# Patient Record
Sex: MALE | Race: WHITE | NOT HISPANIC OR LATINO | Employment: OTHER | URBAN - METROPOLITAN AREA
[De-identification: names, ages, dates, MRNs, and addresses within clinical notes are randomized per-mention and may not be internally consistent; named-entity substitution may affect disease eponyms.]

---

## 2017-01-11 ENCOUNTER — HOSPITAL ENCOUNTER (INPATIENT)
Facility: HOSPITAL | Age: 67
LOS: 3 days | Discharge: HOME HEALTH | End: 2017-01-14
Attending: FAMILY MEDICINE | Admitting: INTERNAL MEDICINE

## 2017-01-11 ENCOUNTER — APPOINTMENT (OUTPATIENT)
Dept: GENERAL RADIOLOGY | Facility: HOSPITAL | Age: 67
End: 2017-01-11

## 2017-01-11 DIAGNOSIS — R26.2 DIFFICULTY WALKING: ICD-10-CM

## 2017-01-11 DIAGNOSIS — S82.831A CLOSED FRACTURE OF DISTAL END OF FIBULA WITH TIBIA, RIGHT, INITIAL ENCOUNTER: Primary | ICD-10-CM
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
## 2017-01-11 DIAGNOSIS — S82.301A CLOSED FRACTURE OF DISTAL END OF FIBULA WITH TIBIA, RIGHT, INITIAL ENCOUNTER: Primary | ICD-10-CM

## 2017-01-11 PROBLEM — S82.839A CLOSED FRACTURE OF DISTAL END OF FIBULA WITH TIBIA: Status: ACTIVE | Noted: 2017-01-11

## 2017-01-11 PROBLEM — S82.309A CLOSED FRACTURE OF DISTAL END OF FIBULA WITH TIBIA: Status: ACTIVE | Noted: 2017-01-11

## 2017-01-11 LAB
ALBUMIN SERPL-MCNC: 4.2 G/DL (ref 3.5–5.2)
ALBUMIN/GLOB SERPL: 1.3 G/DL
ALP SERPL-CCNC: 56 U/L (ref 39–117)
ALT SERPL W P-5'-P-CCNC: 21 U/L (ref 1–41)
ANION GAP SERPL CALCULATED.3IONS-SCNC: 10.2 MMOL/L
APTT PPP: 23.8 SECONDS (ref 22.7–35.4)
AST SERPL-CCNC: 36 U/L (ref 1–40)
BASOPHILS # BLD AUTO: 0.02 10*3/MM3 (ref 0–0.2)
BASOPHILS NFR BLD AUTO: 0.1 % (ref 0–1.5)
BILIRUB SERPL-MCNC: 0.3 MG/DL (ref 0.1–1.2)
BUN BLD-MCNC: 14 MG/DL (ref 8–23)
BUN/CREAT SERPL: 14 (ref 7–25)
CALCIUM SPEC-SCNC: 9.7 MG/DL (ref 8.6–10.5)
CHLORIDE SERPL-SCNC: 104 MMOL/L (ref 98–107)
CO2 SERPL-SCNC: 27.8 MMOL/L (ref 22–29)
CREAT BLD-MCNC: 1 MG/DL (ref 0.76–1.27)
DEPRECATED RDW RBC AUTO: 43.2 FL (ref 37–54)
EOSINOPHIL # BLD AUTO: 0.35 10*3/MM3 (ref 0–0.7)
EOSINOPHIL NFR BLD AUTO: 2.2 % (ref 0.3–6.2)
ERYTHROCYTE [DISTWIDTH] IN BLOOD BY AUTOMATED COUNT: 13.2 % (ref 11.5–14.5)
GFR SERPL CREATININE-BSD FRML MDRD: 75 ML/MIN/1.73
GLOBULIN UR ELPH-MCNC: 3.3 GM/DL
GLUCOSE BLD-MCNC: 99 MG/DL (ref 65–99)
GLUCOSE BLDC GLUCOMTR-MCNC: 188 MG/DL (ref 70–130)
GLUCOSE BLDC GLUCOMTR-MCNC: 280 MG/DL (ref 70–130)
GLUCOSE BLDC GLUCOMTR-MCNC: 97 MG/DL (ref 70–130)
HCT VFR BLD AUTO: 41.1 % (ref 40.4–52.2)
HGB BLD-MCNC: 13.3 G/DL (ref 13.7–17.6)
IMM GRANULOCYTES # BLD: 0.09 10*3/MM3 (ref 0–0.03)
IMM GRANULOCYTES NFR BLD: 0.6 % (ref 0–0.5)
INR PPP: 1.03 (ref 0.9–1.1)
LYMPHOCYTES # BLD AUTO: 2.26 10*3/MM3 (ref 0.9–4.8)
LYMPHOCYTES NFR BLD AUTO: 14.2 % (ref 19.6–45.3)
MCH RBC QN AUTO: 29.2 PG (ref 27–32.7)
MCHC RBC AUTO-ENTMCNC: 32.4 G/DL (ref 32.6–36.4)
MCV RBC AUTO: 90.1 FL (ref 79.8–96.2)
MONOCYTES # BLD AUTO: 0.79 10*3/MM3 (ref 0.2–1.2)
MONOCYTES NFR BLD AUTO: 5 % (ref 5–12)
NEUTROPHILS # BLD AUTO: 12.37 10*3/MM3 (ref 1.9–8.1)
NEUTROPHILS NFR BLD AUTO: 77.9 % (ref 42.7–76)
PLATELET # BLD AUTO: 311 10*3/MM3 (ref 140–500)
PMV BLD AUTO: 10.4 FL (ref 6–12)
POTASSIUM BLD-SCNC: 4.2 MMOL/L (ref 3.5–5.2)
PROT SERPL-MCNC: 7.5 G/DL (ref 6–8.5)
PROTHROMBIN TIME: 13.1 SECONDS (ref 11.7–14.2)
RBC # BLD AUTO: 4.56 10*6/MM3 (ref 4.6–6)
SODIUM BLD-SCNC: 142 MMOL/L (ref 136–145)
WBC NRBC COR # BLD: 15.88 10*3/MM3 (ref 4.5–10.7)

## 2017-01-11 PROCEDURE — 85610 PROTHROMBIN TIME: CPT | Performed by: NURSE PRACTITIONER

## 2017-01-11 PROCEDURE — 99285 EMERGENCY DEPT VISIT HI MDM: CPT

## 2017-01-11 PROCEDURE — 85025 COMPLETE CBC W/AUTO DIFF WBC: CPT | Performed by: NURSE PRACTITIONER

## 2017-01-11 PROCEDURE — 25010000002 HYDROMORPHONE PER 4 MG: Performed by: FAMILY MEDICINE

## 2017-01-11 PROCEDURE — 73600 X-RAY EXAM OF ANKLE: CPT

## 2017-01-11 PROCEDURE — 63710000001 INSULIN ASPART PER 5 UNITS: Performed by: INTERNAL MEDICINE

## 2017-01-11 PROCEDURE — 25010000002 HYDROMORPHONE PER 4 MG: Performed by: INTERNAL MEDICINE

## 2017-01-11 PROCEDURE — 85730 THROMBOPLASTIN TIME PARTIAL: CPT | Performed by: NURSE PRACTITIONER

## 2017-01-11 PROCEDURE — 73590 X-RAY EXAM OF LOWER LEG: CPT

## 2017-01-11 PROCEDURE — 63710000001 INSULIN DETEMER PER 5 UNITS: Performed by: INTERNAL MEDICINE

## 2017-01-11 PROCEDURE — 25010000002 HYDROMORPHONE PER 4 MG: Performed by: NURSE PRACTITIONER

## 2017-01-11 PROCEDURE — 25010000002 PROPOFOL 10 MG/ML EMULSION: Performed by: NURSE PRACTITIONER

## 2017-01-11 PROCEDURE — 25010000002 ONDANSETRON PER 1 MG: Performed by: FAMILY MEDICINE

## 2017-01-11 PROCEDURE — 82962 GLUCOSE BLOOD TEST: CPT

## 2017-01-11 PROCEDURE — 93010 ELECTROCARDIOGRAM REPORT: CPT | Performed by: INTERNAL MEDICINE

## 2017-01-11 PROCEDURE — 80053 COMPREHEN METABOLIC PANEL: CPT | Performed by: NURSE PRACTITIONER

## 2017-01-11 PROCEDURE — 93005 ELECTROCARDIOGRAM TRACING: CPT | Performed by: INTERNAL MEDICINE

## 2017-01-11 RX ORDER — HYDROCODONE BITARTRATE AND ACETAMINOPHEN 7.5; 325 MG/1; MG/1
1 TABLET ORAL EVERY 4 HOURS PRN
Status: DISCONTINUED | OUTPATIENT
Start: 2017-01-11 | End: 2017-01-14

## 2017-01-11 RX ORDER — NICOTINE POLACRILEX 4 MG
15 LOZENGE BUCCAL AS NEEDED
Status: DISCONTINUED | OUTPATIENT
Start: 2017-01-11 | End: 2017-01-14 | Stop reason: HOSPADM

## 2017-01-11 RX ORDER — ASPIRIN 81 MG/1
81 TABLET, CHEWABLE ORAL DAILY
COMMUNITY

## 2017-01-11 RX ORDER — LISINOPRIL 20 MG/1
20 TABLET ORAL 2 TIMES DAILY
Status: DISCONTINUED | OUTPATIENT
Start: 2017-01-11 | End: 2017-01-14 | Stop reason: HOSPADM

## 2017-01-11 RX ORDER — SODIUM CHLORIDE 0.9 % (FLUSH) 0.9 %
10 SYRINGE (ML) INJECTION AS NEEDED
Status: DISCONTINUED | OUTPATIENT
Start: 2017-01-11 | End: 2017-01-14 | Stop reason: HOSPADM

## 2017-01-11 RX ORDER — SIMVASTATIN 10 MG
10 TABLET ORAL NIGHTLY
Status: DISCONTINUED | OUTPATIENT
Start: 2017-01-11 | End: 2017-01-14 | Stop reason: HOSPADM

## 2017-01-11 RX ORDER — LISINOPRIL 20 MG/1
20 TABLET ORAL 2 TIMES DAILY
COMMUNITY
End: 2021-11-09

## 2017-01-11 RX ORDER — INSULIN GLARGINE 100 [IU]/ML
63 INJECTION, SOLUTION SUBCUTANEOUS NIGHTLY
Status: ON HOLD | COMMUNITY
End: 2017-01-14

## 2017-01-11 RX ORDER — DEXTROSE, SODIUM CHLORIDE, AND POTASSIUM CHLORIDE 5; .45; .15 G/100ML; G/100ML; G/100ML
75 INJECTION INTRAVENOUS CONTINUOUS
Status: DISCONTINUED | OUTPATIENT
Start: 2017-01-12 | End: 2017-01-14 | Stop reason: HOSPADM

## 2017-01-11 RX ORDER — ONDANSETRON 2 MG/ML
4 INJECTION INTRAMUSCULAR; INTRAVENOUS ONCE
Status: COMPLETED | OUTPATIENT
Start: 2017-01-11 | End: 2017-01-11

## 2017-01-11 RX ORDER — SODIUM CHLORIDE 0.9 % (FLUSH) 0.9 %
1-10 SYRINGE (ML) INJECTION AS NEEDED
Status: DISCONTINUED | OUTPATIENT
Start: 2017-01-11 | End: 2017-01-14 | Stop reason: HOSPADM

## 2017-01-11 RX ORDER — ONDANSETRON 2 MG/ML
4 INJECTION INTRAMUSCULAR; INTRAVENOUS EVERY 4 HOURS PRN
Status: DISCONTINUED | OUTPATIENT
Start: 2017-01-11 | End: 2017-01-14 | Stop reason: HOSPADM

## 2017-01-11 RX ORDER — AMLODIPINE BESYLATE 2.5 MG/1
2.5 TABLET ORAL DAILY
COMMUNITY
End: 2021-12-01 | Stop reason: SDUPTHER

## 2017-01-11 RX ORDER — CLOPIDOGREL BISULFATE 75 MG/1
75 TABLET ORAL DAILY
COMMUNITY
End: 2021-11-09

## 2017-01-11 RX ORDER — HYDROCODONE BITARTRATE AND ACETAMINOPHEN 7.5; 325 MG/1; MG/1
2 TABLET ORAL EVERY 4 HOURS PRN
Status: DISCONTINUED | OUTPATIENT
Start: 2017-01-21 | End: 2017-01-14

## 2017-01-11 RX ORDER — DEXTROSE MONOHYDRATE 25 G/50ML
25 INJECTION, SOLUTION INTRAVENOUS AS NEEDED
Status: DISCONTINUED | OUTPATIENT
Start: 2017-01-11 | End: 2017-01-14 | Stop reason: HOSPADM

## 2017-01-11 RX ORDER — AMLODIPINE BESYLATE 2.5 MG/1
2.5 TABLET ORAL DAILY
Status: DISCONTINUED | OUTPATIENT
Start: 2017-01-11 | End: 2017-01-14 | Stop reason: HOSPADM

## 2017-01-11 RX ORDER — ACETAMINOPHEN 325 MG/1
650 TABLET ORAL EVERY 4 HOURS PRN
Status: DISCONTINUED | OUTPATIENT
Start: 2017-01-11 | End: 2017-01-14 | Stop reason: HOSPADM

## 2017-01-11 RX ORDER — PROPOFOL 10 MG/ML
20 VIAL (ML) INTRAVENOUS ONCE
Status: COMPLETED | OUTPATIENT
Start: 2017-01-11 | End: 2017-01-11

## 2017-01-11 RX ADMIN — INSULIN DETEMIR 40 UNITS: 100 INJECTION, SOLUTION SUBCUTANEOUS at 20:35

## 2017-01-11 RX ADMIN — PROPOFOL 20 MG: 10 INJECTION, EMULSION INTRAVENOUS at 15:17

## 2017-01-11 RX ADMIN — HYDROMORPHONE HYDROCHLORIDE 1 MG: 1 INJECTION, SOLUTION INTRAMUSCULAR; INTRAVENOUS; SUBCUTANEOUS at 10:37

## 2017-01-11 RX ADMIN — HYDROMORPHONE HYDROCHLORIDE 0.5 MG: 1 INJECTION, SOLUTION INTRAMUSCULAR; INTRAVENOUS; SUBCUTANEOUS at 20:22

## 2017-01-11 RX ADMIN — HYDROMORPHONE HYDROCHLORIDE 1 MG: 1 INJECTION, SOLUTION INTRAMUSCULAR; INTRAVENOUS; SUBCUTANEOUS at 12:47

## 2017-01-11 RX ADMIN — HYDROMORPHONE HYDROCHLORIDE 0.5 MG: 1 INJECTION, SOLUTION INTRAMUSCULAR; INTRAVENOUS; SUBCUTANEOUS at 23:31

## 2017-01-11 RX ADMIN — INSULIN ASPART 8 UNITS: 100 INJECTION, SOLUTION INTRAVENOUS; SUBCUTANEOUS at 18:28

## 2017-01-11 RX ADMIN — HYDROMORPHONE HYDROCHLORIDE 0.5 MG: 1 INJECTION, SOLUTION INTRAMUSCULAR; INTRAVENOUS; SUBCUTANEOUS at 18:17

## 2017-01-11 RX ADMIN — ONDANSETRON 4 MG: 2 INJECTION INTRAMUSCULAR; INTRAVENOUS at 10:37

## 2017-01-11 RX ADMIN — SIMVASTATIN 10 MG: 10 TABLET, FILM COATED ORAL at 23:35

## 2017-01-11 RX ADMIN — METFORMIN HYDROCHLORIDE 850 MG: 850 TABLET ORAL at 20:26

## 2017-01-11 RX ADMIN — LISINOPRIL 20 MG: 20 TABLET ORAL at 20:26

## 2017-01-11 NOTE — IP AVS SNAPSHOT
AFTER VISIT SUMMARY             Lucas Deluca           About your hospitalization     You were admitted on:  January 11, 2017 You last received care in the:  [address]       Procedures & Surgeries      Procedure(s) (LRB):  TIBIA INTRAMEDULLARY NAIL/SHANNON INSERTION (Right)     1/11/2017 - 1/12/2017     Surgeon(s):  Colton Nascimento MD  -------------------      Medications    If you or your caregiver advised us that you are currently taking a medication and that medication is marked below as “Resume”, this simply indicates that we have reviewed those medications to make sure our new therapy recommendations do not interfere.  If you have concerns about medications other than those new ones which we are prescribing today, please consult the physician who prescribed them (or your primary physician).  Our review of your home medications is not meant to indicate that we are directing their use.             Your Medications      START taking these medications     acetaminophen 325 MG tablet   Take 2 tablets by mouth Every 4 (Four) Hours As Needed for mild pain (1-3).   Commonly known as:  TYLENOL            MG capsule   Take 100 mg by mouth 2 (Two) Times a Day for 30 days.   Last time this was given:  1/13/2017  5:40 PM   Next Dose Due:  1/14 pm           HYDROcodone-acetaminophen 7.5-325 MG per tablet   Take 1 tablet by mouth Every 4 (Four) Hours As Needed for moderate pain (4-6) for up to 7 days.   Last time this was given:  1/14/2017  4:56 PM   Commonly known as:  NORCO   Next Dose Due:  1/14 8:56 pm if needed           polyethylene glycol packet   Take 17 g by mouth Daily for 30 days.   Last time this was given:  1/13/2017  5:40 PM   Commonly known as:  MIRALAX   Next Dose Due:  1/15             CHANGE how you take these medications     insulin aspart 100 UNIT/ML injection   Inject 11 Units under the skin 3 (Three) Times a Day Before Meals.   Last time this was given:  1/14/2017 12:35 PM     Commonly known as:  novoLOG   What changed:  how much to take   Next Dose Due:  Before next meal           insulin glargine 100 UNIT/ML injection   Inject 55 Units under the skin Every Night.   Commonly known as:  LANTUS   What changed:  how much to take   Next Dose Due:  1/14             CONTINUE taking these medications     amLODIPine 2.5 MG tablet   Take 2.5 mg by mouth Daily.   Last time this was given:  1/14/2017  8:54 AM   Commonly known as:  NORVASC   Next Dose Due:  1/15           aspirin 81 MG chewable tablet   Chew 81 mg Daily.   Last time this was given:  1/14/2017  8:54 AM   Next Dose Due:  1/15           clopidogrel 75 MG tablet   Take 75 mg by mouth Daily.   Commonly known as:  PLAVIX   Next Dose Due:  1/15           lisinopril 20 MG tablet   Take 20 mg by mouth 2 (Two) Times a Day.   Last time this was given:  1/14/2017  8:54 AM   Commonly known as:  PRINIVIL,ZESTRIL   Next Dose Due:  1/14 pm           metFORMIN 850 MG tablet   Take 850 mg by mouth 2 (Two) Times a Day With Meals.   Last time this was given:  1/14/2017  8:54 AM   Commonly known as:  GLUCOPHAGE   Next Dose Due:  1/14 with dinner           TRICOR PO   Take  by mouth.   Next Dose Due:  1/14           ZOCOR PO   Take  by mouth.   Last time this was given:  1/13/2017  8:55 PM   Next Dose Due:  1/14                Where to Get Your Medications      These medications were sent [facility] [address] - [phone]  - [phone]  [address] [place] [address]     Phone:  [phone]      MG capsule    polyethylene glycol packet         You can get these medications from any pharmacy     Bring a paper prescription for each of these medications     HYDROcodone-acetaminophen 7.5-325 MG per tablet         Information about where to get these medications is not yet available     ! Ask your nurse or doctor about these medications     acetaminophen 325 MG tablet    insulin aspart 100 UNIT/ML  injection    insulin glargine 100 UNIT/ML injection                  Your Medications      Your Medication List           Morning Noon Evening Bedtime As Needed    acetaminophen 325 MG tablet   Take 2 tablets by mouth Every 4 (Four) Hours As Needed for mild pain (1-3).   Commonly known as:  TYLENOL                                   amLODIPine 2.5 MG tablet   Take 2.5 mg by mouth Daily.   Commonly known as:  NORVASC                                   aspirin 81 MG chewable tablet   Chew 81 mg Daily.                                   clopidogrel 75 MG tablet   Take 75 mg by mouth Daily.   Commonly known as:  PLAVIX                                    MG capsule   Take 100 mg by mouth 2 (Two) Times a Day for 30 days.                                      HYDROcodone-acetaminophen 7.5-325 MG per tablet   Take 1 tablet by mouth Every 4 (Four) Hours As Needed for moderate pain (4-6) for up to 7 days.   Commonly known as:  NORCO                                   insulin aspart 100 UNIT/ML injection   Inject 11 Units under the skin 3 (Three) Times a Day Before Meals.   Commonly known as:  novoLOG                                         insulin glargine 100 UNIT/ML injection   Inject 55 Units under the skin Every Night.   Commonly known as:  LANTUS                                   lisinopril 20 MG tablet   Take 20 mg by mouth 2 (Two) Times a Day.   Commonly known as:  PRINIVIL,ZESTRIL                                      metFORMIN 850 MG tablet   Take 850 mg by mouth 2 (Two) Times a Day With Meals.   Commonly known as:  GLUCOPHAGE                                      polyethylene glycol packet   Take 17 g by mouth Daily for 30 days.   Commonly known as:  MIRALAX                                   TRICOR PO   Take  by mouth.                                   ZOCOR PO   Take  by mouth.                                            Instructions for After Discharge        Activity Instructions     Do not apply any weight to  right leg until otherwise instructed by Dr. Nascimento.  Keep dressing clean and dry until your follow up visit. You may take a sponge bath to avoid getting your bandage wet.  If you notice any excessive discharge, foul smell, or pus like substance from bandage, call Dr. Nascimento's office.  If you run a persistent fever (100.5 or greater) notify the MD.             Diet Instructions     You may continue to follow a diabetic diet.           Discharge References/Attachments     TIBIAL FRACTURE, ANKLE, ADULT, DISPLACED, ORIF, CARE AFTER (ENGLISH)    WALKER USE (ENGLISH)    CRYOTHERAPY, EASY-TO-READ (ENGLISH)    ACETAMINOPHEN; HYDROCODONE TABLETS OR CAPSULES (ENGLISH)    POLYETHYLENE GLYCOL POWDER (ENGLISH)    DOCUSATE CAPSULES (ENGLISH)       Follow-ups for After Discharge        Follow-up Information     Follow up with Colton Nascimento MD. Schedule an appointment as soon as possible for a visit in 10 day(s).    Specialty:  Orthopedic Surgery    Contact information:    [facility] [address]  [place] [address]  [phone]        [employer] Signup     [facility] [employer] allows you to send messages to your doctor, view your test results, renew your prescriptions, schedule appointments, and more. To sign up, go to Who@ and click on the Sign Up Now link in the New User? box. Enter your [employer] Activation Code exactly as it appears below along with the last four digits of your Social Security Number and your Date of Birth () to complete the sign-up process. If you do not sign up before the expiration date, you must request a new code.    [employer] Activation Code: [email or web address]  Expires: 2017  5:41 PM    If you have questions, you can email [email or web address] or call [phone] to talk to our [employer] staff. Remember, [employer] is NOT to be used for urgent needs. For medical emergencies, dial 911.           Summary of Your Hospitalization        Reason for Hospitalization     Your  primary diagnosis was:  Not on File    Your diagnoses also included:  Broken Leg, Type 2 Diabetes, Coronary Heart Disease      Care Providers     Provider Service Role Specialty    Aquiles Toribio MD Internal Medicine Attending Provider Internal Medicine    Colton Nascimento MD Surgery Orthopedic Consulting Physician  Orthopedic Surgery    Colton Nascimento MD Surgery Orthopedic Surgeon  Orthopedic Surgery      Your Allergies  Date Reviewed: 1/12/2017    No active allergies      Patient Belongings Returned     Document Return of Belongings Flowsheet     Were the patient bedside belongings sent home?   Yes   Belongings Retrieved from Security & Sent Home   N/A    Belongings Sent to Safe   --   Medications Retrieved from Pharmacy & Sent Home   N/A              More Information      Medial or Posterior Malleolus Fracture Treated With ORIF, Care After  Refer to this sheet in the next few weeks. These instructions provide you with information about caring for yourself after your procedure. Your health care provider may also give you more specific instructions. Your treatment has been planned according to current medical practices, but problems sometimes occur. Call your health care provider if you have any problems or questions after your procedure.  WHAT TO EXPECT AFTER THE PROCEDURE  After your procedure, it is common to have:  · Pain.  · Swelling.  HOME CARE INSTRUCTIONS  If You Have a Cast:  · Do not stick anything inside the cast to scratch your skin. Doing that increases your risk of infection.  · Check the skin around the cast every day. Report any concerns to your health care provider. You may put lotion on dry skin around the edges of the cast. Do not apply lotion to the skin underneath the cast.  Bathing  · Cover the cast with a watertight plastic bag to protect it from water while you take a bath or a shower. Do not let the cast get wet unless you have a waterproof cast.  · Keep the bandage (dressing) dry until  your health care provider says it can be removed. Take sponge baths only. Ask your health care provider when you can start showering or taking a bath.  Incision Care  · There are many different ways to close and cover an incision, including stitches, skin glue, and adhesive strips. Follow instructions from your health care provider about:    Incision care.    Bandage (dressing) changes and removal.    Incision closure removal.  · Check your incision area every day for signs of infection. Watch for:    Redness, swelling, or pain.    Fluid, blood, or pus.  Managing Pain, Stiffness, and Swelling   · If directed, apply ice to the injured area.    Put ice in a plastic bag.    Place a towel between your skin and the bag.    Leave the ice on for 20 minutes, 2-3 times per day.  · Move your toes often to avoid stiffness and to lessen swelling.  · Raise the injured area above the level of your heart while you are sitting or lying down.  Driving  · Do not drive or operate heavy machinery while taking pain medicine.  · Do not drive while wearing a cast on a hand or foot that you use for driving.  Activity  · Return to your normal activities as directed by your health care provider. Ask your health care provider what activities are safe for you.  · Perform range-of-motion exercises only as directed by your health care provider.  Safety  · Do not use the injured limb to support your body weight until your health care provider says that you can. Use crutches as directed by your health care provider.  General Instructions  · Do not put pressure on any part of the cast until it is fully hardened. This may take several hours.  · Keep your cast clean and dry.  · Do not use any tobacco products, including cigarettes, chewing tobacco, or electronic cigarettes. Tobacco can delay bone healing. If you need help quitting, ask your health care provider.  · Take medicines only as directed by your health care provider.  · Keep all follow-up  visits as directed by your health care provider. This is important.  SEEK MEDICAL CARE IF:  · You have a fever.  · Your pain medicine is not helping.  · You have redness, swelling, or pain at the site of your incision.  · You have fluid, blood, or pus coming from your incision or seeping through your cast.  · You notice a bad smell coming from the incision or dressings.  SEEK IMMEDIATE MEDICAL CARE IF:  · You have chest pain or difficulty breathing.  · You have numbness or tingling in your foot or leg.  · Your foot becomes cold, pale, or blue.     This information is not intended to replace advice given to you by your health care provider. Make sure you discuss any questions you have with your health care provider.     Document Released: 09/27/2006 Document Revised: 05/03/2016 Document Reviewed: 11/11/2015  [employer] Interactive Patient Education ©2016 [employer]          Walker Use  HOW TO TELL IF A WALKER IS THE RIGHT SIZE  · With your arms hanging at your sides, the walker handles should be at wrist level. If you cannot find the exact fit, choose the height that is most comfortable.  · If you have been instructed to not place weight on one of your legs, you may feel more comfortable with a shorter height. If you are using the walker for balance, you may prefer a taller height.  · Adjust the height by using the push buttons on the legs of your walker.  · In rest position, the back leg of the walkers should be no further ahead than your toes. With your hands resting on the , your elbows should be slightly bent at about a 30 degree angle.  · Ask your physical therapist or caregiver if you have any concerns.  HOW TO USE A STANDARD WALKER (NO WHEELS)  · Pick your walker up (do not slide your walker) and place it one step length in front of you. The back legs of the walker should be no further ahead than your toes. You should not feel like you need to lean forward to keep your hands on the . As you set the  "walker down, make sure all 4 leg tips contact the ground at the same time.  · Hold onto the walker for support and step forward with your weaker leg into the middle of the walker. Follow the weight bearing instructions your caregiver has given you.  · Push down with your hands and step forward with your stronger leg.  · Be careful not to let the walker get too far ahead of you as you walk.  · Repeat the process for each step.  HOW TO USE A FRONT-WHEELED WALKER  · Slide your walker forward. The back legs of the walker should be no further ahead than your toes. You should not feel like you need to lean forward to keep your hands on the .  · Hold onto the walker for support and step forward with your weaker leg into the middle of the walker. Follow the weight bearing instructions your caregiver has given you.  · Push down with your hands and step forward with your stronger leg.  · Be careful not to let the walker get too far ahead of you as you walk.  · Repeat the process for each step.  · If your walker does not glide well over carpet, consider cutting an \"x\" in 2 old tennis balls and placing them over the back legs of your walker.  STANDING UP FROM A CHAIR WITH ARMRESTS  · It is best to sit in a firm chair with armrests.  · Position your walker directly in front of your chair. Do not pull on the walker when standing up. It is too unstable to support weight when pulled on.  · Slide forward in the chair, with your weaker leg ahead and stronger leg bent near the chair.  · Lean forward and push up from your chair with both hands on the armrests. Straighten your stronger leg, rising to standing. Do not pull yourself up from the walker. This may cause it to tip.  · When you feel steady on your feet, carefully move one hand at a time to the walker.  · Stand for a few seconds to stabilize your balance before you start to walk.  STANDING UP FROM A CHAIR WITHOUT ARMRESTS  · It is best to sit in a firm chair. A low seat or " an overstuffed chair or sofa is hard to get out of.  · Place the walker in front of you. Do not pull on the walker when coming to a standing position.  · Slide forward in the chair, with your weaker leg ahead and stronger leg bent near the chair.  · Push down on the chair seat with the hand opposite your weaker leg. Keep your other hand on the center of the walker's crossbar.  · Stand, steady your balance, and place your hands on the walker handgrips.  SITTING DOWN  · Always back up toward your chair, using your walker, until you feel the back of your legs touch the chair.  · If the chair has armrests, carefully reach back to put your hands on the armrests, and slowly lower your weight.  · If the chair does not have armrests, consider backing up to the side of the chair. You can then hold onto the back of the chair and the front of the seat to slowly lower yourself.  · You should never feel like you are falling into your chair.  USING A WALKER ON STEPS  ·  Before attempting to use your walker on steps, practice with your physical therapist.  · If you are going up a step wide enough to accommodate the entire walker and yourself:    First, place the walker up on the step.    Second, get your feet as close to the step as you can.    Third, press down on the walker with your hands as you step up with your stronger leg. Then step up with your weaker leg.  · If you are going down a step wide enough to accommodate the entire walker and yourself:    First, place the walker down on the step.    Second, hold onto the walker as you step down with your weaker leg. Then step down with your stronger leg.  · If you are going up more than 1 step and have a railing:    First, turn the walker sideways, so the opening is facing in toward you.    Second, place the front 2 legs of the walker on the first step. These front legs should be positioned at the base of the next step.    Third, test the steadiness of the walker. It should feel  sturdy when you press down on the handgrip that is facing the top of the steps.    Finally, placing your weight on the railing and the walker, step up with your stronger leg first. Then step up with your weaker leg.  · If you are going down more than 1 step and have a railing:    First, turn the walker sideways, so the opening is facing in toward you.    Second, place the front 2 legs of the walker down on the first step. When possible, the back legs of the walker should be positioned at the base of the previous step.    Third, test the steadiness of the walker. It should feel sturdy when you press down on the handgrip that is facing the top of the steps.    Finally, placing your weight on the railing and the walker, step down with your weaker leg first. Then step down with your stronger leg.  · Be sure to check the sturdiness of the walker before each step.  · Make sure you have good rubber tips on the legs of your walker to prevent it from slipping.     This information is not intended to replace advice given to you by your health care provider. Make sure you discuss any questions you have with your health care provider.     Document Released: 12/18/2006 Document Revised: 03/11/2013 Document Reviewed: 07/01/2016  [employer] Interactive Patient Education ©2016 [employer]          Cryotherapy  Cryotherapy is when you put ice on your injury. Ice helps lessen pain and puffiness (swelling) after an injury. Ice works the best when you start using it in the first 24 to 48 hours after an injury.  HOME CARE  · Put a dry or damp towel between the ice pack and your skin.  · You may press gently on the ice pack.  · Leave the ice on for no more than 10 to 20 minutes at a time.  · Check your skin after 5 minutes to make sure your skin is okay.  · Rest at least 20 minutes between ice pack uses.  · Stop using ice when your skin loses feeling (numbness).  · Do not use ice on someone who cannot tell you when it hurts. This  includes small children and people with memory problems (dementia).  GET HELP RIGHT AWAY IF:  · You have white spots on your skin.  · Your skin turns blue or pale.  · Your skin feels waxy or hard.  · Your puffiness gets worse.  MAKE SURE YOU:   · Understand these instructions.  · Will watch your condition.  · Will get help right away if you are not doing well or get worse.     This information is not intended to replace advice given to you by your health care provider. Make sure you discuss any questions you have with your health care provider.     Document Released: 06/05/2009 Document Revised: 03/11/2013 Document Reviewed: 08/09/2012  [employer] Interactive Patient Education ©2016 [employer]          Acetaminophen; Hydrocodone tablets or capsules  What is this medicine?  ACETAMINOPHEN; HYDROCODONE (a set a IGNACIO garry fen; mine droe KOE done) is a pain reliever. It is used to treat moderate to severe pain.  This medicine may be used for other purposes; ask your health care provider or pharmacist if you have questions.  What should I tell my health care provider before I take this medicine?  They need to know if you have any of these conditions:  -brain tumor  -Crohn's disease, inflammatory bowel disease, or ulcerative colitis  -drug abuse or addiction  -head injury  -heart or circulation problems  -if you often drink alcohol  -kidney disease or problems going to the bathroom  -liver disease  -lung disease, asthma, or breathing problems  -an unusual or allergic reaction to acetaminophen, hydrocodone, other opioid analgesics, other medicines, foods, dyes, or preservatives  -pregnant or trying to get pregnant  -breast-feeding  How should I use this medicine?  Take this medicine by mouth. Swallow it with a full glass of water. Follow the directions on the prescription label. If the medicine upsets your stomach, take the medicine with food or milk. Do not take more than you are told to take.  Talk to your pediatrician  regarding the use of this medicine in children. This medicine is not approved for use in children.  Patients over 65 years may have a stronger reaction and need a smaller dose.  Overdosage: If you think you have taken too much of this medicine contact a poison control center or emergency room at once.  NOTE: This medicine is only for you. Do not share this medicine with others.  What if I miss a dose?  If you miss a dose, take it as soon as you can. If it is almost time for your next dose, take only that dose. Do not take double or extra doses.  What may interact with this medicine?  -alcohol  -antihistamines  -isoniazid  -medicines for depression, anxiety, or psychotic disturbances  -medicines for sleep  -muscle relaxants  -naltrexone  -narcotic medicines (opiates) for pain  -phenobarbital  -ritonavir  -tramadol  This list may not describe all possible interactions. Give your health care provider a list of all the medicines, herbs, non-prescription drugs, or dietary supplements you use. Also tell them if you smoke, drink alcohol, or use illegal drugs. Some items may interact with your medicine.  What should I watch for while using this medicine?  Tell your doctor or health care professional if your pain does not go away, if it gets worse, or if you have new or a different type of pain. You may develop tolerance to the medicine. Tolerance means that you will need a higher dose of the medicine for pain relief. Tolerance is normal and is expected if you take the medicine for a long time.  Do not suddenly stop taking your medicine because you may develop a severe reaction. Your body becomes used to the medicine. This does NOT mean you are addicted. Addiction is a behavior related to getting and using a drug for a non-medical reason. If you have pain, you have a medical reason to take pain medicine. Your doctor will tell you how much medicine to take. If your doctor wants you to stop the medicine, the dose will be  slowly lowered over time to avoid any side effects.  You may get drowsy or dizzy when you first start taking the medicine or change doses. Do not drive, use machinery, or do anything that may be dangerous until you know how the medicine affects you. Stand or sit up slowly.  There are different types of narcotic medicines (opiates) for pain. If you take more than one type at the same time, you may have more side effects. Give your health care provider a list of all medicines you use. Your doctor will tell you how much medicine to take. Do not take more medicine than directed. Call emergency for help if you have problems breathing.  The medicine will cause constipation. Try to have a bowel movement at least every 2 to 3 days. If you do not have a bowel movement for 3 days, call your doctor or health care professional.  Too much acetaminophen can be very dangerous. Do not take Tylenol (acetaminophen) or medicines that contain acetaminophen with this medicine. Many non-prescription medicines contain acetaminophen. Always read the labels carefully.  What side effects may I notice from receiving this medicine?  Side effects that you should report to your doctor or health care professional as soon as possible:  -allergic reactions like skin rash, itching or hives, swelling of the face, lips, or tongue  -breathing problems  -confusion  -feeling faint or lightheaded, falls  -stomach pain  -yellowing of the eyes or skin  Side effects that usually do not require medical attention (report to your doctor or health care professional if they continue or are bothersome):  -nausea, vomiting  -stomach upset  This list may not describe all possible side effects. Call your doctor for medical advice about side effects. You may report side effects to FDA at 1-211-FDA-6097.  Where should I keep my medicine?  Keep out of the reach of children. This medicine can be abused. Keep your medicine in a safe place to protect it from theft. Do not  share this medicine with anyone. Selling or giving away this medicine is dangerous and against the law.  This medicine may cause accidental overdose and death if it taken by other adults, children, or pets. Mix any unused medicine with a substance like cat litter or coffee grounds. Then throw the medicine away in a sealed container like a sealed bag or a coffee can with a lid. Do not use the medicine after the expiration date.  Store at room temperature between 15 and 30 degrees C (59 and 86 degrees F).  NOTE: This sheet is a summary. It may not cover all possible information. If you have questions about this medicine, talk to your doctor, pharmacist, or health care provider.     © 2016, [employer]. (2015-11-18 15:29:20)          Polyethylene Glycol powder  What is this medicine?  POLYETHYLENE GLYCOL 3350 (cristi ee ETH i jarrod; GLYE col) powder is a laxative used to treat constipation. It increases the amount of water in the stool. Bowel movements become easier and more frequent.  This medicine may be used for other purposes; ask your health care provider or pharmacist if you have questions.  What should I tell my health care provider before I take this medicine?  They need to know if you have any of these conditions:  -a history of blockage of the stomach or intestine  -current abdomen distension or pain  -difficulty swallowing  -diverticulitis, ulcerative colitis, or other chronic bowel disease  -phenylketonuria  -an unusual or allergic reaction to polyethylene glycol, other medicines, dyes, or preservatives  -pregnant or trying to get pregnant  -breast-feeding  How should I use this medicine?  Take this medicine by mouth. The bottle has a measuring cap that is marked with a line. Pour the powder into the cap up to the marked line (the dose is about 1 heaping tablespoon). Add the powder in the cap to a full glass (4 to 8 ounces or 120 to 240 ml) of water, juice, soda, coffee or tea. Mix the powder well.  Drink the solution. Take exactly as directed. Do not take your medicine more often than directed.  Talk to your pediatrician regarding the use of this medicine in children. Special care may be needed.  Overdosage: If you think you have taken too much of this medicine contact a poison control center or emergency room at once.  NOTE: This medicine is only for you. Do not share this medicine with others.  What if I miss a dose?  If you miss a dose, take it as soon as you can. If it is almost time for your next dose, take only that dose. Do not take double or extra doses.  What may interact with this medicine?  Interactions are not expected.  This list may not describe all possible interactions. Give your health care provider a list of all the medicines, herbs, non-prescription drugs, or dietary supplements you use. Also tell them if you smoke, drink alcohol, or use illegal drugs. Some items may interact with your medicine.  What should I watch for while using this medicine?  Do not use for more than 2 weeks without advice from your doctor or health care professional. It can take 2 to 4 days to have a bowel movement and to experience improvement in constipation. See your health care professional for any changes in bowel habits, including constipation, that are severe or last longer than three weeks.  Always take this medicine with plenty of water.  What side effects may I notice from receiving this medicine?  Side effects that you should report to your doctor or health care professional as soon as possible:  -diarrhea  -difficulty breathing  -itching of the skin, hives, or skin rash  -severe bloating, pain, or distension of the stomach  -vomiting  Side effects that usually do not require medical attention (report to your doctor or health care professional if they continue or are bothersome):  -bloating or gas  -lower abdominal discomfort or cramps  -nausea  This list may not describe all possible side effects. Call your  doctor for medical advice about side effects. You may report side effects to FDA at 5-812-DLE-6956.  Where should I keep my medicine?  Keep out of the reach of children.  Store between 15 and 30 degrees C (59 and 86 degrees F). Throw away any unused medicine after the expiration date.  NOTE: This sheet is a summary. It may not cover all possible information. If you have questions about this medicine, talk to your doctor, pharmacist, or health care provider.     © 2016, [employer]. (2009-07-20 16:50:45)          Docusate capsules  What is this medicine?  DOCUSATE (doc CUE sayt) is stool softener. It helps prevent constipation and straining or discomfort associated with hard or dry stools.  This medicine may be used for other purposes; ask your health care provider or pharmacist if you have questions.  What should I tell my health care provider before I take this medicine?  They need to know if you have any of these conditions:  -nausea or vomiting  -severe constipation  -stomach pain  -sudden change in bowel habit lasting more than 2 weeks  -an unusual or allergic reaction to docusate, other medicines, foods, dyes, or preservatives  -pregnant or trying to get pregnant  -breast-feeding  How should I use this medicine?  Take this medicine by mouth with a glass of water. Follow the directions on the label. Take your doses at regular intervals. Do not take your medicine more often than directed.  Talk to your pediatrician regarding the use of this medicine in children. While this medicine may be prescribed for children as young as 2 years for selected conditions, precautions do apply.  Overdosage: If you think you have taken too much of this medicine contact a poison control center or emergency room at once.  NOTE: This medicine is only for you. Do not share this medicine with others.  What if I miss a dose?  If you miss a dose, take it as soon as you can. If it is almost time for your next dose, take only  that dose. Do not take double or extra doses.  What may interact with this medicine?  -mineral oil  This list may not describe all possible interactions. Give your health care provider a list of all the medicines, herbs, non-prescription drugs, or dietary supplements you use. Also tell them if you smoke, drink alcohol, or use illegal drugs. Some items may interact with your medicine.  What should I watch for while using this medicine?  Do not use for more than one week without advice from your doctor or health care professional. If your constipation returns, check with your doctor or health care professional.  Drink plenty of water while taking this medicine. Drinking water helps decrease constipation.  Stop using this medicine and contact your doctor or health care professional if you experience any rectal bleeding or do not have a bowel movement after use. These could be signs of a more serious condition.  What side effects may I notice from receiving this medicine?  Side effects that you should report to your doctor or health care professional as soon as possible:  -allergic reactions like skin rash, itching or hives, swelling of the face, lips, or tongue  Side effects that usually do not require medical attention (report to your doctor or health care professional if they continue or are bothersome):  -diarrhea  -stomach cramps  -throat irritation  This list may not describe all possible side effects. Call your doctor for medical advice about side effects. You may report side effects to FDA at 1-169-FDA-1922.  Where should I keep my medicine?  Keep out of the reach of children.  Store at room temperature between 15 and 30 degrees C (59 and 86 degrees F). Throw away any unused medicine after the expiration date.  NOTE: This sheet is a summary. It may not cover all possible information. If you have questions about this medicine, talk to your doctor, pharmacist, or health care provider.     © 2016, [employer]. (2009-04-09 15:56:49)         PREVENTING SURGICAL SITE INFECTIONS     Surgical Site Infections FAQs  What is a Surgical Site Infection (SSI)?  A surgical site infection is an infection that occurs after surgery in the part of the body where the surgery took place. Most patients who have surgery do not develop an infection. However, infections develop in about 1 to 3 out of every 100 patients who have surgery.  Some of the common symptoms of a surgical site infection are:  · Redness and pain around the area where you had surgery  · Drainage of cloudy fluid from your surgical wound  · Fever  Can SSIs be treated?  Yes. Most surgical site infections can be treated with antibiotics. The antibiotic given to you depends on the bacteria (germs) causing the infection. Sometimes patients with SSIs also need another surgery to treat the infection.  What are some of the things that hospitals are doing to prevent SSIs?  To prevent SSIs, doctors, nurses, and other healthcare providers:  · Clean their hands and arms up to their elbows with an antiseptic agent just before the surgery.  · Clean their hands with soap and water or an alcohol-based hand rub before and after caring for each patient.  · May remove some of your hair immediately before your surgery using electric clippers if the hair is in the same area where the procedure will occur. They should not shave you with a razor.  · Wear special hair covers, masks, gowns, and gloves during surgery to keep the surgery area clean.  · Give you antibiotics before your surgery starts. In most cases, you should get antibiotics within 60 minutes before the surgery starts and the antibiotics should be stopped within 24 hours after surgery.  · Clean the skin at the site of your surgery with a special soap that kills germs.  What can I do to help prevent SSIs?  Before your surgery:  · Tell your doctor about other medical problems you may have. Health problems such as allergies,  diabetes, and obesity could affect your surgery and your treatment.  · Quit smoking. Patients who smoke get more infections. Talk to your doctor about how you can quit before your surgery.  · Do not shave near where you will have surgery. Shaving with a razor can irritate your skin and make it easier to develop an infection.  At the time of your surgery:  · Speak up if someone tries to shave you with a razor before surgery. Ask why you need to be shaved and talk with your surgeon if you have any concerns.  · Ask if you will get antibiotics before surgery.  After your surgery:  · Make sure that your healthcare providers clean their hands before examining you, either with soap and water or an alcohol-based hand rub.    If you do not see your providers clean their hands, please ask them to do so.  · Family and friends who visit you should not touch the surgical wound or dressings.  · Family and friends should clean their hands with soap and water or an alcohol-based hand rub before and after visiting you. If you do not see them clean their hands, ask them to clean their hands.  What do I need to do when I go home from the hospital?  · Before you go home, your doctor or nurse should explain everything you need to know about taking care of your wound. Make sure you understand how to care for your wound before you leave the hospital.  · Always clean your hands before and after caring for your wound.  · Before you go home, make sure you know who to contact if you have questions or problems after you get home.  · If you have any symptoms of an infection, such as redness and pain at the surgery site, drainage, or fever, call your doctor immediately.  If you have additional questions, please ask your doctor or nurse.  Developed and co-sponsored by The Society for Healthcare Epidemiology of [place] (SHEA); Infectious Diseases Society of [place] (IDSA); American Hospital Association; Association for Professionals in Infection  Control and Epidemiology (APIC); Centers for Disease Control and Prevention (CDC); and The Joint Commission.     This information is not intended to replace advice given to you by your health care provider. Make sure you discuss any questions you have with your health care provider.     Document Released: 12/23/2014 Document Revised: 01/08/2016 Document Reviewed: 03/02/2016  [employer] Interactive Patient Education ©2016 [employer]             SYMPTOMS OF A STROKE    Call 911 or have someone take you to the Emergency Department if you have any of the following:    · Sudden numbness or weakness of your face, arm or leg especially on one side of the body  · Sudden confusion, diffiiculty speaking or trouble understanding   · Changes in your vision or loss of sight in one eye  · Sudden severe headache with no known cause  · sudden dizziness, trouble walking, loss of balance or coordination    It is important to seek emergency care right away if you have further stroke symptoms. If you get emergency help quickly, the powerful clot-dissolving medicines can reduce the disabilities caused by a stroke.     For more information:    American Stroke Association  1-559-0-STROKE  www.strokeassociation.org           IF YOU SMOKE OR USE TOBACCO PLEASE READ THE FOLLOWING:    Why is smoking bad for me?  Smoking increases the risk of heart disease, lung disease, vascular disease, stroke, and cancer.     If you smoke, STOP!    If you would like more information on quitting smoking, please visit the [employer] website: [email or web address]   This link will provide additional resources including the QUIT line and the [employer] the [employer] support groups.     For more information:    American Cancer Society  ([phone]    American Heart Association  1-247.839.1167               YOU ARE THE MOST IMPORTANT FACTOR IN YOUR RECOVERY.     Follow all instructions carefully.     I have reviewed my  discharge instructions with my nurse, including the following information, if applicable:     Information about my illness and diagnosis   Follow up appointments (including lab draws)   Wound Care   Equipment Needs   Medications (new and continuing) along with side effects   Preventative information such as vaccines and smoking cessations   Diet   Pain   I know when to contact my Doctor's office or seek emergency care      I want my nurse to describe the side effects of my medications: YES NO   If the answer is no, I understand the side effects of my medications: YES NO   My nurse described the side effects of my medications in a way that I could understand: YES NO   I have taken my personal belongings and my own medications with me at discharge: YES NO            I have received this information and my questions have been answered. I have discussed any concerns I see with this plan with the nurse or physician. I understand these instructions.    Signature of Patient or Responsible Person: _____________________________________    Date: _________________  Time: __________________    Signature of Healthcare Provider: _______________________________________  Date: _________________  Time: __________________

## 2017-01-11 NOTE — H&P
Dictated    1. Right ankle fracture- acceptable risk for OR tomorrow    2. DM2 with cardiovascular complications. Sugars low now. Hasn't eaten all day.  3. CAD with CABG and stent, 2011. Stable tho no EKG done in ER  4. Lipids  5. HTN    [id number]

## 2017-01-11 NOTE — ED PROVIDER NOTES
"  EMERGENCY DEPARTMENT ENCOUNTER    CHIEF COMPLAINT  Chief Complaint: lower extremity injury  History given by: patient, EMS  History limited by: none  Room Number: 05/05  PMD: No Known Provider  Cardiologist- Dr Dow    HPI:  Pt is a 66 y.o. male who presents complaining of right ankle injury s/p fall which occurred this morning. While standing on a ladder at a loading dock, the ladder slipped and pt lost his balance. He subsequently fell 4ft and injured right ankle. He denies right knee pain, numbness, blow to head, loss of consciousness, neck pain, back pain, abd pain, chest pain, trouble breathing, nausea, vomiting, and sustaining any other injury. He has not been able to ambulate since the event. He is on plavix. Past Medical History of HTN , CAD, and diabetes.     Duration: occurred today prior to ED arrival  Timing: constant  Location: right ankle  Radiation: none  Quality: \"pain\"  Intensity/Severity: moderate  Progression: unchanged   Associated Symptoms: unable to bear weight, right ankle swelling  Aggravating Factors: movement   Alleviating Factors: none  Previous Episodes: none  Treatment before arrival: splinting of right ankle per EMS    PAST MEDICAL HISTORY  Active Ambulatory Problems     Diagnosis Date Noted   • No Active Ambulatory Problems     Resolved Ambulatory Problems     Diagnosis Date Noted   • No Resolved Ambulatory Problems     Past Medical History   Diagnosis Date   • Diabetes mellitus    • Hyperlipidemia    • Hypertension    • Macular degeneration        PAST SURGICAL HISTORY  Past Surgical History   Procedure Laterality Date   • Coronary artery bypass graft       2004   • Coronary angioplasty with stent placement     • Tonsillectomy         FAMILY HISTORY  History reviewed. No pertinent family history.    SOCIAL HISTORY  Social History     Social History   • Marital status:      Spouse name: N/A   • Number of children: N/A   • Years of education: N/A     Occupational History "   • Not on file.     Social History Main Topics   • Smoking status: Former Smoker   • Smokeless tobacco: Not on file   • Alcohol use No   • Drug use: No   • Sexual activity: Defer     Other Topics Concern   • Not on file     Social History Narrative   • No narrative on file         ALLERGIES  Review of patient's allergies indicates no known allergies.    REVIEW OF SYSTEMS  Review of Systems   Constitutional: Negative for chills and fever.   HENT: Negative for sore throat.    Respiratory: Negative for shortness of breath.    Cardiovascular: Negative for chest pain.   Gastrointestinal: Negative for nausea and vomiting.   Genitourinary: Negative for dysuria.   Musculoskeletal: Positive for gait problem. Negative for back pain.        Right ankle pain and swelling   Skin: Positive for wound (superficial abrasions to RLE). Negative for rash.   Neurological: Negative for dizziness.   Psychiatric/Behavioral: The patient is not nervous/anxious.        PHYSICAL EXAM  ED Triage Vitals   Temp Heart Rate Resp BP SpO2   01/11/17 1007 01/11/17 1007 01/11/17 [facility] 01/11/17 [facility] 01/11/17 [facility]   97.7 °F (36.5 °C) 67 18 183/67 99 % WNL      Temp src Heart Rate Source Patient Position BP Location FiO2 (%)   01/11/17 1007 01/11/17 [facility] -- -- --   Tympanic Monitor          Physical Exam   Constitutional: He is oriented to person, place, and time and well-developed, well-nourished, and in no distress. No distress.   HENT:   Head: Normocephalic and atraumatic.   Mouth/Throat: Mucous membranes are normal.   Eyes: No scleral icterus.   Neck: Normal range of motion.   No c-spine tenderness   Cardiovascular: Normal rate, regular rhythm, normal heart sounds and intact distal pulses.    Pulses:       Dorsalis pedis pulses are 2+ on the right side, and 2+ on the left side.        Posterior tibial pulses are 2+ on the right side, and 2+ on the left side.   Pulmonary/Chest: Effort normal and breath sounds normal. He exhibits no tenderness.    Musculoskeletal: He exhibits deformity (obvious to the right ankle).        Right knee: No tenderness found.        Right ankle: He exhibits decreased range of motion, swelling and deformity. Tenderness. Medial malleolus tenderness found.   No t-spine or l-spine tenderness, all other extremities are atraumatic, NV intact distally to all extremities   Neurological: He is alert and oriented to person, place, and time. He has normal sensation.   Sensation intact in RLE   Skin: Skin is warm and dry. Abrasion (tiny superficial abrasions to the right medial malleolus and right anterior ankle  (not indicative of open fracture)) noted.   Psychiatric: Mood and affect normal.   Nursing note and vitals reviewed.      LAB RESULTS  Recent Results (from the past 24 hour(s))   Comprehensive Metabolic Panel    Collection Time: 01/11/17 11:08 AM   Result Value Ref Range    Glucose 99 65 - 99 mg/dL    BUN 14 8 - 23 mg/dL    Creatinine 1.00 0.76 - 1.27 mg/dL    Sodium 142 136 - 145 mmol/L    Potassium 4.2 3.5 - 5.2 mmol/L    Chloride 104 98 - 107 mmol/L    CO2 27.8 22.0 - 29.0 mmol/L    Calcium 9.7 8.6 - 10.5 mg/dL    Total Protein 7.5 6.0 - 8.5 g/dL    Albumin 4.20 3.50 - 5.20 g/dL    ALT (SGPT) 21 1 - 41 U/L    AST (SGOT) 36 1 - 40 U/L    Alkaline Phosphatase 56 39 - 117 U/L    Total Bilirubin 0.3 0.1 - 1.2 mg/dL    eGFR Non African Amer 75 >60 mL/min/1.73    Globulin 3.3 gm/dL    A/G Ratio 1.3 g/dL    BUN/Creatinine Ratio 14.0 7.0 - 25.0    Anion Gap 10.2 mmol/L   Protime-INR    Collection Time: 01/11/17 11:08 AM   Result Value Ref Range    Protime 13.1 11.7 - 14.2 Seconds    INR 1.03 0.90 - 1.10   aPTT    Collection Time: 01/11/17 11:08 AM   Result Value Ref Range    PTT 23.8 22.7 - 35.4 seconds   CBC Auto Differential    Collection Time: 01/11/17 11:08 AM   Result Value Ref Range    WBC 15.88 (H) 4.50 - 10.70 10*3/mm3    RBC 4.56 (L) 4.60 - 6.00 10*6/mm3    Hemoglobin 13.3 (L) 13.7 - 17.6 g/dL    Hematocrit 41.1 40.4 - 52.2 %     MCV 90.1 79.8 - 96.2 fL    MCH 29.2 27.0 - 32.7 pg    MCHC 32.4 (L) 32.6 - 36.4 g/dL    RDW 13.2 11.5 - 14.5 %    RDW-SD 43.2 37.0 - 54.0 fl    MPV 10.4 6.0 - 12.0 fL    Platelets 311 140 - 500 10*3/mm3    Neutrophil % 77.9 (H) 42.7 - 76.0 %    Lymphocyte % 14.2 (L) 19.6 - 45.3 %    Monocyte % 5.0 5.0 - 12.0 %    Eosinophil % 2.2 0.3 - 6.2 %    Basophil % 0.1 0.0 - 1.5 %    Immature Grans % 0.6 (H) 0.0 - 0.5 %    Neutrophils, Absolute 12.37 (H) 1.90 - 8.10 10*3/mm3    Lymphocytes, Absolute 2.26 0.90 - 4.80 10*3/mm3    Monocytes, Absolute 0.79 0.20 - 1.20 10*3/mm3    Eosinophils, Absolute 0.35 0.00 - 0.70 10*3/mm3    Basophils, Absolute 0.02 0.00 - 0.20 10*3/mm3    Immature Grans, Absolute 0.09 (H) 0.00 - 0.03 10*3/mm3       I ordered the above labs and reviewed the results    RADIOLOGY         XR Ankle 2 View Right (Final result) Result time: 01/11/17 11:52:56     Procedure changed from XR Ankle 3+ View Right          Final result by Lucas Sauceda MD (01/11/17 11:52:56)     Narrative:     RIGHT ANKLE AND TIBIA AND FIBULA X-RAYS     CLINICAL HISTORY: Patient fell off ladder. Severe pain.     AP and lateral views of the right tibia and fibula and ankle demonstrate  acute comminuted fractures involving the shafts of the distal tibia and  fibula with marked angulation at both fracture sites. No bone is  identified extending beyond the confines of the skin. The fractures do  not extend into the ankle joint which appears grossly intact. The knee  joint also appears intact.     This report was finalized on 1/11/2017 11:52 AM by Dr. Lucas Sauceda MD.        Post reduction right tib/fib xray- successful reduction of distal right tib/fib dislocation     I ordered the above noted radiological studies and reviewed the images on the PACS system.         MEDICAL RECORD REVIEW    PROCEDURES  Orthopedic Injury Treatment  Date/Time: 1/11/2017 3:10 PM  Performed by: YAJAIRA MOORE  Authorized by: BERE WEBER    Consent: Verbal consent obtained.  Risks and benefits: risks, benefits and alternatives were discussed  Consent given by: patient  Patient understanding: patient states understanding of the procedure being performed  Required items: required blood products, implants, devices, and special equipment available  Patient identity confirmed: verbally with patient and arm band  Injury location: ankle  Location details: right ankle  Injury type: fracture-dislocation (right distal tib/fib)  Pre-procedure neurovascular assessment: neurovascularly intact  Pre-procedure distal perfusion: normal  Pre-procedure neurological function: normal  Pre-procedure range of motion: reduced  Local anesthesia used: no    Anesthesia:  Local anesthesia used: no  Sedation:  Patient sedated: yes  Sedatives: propofol (total of 100mg propofol)  Sedation start date/time: 1/11/2017 3:10 PM  Sedation end date/time: 1/11/2017 3:40 PM  Vitals: Vital signs were monitored during sedation.    Manipulation performed: yes  Reduction successful: yes  X-ray confirmed reduction: yes  Immobilization: splint  Splint type: ankle stirrup (posterior)  Supplies used: Ortho-Glass  Post-procedure neurovascular assessment: post-procedure neurovascularly intact  Post-procedure distal perfusion: normal  Post-procedure neurological function: normal  Post-procedure range of motion: improved  Patient tolerance: Patient tolerated the procedure well with no immediate complications  Comments: 3:10 PM- Administered 60mg propofol for conscious sedation per Dr Thurman's request.     3:12 PM- Again administered 20mg propofol per Dr Thurman's request.     3:13 PM- Administered another 20mg propofol per Dr Thurman's request.    3:16 PM- Dr Thurman reduced right ankle fracture-dislocation and splinted right ankle with posterior ankle stirrup. See Dr Thurman's note for further details. Will obtain repeat right ankle xray to confirm reduction.               PROGRESS AND CONSULTS  10:32  AM- Ordered dilaudid and zofran for pain.   10:35 AM- Reviewed pt's history and workup with Dr. Thurman.  At bedside evaluation, they agree with the plan of care.  12:27 PM- Rechecked pt. He rates current pain at 5/10. On reexam, sensation and pedal pulses remain intact in RLE. NV intact distally to right foot. Discussed with pt about right ankle xray and right tib/fib xray results which are indicative of comminuted fractures involving the right distal tibia and fibula with marked angulation. Informed pt of plan to reduce and splint right ankle dislocation and fractures for stabilization and to consult with orthopedic surgery.   12:42 PM- Ordered repeat dose of dilaudid for pain.   1:52 PM- Discussed case with Dr Jacobo (orthopedic surgeon)   Reviewed history, exam, results and treatments.  Discussed concerns and plan of care. Dr Jacobo will consult and requests that hospitalist admit.   2:34 PM- Discussed case with Dr GRACE Vasquez ([facility] hospitalist)  Reviewed history, exam, results and treatments.  Discussed concerns and plan of care. Dr Vasquez accepts pt to be admitted to med/surg.  2:51 PM- Rechecked pt. He is resting comfortably. Informed pt that he will be partially sedated prior to reduction and splint application. Discussed pt admission for further care, orthopedic surgery consult, and observation. Pt verbalizes understanding and agrees with plan.   3:10 PM- Performed conscious sedation prior to reduction and splint application. See procedure note for further details.  3:16 PM- Dr Thurman reduced right ankle fracture-dislocation. He subsequently splinted right ankle with posterior ankle stirrup. See Dr Thurman's note for further details. Pt tolerated procedures without difficulty.  Will obtain repeat right ankle xray to confirm reduction.   4:01 PM- Rechecked pt. He has returned to baseline mental status. He is awake, alert, and oriented x3. Vitals are stable.       ADMISSION- med/surg    Discussed treatment plan and  "reason for admission with pt and admitting physician.  Pt voiced understanding of the plan for admission for further testing/treatment as needed.      DIAGNOSIS  Final diagnoses:   Closed fracture of distal end of fibula with tibia, right, initial encounter   Fall, initial encounter         COURSE & MEDICAL DECISION MAKING  Pertinent Labs and Imaging studies that were ordered and reviewed are noted above.  Results were reviewed/discussed with the patient and they were also made aware of online assess.   Pt also made aware that some labs, such as cultures, will not be resulted during ER visit and follow up with PMD is necessary.     MEDICATIONS GIVEN IN ER  Medications   sodium chloride 0.9 % flush 10 mL (not administered)   HYDROmorphone (DILAUDID) injection 1 mg (1 mg Intravenous Given 1/11/17 1037)   ondansetron (ZOFRAN) injection 4 mg (4 mg Intravenous Given 1/11/17 1037)   HYDROmorphone (DILAUDID) injection 1 mg (1 mg Intravenous Given 1/11/17 1247)   Propofol (DIPRIVAN) injection 20 mg (0 mg Intravenous Stopped 1/11/17 1526)       Visit Vitals   • BP (!) 181/81 (BP Location: Left arm, Patient Position: Sitting)   • Pulse 68   • Temp 97.7 °F (36.5 °C) (Tympanic)   • Resp 16   • Ht 70\" (177.8 cm)   • Wt 195 lb (88.5 kg)   • SpO2 99%   • BMI 27.98 kg/m2         I personally reviewed the past medical history, past surgical history, social history, family history, current medications and allergies as they appear in this chart.  The scribe's note accurately reflects the work and decisions made by me.     I personally scribed for CARISA Hull on 1/11/2017 at 3:56 PM.  Electronically signed by Nazario Peraza on 1/11/2017 at time 3:56 PM         Nazario Peraza  01/11/17 [id number]       JASON Kuo  01/12/17 [id number]    "

## 2017-01-11 NOTE — ED PROVIDER NOTES
Pt presents to the ED c/o R ankle pain after slipping off a ladder and falling around 4 ft. Upon exam, pt has 2 tiny abrasions to medial malleolus and shin that to do not appear to be open fx. He has R ankle deformity. I agree with the plan for XR R ankle.    I supervised care provided by the midlevel provider.    We have discussed this patient's history, physical exam, and treatment plan.   I have reviewed the note and personally saw and examined the patient and agree with the plan of care.    Documentation assistance provided by shania Epperson for Dr. Thurman.  Information recorded by the scribe was done at my direction and has been verified and validated by me.     Shlomo Epperson  01/11/17 [id number]       Car Thurman MD  01/11/17 9380

## 2017-01-11 NOTE — PLAN OF CARE
Problem: Fall Risk (Adult)  Goal: Identify Related Risk Factors and Signs and Symptoms  Outcome: Outcome(s) achieved Date Met:  01/11/17

## 2017-01-11 NOTE — Clinical Note
Level of Care: Med/Surg [1]   Diagnosis: Closed fracture of distal end of fibula with tibia, right, initial encounter [[id number]]   Admitting Physician: FANNY ANDERS [[id number]]   Attending Physician: FANNY ANDERS [[id number]]

## 2017-01-11 NOTE — PLAN OF CARE
Problem: Pain, Acute (Adult)  Goal: Identify Related Risk Factors and Signs and Symptoms  Outcome: Outcome(s) achieved Date Met:  01/11/17

## 2017-01-12 ENCOUNTER — ANESTHESIA (OUTPATIENT)
Dept: PERIOP | Facility: HOSPITAL | Age: 67
End: 2017-01-12

## 2017-01-12 ENCOUNTER — APPOINTMENT (OUTPATIENT)
Dept: GENERAL RADIOLOGY | Facility: HOSPITAL | Age: 67
End: 2017-01-12

## 2017-01-12 ENCOUNTER — ANESTHESIA EVENT (OUTPATIENT)
Dept: PERIOP | Facility: HOSPITAL | Age: 67
End: 2017-01-12

## 2017-01-12 LAB
ANION GAP SERPL CALCULATED.3IONS-SCNC: 13.8 MMOL/L
BUN BLD-MCNC: 12 MG/DL (ref 8–23)
BUN/CREAT SERPL: 11.8 (ref 7–25)
CALCIUM SPEC-SCNC: 8.9 MG/DL (ref 8.6–10.5)
CHLORIDE SERPL-SCNC: 101 MMOL/L (ref 98–107)
CO2 SERPL-SCNC: 24.2 MMOL/L (ref 22–29)
CREAT BLD-MCNC: 1.02 MG/DL (ref 0.76–1.27)
DEPRECATED RDW RBC AUTO: 42.7 FL (ref 37–54)
ERYTHROCYTE [DISTWIDTH] IN BLOOD BY AUTOMATED COUNT: 13.3 % (ref 11.5–14.5)
GFR SERPL CREATININE-BSD FRML MDRD: 74 ML/MIN/1.73
GLUCOSE BLD-MCNC: 222 MG/DL (ref 65–99)
GLUCOSE BLDC GLUCOMTR-MCNC: 220 MG/DL (ref 70–130)
GLUCOSE BLDC GLUCOMTR-MCNC: 221 MG/DL (ref 70–130)
GLUCOSE BLDC GLUCOMTR-MCNC: 230 MG/DL (ref 70–130)
GLUCOSE BLDC GLUCOMTR-MCNC: 232 MG/DL (ref 70–130)
HBA1C MFR BLD: 7.51 % (ref 4.8–5.6)
HCT VFR BLD AUTO: 38.6 % (ref 40.4–52.2)
HGB BLD-MCNC: 12.8 G/DL (ref 13.7–17.6)
MCH RBC QN AUTO: 29.2 PG (ref 27–32.7)
MCHC RBC AUTO-ENTMCNC: 33.2 G/DL (ref 32.6–36.4)
MCV RBC AUTO: 88.1 FL (ref 79.8–96.2)
PLATELET # BLD AUTO: 292 10*3/MM3 (ref 140–500)
PMV BLD AUTO: 10.6 FL (ref 6–12)
POTASSIUM BLD-SCNC: 4.1 MMOL/L (ref 3.5–5.2)
RBC # BLD AUTO: 4.38 10*6/MM3 (ref 4.6–6)
SODIUM BLD-SCNC: 139 MMOL/L (ref 136–145)
WBC NRBC COR # BLD: 9.73 10*3/MM3 (ref 4.5–10.7)

## 2017-01-12 PROCEDURE — 25010000003 CEFAZOLIN IN DEXTROSE 2-4 GM/100ML-% SOLUTION: Performed by: ORTHOPAEDIC SURGERY

## 2017-01-12 PROCEDURE — C1713 ANCHOR/SCREW BN/BN,TIS/BN: HCPCS | Performed by: ORTHOPAEDIC SURGERY

## 2017-01-12 PROCEDURE — 94799 UNLISTED PULMONARY SVC/PX: CPT

## 2017-01-12 PROCEDURE — 25010000002 HYDROMORPHONE PER 4 MG: Performed by: NURSE ANESTHETIST, CERTIFIED REGISTERED

## 2017-01-12 PROCEDURE — 82962 GLUCOSE BLOOD TEST: CPT

## 2017-01-12 PROCEDURE — 25010000002 PROPOFOL 10 MG/ML EMULSION: Performed by: NURSE ANESTHETIST, CERTIFIED REGISTERED

## 2017-01-12 PROCEDURE — 25010000002 ONDANSETRON PER 1 MG: Performed by: INTERNAL MEDICINE

## 2017-01-12 PROCEDURE — 0QSG04Z REPOSITION RIGHT TIBIA WITH INTERNAL FIXATION DEVICE, OPEN APPROACH: ICD-10-PCS | Performed by: ORTHOPAEDIC SURGERY

## 2017-01-12 PROCEDURE — 83036 HEMOGLOBIN GLYCOSYLATED A1C: CPT | Performed by: INTERNAL MEDICINE

## 2017-01-12 PROCEDURE — 25010000002 FENTANYL CITRATE (PF) 100 MCG/2ML SOLUTION: Performed by: NURSE ANESTHETIST, CERTIFIED REGISTERED

## 2017-01-12 PROCEDURE — 25010000002 HYDRALAZINE PER 20 MG

## 2017-01-12 PROCEDURE — 73590 X-RAY EXAM OF LOWER LEG: CPT

## 2017-01-12 PROCEDURE — 25010000002 FENTANYL CITRATE (PF) 100 MCG/2ML SOLUTION

## 2017-01-12 PROCEDURE — 25010000002 NEOSTIGMINE 10 MG/10ML SOLUTION: Performed by: ANESTHESIOLOGY

## 2017-01-12 PROCEDURE — 80048 BASIC METABOLIC PNL TOTAL CA: CPT | Performed by: INTERNAL MEDICINE

## 2017-01-12 PROCEDURE — 85027 COMPLETE CBC AUTOMATED: CPT | Performed by: INTERNAL MEDICINE

## 2017-01-12 PROCEDURE — 25010000002 MIDAZOLAM PER 1 MG: Performed by: ANESTHESIOLOGY

## 2017-01-12 PROCEDURE — 76000 FLUOROSCOPY <1 HR PHYS/QHP: CPT

## 2017-01-12 PROCEDURE — 25010000002 MEPERIDINE 25 MG/0.5ML SOLUTION: Performed by: NURSE ANESTHETIST, CERTIFIED REGISTERED

## 2017-01-12 PROCEDURE — 63710000001 INSULIN REGULAR HUMAN PER 5 UNITS: Performed by: ANESTHESIOLOGY

## 2017-01-12 PROCEDURE — 25010000002 HYDROMORPHONE PER 4 MG: Performed by: INTERNAL MEDICINE

## 2017-01-12 PROCEDURE — L1830 KO IMMOB CANVAS LONG PRE OTS: HCPCS | Performed by: ORTHOPAEDIC SURGERY

## 2017-01-12 DEVICE — GUIDEPIN TEMP THRD 3X305MM DISP: Type: IMPLANTABLE DEVICE | Site: TIBIA | Status: FUNCTIONAL

## 2017-01-12 DEVICE — IMPLANTABLE DEVICE: Type: IMPLANTABLE DEVICE | Site: TIBIA | Status: FUNCTIONAL

## 2017-01-12 DEVICE — SCRW CORT FA FUL/THRD HEX3.5 TI 5X32.5MM: Type: IMPLANTABLE DEVICE | Site: TIBIA | Status: FUNCTIONAL

## 2017-01-12 DEVICE — SCRW CORT FA FUL/THRD HEX3.5 TI 5X50MM: Type: IMPLANTABLE DEVICE | Site: TIBIA | Status: FUNCTIONAL

## 2017-01-12 DEVICE — SCRW CORT FA FUL/THRD HEX3.5 TI 5X55MM: Type: IMPLANTABLE DEVICE | Site: TIBIA | Status: FUNCTIONAL

## 2017-01-12 DEVICE — SCRW CORT FA FUL/THRD HEX3.5 TI 5X40MM: Type: IMPLANTABLE DEVICE | Site: TIBIA | Status: FUNCTIONAL

## 2017-01-12 RX ORDER — PROMETHAZINE HYDROCHLORIDE 25 MG/1
25 SUPPOSITORY RECTAL ONCE AS NEEDED
Status: DISCONTINUED | OUTPATIENT
Start: 2017-01-12 | End: 2017-01-12 | Stop reason: HOSPADM

## 2017-01-12 RX ORDER — PROPOFOL 10 MG/ML
VIAL (ML) INTRAVENOUS AS NEEDED
Status: DISCONTINUED | OUTPATIENT
Start: 2017-01-12 | End: 2017-01-12 | Stop reason: SURG

## 2017-01-12 RX ORDER — PROMETHAZINE HYDROCHLORIDE 25 MG/ML
6.25 INJECTION, SOLUTION INTRAMUSCULAR; INTRAVENOUS ONCE AS NEEDED
Status: DISCONTINUED | OUTPATIENT
Start: 2017-01-12 | End: 2017-01-12 | Stop reason: HOSPADM

## 2017-01-12 RX ORDER — ONDANSETRON 2 MG/ML
4 INJECTION INTRAMUSCULAR; INTRAVENOUS ONCE AS NEEDED
Status: DISCONTINUED | OUTPATIENT
Start: 2017-01-12 | End: 2017-01-12 | Stop reason: HOSPADM

## 2017-01-12 RX ORDER — DIPHENHYDRAMINE HYDROCHLORIDE 50 MG/ML
12.5 INJECTION INTRAMUSCULAR; INTRAVENOUS
Status: DISCONTINUED | OUTPATIENT
Start: 2017-01-12 | End: 2017-01-12 | Stop reason: HOSPADM

## 2017-01-12 RX ORDER — ROCURONIUM BROMIDE 10 MG/ML
INJECTION, SOLUTION INTRAVENOUS AS NEEDED
Status: DISCONTINUED | OUTPATIENT
Start: 2017-01-12 | End: 2017-01-12 | Stop reason: SURG

## 2017-01-12 RX ORDER — HYDROMORPHONE HYDROCHLORIDE 1 MG/ML
0.5 INJECTION, SOLUTION INTRAMUSCULAR; INTRAVENOUS; SUBCUTANEOUS
Status: DISCONTINUED | OUTPATIENT
Start: 2017-01-12 | End: 2017-01-12 | Stop reason: HOSPADM

## 2017-01-12 RX ORDER — MIDAZOLAM HYDROCHLORIDE 1 MG/ML
1 INJECTION INTRAMUSCULAR; INTRAVENOUS
Status: DISCONTINUED | OUTPATIENT
Start: 2017-01-12 | End: 2017-01-12 | Stop reason: HOSPADM

## 2017-01-12 RX ORDER — SODIUM CHLORIDE 0.9 % (FLUSH) 0.9 %
1-10 SYRINGE (ML) INJECTION AS NEEDED
Status: DISCONTINUED | OUTPATIENT
Start: 2017-01-12 | End: 2017-01-12 | Stop reason: HOSPADM

## 2017-01-12 RX ORDER — FENTANYL CITRATE 50 UG/ML
INJECTION, SOLUTION INTRAMUSCULAR; INTRAVENOUS AS NEEDED
Status: DISCONTINUED | OUTPATIENT
Start: 2017-01-12 | End: 2017-01-12 | Stop reason: SURG

## 2017-01-12 RX ORDER — SODIUM CHLORIDE, SODIUM LACTATE, POTASSIUM CHLORIDE, CALCIUM CHLORIDE 600; 310; 30; 20 MG/100ML; MG/100ML; MG/100ML; MG/100ML
100 INJECTION, SOLUTION INTRAVENOUS CONTINUOUS
Status: DISCONTINUED | OUTPATIENT
Start: 2017-01-12 | End: 2017-01-14 | Stop reason: HOSPADM

## 2017-01-12 RX ORDER — HYDRALAZINE HYDROCHLORIDE 20 MG/ML
5 INJECTION INTRAMUSCULAR; INTRAVENOUS
Status: DISCONTINUED | OUTPATIENT
Start: 2017-01-12 | End: 2017-01-12 | Stop reason: HOSPADM

## 2017-01-12 RX ORDER — HYDROCODONE BITARTRATE AND ACETAMINOPHEN 7.5; 325 MG/1; MG/1
1 TABLET ORAL ONCE AS NEEDED
Status: DISCONTINUED | OUTPATIENT
Start: 2017-01-12 | End: 2017-01-12 | Stop reason: HOSPADM

## 2017-01-12 RX ORDER — LABETALOL HYDROCHLORIDE 5 MG/ML
5 INJECTION, SOLUTION INTRAVENOUS
Status: DISCONTINUED | OUTPATIENT
Start: 2017-01-12 | End: 2017-01-12 | Stop reason: HOSPADM

## 2017-01-12 RX ORDER — FENTANYL CITRATE 50 UG/ML
INJECTION, SOLUTION INTRAMUSCULAR; INTRAVENOUS
Status: COMPLETED
Start: 2017-01-12 | End: 2017-01-12

## 2017-01-12 RX ORDER — PROMETHAZINE HYDROCHLORIDE 25 MG/1
25 TABLET ORAL ONCE AS NEEDED
Status: DISCONTINUED | OUTPATIENT
Start: 2017-01-12 | End: 2017-01-12 | Stop reason: HOSPADM

## 2017-01-12 RX ORDER — IPRATROPIUM BROMIDE AND ALBUTEROL SULFATE 2.5; .5 MG/3ML; MG/3ML
3 SOLUTION RESPIRATORY (INHALATION) ONCE AS NEEDED
Status: DISCONTINUED | OUTPATIENT
Start: 2017-01-12 | End: 2017-01-12 | Stop reason: HOSPADM

## 2017-01-12 RX ORDER — FENTANYL CITRATE 50 UG/ML
50 INJECTION, SOLUTION INTRAMUSCULAR; INTRAVENOUS
Status: DISCONTINUED | OUTPATIENT
Start: 2017-01-12 | End: 2017-01-12 | Stop reason: HOSPADM

## 2017-01-12 RX ORDER — CEFAZOLIN SODIUM 2 G/100ML
2 INJECTION, SOLUTION INTRAVENOUS ONCE
Status: COMPLETED | OUTPATIENT
Start: 2017-01-12 | End: 2017-01-12

## 2017-01-12 RX ORDER — NEOSTIGMINE METHYLSULFATE 1 MG/ML
INJECTION, SOLUTION INTRAVENOUS AS NEEDED
Status: DISCONTINUED | OUTPATIENT
Start: 2017-01-12 | End: 2017-01-12 | Stop reason: SURG

## 2017-01-12 RX ORDER — MIDAZOLAM HYDROCHLORIDE 1 MG/ML
2 INJECTION INTRAMUSCULAR; INTRAVENOUS
Status: DISCONTINUED | OUTPATIENT
Start: 2017-01-12 | End: 2017-01-12 | Stop reason: HOSPADM

## 2017-01-12 RX ORDER — LIDOCAINE HYDROCHLORIDE 20 MG/ML
INJECTION, SOLUTION INFILTRATION; PERINEURAL AS NEEDED
Status: DISCONTINUED | OUTPATIENT
Start: 2017-01-12 | End: 2017-01-12 | Stop reason: SURG

## 2017-01-12 RX ORDER — FAMOTIDINE 10 MG/ML
20 INJECTION, SOLUTION INTRAVENOUS ONCE
Status: COMPLETED | OUTPATIENT
Start: 2017-01-12 | End: 2017-01-12

## 2017-01-12 RX ORDER — CEFAZOLIN SODIUM 2 G/100ML
2 INJECTION, SOLUTION INTRAVENOUS EVERY 8 HOURS
Status: COMPLETED | OUTPATIENT
Start: 2017-01-13 | End: 2017-01-13

## 2017-01-12 RX ORDER — SODIUM CHLORIDE, SODIUM LACTATE, POTASSIUM CHLORIDE, CALCIUM CHLORIDE 600; 310; 30; 20 MG/100ML; MG/100ML; MG/100ML; MG/100ML
9 INJECTION, SOLUTION INTRAVENOUS CONTINUOUS
Status: DISCONTINUED | OUTPATIENT
Start: 2017-01-12 | End: 2017-01-14 | Stop reason: HOSPADM

## 2017-01-12 RX ORDER — GLYCOPYRROLATE 0.2 MG/ML
INJECTION INTRAMUSCULAR; INTRAVENOUS AS NEEDED
Status: DISCONTINUED | OUTPATIENT
Start: 2017-01-12 | End: 2017-01-12 | Stop reason: SURG

## 2017-01-12 RX ORDER — HYDRALAZINE HYDROCHLORIDE 20 MG/ML
INJECTION INTRAMUSCULAR; INTRAVENOUS
Status: COMPLETED
Start: 2017-01-12 | End: 2017-01-12

## 2017-01-12 RX ORDER — HYDROMORPHONE HCL 110MG/55ML
PATIENT CONTROLLED ANALGESIA SYRINGE INTRAVENOUS AS NEEDED
Status: DISCONTINUED | OUTPATIENT
Start: 2017-01-12 | End: 2017-01-12 | Stop reason: SURG

## 2017-01-12 RX ADMIN — MIDAZOLAM HYDROCHLORIDE 1 MG: 1 INJECTION, SOLUTION INTRAMUSCULAR; INTRAVENOUS at 14:20

## 2017-01-12 RX ADMIN — HYDROMORPHONE HYDROCHLORIDE 0.4 MG: 2 INJECTION, SOLUTION INTRAMUSCULAR; INTRAVENOUS; SUBCUTANEOUS at 16:50

## 2017-01-12 RX ADMIN — HYDROMORPHONE HYDROCHLORIDE 0.4 MG: 2 INJECTION, SOLUTION INTRAMUSCULAR; INTRAVENOUS; SUBCUTANEOUS at 17:42

## 2017-01-12 RX ADMIN — GLYCOPYRROLATE 0.6 MG: 0.2 INJECTION INTRAMUSCULAR; INTRAVENOUS at 18:14

## 2017-01-12 RX ADMIN — FENTANYL CITRATE 100 MCG: 50 INJECTION INTRAMUSCULAR; INTRAVENOUS at 16:22

## 2017-01-12 RX ADMIN — ROCURONIUM BROMIDE 50 MG: 10 INJECTION INTRAVENOUS at 16:28

## 2017-01-12 RX ADMIN — SODIUM CHLORIDE, POTASSIUM CHLORIDE, SODIUM LACTATE AND CALCIUM CHLORIDE 9 ML/HR: 600; 310; 30; 20 INJECTION, SOLUTION INTRAVENOUS at 14:20

## 2017-01-12 RX ADMIN — HYDROMORPHONE HYDROCHLORIDE 0.4 MG: 2 INJECTION, SOLUTION INTRAMUSCULAR; INTRAVENOUS; SUBCUTANEOUS at 17:34

## 2017-01-12 RX ADMIN — HYDROMORPHONE HYDROCHLORIDE 0.5 MG: 1 INJECTION, SOLUTION INTRAMUSCULAR; INTRAVENOUS; SUBCUTANEOUS at 02:34

## 2017-01-12 RX ADMIN — HYDROMORPHONE HYDROCHLORIDE 0.5 MG: 1 INJECTION, SOLUTION INTRAMUSCULAR; INTRAVENOUS; SUBCUTANEOUS at 22:19

## 2017-01-12 RX ADMIN — FENTANYL CITRATE 50 MCG: 50 INJECTION INTRAMUSCULAR; INTRAVENOUS at 19:33

## 2017-01-12 RX ADMIN — HYDROMORPHONE HYDROCHLORIDE 0.4 MG: 2 INJECTION, SOLUTION INTRAMUSCULAR; INTRAVENOUS; SUBCUTANEOUS at 17:05

## 2017-01-12 RX ADMIN — MEPERIDINE HYDROCHLORIDE 12.5 MG: 25 INJECTION, SOLUTION INTRAMUSCULAR; INTRAVENOUS; SUBCUTANEOUS at 19:20

## 2017-01-12 RX ADMIN — HYDROMORPHONE HYDROCHLORIDE 0.5 MG: 1 INJECTION, SOLUTION INTRAMUSCULAR; INTRAVENOUS; SUBCUTANEOUS at 12:44

## 2017-01-12 RX ADMIN — HYDROMORPHONE HYDROCHLORIDE 0.5 MG: 1 INJECTION, SOLUTION INTRAMUSCULAR; INTRAVENOUS; SUBCUTANEOUS at 05:36

## 2017-01-12 RX ADMIN — CEFAZOLIN SODIUM 2 G: 2 INJECTION, SOLUTION INTRAVENOUS at 16:21

## 2017-01-12 RX ADMIN — SIMVASTATIN 10 MG: 10 TABLET, FILM COATED ORAL at 23:21

## 2017-01-12 RX ADMIN — HYDROMORPHONE HYDROCHLORIDE 0.5 MG: 1 INJECTION, SOLUTION INTRAMUSCULAR; INTRAVENOUS; SUBCUTANEOUS at 10:05

## 2017-01-12 RX ADMIN — HYDROMORPHONE HYDROCHLORIDE 0.5 MG: 1 INJECTION, SOLUTION INTRAMUSCULAR; INTRAVENOUS; SUBCUTANEOUS at 19:34

## 2017-01-12 RX ADMIN — METFORMIN HYDROCHLORIDE 850 MG: 850 TABLET ORAL at 23:22

## 2017-01-12 RX ADMIN — ONDANSETRON 4 MG: 2 INJECTION INTRAMUSCULAR; INTRAVENOUS at 18:14

## 2017-01-12 RX ADMIN — FENTANYL CITRATE 50 MCG: 50 INJECTION INTRAMUSCULAR; INTRAVENOUS at 18:40

## 2017-01-12 RX ADMIN — HYDROMORPHONE HYDROCHLORIDE 0.5 MG: 1 INJECTION, SOLUTION INTRAMUSCULAR; INTRAVENOUS; SUBCUTANEOUS at 07:38

## 2017-01-12 RX ADMIN — POTASSIUM CHLORIDE, DEXTROSE MONOHYDRATE AND SODIUM CHLORIDE 75 ML/HR: 150; 5; 450 INJECTION, SOLUTION INTRAVENOUS at 00:39

## 2017-01-12 RX ADMIN — PROPOFOL 200 MG: 10 INJECTION, EMULSION INTRAVENOUS at 16:28

## 2017-01-12 RX ADMIN — HUMAN INSULIN 5 UNITS: 100 INJECTION, SOLUTION SUBCUTANEOUS at 14:26

## 2017-01-12 RX ADMIN — HYDRALAZINE HYDROCHLORIDE 5 MG: 20 INJECTION INTRAMUSCULAR; INTRAVENOUS at 18:44

## 2017-01-12 RX ADMIN — FENTANYL CITRATE 50 MCG: 50 INJECTION INTRAMUSCULAR; INTRAVENOUS at 16:28

## 2017-01-12 RX ADMIN — HYDROMORPHONE HYDROCHLORIDE 0.4 MG: 2 INJECTION, SOLUTION INTRAMUSCULAR; INTRAVENOUS; SUBCUTANEOUS at 17:20

## 2017-01-12 RX ADMIN — NEOSTIGMINE METHYLSULFATE 5 MG: 1 INJECTION INTRAVENOUS at 18:14

## 2017-01-12 RX ADMIN — SODIUM CHLORIDE, POTASSIUM CHLORIDE, SODIUM LACTATE AND CALCIUM CHLORIDE 100 ML/HR: 600; 310; 30; 20 INJECTION, SOLUTION INTRAVENOUS at 22:20

## 2017-01-12 RX ADMIN — FAMOTIDINE 20 MG: 10 INJECTION, SOLUTION INTRAVENOUS at 14:20

## 2017-01-12 RX ADMIN — FENTANYL CITRATE 50 MCG: 50 INJECTION INTRAMUSCULAR; INTRAVENOUS at 19:07

## 2017-01-12 RX ADMIN — INSULIN DETEMIR 40 UNITS: 100 INJECTION, SOLUTION SUBCUTANEOUS at 23:21

## 2017-01-12 RX ADMIN — HYDROMORPHONE HYDROCHLORIDE 0.5 MG: 1 INJECTION, SOLUTION INTRAMUSCULAR; INTRAVENOUS; SUBCUTANEOUS at 18:47

## 2017-01-12 RX ADMIN — LISINOPRIL 20 MG: 20 TABLET ORAL at 23:22

## 2017-01-12 RX ADMIN — LISINOPRIL 20 MG: 20 TABLET ORAL at 08:42

## 2017-01-12 RX ADMIN — LIDOCAINE HYDROCHLORIDE 60 MG: 20 INJECTION, SOLUTION INFILTRATION; PERINEURAL at 16:28

## 2017-01-12 RX ADMIN — AMLODIPINE BESYLATE 2.5 MG: 2.5 TABLET ORAL at 08:43

## 2017-01-12 RX ADMIN — HYDROMORPHONE HYDROCHLORIDE 0.5 MG: 1 INJECTION, SOLUTION INTRAMUSCULAR; INTRAVENOUS; SUBCUTANEOUS at 19:08

## 2017-01-12 RX ADMIN — FENTANYL CITRATE 50 MCG: 50 INJECTION INTRAMUSCULAR; INTRAVENOUS at 16:35

## 2017-01-12 NOTE — H&P
PRIMARY CARE PHYSICIAN:  Dr. Olivares     CHIEF COMPLAINT: Fall with left ankle injury.     HISTORY OF PRESENT ILLNESS: This is a 66-year-old man who is unknown to our service. He was at work today and coming down a ladder when the ladder just went out from under him. He landed on his left side and injured his right foot and right shoulder. He was brought to the emergency room for a right ankle deformity, and was found to have acute comminuted fractures involving shafts of the distal femur and fibula with marked angulation of both fracture sites. We have been asked to admit him as he has several underlying medical issues. Currently, his pain is fairly well controlled. He is getting relief with the Dilaudid. He was not able to bear weight after the injury. The pain was severe prior to the Dilaudid dose. The pain is sharp and severe and is all around the right foot. The right shoulder was a little bit sore earlier, but that has now resolved. He also felt some soreness in the right hand, but again that has resolved too. No other associated symptoms or exacerbating or alleviating factors.  His injury occurred at work.     PAST MEDICAL/SURGICAL HISTORY:  1.  Coronary artery disease with CABG and stent in 2011.  2.  Diabetes mellitus, type 2, since 2004.  3.  Hypertension.  4.  Hyperlipidemia.   5.  Tonsillectomy.     HOME MEDICATIONS:  1.  Aspirin 81 mg daily.   2.  Plavix 75 mg daily.   3.  Norvasc 2.5 mg daily.   4.  Lantus 63 units at bedtime.   5.  NovoLog 10-15 units t.i.d. with meals.  6.  Prinivil 20 mg b.i.d.   7.  Glucophage 850 mg b.i.d.  8.  TriCor, dosage is not noted.  9.  Zocor 20 mg at bedtime.     ALLERGIES: No known drug allergies.     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  He quit smoking 25 years ago, but before that had smoked up to 2 and a half packs a day. He had started smoking at age 16. He drinks alcohol rarely. Last year, he had 1 drink.     FAMILY HISTORY:  Positive  for heart disease, diabetes, and stroke. Negative for emphysema.     REVIEW OF SYSTEMS: No sore throat. No earache. No vision changes. No hearing changes. No polyuria or polydipsia. No paresthesias. No ankle swelling. No chest pain. No palpitations. No PND.  No orthopnea. No cough or wheezing. No shortness of breath.  Weight has been stable over the last several months. Appetite has been good. Energy level has been good. No constipation or diarrhea. No nausea or vomiting. No dysuria. No rash or skin changes. The rest of the 10-point review of systems is otherwise negative, except for what is noted above.     PHYSICAL EXAMINATION:  GENERAL: No acute distress. He looks about his stated age. Pleasant, alert, cooperative, neatly groomed, well-developed.   VITAL SIGNS: Temperature 97.1, pulse 66, respirations 16, blood pressure 170/78, weight is 195 pounds, O2 saturation 98% on room air.   HEENT: Normocephalic, atraumatic. Pupils are equal, round, and reactive to light and accommodation. No nystagmus, and extraocular movements intact. Lids and conjunctivae normal without ptosis or icterus.  Oropharynx: He has upper dentures.  No thrush. No masses or lesions. External ears and nose are normal on inspection and hearing is intact to normal conversational tones.   NECK: Supple without lymphadenopathy, thyromegaly, JVD, or bruit. Trachea midline.   HEART: Regular without murmur, rub or gallop. Normal S1, S2.   LUNGS: Clear. Breath sounds equal.  Good air movement.  No usage of accessory muscles of respiration.   ABDOMEN: Soft, nontender. Positive bowel sounds. No hepatosplenomegaly, rebound, guarding or mass.   EXTREMITIES: The right ankle and foot are splinted and bandaged. No edema at the left ankle. No clubbing or cyanosis. No increased warmth, erythema or effusions over the upper extremity joints or at the left extremity.    VASCULAR: Left pedal pulses intact. Radial and carotid pulses intact.   SKIN: No rash, ulceration,  or nodule excluding examination of the right foot and ankle.   NEUROLOGIC: Cranial nerves intact. Sensation intact with the plastic filament at the distal left lower extremity.     DIAGNOSTIC DATA: CMP is normal. BUN 14, creatinine 1. INR is 1.  PTT is normal. White count is 15.9, hemoglobin 13.3, platelets 311,000, segs 78, lymphocytes 14, red cell indices are normal. No urinalysis. No EKG.  No chest x-ray. Plain films of the ankle show acute comminuted fractures involving shafts of distal femur and fibula with marked angulation of both fracture sites.     IMPRESSION AND PLAN:  1.  Complicated right ankle fracture. IV and p.o. pain medications as needed with IV antiemetics as needed. A splint was placed in the ER. He is at acceptable risk for the operating room tomorrow. EKG has been ordered. He has no evidence of any acute heart failure or ischemia. Dr. Nascimento was consulted through the emergency room for surgery tomorrow.   2.  Diabetes mellitus type 2 with cardiovascular complications. Sugars are a bit low now. He is on Lantus. I will give him less of the Levemir:  Instead of 63 units, just 40 for now. He has not eaten all day. Avoid hypoglycemia. Sliding scale regimen as needed.   3.  Coronary artery disease with CABG and stent. Stable. Await EKG.   4.  Hyperlipidemia. Continue statin.   5.  Hypertension. Continue with medications.     I have discussed the above with the patient.       Matilde Vasquez M.D.  JH:robbin  D:   01/11/2017 18:31:01  T:   01/11/2017 18:57:40  Job ID:   [id number]  Document ID:   [id number]  cc:   Dr. Olivares

## 2017-01-12 NOTE — BRIEF OP NOTE
TIBIA INTRAMEDULLARY NAIL/SHANNON INSERTION  Procedure Note    [place]  1/11/2017 - 1/12/2017    Pre-op Diagnosis:   Right comminuted distal tibia and fibula fractures    Post-op Diagnosis:     Right comminuted distal tibia and fibula fractures    Procedure/CPT® Codes:      Procedure(s):  TIBIA INTRAMEDULLARY NAIL/SHANNON INSERTION    Surgeon(s):  Colton Nascimento MD    Anesthesia: General    Staff:   Circulator: Anny Muller RN  Radiology Technologist: Heath Elizalde RRT  Scrub Person: Yaneth Mcdonnell; Mauricio Akers  Vendor Representative: Tushar Chavis  Assistant: Doron Crook    Estimated Blood Loss: * No values recorded between 1/12/2017  4:17 PM and 1/12/2017  6:13 PM *    Specimens:                * No specimens in log *      Drains:           Findings: fracture    Complications: none      Colton Nascimento MD     Date: 1/12/2017  Time: 6:18 PM

## 2017-01-12 NOTE — ANESTHESIA PREPROCEDURE EVALUATION
Anesthesia Evaluation      No history of anesthetic complications   Airway   Mallampati: I  TM distance: >3 FB  Neck ROM: full  no difficulty expected  Dental    (+) edentulous    Pulmonary     breath sounds clear to auscultation  Cardiovascular   (+) hypertension, CAD, CABG > 6 Months,     Rhythm: regular  Rate: normal    Neuro/Psych  GI/Hepatic/Renal/Endo    (+)  diabetes mellitus using insulin,     Musculoskeletal     Abdominal    Substance History      OB/GYN          Other                             Anesthesia Plan    ASA 3     general     intravenous induction   Anesthetic plan and risks discussed with patient.

## 2017-01-12 NOTE — PLAN OF CARE
Problem: Perioperative Period (Adult)  Goal: Signs and Symptoms of Listed Potential Problems Will be Absent or Manageable (Perioperative Period)  Outcome: Ongoing (interventions implemented as appropriate)    01/12/17 2417   Perioperative Period   Problems Assessed (Perioperative Period) pain;situational response   Problems Present (Perioperative Period) pain;situational response

## 2017-01-12 NOTE — PROGRESS NOTES
[facility]     LOS: 1 day     Subjective:      Objective:    Vital Signs:  Temp:  [97 °F (36.1 °C)-98.2 °F (36.8 °C)] 98.1 °F (36.7 °C)  Heart Rate:  [77-87] 79  Resp:  [12-16] 12  BP: (138-175)/(77-83) 175/82    In operating room      Results Review:    GLUCOSE   Date Value Ref Range Status   01/12/2017 222 (H) 65 - 99 mg/dL Final   01/11/2017 99 65 - 99 mg/dL Final       Results from last 7 days  Lab Units 01/12/17  0420   WBC 10*3/mm3 9.73   HEMOGLOBIN g/dL 12.8*   HEMATOCRIT % 38.6*   PLATELETS 10*3/mm3 292       Results from last 7 days  Lab Units 01/12/17  0420 01/11/17  1108   SODIUM mmol/L 139 142   POTASSIUM mmol/L 4.1 4.2   CHLORIDE mmol/L 101 104   TOTAL CO2 mmol/L 24.2 27.8   BUN mg/dL 12 14   CREATININE mg/dL 1.02 1.00   CALCIUM mg/dL 8.9 9.7   BILIRUBIN mg/dL  --  0.3   ALK PHOS U/L  --  56   ALT (SGPT) U/L  --  21   AST (SGOT) U/L  --  36   GLUCOSE mg/dL 222* 99       Results from last 7 days  Lab Units 01/11/17  1108   INR  1.03   APTT seconds 23.8                 I have reviewed daily medications and changes in CPOE    Scheduled meds    amLODIPine 2.5 mg Oral Daily   [MAR Hold] insulin aspart 0-14 Units Subcutaneous Q6H   [MAR Hold] insulin detemir 40 Units Subcutaneous Nightly   lisinopril 20 mg Oral BID   metFORMIN 850 mg Oral BID With Meals   [MAR Hold] simvastatin 10 mg Oral Nightly         dextrose 5 % and sodium chloride 0.45 % with KCl 20 mEq/L 75 mL/hr Last Rate: 75 mL/hr (01/12/17 0039)   lactated ringers 9 mL/hr Last Rate: 9 mL/hr (01/12/17 1420)     PRN meds  •  acetaminophen  •  [MAR Hold] dextrose  •  [MAR Hold] dextrose  •  fentanyl  •  [MAR Hold] glucagon (human recombinant)  •  HYDROcodone-acetaminophen **FOLLOWED BY** [START ON 1/21/2017] HYDROcodone-acetaminophen  •  HYDROmorphone  •  influenza vaccine  •  midazolam **OR** midazolam  •  [MAR Hold] ondansetron  •  pneumococcal polysaccharide 23-valent  •  [MAR Hold] sodium chloride  •  sodium  chloride  •  Insert peripheral IV **AND** sodium chloride  •  BH OR ANTIBIOTIC IRRIGATION BUILDER    Assessment:    Active Problems:    Closed fracture of distal end of fibula with tibia  1. Right ankle fracture- currently in the OR     2. DM2 with cardiovascular complications. BS high    3. CAD with CABG and stent, 2011.     4. Lipids    5. HTN          Aquiles Toribio MD  01/12/17  5:56 PM

## 2017-01-12 NOTE — PLAN OF CARE
Problem: Patient Care Overview (Adult)  Goal: Plan of Care Review  Outcome: Ongoing (interventions implemented as appropriate)    01/12/17 0237   Coping/Psychosocial Response Interventions   Plan Of Care Reviewed With patient   Patient Care Overview   Progress improving   Outcome Evaluation   Outcome Summary/Follow up Plan IV PAIN MEDICATION CONTROLLING PAIN. POSSIBLE OR TODAY       Goal: Adult Individualization and Mutuality  Outcome: Ongoing (interventions implemented as appropriate)  Goal: Discharge Needs Assessment  Outcome: Ongoing (interventions implemented as appropriate)    Problem: Fall Risk (Adult)  Goal: Absence of Falls  Outcome: Ongoing (interventions implemented as appropriate)    Problem: Orthopaedic Fracture (Adult)  Goal: Signs and Symptoms of Listed Potential Problems Will be Absent or Manageable (Orthopaedic Fracture)  Outcome: Ongoing (interventions implemented as appropriate)    Problem: Pain, Acute (Adult)  Goal: Acceptable Pain Control/Comfort Level  Outcome: Ongoing (interventions implemented as appropriate)

## 2017-01-12 NOTE — CONSULTS
Orthopaedic Consult      Patient: Lucas Deluca    Date of Admission: 1/11/2017 10:09 AM    YOB: 1950    Medical Record Number: [id number]    Attending Physician: Aquiles Toribio MD  Consulting Physician: Marcos Lam PA-C      Chief Complaints: Fall, initial encounter [W19.XXXA]  Closed fracture of distal end of fibula with tibia, right, initial encounter [S82.301A, S82.831A]      History of Present Illness: 66 y.o. male admitted to [facility] with Fall, initial encounter [W19.XXXA]  Closed fracture of distal end of fibula with tibia, right, initial encounter [S82.301A, S82.831A]. I was consulted for further evaluation and treatment.  The patient reports he had fallen off of a ladder yesterday morning at 9:30 while working at [employer].  The patient reports she developed severe pain in his right lower extremity immediately.  The patient was unable to ambulate.  The patient reports his pain is rated at a 10 on a scale of 1-10.  He denies any numbness tingling fever or chills.  The patient was transported to [facility] for further evaluation and was found to have a distal tibia and fibula fracture on the right lower extremity.    No Known Allergies    Home Medications:  Prescriptions Prior to Admission   Medication Sig Dispense Refill Last Dose   • amLODIPine (NORVASC) 2.5 MG tablet Take 2.5 mg by mouth Daily.   1/11/2017 at Unknown time   • aspirin 81 MG chewable tablet Chew 81 mg Daily.   1/11/2017 at Unknown time   • clopidogrel (PLAVIX) 75 MG tablet Take 75 mg by mouth Daily.   1/11/2017 at Unknown time   • Fenofibrate (TRICOR PO) Take  by mouth.   1/11/2017 at Unknown time   • insulin aspart (novoLOG) 100 UNIT/ML injection Inject  under the skin 3 (Three) Times a Day Before Meals.      • insulin glargine (LANTUS) 100 UNIT/ML injection Inject 63 Units under the skin Every Night.      • lisinopril (PRINIVIL,ZESTRIL) 20 MG tablet Take 20 mg by mouth 2 (Two) Times a Day.    1/11/2017 at Unknown time   • metFORMIN (GLUCOPHAGE) 850 MG tablet Take 850 mg by mouth 2 (Two) Times a Day With Meals.   1/11/2017 at Unknown time   • Simvastatin (ZOCOR PO) Take  by mouth.   1/11/2017 at Unknown time       Current Medications:  Scheduled Meds:  amLODIPine 2.5 mg Oral Daily   insulin aspart 0-14 Units Subcutaneous Q6H   insulin detemir 40 Units Subcutaneous Nightly   lisinopril 20 mg Oral BID   metFORMIN 850 mg Oral BID With Meals   simvastatin 10 mg Oral Nightly     Continuous Infusions:  dextrose 5 % and sodium chloride 0.45 % with KCl 20 mEq/L 75 mL/hr Last Rate: 75 mL/hr (01/12/17 0039)     PRN Meds:.•  acetaminophen  •  dextrose  •  dextrose  •  glucagon (human recombinant)  •  HYDROcodone-acetaminophen **FOLLOWED BY** [START ON 1/21/2017] HYDROcodone-acetaminophen  •  HYDROmorphone  •  influenza vaccine  •  ondansetron  •  pneumococcal polysaccharide 23-valent  •  sodium chloride  •  Insert peripheral IV **AND** sodium chloride    Past Medical History   Diagnosis Date   • Diabetes mellitus    • Hyperlipidemia    • Hypertension    • Macular degeneration      Past Surgical History   Procedure Laterality Date   • Coronary artery bypass graft       2004   • Coronary angioplasty with stent placement     • Tonsillectomy       Social History     Occupational History   • Not on file.     Social History Main Topics   • Smoking status: Former Smoker   • Smokeless tobacco: Not on file   • Alcohol use No   • Drug use: No   • Sexual activity: Defer    Social History     Social History Narrative   • No narrative on file     History reviewed. No pertinent family history.      Review of Systems:   Constitutional: Negative for fatigue, fever, or weight loss  HEENT: Patient denies any headaches, vision changes, sore throat. Admits to wearing glasses  Pulmonary: Patient denies any cough, congestion, SOA, or wheezing  Cardiovascular: Patient denies any chest pain, palpitations, weakness,  Gastrointestinal:   Patient denies nausea, vomiting, diarrhea, constipation  Genital/Urinary: Patient denies dysuria, urinary frequency, urgency, incontinence, retention  Musculoskeletal: Positive for right lower extremity pain . Negative for muscle cramps  Neurological: Patient denies dizziness, paresthesia, loss of sensation, seizures, syncope  Endocrine system: Patient denies tremors, cold or heat intolerance  Psychological: Patient denies thoughts/plans or harming self or other; hallucinations,  insomnia  Skin: Patient denies any bruising, rashes, lesions, or ulcers   Hematopoietic: Patient denies history of MRSA or slow wound healing,  spontaneous or excessive bleeding    Physical Exam: 66 y.o. male  Vitals:    01/11/17 2026 01/11/17 2247 01/12/17 0305 01/12/17 0730   BP: 156/83 151/82 140/80 155/77   BP Location:  Left arm Left arm Left arm   Patient Position:  Lying Lying Lying   Pulse:  87 78 77   Resp:  16 16 16   Temp:  98.2 °F (36.8 °C) 97.8 °F (36.6 °C) 97 °F (36.1 °C)   TempSrc:  Oral Oral Oral   SpO2:  95% 94% 95%   Weight:       Height:                                General Appearance:  Alert, cooperative, in no acute distress    HEENT:    Normocephalic,  Atraumatic, Pupils are equal   Neck:   No adenopathy, supple, trachea midline, no thyromegaly       Lungs:     Clear to auscultation, equal expansion bilaterally, respirations regular, even and               unlabored    Heart:    Regular rhythm and normal rate, normal S1 and S2, no murmur, no gallops   Chest Wall:    Within normal limits   Abdomen:     Normal bowel sounds, no masses,  soft non-tender, non-distended,       Rectal:  Musculoskeletal:     Deferred    Patient's gait was not appreciated examination of the patient's right lower extremity reveals he does have a splint in place.  The patient did have 2+ pulses in his right lower extremity.  He was able to  Fan his toes without problems or complications.  He did have good sensation to both dull and sharp.  I  didn't appreciate bruising in his right lower extremity consistent with fracture and the blood allowing gravity to pull to his right ankle.  There was no other masses or effusion.  His skin was warm and dry and intact with no open wounds.     Extremities:   No clubbin, no edema, no cyanosis   Pulses  Neurovascular:   Pulses palpable and equal bilaterally in all 4 extremities    Patient was alert and oriented x 3 spheres   Skin:   No lesions, ulcers or rashes   Neurologic:   Cranial nerves 2 - 12 grossly intact, sensation intact            Diagnostic Tests:    Admission on 01/11/2017   Component Date Value Ref Range Status   • Glucose 01/11/2017 99  65 - 99 mg/dL Final   • BUN 01/11/2017 14  8 - 23 mg/dL Final   • Creatinine 01/11/2017 1.00  0.76 - 1.27 mg/dL Final   • Sodium 01/11/2017 142  136 - 145 mmol/L Final   • Potassium 01/11/2017 4.2  3.5 - 5.2 mmol/L Final   • Chloride 01/11/2017 104  98 - 107 mmol/L Final   • CO2 01/11/2017 27.8  22.0 - 29.0 mmol/L Final   • Calcium 01/11/2017 9.7  8.6 - 10.5 mg/dL Final   • Total Protein 01/11/2017 7.5  6.0 - 8.5 g/dL Final   • Albumin 01/11/2017 4.20  3.50 - 5.20 g/dL Final   • ALT (SGPT) 01/11/2017 21  1 - 41 U/L Final   • AST (SGOT) 01/11/2017 36  1 - 40 U/L Final   • Alkaline Phosphatase 01/11/2017 56  39 - 117 U/L Final   • Total Bilirubin 01/11/2017 0.3  0.1 - 1.2 mg/dL Final   • eGFR Non African Amer 01/11/2017 75  >60 mL/min/1.73 Final   • Globulin 01/11/2017 3.3  gm/dL Final   • A/G Ratio 01/11/2017 1.3  g/dL Final   • BUN/Creatinine Ratio 01/11/2017 14.0  7.0 - 25.0 Final   • Anion Gap 01/11/2017 10.2  mmol/L Final   • Protime 01/11/2017 13.1  11.7 - 14.2 Seconds Final   • INR 01/11/2017 1.03  0.90 - 1.10 Final   • PTT 01/11/2017 23.8  22.7 - 35.4 seconds Final   • WBC 01/11/2017 15.88* 4.50 - 10.70 10*3/mm3 Final   • RBC 01/11/2017 4.56* 4.60 - 6.00 10*6/mm3 Final   • Hemoglobin 01/11/2017 13.3* 13.7 - 17.6 g/dL Final   • Hematocrit 01/11/2017 41.1  40.4 - 52.2  % Final   • MCV 01/11/2017 90.1  79.8 - 96.2 fL Final   • MCH 01/11/2017 29.2  27.0 - 32.7 pg Final   • MCHC 01/11/2017 32.4* 32.6 - 36.4 g/dL Final   • RDW 01/11/2017 13.2  11.5 - 14.5 % Final   • RDW-SD 01/11/2017 43.2  37.0 - 54.0 fl Final   • MPV 01/11/2017 10.4  6.0 - 12.0 fL Final   • Platelets 01/11/2017 311  140 - 500 10*3/mm3 Final   • Neutrophil % 01/11/2017 77.9* 42.7 - 76.0 % Final   • Lymphocyte % 01/11/2017 14.2* 19.6 - 45.3 % Final   • Monocyte % 01/11/2017 5.0  5.0 - 12.0 % Final   • Eosinophil % 01/11/2017 2.2  0.3 - 6.2 % Final   • Basophil % 01/11/2017 0.1  0.0 - 1.5 % Final   • Immature Grans % 01/11/2017 0.6* 0.0 - 0.5 % Final   • Neutrophils, Absolute 01/11/2017 12.37* 1.90 - 8.10 10*3/mm3 Final   • Lymphocytes, Absolute 01/11/2017 2.26  0.90 - 4.80 10*3/mm3 Final   • Monocytes, Absolute 01/11/2017 0.79  0.20 - 1.20 10*3/mm3 Final   • Eosinophils, Absolute 01/11/2017 0.35  0.00 - 0.70 10*3/mm3 Final   • Basophils, Absolute 01/11/2017 0.02  0.00 - 0.20 10*3/mm3 Final   • Immature Grans, Absolute 01/11/2017 0.09* 0.00 - 0.03 10*3/mm3 Final   • Glucose 01/11/2017 97  70 - 130 mg/dL Final   • Glucose 01/11/2017 280* 70 - 130 mg/dL Final   • Glucose 01/11/2017 188* 70 - 130 mg/dL Final   • WBC 01/12/2017 9.73  4.50 - 10.70 10*3/mm3 Final   • RBC 01/12/2017 4.38* 4.60 - 6.00 10*6/mm3 Final   • Hemoglobin 01/12/2017 12.8* 13.7 - 17.6 g/dL Final   • Hematocrit 01/12/2017 38.6* 40.4 - 52.2 % Final   • MCV 01/12/2017 88.1  79.8 - 96.2 fL Final   • MCH 01/12/2017 29.2  27.0 - 32.7 pg Final   • MCHC 01/12/2017 33.2  32.6 - 36.4 g/dL Final   • RDW 01/12/2017 13.3  11.5 - 14.5 % Final   • RDW-SD 01/12/2017 42.7  37.0 - 54.0 fl Final   • MPV 01/12/2017 10.6  6.0 - 12.0 fL Final   • Platelets 01/12/2017 292  140 - 500 10*3/mm3 Final   • Glucose 01/12/2017 222* 65 - 99 mg/dL Final   • BUN 01/12/2017 12  8 - 23 mg/dL Final   • Creatinine 01/12/2017 1.02  0.76 - 1.27 mg/dL Final   • Sodium 01/12/2017 139  136 -  145 mmol/L Final   • Potassium 01/12/2017 4.1  3.5 - 5.2 mmol/L Final   • Chloride 01/12/2017 101  98 - 107 mmol/L Final   • CO2 01/12/2017 24.2  22.0 - 29.0 mmol/L Final   • Calcium 01/12/2017 8.9  8.6 - 10.5 mg/dL Final   • eGFR Non African Amer 01/12/2017 74  >60 mL/min/1.73 Final   • BUN/Creatinine Ratio 01/12/2017 11.8  7.0 - 25.0 Final   • Anion Gap 01/12/2017 13.8  mmol/L Final   • Hemoglobin A1C 01/12/2017 7.51* 4.80 - 5.60 % Final   • Glucose 01/12/2017 221* 70 - 130 mg/dL Final       Xr Tibia Fibula 2 View Right    Result Date: 1/11/2017  Narrative: RIGHT LOWER LEG X-RAY  HISTORY: Postreduction of right tibia-fibula fracture.  FINDINGS: Three x-ray images of the right lower leg are provided and show splint material around the distal lower leg and foot. This surrounds a spiral, comminuted distal tibia shaft fracture, and a comminuted distal fibular shaft fracture. Those are reduced such that the major fracture fragments are in near-anatomic position.      Impression: Comminuted right tibial and fibular fractures are reduced to near-anatomic position with splint material in place.  This report was finalized on 1/11/2017 9:22 PM by Dr. Doron Sharp MD.      Xr Tibia Fibula 2 View Right    Addendum Date: 1/11/2017 Addendum:   ADDENDUM TO ANKLE AND TIBIA AND FIBULA X-RAYS  There is a typographical error in the report:  It states the fracture involves the distal femur, it should state the fracture involves the distal tibia.  This report was finalized on 1/11/2017 12:45 PM by Dr. Lucas Sauceda MD.      Result Date: 1/11/2017  Narrative: RIGHT ANKLE AND TIBIA AND FIBULA X-RAYS  CLINICAL HISTORY: Patient fell off ladder. Severe pain.  AP and lateral views of the right tibia and fibula and ankle demonstrate acute comminuted fractures involving the shafts of the distal femur and fibula with marked angulation at both fracture sites. No bone is identified extending beyond the confines of the skin. The fractures do  not extend into the ankle joint which appears grossly intact. The knee joint also appears intact.  This report was finalized on 1/11/2017 11:52 AM by Dr. Lucas Sauceda MD.      Xr Ankle 2 View Right    Addendum Date: 1/11/2017 Addendum:   ADDENDUM TO ANKLE AND TIBIA AND FIBULA X-RAYS  There is a typographical error in the report:  It states the fracture involves the distal femur, it should state the fracture involves the distal tibia.  This report was finalized on 1/11/2017 12:45 PM by Dr. Lucas Sauceda MD.      Result Date: 1/11/2017  Narrative: RIGHT ANKLE AND TIBIA AND FIBULA X-RAYS  CLINICAL HISTORY: Patient fell off ladder. Severe pain.  AP and lateral views of the right tibia and fibula and ankle demonstrate acute comminuted fractures involving the shafts of the distal femur and fibula with marked angulation at both fracture sites. No bone is identified extending beyond the confines of the skin. The fractures do not extend into the ankle joint which appears grossly intact. The knee joint also appears intact.  This report was finalized on 1/11/2017 11:52 AM by Dr. Lucas Sauceda MD.          Assessment:  Patient Active Problem List   Diagnosis   • Closed fracture of distal end of fibula with tibia           Plan:  The patient voiced understanding of the risks, benefits, and alternative forms of treatment that were discussed and the patient consents to proceed with open reduction with intramedullary nail for his right tibia fracture.  I did explain the risk and expected outcomes to wrist include blood clot infection neurovascular deficit leg discrepancy and even death.  The patient completely understands and wishes to proceed..     Date: 1/12/2017  Marcos Lam PA-C

## 2017-01-12 NOTE — ANESTHESIA PROCEDURE NOTES
Airway  Urgency: elective    Airway not difficult    General Information and Staff    Patient location during procedure: OR  Anesthesiologist: HARSH PACHECO  CRNA: RADHA CADENA    Indications and Patient Condition  Indications for airway management: airway protection    Preoxygenated: yes  MILS not maintained throughout  Mask difficulty assessment: 1 - vent by mask    Final Airway Details  Final airway type: endotracheal airway      Successful airway: ETT  Cuffed: yes   Successful intubation technique: direct laryngoscopy  Facilitating devices/methods: intubating stylet  Endotracheal tube insertion site: oral  Blade: Vineet  Blade size: #3  ETT size: 8.0 mm  Cormack-Lehane Classification: grade I - full view of glottis  Placement verified by: chest auscultation   Cuff volume (mL): 9  Measured from: lips  ETT to lips (cm): 23  Number of attempts at approach: 1    Additional Comments  PreO2 100% face mask, IV induction, easy mask, DVL x1, cords noted, tube through, cuff up, EBBSH, +etCO2, = chest movement, tube secured in place, atraumatic, teeth and lips intact as preop.

## 2017-01-13 LAB
ANION GAP SERPL CALCULATED.3IONS-SCNC: 15.3 MMOL/L
BUN BLD-MCNC: 11 MG/DL (ref 8–23)
BUN/CREAT SERPL: 11.2 (ref 7–25)
CALCIUM SPEC-SCNC: 8.7 MG/DL (ref 8.6–10.5)
CHLORIDE SERPL-SCNC: 97 MMOL/L (ref 98–107)
CO2 SERPL-SCNC: 24.7 MMOL/L (ref 22–29)
CREAT BLD-MCNC: 0.98 MG/DL (ref 0.76–1.27)
DEPRECATED RDW RBC AUTO: 41.9 FL (ref 37–54)
ERYTHROCYTE [DISTWIDTH] IN BLOOD BY AUTOMATED COUNT: 13.1 % (ref 11.5–14.5)
GFR SERPL CREATININE-BSD FRML MDRD: 77 ML/MIN/1.73
GLUCOSE BLD-MCNC: 218 MG/DL (ref 65–99)
GLUCOSE BLDC GLUCOMTR-MCNC: 157 MG/DL (ref 70–130)
GLUCOSE BLDC GLUCOMTR-MCNC: 166 MG/DL (ref 70–130)
GLUCOSE BLDC GLUCOMTR-MCNC: 174 MG/DL (ref 70–130)
GLUCOSE BLDC GLUCOMTR-MCNC: 191 MG/DL (ref 70–130)
GLUCOSE BLDC GLUCOMTR-MCNC: 212 MG/DL (ref 70–130)
HCT VFR BLD AUTO: 36.1 % (ref 40.4–52.2)
HGB BLD-MCNC: 12.1 G/DL (ref 13.7–17.6)
MCH RBC QN AUTO: 29.6 PG (ref 27–32.7)
MCHC RBC AUTO-ENTMCNC: 33.5 G/DL (ref 32.6–36.4)
MCV RBC AUTO: 88.3 FL (ref 79.8–96.2)
PLATELET # BLD AUTO: 291 10*3/MM3 (ref 140–500)
PMV BLD AUTO: 11.1 FL (ref 6–12)
POTASSIUM BLD-SCNC: 3.7 MMOL/L (ref 3.5–5.2)
RBC # BLD AUTO: 4.09 10*6/MM3 (ref 4.6–6)
SODIUM BLD-SCNC: 137 MMOL/L (ref 136–145)
WBC NRBC COR # BLD: 17.57 10*3/MM3 (ref 4.5–10.7)

## 2017-01-13 PROCEDURE — 63710000001 INSULIN ASPART PER 5 UNITS: Performed by: HOSPITALIST

## 2017-01-13 PROCEDURE — 25010000002 HYDROMORPHONE PER 4 MG: Performed by: INTERNAL MEDICINE

## 2017-01-13 PROCEDURE — 97165 OT EVAL LOW COMPLEX 30 MIN: CPT

## 2017-01-13 PROCEDURE — 25010000002 ENOXAPARIN PER 10 MG: Performed by: ORTHOPAEDIC SURGERY

## 2017-01-13 PROCEDURE — 97162 PT EVAL MOD COMPLEX 30 MIN: CPT | Performed by: PHYSICAL THERAPIST

## 2017-01-13 PROCEDURE — 97166 OT EVAL MOD COMPLEX 45 MIN: CPT

## 2017-01-13 PROCEDURE — 85027 COMPLETE CBC AUTOMATED: CPT | Performed by: ORTHOPAEDIC SURGERY

## 2017-01-13 PROCEDURE — 82962 GLUCOSE BLOOD TEST: CPT

## 2017-01-13 PROCEDURE — 63710000001 INSULIN ASPART PER 5 UNITS: Performed by: INTERNAL MEDICINE

## 2017-01-13 PROCEDURE — 80048 BASIC METABOLIC PNL TOTAL CA: CPT | Performed by: ORTHOPAEDIC SURGERY

## 2017-01-13 PROCEDURE — 25010000003 CEFAZOLIN IN DEXTROSE 2-4 GM/100ML-% SOLUTION: Performed by: ORTHOPAEDIC SURGERY

## 2017-01-13 PROCEDURE — 97530 THERAPEUTIC ACTIVITIES: CPT

## 2017-01-13 PROCEDURE — 97535 SELF CARE MNGMENT TRAINING: CPT

## 2017-01-13 RX ORDER — DOCUSATE SODIUM 100 MG/1
100 CAPSULE, LIQUID FILLED ORAL 2 TIMES DAILY
Status: DISCONTINUED | OUTPATIENT
Start: 2017-01-13 | End: 2017-01-14 | Stop reason: HOSPADM

## 2017-01-13 RX ORDER — POLYETHYLENE GLYCOL 3350 17 G/17G
17 POWDER, FOR SOLUTION ORAL 2 TIMES DAILY
Status: DISCONTINUED | OUTPATIENT
Start: 2017-01-13 | End: 2017-01-14 | Stop reason: HOSPADM

## 2017-01-13 RX ORDER — ASPIRIN 81 MG/1
81 TABLET, CHEWABLE ORAL DAILY
Status: DISCONTINUED | OUTPATIENT
Start: 2017-01-13 | End: 2017-01-14 | Stop reason: HOSPADM

## 2017-01-13 RX ADMIN — INSULIN ASPART 3 UNITS: 100 INJECTION, SOLUTION INTRAVENOUS; SUBCUTANEOUS at 21:47

## 2017-01-13 RX ADMIN — HYDROCODONE BITARTRATE AND ACETAMINOPHEN 1 TABLET: 7.5; 325 TABLET ORAL at 00:09

## 2017-01-13 RX ADMIN — INSULIN ASPART 3 UNITS: 100 INJECTION, SOLUTION INTRAVENOUS; SUBCUTANEOUS at 12:25

## 2017-01-13 RX ADMIN — HYDROCODONE BITARTRATE AND ACETAMINOPHEN 1 TABLET: 7.5; 325 TABLET ORAL at 06:46

## 2017-01-13 RX ADMIN — HYDROMORPHONE HYDROCHLORIDE 0.5 MG: 1 INJECTION, SOLUTION INTRAMUSCULAR; INTRAVENOUS; SUBCUTANEOUS at 02:05

## 2017-01-13 RX ADMIN — HYDROCODONE BITARTRATE AND ACETAMINOPHEN 1 TABLET: 7.5; 325 TABLET ORAL at 22:44

## 2017-01-13 RX ADMIN — AMLODIPINE BESYLATE 2.5 MG: 2.5 TABLET ORAL at 08:25

## 2017-01-13 RX ADMIN — LISINOPRIL 20 MG: 20 TABLET ORAL at 08:25

## 2017-01-13 RX ADMIN — ASPIRIN 81 MG: 81 TABLET, CHEWABLE ORAL at 20:55

## 2017-01-13 RX ADMIN — HYDROCODONE BITARTRATE AND ACETAMINOPHEN 1 TABLET: 7.5; 325 TABLET ORAL at 14:38

## 2017-01-13 RX ADMIN — INSULIN ASPART 3 UNITS: 100 INJECTION, SOLUTION INTRAVENOUS; SUBCUTANEOUS at 08:25

## 2017-01-13 RX ADMIN — HYDROCODONE BITARTRATE AND ACETAMINOPHEN 1 TABLET: 7.5; 325 TABLET ORAL at 11:08

## 2017-01-13 RX ADMIN — HYDROCODONE BITARTRATE AND ACETAMINOPHEN 1 TABLET: 7.5; 325 TABLET ORAL at 18:40

## 2017-01-13 RX ADMIN — CEFAZOLIN SODIUM 2 G: 2 INJECTION, SOLUTION INTRAVENOUS at 02:03

## 2017-01-13 RX ADMIN — METFORMIN HYDROCHLORIDE 850 MG: 850 TABLET ORAL at 08:25

## 2017-01-13 RX ADMIN — METFORMIN HYDROCHLORIDE 850 MG: 850 TABLET ORAL at 17:40

## 2017-01-13 RX ADMIN — INSULIN ASPART 5 UNITS: 100 INJECTION, SOLUTION INTRAVENOUS; SUBCUTANEOUS at 02:04

## 2017-01-13 RX ADMIN — POTASSIUM CHLORIDE, DEXTROSE MONOHYDRATE AND SODIUM CHLORIDE 75 ML/HR: 150; 5; 450 INJECTION, SOLUTION INTRAVENOUS at 02:04

## 2017-01-13 RX ADMIN — SIMVASTATIN 10 MG: 10 TABLET, FILM COATED ORAL at 20:55

## 2017-01-13 RX ADMIN — ENOXAPARIN SODIUM 40 MG: 40 INJECTION SUBCUTANEOUS at 08:25

## 2017-01-13 RX ADMIN — POLYETHYLENE GLYCOL 3350 17 G: 17 POWDER, FOR SOLUTION ORAL at 17:40

## 2017-01-13 RX ADMIN — DOCUSATE SODIUM 100 MG: 100 CAPSULE, LIQUID FILLED ORAL at 17:40

## 2017-01-13 RX ADMIN — CEFAZOLIN SODIUM 2 G: 2 INJECTION, SOLUTION INTRAVENOUS at 08:25

## 2017-01-13 RX ADMIN — INSULIN ASPART 3 UNITS: 100 INJECTION, SOLUTION INTRAVENOUS; SUBCUTANEOUS at 17:40

## 2017-01-13 RX ADMIN — LISINOPRIL 20 MG: 20 TABLET ORAL at 17:40

## 2017-01-13 NOTE — OP NOTE
Date of Procedure:  01/12/2017     PREOPERATIVE DIAGNOSIS: Right comminuted distal tibia and fibula fractures.     POSTOPERATIVE DIAGNOSIS: Right comminuted distal tibia and fibular fractures.     PROCEDURE PERFORMED: Right tibial intramedullary nail.     SURGEON: Colton Nascimento MD     ASSISTANT: MARYBEL De Souza     ANESTHESIA: General endotracheal.     COMPLICATION: None.     SPECIMEN: None.     DRAIN: None.     IMPLANT: Bob Natural Nail suprapatellar approach 360 mm x 10 mm nail, 3 distal locking screws, and 2 proximal locking screws.     INDICATIONS: The patient is a 66-year-old male who was at his job when he fell off a ladder injuring the right leg. He was unable to bear weight. He was brought to the emergency room where he was noted to have distal tibia and fibula fractures. He was placed into a splint and admitted. He was cleared by the medical service for surgery. It was felt he would benefit from an intramedullary nail.     INFORMED CONSENT:  Risks, benefits, and alternatives were discussed with the patient and informed consent was obtained. Risks include, but are not limited to, infection, bleeding, nerve injury, blood clots, risks associated anesthesia, need for further surgery, and possibly death.     DESCRIPTION OF PROCEDURE: On 01/12/2017 the patient was seen in the preoperative holding area where his surgical site was marked. Preoperative antibiotics were received. History and physical and consent were updated. The patient was taken to the operating room and placed on the operating table in supine position. General anesthesia was provided without complication. The right lower extremity was then bumped up with blankets for a suprapatellar approach. The leg was then prepped and draped in typical sterile fashion. Timeout performed, confirming the correct surgical site and procedure. At this point a longitudinal incision was made just proximal to the superior pole of the patella. Incision was  taken down through skin and subcutaneous tissues. The quadriceps tendon was then split in line with its fibers approximately 1 inch to gain access into the joint. A guide was carefully placed down to the starting point at the proximal tibia. Pin was then placed in a longitudinal fashion and checked on both the AP and lateral planes to be centered down the canal. It was then advanced followed by the starting reamer. A ball-tipped long guidewire was then placed down the canal of the tibia. Reduction was held at the fracture site. The guidewire was advanced all the way down to the center/center position of the tibial plafond on both the AP and lateral planes. It was subsequently measured and reamed with the flexible reaming system. The nail was then placed and seated all the way physeal scar distally. Fracture reduction was maintained on both planes. Next 2 distal locking screws were placed from medial to lateral using perfect circles technique. In similar fashion proximally 2 static screws were placed from medial to lateral. Their lengths were checked with the fluoroscopy. At this point a 3rd anterior-to-posterior screw was placed, again using perfect circles distally. Final AP, oblique, and lateral imaging was taken of the tibia, confirming appropriate reduction of fracture and placement of hardware. As noted, there was significant comminution of the tibia and lateral fibula. Based on the injury pattern it was felt that the tibial nail was sufficient and the fibula would heal on its own. At this point the wounds were thoroughly irrigated. Quadriceps tendon was closed with 0 Vicryl suture followed by 2-0 Vicryl for subcutaneous tissues and staples for skin. Xeroform, 4 x 4's, ABD pads, cast padding, and a well-padded posterior splint were placed. The patient was subsequently awakened from general anesthesia in stable condition and taken to the PACU postoperatively.     PLAN: The patient will be nonweightbearing on the  right lower extremity. He will be working with Physical Therapy. He will have standard 24-hour antibiotic protocol. He will start Lovenox while in the hospital and can transition to aspirin upon discharge home. No complications were encountered during this surgical procedure.       Colton Nascimento M.D.  BAD:ab  D:   01/12/2017 18:30:16  T:   01/12/2017 22:40:48  Job ID:   [id number]  Document ID:   [id number]  cc:

## 2017-01-13 NOTE — ANESTHESIA POSTPROCEDURE EVALUATION
Patient: Lucas Deluca    Procedure Summary     Date Anesthesia Start Anesthesia Stop Room / Location    01/12/17 [id number]  TED OR 24 /  TED MAIN OR       Procedure Diagnosis Surgeon Provider    TIBIA INTRAMEDULLARY NAIL/SHANNON INSERTION (Right Leg Lower) No diagnosis on file. MD Jose Calabrese MD          Anesthesia Type: general  Last vitals  BP     Temp      Pulse     Resp      SpO2        Post Anesthesia Care and Evaluation    Patient location during evaluation: bedside  Patient participation: complete - patient participated  Level of consciousness: awake  Pain management: adequate  Airway patency: patent  Anesthetic complications: No anesthetic complications    Cardiovascular status: acceptable  Respiratory status: acceptable  Hydration status: acceptable

## 2017-01-13 NOTE — PROGRESS NOTES
Acute Care - Occupational Therapy Initial Evaluation   Jose Raul     Patient Name: Lucas Deluca  : 1950  MRN: [id number]  Today's Date: 2017  Onset of Illness/Injury or Date of Surgery Date: 17     Referring Physician: Pily    Admit Date: 2017       ICD-10-CM ICD-9-CM   1. Closed fracture of distal end of fibula with tibia, right, initial encounter S82.301A 827.0    S82.831A    2. Fall, initial encounter W19.XXXA E888.9   3. Difficulty walking R26.2 719.7     Patient Active Problem List   Diagnosis   • Closed fracture of distal end of fibula with tibia     Past Medical History   Diagnosis Date   • Diabetes mellitus    • Hyperlipidemia    • Hypertension    • Macular degeneration      Past Surgical History   Procedure Laterality Date   • Coronary artery bypass graft          • Coronary angioplasty with stent placement     • Tonsillectomy     • Pr treat tibial shaft fx, intramed implant Right 2017     Procedure: TIBIA INTRAMEDULLARY NAIL/SHANNON INSERTION;  Surgeon: Colton Nascimento MD;  Location: [facility] OR;  Service: Orthopedics          OT ASSESSMENT FLOWSHEET (last 72 hours)      OT Evaluation       17 1423 17 1237 17 1117 17 0237 17 1655    Rehab Evaluation    Document Type evaluation  -ER  evaluation  -KH      Subjective Information agree to therapy;complains of;pain  -ER  agree to therapy;complains of;pain  -KH      Patient Effort, Rehab Treatment good  -ER  good  -KH      Symptoms Noted During/After Treatment increased pain  -ER  increased pain  -KH      Symptoms Noted Comment RN notified pt asking for pain meds  -ER        General Information    Patient Profile Review yes  -ER  yes  -KH      Onset of Illness/Injury or Date of Surgery Date 17  -ER        Referring Physician Pily  -ER  Dr. 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  -ER  independent:  -KH      Equipment Currently Used at Home none  -ER  walker, standard  -KH walker, standard  -QN     Plans/Goals Discussed With patient;agreed upon  -ER  patient  -KH      Risks Reviewed patient:;increased discomfort  -ER  patient:  -      Benefits Reviewed patient:;increase independence;increase strength  -ER  patient:  -      Barriers to Rehab   none identified  -      Living Environment    Lives With alone  -ER [facility]  -[facility] alone  -  alone  -JT    Living Arrangements house  -ER house  -[facility] house  -  house  -JT    Home Accessibility stairs (1 railing present)  -ER  stairs to enter home  -  stairs to enter home  -JT    Number of Stairs to Enter Home 3  -ER  3  -KH  3  -JT    Stair Railings at Home outside, present on right side  -ER  outside, present on right side  -  outside, present on left side  -JT    Type of Financial/Environmental Concern none  -ER  none  -KH  none  -JT    Transportation Available  car;family or friend will provide  -[facility] car  -  car  -JT    Living Environment Comment Pt has 3 steps to climb to get into his home, pt reports plans to d/c to [facility] and she has no steps to enter  -ER  Patient will need to hop up the steps  -      Clinical Impression    OT Diagnosis impaired (I) for LB ADLS, impaired safety with fxl tfers d/t NWB status of RLE  -ER        Impairments Found (describe specific impairments) ergonomics and body mechanics;gait, locomotion, and balance  -ER        Patient/Family Goals Statement return to baseline  -ER        Criteria for Skilled Therapeutic Interventions Met yes;treatment  indicated  -ER        Rehab Potential good, to achieve stated therapy goals  -ER        Therapy Frequency 3-5 times/wk  -ER        Predicted Duration of Therapy Intervention (days/wks) 1 week in Acute setting  -ER        Anticipated Equipment Needs At Discharge bedside commode  -ER        Anticipated Discharge Disposition inpatient rehabilitation facility;home with home health   Pt reports he is going home with  OT/PT  -ER        Pain Assessment    Pain Assessment 0-10  -ER  0-10  -KH      Pain Score 7  -ER  3  -KH      Post Pain Score 7  -ER  5  -KH      Pain Type Acute pain  -ER  Acute pain  -KH      Pain Location Leg  -ER  Knee  -KH      Pain Orientation Right  -ER  Right  -KH      Pain Intervention(s) Medication (See MAR)  -ER  Repositioned  -      Vision Assessment/Intervention    Visual Impairment WFL  -ER  WFL  -      Cognitive Assessment/Intervention    Current Cognitive/Communication Assessment functional  -ER  functional  -      Orientation Status oriented x 4  -ER  oriented x 4  -KH      Follows Commands/Answers Questions 100% of the time  -ER  100% of the time  -      Personal Safety WNL/WFL  -ER  WNL/WFL  -      Personal Safety Interventions fall prevention program maintained;gait belt;nonskid shoes/slippers when out of bed  -ER  fall prevention program maintained;gait belt;nonskid shoes/slippers when out of bed  -KH      Short/Long Term Memory intact short term memory;intact long term memory  -ER  intact short term memory;intact long term memory  -      ROM (Range of Motion)    General ROM no range of motion deficits identified   BUE WLF, R shoulder h/o RTC injury  -ER  no range of motion deficits identified  -      General ROM Detail   R LE NT decondary to cast  -      MMT (Manual Muscle Testing)    General MMT Assessment no strength deficits identified  -ER  no strength deficits identified  -      General MMT Assessment Detail   R lower leg NT secondary to cast. Grossly 4+/5  otherwise  -KH      Bed Mobility, Assessment/Treatment    Bed Mobility, Assistive Device   bed rails;head of bed elevated  -KH      Bed Mob, Supine to Sit, [place]   minimum assist (75% patient effort)  -      Bed Mob, Sit to Supine, [place] minimum assist (75% patient effort)   for RLE  -ER  not tested  -      Bed Mobility, Impairments   strength decreased;pain  -KH      Transfer Assessment/Treatment    Transfers, Chair-Bed [place] contact guard assist  -ER        Transfers, Bed-Chair-Bed, Assist Device rolling walker  -ER        Transfers, Sit-Stand [place] contact guard assist  -ER  minimum assist (75% patient effort)  -KH      Transfers, Stand-Sit [place] contact guard assist  -ER  minimum assist (75% patient effort)  -KH      Transfers, Sit-Stand-Sit, Assist Device rolling walker  -ER  rolling walker  -KH      Toilet Transfer, [place] contact guard assist  -ER        Toilet Transfer, Assistive Device bedside commode without drop arms   simulated  -ER        Transfer, Maintain Weight Bearing Status   able to maintain weight bearing status  -      Transfer, Safety Issues   weight-shifting ability decreased;balance decreased during turns  -KH      Transfer, Impairments   strength decreased;pain  -KH      Transfer, Comment Pt given VC and ed on hand placement and tech for safe tfer using RWX. Pt able to maintain NWB status   -ER  Elevated heigh of bed for transfer  -      Lower Body Bathing Assessment/Training    LB Bathing Assess/Train Assistive Device long-handled sponge  -ER        LB Bathing Assess/Train, [place] Level minimum assist (75% patient effort)  -ER        LB Bathing Assess/Train, Impairments impaired balance;pain  -ER        LB Bathing Assess/Train, Comment ed pt on use of LH sponge to wash lower legs/feet and back as needed d/t pain limiations  -ER        Upper Body Dressing Assessment/Training    UB Dressing Assess/Train, Position sitting  -ER        UB  Dressing Assess/Train, [place] independent   simulated  -ER        Lower Body Dressing Assessment/Training    LB Dressing Assess/Train, Clothing Type doffing:;donning:;pants;socks  -ER        LB Dressing Assess/Train, Assist Device reacher  -ER        LB Dressing Assess/Train, Position sitting  -ER        LB Dressing Assess/Train, [place] moderate assist (50% patient effort);minimum assist (75% patient effort)   MOD without AE, MIN with AE  -ER        LB Dressing Assess/Train, Impairments impaired balance;pain;decreased flexibility  -ER        LB Dressing Assess/Train, Comment Pt able to d/d sock on LLE, wearing cast on RUE and no sock able to fit over (pt is NWBa nd not putting leg completly on floor). Pt ed on dressing tech to increase (I) for LBD at home using reach as well as other compensatory tech. Pt's friend reports she has a reacher she will lend him for home use.   -ER        Grooming Assessment/Training    Grooming Assess/Train, Position sitting  -ER        Grooming Assess/Train, Indepen Level set up required;conditional independence  -ER        General Therapy Interventions    Planned Therapy Interventions ADL retraining;balance training;transfer training;activity intolerance;adaptive equipment training  -ER        Positioning and Restraints    Pre-Treatment Position sitting in chair/recliner  -ER  in bed  -      Post Treatment Position bed  -ER  chair  -KH      In Bed supine;call light within reach;encouraged to call for assist;with family/caregiver  -ER        In Chair   reclined;encouraged to call for assist;call light within reach;RLE elevated  -        01/11/17 0753                General Information    Equipment Currently Used at Home none  -JT        Functional Level Prior    Ambulation 0-->independent  -JT        Transferring 0-->independent  -JT        Toileting 0-->independent  -JT        Bathing 0-->independent  -JT        Dressing 0-->independent  -JT        Eating  0-->independent  -JT        Communication 0-->understands/communicates without difficulty  -JT        Swallowing 0-->swallows foods/liquids without difficulty  -JT          User Key  (r) = Recorded By, (t) = Taken By, (c) = Cosigned By    Initials Name Effective Dates    ER Roxana Flores OTR 04/13/15 -     [place] Kinga Rios, RN 07/28/16 -     PEDRO PABLO Peraza, RN 06/16/16 -     JT Ragini Guteirrez RN 06/16/16 -     HANNAH Zapata, PT 06/22/16 -            Occupational Therapy Education     Title: PT OT SLP Therapies (Active)     Topic: Occupational Therapy (Done)     Point: ADL training (Done)    Description: Instruct learner(s) on proper safety adaptation and remediation techniques during self care or transfers.   Instruct in proper use of assistive devices.    Learning Progress Summary    Learner Readiness Method Response Comment Documented by Status   Patient Acceptance E [facility] 01/13/17 1433 Done               Point: Precautions (Done)    Description: Instruct learner(s) on prescribed precautions during self-care and functional transfers.    Learning Progress Summary    Learner Readiness Method Response Comment Documented by Status   Patient Acceptance E [facility] 01/13/17 1433 Done               Point: Body mechanics (Done)    Description: Instruct learner(s) on proper positioning and spine alignment during self-care, functional mobility activities and/or exercises.    Learning Progress Summary    Learner Readiness Method Response Comment Documented by Status   Patient Acceptance E [facility] 01/13/17 1433 Done                      User Key     Initials Effective Dates Name Provider Type Discipline    ER 04/13/15 -  Roxana Flores, OTR Occupational Therapist OT                  OT Recommendation and Plan  Anticipated Equipment Needs At Discharge: bedside commode  Anticipated Discharge Disposition: inpatient rehabilitation facility, home with home health (Pt reports he is going home with  OT/PT)  Planned Therapy  Interventions: ADL retraining, balance training, transfer training, activity intolerance, adaptive equipment training  Therapy Frequency: 3-5 times/wk  Plan of Care Review  Plan Of Care Reviewed With: patient  Progress: progress towards functional goals is fair  Outcome Summary/Follow up Plan: PT s/p tib/fib IM nailing, NWB on RLE. Pt presenting with impaired (I) and safety for fxl tfers to BSC/bed/chair as well as impaired (I) for ADLs. Pt will benefit from skilled OT while IP to maximize (I) and safety before d/c home          OT Goals       01/13/17 1447          Bed Mobility OT LTG    Bed Mobility OT LTG, Date Established 01/13/17  -ER      Bed Mobility OT LTG, Time to Achieve 1 wk  -ER      Bed Mobility OT LTG, Activity Type all bed mobility  -ER      Bed Mobility OT LTG, [place] Level conditional independence   for ADL (I)  -ER      Transfer Training OT LTG    Transfer Training OT LTG, Date Established 01/13/17  -ER      Transfer Training OT LTG, Time to Achieve 1 wk  -ER      Transfer Training OT LTG, Activity Type toilet  -ER      Transfer Training OT LTG, [place] Level supervision required  -ER      Transfer Training OT LTG, Assist Device walker, rolling;commode, bedside  -ER      Toileting OT LTG    Toileting Goal OT LTG, Date Established 01/13/17  -ER      Toileting Goal OT LTG, Time to Achieve 1 wk  -ER      Toileting Goal OT LTG, [place] Level conditional independence  -ER      Toileting Goal OT LTG, Assist Device --   BSC  -ER      LB Dressing OT LTG    LB Dressing Goal OT LTG, Date Established 01/13/17  -ER      LB Dressing Goal OT LTG, Time to Achieve 1 wk  -ER      LB Dressing Goal OT LTG, Activity Type Pt to don pants, underwear and L sock using reacher  -ER      LB Dressing Goal OT LTG, [place] Level conditional independence  -ER      LB Dressing Goal OT LTG, Adaptive Equipment reacher  -ER        User Key  (r) = Recorded By, (t) = Taken By, (c) = Cosigned By    Initials Name  Provider Type    ER YOHANNES Barron Occupational Therapist                Outcome Measures       01/13/17 1423 01/13/17 1100       How much help from another person do you currently need...    Turning from your back to your side while in flat bed without using bedrails?  3  -KH     Moving from lying on back to sitting on the side of a flat bed without bedrails?  3  -KH     Moving to and from a bed to a chair (including a wheelchair)?  3  -KH     Standing up from a chair using your arms (e.g., wheelchair, bedside chair)?  3  -KH     Climbing 3-5 steps with a railing?  2  -KH     To walk in hospital room?  2  -KH     AM-PAC 6 Clicks Score  16  -KH     How much help from another is currently needed...    Putting on and taking off regular lower body clothing? 3  -ER      Bathing (including washing, rinsing, and drying) 3  -ER      Toileting (which includes using toilet bed pan or urinal) 3  -ER      Putting on and taking off regular upper body clothing 4  -ER      Taking care of personal grooming (such as brushing teeth) 4  -ER      Eating meals 4  -ER      Score 21  -ER      Functional Assessment    Outcome Measure Options AM-PAC 6 Clicks Daily Activity (OT)  -ER AM-PAC 6 Clicks Basic Mobility (PT)  -       User Key  (r) = Recorded By, (t) = Taken By, (c) = Cosigned By    Initials Name Provider Type    ER YOHANNES Barron Occupational Therapist    HANNAH Zapata, PT Physical Therapist          Time Calculation:   OT Start Time: 1402  OT Stop Time: 1444  OT Time Calculation (min): 42 min  OT Non-Billable Time (min): 10 min    Therapy Charges for Today     Code Description Service Date Service Provider Modifiers Qty    [id number]  OT SELF CARE/MGMT/TRAIN EA 15 MIN 1/13/2017 YOHANNES Barron GO 1    [id number]  OT THERAPEUTIC ACT EA 15 MIN 1/13/2017 YOHANNES Barron GO 1    [id number]  OT EVAL LOW COMPLEXITY 2 1/13/2017 YOHANNES Barron GO 1               YOHANNES Barron  1/13/2017

## 2017-01-13 NOTE — PLAN OF CARE
Problem: Patient Care Overview (Adult)  Goal: Plan of Care Review  Outcome: Ongoing (interventions implemented as appropriate)    01/13/17 1123 01/13/17 1215 01/13/17 1539   Coping/Psychosocial Response Interventions   Plan Of Care Reviewed With --  patient --    Patient Care Overview   Progress progress towards functional goals is fair --  --    Outcome Evaluation   Outcome Summary/Follow up Plan --  --  pt working with therapy. vss. pain is controlled with PO pain medication. family at bedside. possible d/c in am.        Goal: Adult Individualization and Mutuality  Outcome: Ongoing (interventions implemented as appropriate)  Goal: Discharge Needs Assessment  Outcome: Ongoing (interventions implemented as appropriate)    Problem: Fall Risk (Adult)  Goal: Identify Related Risk Factors and Signs and Symptoms  Outcome: Ongoing (interventions implemented as appropriate)  Goal: Absence of Falls  Outcome: Ongoing (interventions implemented as appropriate)    Problem: Pain, Acute (Adult)  Goal: Identify Related Risk Factors and Signs and Symptoms  Outcome: Ongoing (interventions implemented as appropriate)  Goal: Acceptable Pain Control/Comfort Level  Outcome: Ongoing (interventions implemented as appropriate)    Problem: Perioperative Period (Adult)  Goal: Signs and Symptoms of Listed Potential Problems Will be Absent or Manageable (Perioperative Period)  Outcome: Ongoing (interventions implemented as appropriate)

## 2017-01-13 NOTE — PAYOR COMM NOTE
"UR NURSE:  MODESTO CHAVES  P:  [phone]  F:  [phone]    Phillip Landry (66 y.o. Male)     Date of Birth Social Security Number Address Home Phone MRN    1950  [id number] [place] [address] [phone] [id number]    Tenriism Marital Status          None        Admission Date Admission Type Admitting Provider Attending Provider Department, Room/Bed    1/11/17 Emergency Matilde Vasquez MD Edling, Stephen A, MD [address], P789/1    Discharge Date Discharge Disposition Discharge Destination                      Attending Provider: Aquiles Toribio MD     Allergies:  No Known Allergies    Isolation:  None   Infection:  None   Code Status:  FULL    Ht:  70\" (177.8 cm)   Wt:  195 lb (88.5 kg)    Admission Cmt:  None   Principal Problem:  None                Active Insurance as of 1/11/2017     Primary Coverage     Payor Plan Insurance Group Employer/Plan Group    WORKERS COMPENSATION MISC WORKERS COMPENSATION      Coverage Address Coverage Phone Number Effective From Effective To    [address] [phone] 1/11/2017     [place] [address]       Subscriber Name Subscriber Birth Date Member ID       PHILLIP LANDRY [id number]                  Emergency Contacts      (Rel.) Home Phone Work Phone Mobile Phone    Hugh Landry (Son) [phone] -- --               History & Physical      H&P signed by Matilde Vasquez MD at 1/12/2017 11:31 AM              PRIMARY CARE PHYSICIAN:  Dr. Olivares     CHIEF COMPLAINT: Fall with left ankle injury.     HISTORY OF PRESENT ILLNESS: This is a 66-year-old man who is unknown to our service. He was at work today and coming down a ladder when the ladder just went out from under him. He landed on his left side and injured his right foot and right shoulder. He was brought to the emergency room for a right ankle deformity, and was found to have acute comminuted fractures involving shafts of the distal femur and " fibula with marked angulation of both fracture sites. We have been asked to admit him as he has several underlying medical issues. Currently, his pain is fairly well controlled. He is getting relief with the Dilaudid. He was not able to bear weight after the injury. The pain was severe prior to the Dilaudid dose. The pain is sharp and severe and is all around the right foot. The right shoulder was a little bit sore earlier, but that has now resolved. He also felt some soreness in the right hand, but again that has resolved too. No other associated symptoms or exacerbating or alleviating factors.  His injury occurred at work.     PAST MEDICAL/SURGICAL HISTORY:  1.  Coronary artery disease with CABG and stent in 2011.  2.  Diabetes mellitus, type 2, since 2004.  3.  Hypertension.  4.  Hyperlipidemia.   5.  Tonsillectomy.     HOME MEDICATIONS:  1.  Aspirin 81 mg daily.   2.  Plavix 75 mg daily.   3.  Norvasc 2.5 mg daily.   4.  Lantus 63 units at bedtime.   5.  NovoLog 10-15 units t.i.d. with meals.  6.  Prinivil 20 mg b.i.d.   7.  Glucophage 850 mg b.i.d.  8.  TriCor, dosage is not noted.  9.  Zocor 20 mg at bedtime.     ALLERGIES: No known drug allergies.     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  He quit smoking 25 years ago, but before that had smoked up to 2 and a half packs a day. He had started smoking at age 16. He drinks alcohol rarely. Last year, he had 1 drink.     FAMILY HISTORY:  Positive for heart disease, diabetes, and stroke. Negative for emphysema.     REVIEW OF SYSTEMS: No sore throat. No earache. No vision changes. No hearing changes. No polyuria or polydipsia. No paresthesias. No ankle swelling. No chest pain. No palpitations. No PND.  No orthopnea. No cough or wheezing. No shortness of breath.  Weight has been stable over the last several months. Appetite has been good. Energy level has been good. No constipation or diarrhea. No nausea or vomiting. No dysuria. No rash or  skin changes. The rest of the 10-point review of systems is otherwise negative, except for what is noted above.     PHYSICAL EXAMINATION:  GENERAL: No acute distress. He looks about his stated age. Pleasant, alert, cooperative, neatly groomed, well-developed.   VITAL SIGNS: Temperature 97.1, pulse 66, respirations 16, blood pressure 170/78, weight is 195 pounds, O2 saturation 98% on room air.   HEENT: Normocephalic, atraumatic. Pupils are equal, round, and reactive to light and accommodation. No nystagmus, and extraocular movements intact. Lids and conjunctivae normal without ptosis or icterus.  Oropharynx: He has upper dentures.  No thrush. No masses or lesions. External ears and nose are normal on inspection and hearing is intact to normal conversational tones.   NECK: Supple without lymphadenopathy, thyromegaly, JVD, or bruit. Trachea midline.   HEART: Regular without murmur, rub or gallop. Normal S1, S2.   LUNGS: Clear. Breath sounds equal.  Good air movement.  No usage of accessory muscles of respiration.   ABDOMEN: Soft, nontender. Positive bowel sounds. No hepatosplenomegaly, rebound, guarding or mass.   EXTREMITIES: The right ankle and foot are splinted and bandaged. No edema at the left ankle. No clubbing or cyanosis. No increased warmth, erythema or effusions over the upper extremity joints or at the left extremity.    VASCULAR: Left pedal pulses intact. Radial and carotid pulses intact.   SKIN: No rash, ulceration, or nodule excluding examination of the right foot and ankle.   NEUROLOGIC: Cranial nerves intact. Sensation intact with the plastic filament at the distal left lower extremity.     DIAGNOSTIC DATA: CMP is normal. BUN 14, creatinine 1. INR is 1.  PTT is normal. White count is 15.9, hemoglobin 13.3, platelets 311,000, segs 78, lymphocytes 14, red cell indices are normal. No urinalysis. No EKG.  No chest x-ray. Plain films of the ankle show acute comminuted fractures involving shafts of distal  femur and fibula with marked angulation of both fracture sites.     IMPRESSION AND PLAN:  1.  Complicated right ankle fracture. IV and p.o. pain medications as needed with IV antiemetics as needed. A splint was placed in the ER. He is at acceptable risk for the operating room tomorrow. EKG has been ordered. He has no evidence of any acute heart failure or ischemia. Dr. Nascimento was consulted through the emergency room for surgery tomorrow.   2.  Diabetes mellitus type 2 with cardiovascular complications. Sugars are a bit low now. He is on Lantus. I will give him less of the Levemir:  Instead of 63 units, just 40 for now. He has not eaten all day. Avoid hypoglycemia. Sliding scale regimen as needed.   3.  Coronary artery disease with CABG and stent. Stable. Await EKG.   4.  Hyperlipidemia. Continue statin.   5.  Hypertension. Continue with medications.     I have discussed the above with the patient.       Matilde Vasquez M.D.  JH:robbin  D:   01/11/2017 18:31:01  T:   01/11/2017 18:57:40  Job ID:   [id number]  Document ID:   [id number]  cc:   Dr. Olivares         Electronically signed by Matilde Vasquez MD at 1/12/2017 11:31 AM      Matilde Vasquez MD at 1/11/2017  6:12 PM          Dictated    1. Right ankle fracture- acceptable risk for OR tomorrow    2. DM2 with cardiovascular complications. Sugars low now. Hasn't eaten all day.  3. CAD with CABG and stent, 2011. Stable tho no EKG done in ER  4. Lipids  5. HTN    12664     Electronically signed by Matilde Vasquez MD at 1/11/2017  6:31 PM           Emergency Department Notes      JASON Kuo at 1/11/2017 10:27 AM      Procedure Orders:    1. Orthopedic Injury Treatment [[id number]] ordered by JASON Kuo at 01/11/17 1525                  EMERGENCY DEPARTMENT ENCOUNTER    CHIEF COMPLAINT  Chief Complaint: lower extremity injury  History given by: patient, EMS  History limited by: none  Room Number: 05/05  PMD: No Known Provider  Cardiologist-   "Karyna    HPI:  Pt is a 66 y.o. male who presents complaining of right ankle injury s/p fall which occurred this morning. While standing on a ladder at a loading dock, the ladder slipped and pt lost his balance. He subsequently fell 4ft and injured right ankle. He denies right knee pain, numbness, blow to head, loss of consciousness, neck pain, back pain, abd pain, chest pain, trouble breathing, nausea, vomiting, and sustaining any other injury. He has not been able to ambulate since the event. He is on plavix. Past Medical History of HTN , CAD, and diabetes.     Duration: occurred today prior to ED arrival  Timing: constant  Location: right ankle  Radiation: none  Quality: \"pain\"  Intensity/Severity: moderate  Progression: unchanged   Associated Symptoms: unable to bear weight, right ankle swelling  Aggravating Factors: movement   Alleviating Factors: none  Previous Episodes: none  Treatment before arrival: splinting of right ankle per EMS    PAST MEDICAL HISTORY  Active Ambulatory Problems     Diagnosis Date Noted   • No Active Ambulatory Problems     Resolved Ambulatory Problems     Diagnosis Date Noted   • No Resolved Ambulatory Problems     Past Medical History   Diagnosis Date   • Diabetes mellitus    • Hyperlipidemia    • Hypertension    • Macular degeneration        PAST SURGICAL HISTORY  Past Surgical History   Procedure Laterality Date   • Coronary artery bypass graft       2004   • Coronary angioplasty with stent placement     • Tonsillectomy         FAMILY HISTORY  History reviewed. No pertinent family history.    SOCIAL HISTORY  Social History     Social History   • Marital status:      Spouse name: N/A   • Number of children: N/A   • Years of education: N/A     Occupational History   • Not on file.     Social History Main Topics   • Smoking status: Former Smoker   • Smokeless tobacco: Not on file   • Alcohol use No   • Drug use: No   • Sexual activity: Defer     Other Topics Concern   • Not " on file     Social History Narrative   • No narrative on file         ALLERGIES  Review of patient's allergies indicates no known allergies.    REVIEW OF SYSTEMS  Review of Systems   Constitutional: Negative for chills and fever.   HENT: Negative for sore throat.    Respiratory: Negative for shortness of breath.    Cardiovascular: Negative for chest pain.   Gastrointestinal: Negative for nausea and vomiting.   Genitourinary: Negative for dysuria.   Musculoskeletal: Positive for gait problem. Negative for back pain.        Right ankle pain and swelling   Skin: Positive for wound (superficial abrasions to RLE). Negative for rash.   Neurological: Negative for dizziness.   Psychiatric/Behavioral: The patient is not nervous/anxious.        PHYSICAL EXAM  ED Triage Vitals   Temp Heart Rate Resp BP SpO2   01/11/17 1007 01/11/17 1007 01/11/17 1007 01/11/17 1007 01/11/17 [facility]   97.7 °F (36.5 °C) 67 18 183/67 99 % WNL      Temp src Heart Rate Source Patient Position BP Location FiO2 (%)   01/11/17 [facility] 01/11/17 [facility] -- -- --   Tympanic Monitor          Physical Exam   Constitutional: He is oriented to person, place, and time and well-developed, well-nourished, and in no distress. No distress.   HENT:   Head: Normocephalic and atraumatic.   Mouth/Throat: Mucous membranes are normal.   Eyes: No scleral icterus.   Neck: Normal range of motion.   No c-spine tenderness   Cardiovascular: Normal rate, regular rhythm, normal heart sounds and intact distal pulses.    Pulses:       Dorsalis pedis pulses are 2+ on the right side, and 2+ on the left side.        Posterior tibial pulses are 2+ on the right side, and 2+ on the left side.   Pulmonary/Chest: Effort normal and breath sounds normal. He exhibits no tenderness.   Musculoskeletal: He exhibits deformity (obvious to the right ankle).        Right knee: No tenderness found.        Right ankle: He exhibits decreased range of motion, swelling and deformity. Tenderness. Medial  malleolus tenderness found.   No t-spine or l-spine tenderness, all other extremities are atraumatic, NV intact distally to all extremities   Neurological: He is alert and oriented to person, place, and time. He has normal sensation.   Sensation intact in RLE   Skin: Skin is warm and dry. Abrasion (tiny superficial abrasions to the right medial malleolus and right anterior ankle  (not indicative of open fracture)) noted.   Psychiatric: Mood and affect normal.   Nursing note and vitals reviewed.      LAB RESULTS  Recent Results (from the past 24 hour(s))   Comprehensive Metabolic Panel    Collection Time: 01/11/17 11:08 AM   Result Value Ref Range    Glucose 99 65 - 99 mg/dL    BUN 14 8 - 23 mg/dL    Creatinine 1.00 0.76 - 1.27 mg/dL    Sodium 142 136 - 145 mmol/L    Potassium 4.2 3.5 - 5.2 mmol/L    Chloride 104 98 - 107 mmol/L    CO2 27.8 22.0 - 29.0 mmol/L    Calcium 9.7 8.6 - 10.5 mg/dL    Total Protein 7.5 6.0 - 8.5 g/dL    Albumin 4.20 3.50 - 5.20 g/dL    ALT (SGPT) 21 1 - 41 U/L    AST (SGOT) 36 1 - 40 U/L    Alkaline Phosphatase 56 39 - 117 U/L    Total Bilirubin 0.3 0.1 - 1.2 mg/dL    eGFR Non African Amer 75 >60 mL/min/1.73    Globulin 3.3 gm/dL    A/G Ratio 1.3 g/dL    BUN/Creatinine Ratio 14.0 7.0 - 25.0    Anion Gap 10.2 mmol/L   Protime-INR    Collection Time: 01/11/17 11:08 AM   Result Value Ref Range    Protime 13.1 11.7 - 14.2 Seconds    INR 1.03 0.90 - 1.10   aPTT    Collection Time: 01/11/17 11:08 AM   Result Value Ref Range    PTT 23.8 22.7 - 35.4 seconds   CBC Auto Differential    Collection Time: 01/11/17 11:08 AM   Result Value Ref Range    WBC 15.88 (H) 4.50 - 10.70 10*3/mm3    RBC 4.56 (L) 4.60 - 6.00 10*6/mm3    Hemoglobin 13.3 (L) 13.7 - 17.6 g/dL    Hematocrit 41.1 40.4 - 52.2 %    MCV 90.1 79.8 - 96.2 fL    MCH 29.2 27.0 - 32.7 pg    MCHC 32.4 (L) 32.6 - 36.4 g/dL    RDW 13.2 11.5 - 14.5 %    RDW-SD 43.2 37.0 - 54.0 fl    MPV 10.4 6.0 - 12.0 fL    Platelets 311 140 - 500 10*3/mm3     Neutrophil % 77.9 (H) 42.7 - 76.0 %    Lymphocyte % 14.2 (L) 19.6 - 45.3 %    Monocyte % 5.0 5.0 - 12.0 %    Eosinophil % 2.2 0.3 - 6.2 %    Basophil % 0.1 0.0 - 1.5 %    Immature Grans % 0.6 (H) 0.0 - 0.5 %    Neutrophils, Absolute 12.37 (H) 1.90 - 8.10 10*3/mm3    Lymphocytes, Absolute 2.26 0.90 - 4.80 10*3/mm3    Monocytes, Absolute 0.79 0.20 - 1.20 10*3/mm3    Eosinophils, Absolute 0.35 0.00 - 0.70 10*3/mm3    Basophils, Absolute 0.02 0.00 - 0.20 10*3/mm3    Immature Grans, Absolute 0.09 (H) 0.00 - 0.03 10*3/mm3       I ordered the above labs and reviewed the results    RADIOLOGY         XR Ankle 2 View Right (Final result) Result time: 01/11/17 11:52:56     Procedure changed from XR Ankle 3+ View Right          Final result by Lucas Sauceda MD (01/11/17 11:52:56)     Narrative:     RIGHT ANKLE AND TIBIA AND FIBULA X-RAYS     CLINICAL HISTORY: Patient fell off ladder. Severe pain.     AP and lateral views of the right tibia and fibula and ankle demonstrate  acute comminuted fractures involving the shafts of the distal tibia and  fibula with marked angulation at both fracture sites. No bone is  identified extending beyond the confines of the skin. The fractures do  not extend into the ankle joint which appears grossly intact. The knee  joint also appears intact.     This report was finalized on 1/11/2017 11:52 AM by Dr. Lucas Sauceda MD.        Post reduction right tib/fib xray- successful reduction of distal right tib/fib dislocation     I ordered the above noted radiological studies and reviewed the images on the PACS system.         MEDICAL RECORD REVIEW    PROCEDURES  Orthopedic Injury Treatment  Date/Time: 1/11/2017 3:10 PM  Performed by: YAJAIRA MOORE  Authorized by: BERE WEBER   Consent: Verbal consent obtained.  Risks and benefits: risks, benefits and alternatives were discussed  Consent given by: patient  Patient understanding: patient states understanding of the procedure being  performed  Required items: required blood products, implants, devices, and special equipment available  Patient identity confirmed: verbally with patient and arm band  Injury location: ankle  Location details: right ankle  Injury type: fracture-dislocation (right distal tib/fib)  Pre-procedure neurovascular assessment: neurovascularly intact  Pre-procedure distal perfusion: normal  Pre-procedure neurological function: normal  Pre-procedure range of motion: reduced  Local anesthesia used: no    Anesthesia:  Local anesthesia used: no  Sedation:  Patient sedated: yes  Sedatives: propofol (total of 100mg propofol)  Sedation start date/time: 1/11/2017 3:10 PM  Sedation end date/time: 1/11/2017 3:40 PM  Vitals: Vital signs were monitored during sedation.    Manipulation performed: yes  Reduction successful: yes  X-ray confirmed reduction: yes  Immobilization: splint  Splint type: ankle stirrup (posterior)  Supplies used: Ortho-Glass  Post-procedure neurovascular assessment: post-procedure neurovascularly intact  Post-procedure distal perfusion: normal  Post-procedure neurological function: normal  Post-procedure range of motion: improved  Patient tolerance: Patient tolerated the procedure well with no immediate complications  Comments: 3:10 PM- Administered 60mg propofol for conscious sedation per Dr Thurman's request.     3:12 PM- Again administered 20mg propofol per Dr Thurman's request.     3:13 PM- Administered another 20mg propofol per Dr Thurman's request.    3:16 PM- Dr Thurman reduced right ankle fracture-dislocation and splinted right ankle with posterior ankle stirrup. See Dr Thurman's note for further details. Will obtain repeat right ankle xray to confirm reduction.               PROGRESS AND CONSULTS  10:32 AM- Ordered dilaudid and zofran for pain.   10:35 AM- Reviewed pt's history and workup with Dr. Thurman.  At bedside evaluation, they agree with the plan of care.  12:27 PM- Rechecked pt. He rates current pain  at 5/10. On reexam, sensation and pedal pulses remain intact in RLE. NV intact distally to right foot. Discussed with pt about right ankle xray and right tib/fib xray results which are indicative of comminuted fractures involving the right distal tibia and fibula with marked angulation. Informed pt of plan to reduce and splint right ankle dislocation and fractures for stabilization and to consult with orthopedic surgery.   12:42 PM- Ordered repeat dose of dilaudid for pain.   1:52 PM- Discussed case with Dr Jacobo (orthopedic surgeon)   Reviewed history, exam, results and treatments.  Discussed concerns and plan of care. Dr Jacobo will consult and requests that hospitalist admit.   2:34 PM- Discussed case with Dr GRACE Vasquez ([facility] hospitalist)  Reviewed history, exam, results and treatments.  Discussed concerns and plan of care. Dr Vasquez accepts pt to be admitted to med/surg.  2:51 PM- Rechecked pt. He is resting comfortably. Informed pt that he will be partially sedated prior to reduction and splint application. Discussed pt admission for further care, orthopedic surgery consult, and observation. Pt verbalizes understanding and agrees with plan.   3:10 PM- Performed conscious sedation prior to reduction and splint application. See procedure note for further details.  3:16 PM- Dr Thurman reduced right ankle fracture-dislocation. He subsequently splinted right ankle with posterior ankle stirrup. See Dr Thurman's note for further details. Pt tolerated procedures without difficulty.  Will obtain repeat right ankle xray to confirm reduction.   4:01 PM- Rechecked pt. He has returned to baseline mental status. He is awake, alert, and oriented x3. Vitals are stable.       ADMISSION- med/surg    Discussed treatment plan and reason for admission with pt and admitting physician.  Pt voiced understanding of the plan for admission for further testing/treatment as needed.      DIAGNOSIS  Final diagnoses:   Closed fracture of distal  "end of fibula with tibia, right, initial encounter   Fall, initial encounter         COURSE & MEDICAL DECISION MAKING  Pertinent Labs and Imaging studies that were ordered and reviewed are noted above.  Results were reviewed/discussed with the patient and they were also made aware of online assess.   Pt also made aware that some labs, such as cultures, will not be resulted during ER visit and follow up with PMD is necessary.     MEDICATIONS GIVEN IN ER  Medications   sodium chloride 0.9 % flush 10 mL (not administered)   HYDROmorphone (DILAUDID) injection 1 mg (1 mg Intravenous Given 1/11/17 1037)   ondansetron (ZOFRAN) injection 4 mg (4 mg Intravenous Given 1/11/17 1037)   HYDROmorphone (DILAUDID) injection 1 mg (1 mg Intravenous Given 1/11/17 1247)   Propofol (DIPRIVAN) injection 20 mg (0 mg Intravenous Stopped 1/11/17 1526)       Visit Vitals   • BP (!) 181/81 (BP Location: Left arm, Patient Position: Sitting)   • Pulse 68   • Temp 97.7 °F (36.5 °C) (Tympanic)   • Resp 16   • Ht 70\" (177.8 cm)   • Wt 195 lb (88.5 kg)   • SpO2 99%   • BMI 27.98 kg/m2         I personally reviewed the past medical history, past surgical history, social history, family history, current medications and allergies as they appear in this chart.  The scribe's note accurately reflects the work and decisions made by me.     I personally scribed for CARISA Hull on 1/11/2017 at 3:56 PM.  Electronically signed by Nazario Peraza on 1/11/2017 at time 3:56 PM         Nazario Peraza  01/11/17 1719       JASON Kuo  01/12/17 1347       Electronically signed by JASON Kuo at 1/12/2017  1:47 PM      Car Thurman MD at 1/11/2017 10:36 AM          Pt presents to the ED c/o R ankle pain after slipping off a ladder and falling around 4 ft. Upon exam, pt has 2 tiny abrasions to medial malleolus and shin that to do not appear to be open fx. He has R ankle deformity. I agree with the plan for XR R ankle.    I " supervised care provided by the midlevel provider.    We have discussed this patient's history, physical exam, and treatment plan.   I have reviewed the note and personally saw and examined the patient and agree with the plan of care.    Documentation assistance provided by shania Epperson for Dr. Thurman.  Information recorded by the scribe was done at my direction and has been verified and validated by me.     Shlomo Epperson  01/11/17 1040       Car Thurman MD  01/11/17 1152       Electronically signed by Car Thurman MD at 1/11/2017  5:05 PM        Hospital Medications (active)       Dose Frequency Start End    acetaminophen (TYLENOL) tablet 650 mg 650 mg Every 4 Hours PRN 1/11/2017     Sig - Route: Take 2 tablets by mouth Every 4 (Four) Hours As Needed for mild pain (1-3). - Oral    amLODIPine (NORVASC) tablet 2.5 mg 2.5 mg Daily 1/11/2017     Sig - Route: Take 1 tablet by mouth Daily. - Oral    ceFAZolin in dextrose (ANCEF) IVPB solution 2 g 2 g Once 1/12/2017 1/12/2017    Sig - Route: Infuse 100 mL into a venous catheter 1 (One) Time. - Intravenous    ceFAZolin in dextrose (ANCEF) IVPB solution 2 g 2 g Every 8 Hours 1/13/2017 1/13/2017    Sig - Route: Infuse 100 mL into a venous catheter Every 8 (Eight) Hours. - Intravenous    dextrose (D50W) solution 25 g 25 g As Needed 1/11/2017     Sig - Route: Infuse 50 mL into a venous catheter As Needed for low blood sugar. - Intravenous    dextrose (GLUTOSE) oral gel 15 g 15 g As Needed 1/11/2017     Sig - Route: Take 15 g by mouth As Needed for low blood sugar. - Oral    dextrose 5 % and sodium chloride 0.45 % with KCl 20 mEq/L infusion 75 mL/hr Continuous 1/12/2017     Sig - Route: Infuse 75 mL/hr into a venous catheter Continuous. - Intravenous    enoxaparin (LOVENOX) syringe 40 mg 40 mg Every 24 Hours Scheduled 1/13/2017     Sig - Route: Inject 0.4 mL under the skin Daily. - Subcutaneous    famotidine (PEPCID) injection 20 mg 20 mg Once 1/12/2017  "1/12/2017    Sig - Route: Infuse 2 mL into a venous catheter 1 (One) Time. - Intravenous    glucagon (human recombinant) (GLUCAGEN DIAGNOSTIC) injection 1 mg 1 mg Once As Needed 1/11/2017     Sig - Route: Inject 1 mg under the skin 1 (One) Time As Needed (hypoglycemia). - Subcutaneous    HYDROcodone-acetaminophen (NORCO) 7.5-325 MG per tablet 1 tablet 1 tablet Every 4 Hours PRN 1/11/2017 1/21/2017    Sig - Route: Take 1 tablet by mouth Every 4 (Four) Hours As Needed for moderate pain (4-6). - Oral    Linked Group 1:  \"Followed by\" Linked Group Details        HYDROcodone-acetaminophen (NORCO) 7.5-325 MG per tablet 2 tablet 2 tablet Every 4 Hours PRN 1/21/2017 1/31/2017    Sig - Route: Take 2 tablets by mouth Every 4 (Four) Hours As Needed for moderate pain (4-6). - Oral    Linked Group 1:  \"Followed by\" Linked Group Details        HYDROmorphone (DILAUDID) injection 0.5 mg 0.5 mg Every 2 Hours PRN 1/11/2017 1/21/2017    Sig - Route: Infuse 0.5 mg into a venous catheter Every 2 (Two) Hours As Needed for severe pain (7-10). - Intravenous    Influenza Vac Subunit Quad (FLUCELVAX) injection 0.5 mL 0.5 mL During Hospitalization 1/11/2017     Sig - Route: Inject 0.5 mL into the shoulder, thigh, or buttocks During Hospitalization for immunization. - Intramuscular    insulin aspart (novoLOG) injection 0-14 Units 0-14 Units Every 6 Hours 1/11/2017     Sig - Route: Inject 0-14 Units under the skin Every 6 (Six) Hours. - Subcutaneous    insulin detemir (LEVEMIR) injection 40 Units 40 Units Nightly 1/11/2017     Sig - Route: Inject 40 Units under the skin Every Night. - Subcutaneous    insulin regular (humuLIN R,novoLIN R) injection 5 Units 5 Units Once 1/12/2017 1/12/2017    Sig - Route: Inject 5 Units under the skin 1 (One) Time. - Subcutaneous    lactated ringers infusion 9 mL/hr Continuous 1/12/2017     Sig - Route: Infuse 9 mL/hr into a venous catheter Continuous. - Intravenous    lactated ringers infusion 100 mL/hr " "Continuous 1/12/2017     Sig - Route: Infuse 100 mL/hr into a venous catheter Continuous. - Intravenous    lisinopril (PRINIVIL,ZESTRIL) tablet 20 mg 20 mg 2 Times Daily 1/11/2017     Sig - Route: Take 1 tablet by mouth 2 (Two) Times a Day. - Oral    metFORMIN (GLUCOPHAGE) tablet 850 mg 850 mg 2 Times Daily With Meals 1/11/2017     Sig - Route: Take 1 tablet by mouth 2 (Two) Times a Day With Meals. - Oral    ondansetron (ZOFRAN) injection 4 mg 4 mg Every 4 Hours PRN 1/11/2017     Sig - Route: Infuse 2 mL into a venous catheter Every 4 (Four) Hours As Needed for nausea or vomiting. - Intravenous    pneumococcal polysaccharide 23-valent (PNEUMOVAX-23) vaccine 0.5 mL 0.5 mL During Hospitalization 1/11/2017     Sig - Route: Inject 0.5 mL into the shoulder, thigh, or buttocks During Hospitalization for immunization. - Intramuscular    simvastatin (ZOCOR) tablet 10 mg 10 mg Nightly 1/11/2017     Sig - Route: Take 1 tablet by mouth Every Night. - Oral    sodium chloride 0.9 % flush 1-10 mL 1-10 mL As Needed 1/11/2017     Sig - Route: Infuse 1-10 mL into a venous catheter As Needed for line care. - Intravenous    sodium chloride 0.9 % flush 10 mL 10 mL As Needed 1/11/2017     Sig - Route: Infuse 10 mL into a venous catheter As Needed for line care. - Intravenous    Linked Group 2:  \"And\" Linked Group Details        diphenhydrAMINE (BENADRYL) injection 12.5 mg (Discontinued) 12.5 mg Every 15 Minutes PRN 1/12/2017 1/12/2017    Sig - Route: Infuse 0.25 mL into a venous catheter Every 15 (Fifteen) Minutes As Needed for itching (May repeat x 1). - Intravenous    Reason for Discontinue: Patient Transfer    FentaNYL Citrate (PF) (SUBLIMAZE) injection 50 mcg (Discontinued) 50 mcg Every 10 Minutes PRN 1/12/2017 1/12/2017    Sig - Route: Infuse 1 mL into a venous catheter Every 10 (Ten) Minutes As Needed for severe pain (7-10). - Intravenous    Reason for Discontinue: Patient Transfer    FentaNYL Citrate (PF) (SUBLIMAZE) injection 50 " mcg (Discontinued) 50 mcg Every 10 Minutes PRN 1/12/2017 1/12/2017    Sig - Route: Infuse 1 mL into a venous catheter Every 10 (Ten) Minutes As Needed for severe pain (7-10). - Intravenous    Reason for Discontinue: Patient Transfer    FentaNYL Citrate (PF) (SUBLIMAZE) injection (Discontinued)  As Needed 1/12/2017 1/12/2017    Sig - Route: Infuse  into a venous catheter As Needed for severe pain (7-10). - Intravenous    Reason for Discontinue: Anesthesia Stop    glycopyrrolate (ROBINUL) injection (Discontinued)  As Needed 1/12/2017 1/12/2017    Sig - Route: Infuse  into a venous catheter As Needed for excess secretions. - Intravenous    Reason for Discontinue: Anesthesia Stop    hydrALAZINE (APRESOLINE) injection 5 mg (Discontinued) 5 mg Every 10 Minutes PRN 1/12/2017 1/12/2017    Sig - Route: Infuse 0.25 mL into a venous catheter Every 10 (Ten) Minutes As Needed for high blood pressure (for systolic blood pressure greater than 180 mmHg or diastolic blood pressure greater than 105 mmHg). - Intravenous    Reason for Discontinue: Patient Transfer    HYDROcodone-acetaminophen (NORCO) 7.5-325 MG per tablet 1 tablet (Discontinued) 1 tablet Once As Needed 1/12/2017 1/12/2017    Sig - Route: Take 1 tablet by mouth 1 (One) Time As Needed for moderate pain (4-6). - Oral    Reason for Discontinue: Patient Transfer    HYDROmorphone (DILAUDID) injection 0.5 mg (Discontinued) 0.5 mg Every 10 Minutes PRN 1/12/2017 1/12/2017    Sig - Route: Infuse 0.5 mg into a venous catheter Every 10 (Ten) Minutes As Needed for moderate pain (4-6). - Intravenous    Reason for Discontinue: Patient Transfer    HYDROmorphone (DILAUDID) injection (Discontinued)  As Needed 1/12/2017 1/12/2017    Sig - Route: Infuse  into a venous catheter As Needed for severe pain (7-10). - Intravenous    Reason for Discontinue: Anesthesia Stop    ipratropium-albuterol (DUO-NEB) nebulizer solution 3 mL (Discontinued) 3 mL Once As Needed 1/12/2017 1/12/2017    Sig -  "Route: Take 3 mL by nebulization 1 (One) Time As Needed for wheezing or shortness of air (bronchospasm). - Nebulization    Reason for Discontinue: Patient Transfer    labetalol (NORMODYNE,TRANDATE) injection 5 mg (Discontinued) 5 mg Every 5 Minutes PRN 1/12/2017 1/12/2017    Sig - Route: Infuse 1 mL into a venous catheter Every 5 (Five) Minutes As Needed for high blood pressure (for systolic blood pressure greater than 180 mmHg or diastolic blood pressure greater than 105 mmHg). - Intravenous    Reason for Discontinue: Patient Transfer    lidocaine (XYLOCAINE) 2 % injection (Discontinued)  As Needed 1/12/2017 1/12/2017    Sig: As Needed.    Reason for Discontinue: Anesthesia Stop    meperidine (DEMEROL) injection 12.5 mg (Discontinued) 12.5 mg Every 5 Minutes PRN 1/12/2017 1/12/2017    Sig - Route: Infuse 0.25 mL into a venous catheter Every 5 (Five) Minutes As Needed for shivering (May repeat x 1). - Intravenous    Reason for Discontinue: Patient Transfer    metoprolol (LOPRESSOR) injection 2.5 mg (Discontinued) 2.5 mg Every 5 Minutes PRN 1/12/2017 1/12/2017    Sig - Route: Infuse 2.5 mL into a venous catheter Every 5 (Five) Minutes As Needed for high blood pressure (for systolic blood pressure greater than 180 mmHg or diastolic blood pressure greater than 105 mmHg). - Intravenous    Reason for Discontinue: Patient Transfer    midazolam (VERSED) injection 1 mg (Discontinued) 1 mg Every 5 Minutes PRN 1/12/2017 1/12/2017    Sig - Route: Infuse 1 mL into a venous catheter Every 5 (Five) Minutes As Needed for anxiety (Max 4mg midazolam TOTAL). - Intravenous    Reason for Discontinue: Patient Transfer    Linked Group 3:  \"Or\" Linked Group Details        midazolam (VERSED) injection 1 mg (Discontinued) 1 mg Every 5 Minutes PRN 1/12/2017 1/12/2017    Sig - Route: Infuse 1 mL into a venous catheter Every 5 (Five) Minutes As Needed for anxiety. - Intravenous    Reason for Discontinue: Patient Transfer    midazolam (VERSED) " "injection 2 mg (Discontinued) 2 mg Every 5 Minutes PRN 1/12/2017 1/12/2017    Sig - Route: Infuse 2 mL into a venous catheter Every 5 (Five) Minutes As Needed for anxiety (Max 4mg midazolam TOTAL). - Intravenous    Reason for Discontinue: Patient Transfer    Linked Group 3:  \"Or\" Linked Group Details        neostigmine injection (Discontinued)  As Needed 1/12/2017 1/12/2017    Sig - Route: Infuse  into a venous catheter As Needed. - Intravenous    Reason for Discontinue: Anesthesia Stop    ondansetron (ZOFRAN) injection 4 mg (Discontinued) 4 mg Once As Needed 1/12/2017 1/12/2017    Sig - Route: Infuse 2 mL into a venous catheter 1 (One) Time As Needed for nausea or vomiting. - Intravenous    Reason for Discontinue: Patient Transfer    promethazine (PHENERGAN) injection 6.25 mg (Discontinued) 6.25 mg Once As Needed 1/12/2017 1/12/2017    Sig - Route: Infuse 0.25 mL into a venous catheter 1 (One) Time As Needed for nausea or vomiting. - Intravenous    Reason for Discontinue: Patient Transfer    Linked Group 4:  \"Or\" Linked Group Details        promethazine (PHENERGAN) injection 6.25 mg (Discontinued) 6.25 mg Once As Needed 1/12/2017 1/12/2017    Sig - Route: Inject 0.25 mL into the shoulder, thigh, or buttocks 1 (One) Time As Needed for nausea or vomiting. - Intramuscular    Reason for Discontinue: Patient Transfer    Linked Group 4:  \"Or\" Linked Group Details        promethazine (PHENERGAN) suppository 25 mg (Discontinued) 25 mg Once As Needed 1/12/2017 1/12/2017    Sig - Route: Insert 1 suppository into the rectum 1 (One) Time As Needed for nausea or vomiting. - Rectal    Reason for Discontinue: Patient Transfer    Linked Group 4:  \"Or\" Linked Group Details        promethazine (PHENERGAN) tablet 25 mg (Discontinued) 25 mg Once As Needed 1/12/2017 1/12/2017    Sig - Route: Take 1 tablet by mouth 1 (One) Time As Needed for nausea or vomiting. - Oral    Reason for Discontinue: Patient Transfer    Linked Group 4:  \"Or\" " Linked Group Details        Propofol (DIPRIVAN) injection (Discontinued)  As Needed 1/12/2017 1/12/2017    Sig - Route: Infuse  into a venous catheter As Needed. - Intravenous    Reason for Discontinue: Anesthesia Stop    rocuronium (ZEMURON) injection (Discontinued)  As Needed 1/12/2017 1/12/2017    Sig - Route: Infuse  into a venous catheter As Needed. - Intravenous    Reason for Discontinue: Anesthesia Stop    sodium chloride 0.9 % flush 1-10 mL (Discontinued) 1-10 mL As Needed 1/12/2017 1/12/2017    Sig - Route: Infuse 1-10 mL into a venous catheter As Needed for line care. - Intravenous    Reason for Discontinue: Patient Transfer    sodium chloride 1,000 mL with bacitracin 50,000 Units irrigation (Discontinued)  As Needed 1/12/2017 1/12/2017    Sig: As Needed.    Reason for Discontinue: Patient Discharge             Operative/Procedure Notes      Colton Nascimento MD at 1/12/2017  6:18 PM  Version 1 of 1         TIBIA INTRAMEDULLARY NAIL/SHANNON INSERTION  Procedure Note    Lucas Deluca  1/11/2017 - 1/12/2017    Pre-op Diagnosis:   Right comminuted distal tibia and fibula fractures    Post-op Diagnosis:     Right comminuted distal tibia and fibula fractures    Procedure/CPT® Codes:      Procedure(s):  TIBIA INTRAMEDULLARY NAIL/SHANNON INSERTION    Surgeon(s):  Colton Nascimento MD    Anesthesia: General    Staff:   Circulator: Anny Muller RN  Radiology Technologist: Heath Elizalde RRT  Scrub Person: Yaneth Akers  Vendor Representative: Tushar Chavis  Assistant: Doron Crook    Estimated Blood Loss: * No values recorded between 1/12/2017  4:17 PM and 1/12/2017  6:13 PM *    Specimens:                * No specimens in log *      Drains:           Findings: fracture    Complications: none      Colton Nascimento MD     Date: 1/12/2017  Time: 6:18 PM         Electronically signed by Colton Nascimento MD at 1/12/2017  6:18 PM           Physician Progress Notes       Aquiles Toribio MD at  1/12/2017  5:56 PM  Version 1 of 1             [facility]     LOS: 1 day     Subjective:      Objective:    Vital Signs:  Temp:  [97 °F (36.1 °C)-98.2 °F (36.8 °C)] 98.1 °F (36.7 °C)  Heart Rate:  [77-87] 79  Resp:  [12-16] 12  BP: (138-175)/(77-83) 175/82    In operating room      Results Review:    GLUCOSE   Date Value Ref Range Status   01/12/2017 222 (H) 65 - 99 mg/dL Final   01/11/2017 99 65 - 99 mg/dL Final       Results from last 7 days  Lab Units 01/12/17  0420   WBC 10*3/mm3 9.73   HEMOGLOBIN g/dL 12.8*   HEMATOCRIT % 38.6*   PLATELETS 10*3/mm3 292       Results from last 7 days  Lab Units 01/12/17  0420 01/11/17  1108   SODIUM mmol/L 139 142   POTASSIUM mmol/L 4.1 4.2   CHLORIDE mmol/L 101 104   TOTAL CO2 mmol/L 24.2 27.8   BUN mg/dL 12 14   CREATININE mg/dL 1.02 1.00   CALCIUM mg/dL 8.9 9.7   BILIRUBIN mg/dL  --  0.3   ALK PHOS U/L  --  56   ALT (SGPT) U/L  --  21   AST (SGOT) U/L  --  36   GLUCOSE mg/dL 222* 99       Results from last 7 days  Lab Units 01/11/17  1108   INR  1.03   APTT seconds 23.8                 I have reviewed daily medications and changes in CPOE    Scheduled meds    amLODIPine 2.5 mg Oral Daily   [MAR Hold] insulin aspart 0-14 Units Subcutaneous Q6H   [MAR Hold] insulin detemir 40 Units Subcutaneous Nightly   lisinopril 20 mg Oral BID   metFORMIN 850 mg Oral BID With Meals   [MAR Hold] simvastatin 10 mg Oral Nightly         dextrose 5 % and sodium chloride 0.45 % with KCl 20 mEq/L 75 mL/hr Last Rate: 75 mL/hr (01/12/17 0039)   lactated ringers 9 mL/hr Last Rate: 9 mL/hr (01/12/17 1420)     PRN meds  •  acetaminophen  •  [MAR Hold] dextrose  •  [MAR Hold] dextrose  •  fentanyl  •  [MAR Hold] glucagon (human recombinant)  •  HYDROcodone-acetaminophen **FOLLOWED BY** [START ON 1/21/2017] HYDROcodone-acetaminophen  •  HYDROmorphone  •  influenza vaccine  •  midazolam **OR** midazolam  •  [MAR Hold] ondansetron  •  pneumococcal polysaccharide 23-valent  •  [MAR  Hold] sodium chloride  •  sodium chloride  •  Insert peripheral IV **AND** sodium chloride  •  BH OR ANTIBIOTIC IRRIGATION BUILDER    Assessment:    Active Problems:    Closed fracture of distal end of fibula with tibia  1. Right ankle fracture- currently in the OR     2. DM2 with cardiovascular complications. BS high    3. CAD with CABG and stent, 2011.     4. Lipids    5. HTN          Aquiles Toribio MD  01/12/17  5:56 PM         Electronically signed by Aquiles Toribio MD at 1/12/2017  5:57 PM      Colton Nascimento MD at 1/13/2017  7:26 AM  Version 1 of 1          Orthopedic Progress Note    Subjective :   Doing ok.  Knee pain after surgery.    Objective :    Vital signs in last 24 hours:  Temp:  [97 °F (36.1 °C)-99.4 °F (37.4 °C)] 98.5 °F (36.9 °C)  Heart Rate:  [] 100  Resp:  [12-24] 20  BP: (138-209)/() 149/81  Vitals:    01/12/17 2350 01/13/17 0000 01/13/17 0300 01/13/17 0634   BP:  172/84 162/82 149/81   BP Location:  Left arm Left arm Left arm   Patient Position:  Lying Lying Lying   Pulse:  109 105 100   Resp:  16 24 20   Temp:  97.9 °F (36.6 °C) 99.4 °F (37.4 °C) 98.5 °F (36.9 °C)   TempSrc:  Oral Oral Oral   SpO2: 98% 97% 99% 98%   Weight:       Height:           PHYSICAL EXAM:  Patient is calm, in no acute distress, awake and oriented x 3.  Dressing with some saturation distal.  No signs of infection.  Swelling is appropriate.  Ecchymosis is appropriate in amount.  Homans test is negative.  Patient is neurovascularly intact distally.      LABS:    Results from last 7 days  Lab Units 01/13/17  0355   WBC 10*3/mm3 17.57*   HEMOGLOBIN g/dL 12.1*   HEMATOCRIT % 36.1*   PLATELETS 10*3/mm3 291       Results from last 7 days  Lab Units 01/13/17  0355   SODIUM mmol/L 137   POTASSIUM mmol/L 3.7   CHLORIDE mmol/L 97*   TOTAL CO2 mmol/L 24.7   BUN mg/dL 11   CREATININE mg/dL 0.98   GLUCOSE mg/dL 218*   CALCIUM mg/dL 8.7       Results from last 7 days  Lab Units 01/11/17  1108   INR  1.03   APTT  seconds 23.8       ASSESSMENT:  Status post right tibial nail.    Plan:  Continue Physical Therapy, NWB RLE..  Continue SCDs, Continue Lovenox while in hospital followed by Aspirin 325 mg daily x 2 weeks at discharge for DVT prophylaxis.  Dispo planning for home [facility] when medically stable and pain controlled.      Colton Nascimento MD    Date: 1/13/2017  Time: 7:26 AM     Electronically signed by Colton Nascimento MD at 1/13/2017  7:28 AM           Consult Notes       Marcos Lam PA-C at 1/12/2017  7:31 AM  Version 1 of 1     Consult Orders:    1. Inpatient Consult to Orthopedic Surgery [[id number]] ordered by Matilde Vasquez MD at 01/11/17 1726                  Orthopaedic Consult      Patient: Lucas Deluca    Date of Admission: 1/11/2017 10:09 AM    YOB: 1950    Medical Record Number: [id number]    Attending Physician: Aquiles Toribio MD  Consulting Physician: Marcos Lam PA-C      Chief Complaints: Fall, initial encounter [W19.XXXA]  Closed fracture of distal end of fibula with tibia, right, initial encounter [S82.301A, S82.831A]      History of Present Illness: 66 y.o. male admitted to [facility] with Fall, initial encounter [W19.XXXA]  Closed fracture of distal end of fibula with tibia, right, initial encounter [S82.301A, S82.831A]. I was consulted for further evaluation and treatment.  The patient reports he had fallen off of a ladder yesterday morning at 9:30 while working at [employer].  The patient reports she developed severe pain in his right lower extremity immediately.  The patient was unable to ambulate.  The patient reports his pain is rated at a 10 on a scale of 1-10.  He denies any numbness tingling fever or chills.  The patient was transported to [facility] for further evaluation and was found to have a distal tibia and fibula fracture on the right lower extremity.    No Known Allergies    Home Medications:  Prescriptions Prior to Admission    Medication Sig Dispense Refill Last Dose   • amLODIPine (NORVASC) 2.5 MG tablet Take 2.5 mg by mouth Daily.   1/11/2017 at Unknown time   • aspirin 81 MG chewable tablet Chew 81 mg Daily.   1/11/2017 at Unknown time   • clopidogrel (PLAVIX) 75 MG tablet Take 75 mg by mouth Daily.   1/11/2017 at Unknown time   • Fenofibrate (TRICOR PO) Take  by mouth.   1/11/2017 at Unknown time   • insulin aspart (novoLOG) 100 UNIT/ML injection Inject  under the skin 3 (Three) Times a Day Before Meals.      • insulin glargine (LANTUS) 100 UNIT/ML injection Inject 63 Units under the skin Every Night.      • lisinopril (PRINIVIL,ZESTRIL) 20 MG tablet Take 20 mg by mouth 2 (Two) Times a Day.   1/11/2017 at Unknown time   • metFORMIN (GLUCOPHAGE) 850 MG tablet Take 850 mg by mouth 2 (Two) Times a Day With Meals.   1/11/2017 at Unknown time   • Simvastatin (ZOCOR PO) Take  by mouth.   1/11/2017 at Unknown time       Current Medications:  Scheduled Meds:  amLODIPine 2.5 mg Oral Daily   insulin aspart 0-14 Units Subcutaneous Q6H   insulin detemir 40 Units Subcutaneous Nightly   lisinopril 20 mg Oral BID   metFORMIN 850 mg Oral BID With Meals   simvastatin 10 mg Oral Nightly     Continuous Infusions:  dextrose 5 % and sodium chloride 0.45 % with KCl 20 mEq/L 75 mL/hr Last Rate: 75 mL/hr (01/12/17 0039)     PRN Meds:.•  acetaminophen  •  dextrose  •  dextrose  •  glucagon (human recombinant)  •  HYDROcodone-acetaminophen **FOLLOWED BY** [START ON 1/21/2017] HYDROcodone-acetaminophen  •  HYDROmorphone  •  influenza vaccine  •  ondansetron  •  pneumococcal polysaccharide 23-valent  •  sodium chloride  •  Insert peripheral IV **AND** sodium chloride    Past Medical History   Diagnosis Date   • Diabetes mellitus    • Hyperlipidemia    • Hypertension    • Macular degeneration      Past Surgical History   Procedure Laterality Date   • Coronary artery bypass graft       2004   • Coronary angioplasty with stent placement     • Tonsillectomy        Social History     Occupational History   • Not on file.     Social History Main Topics   • Smoking status: Former Smoker   • Smokeless tobacco: Not on file   • Alcohol use No   • Drug use: No   • Sexual activity: Defer    Social History     Social History Narrative   • No narrative on file     History reviewed. No pertinent family history.      Review of Systems:   Constitutional: Negative for fatigue, fever, or weight loss  HEENT: Patient denies any headaches, vision changes, sore throat. Admits to wearing glasses  Pulmonary: Patient denies any cough, congestion, SOA, or wheezing  Cardiovascular: Patient denies any chest pain, palpitations, weakness,  Gastrointestinal:  Patient denies nausea, vomiting, diarrhea, constipation  Genital/Urinary: Patient denies dysuria, urinary frequency, urgency, incontinence, retention  Musculoskeletal: Positive for right lower extremity pain . Negative for muscle cramps  Neurological: Patient denies dizziness, paresthesia, loss of sensation, seizures, syncope  Endocrine system: Patient denies tremors, cold or heat intolerance  Psychological: Patient denies thoughts/plans or harming self or other; hallucinations,  insomnia  Skin: Patient denies any bruising, rashes, lesions, or ulcers   Hematopoietic: Patient denies history of MRSA or slow wound healing,  spontaneous or excessive bleeding    Physical Exam: 66 y.o. male  Vitals:    01/11/17 2026 01/11/17 2247 01/12/17 0305 01/12/17 0730   BP: 156/83 151/82 140/80 155/77   BP Location:  Left arm Left arm Left arm   Patient Position:  Lying Lying Lying   Pulse:  87 78 77   Resp:  16 16 16   Temp:  98.2 °F (36.8 °C) 97.8 °F (36.6 °C) 97 °F (36.1 °C)   TempSrc:  Oral Oral Oral   SpO2:  95% 94% 95%   Weight:       Height:                                General Appearance:  Alert, cooperative, in no acute distress    HEENT:    Normocephalic,  Atraumatic, Pupils are equal   Neck:   No adenopathy, supple, trachea midline, no thyromegaly        Lungs:     Clear to auscultation, equal expansion bilaterally, respirations regular, even and               unlabored    Heart:    Regular rhythm and normal rate, normal S1 and S2, no murmur, no gallops   Chest Wall:    Within normal limits   Abdomen:     Normal bowel sounds, no masses,  soft non-tender, non-distended,       Rectal:  Musculoskeletal:     Deferred    Patient's gait was not appreciated examination of the patient's right lower extremity reveals he does have a splint in place.  The patient did have 2+ pulses in his right lower extremity.  He was able to  Fan his toes without problems or complications.  He did have good sensation to both dull and sharp.  I didn't appreciate bruising in his right lower extremity consistent with fracture and the blood allowing gravity to pull to his right ankle.  There was no other masses or effusion.  His skin was warm and dry and intact with no open wounds.     Extremities:   No clubbin, no edema, no cyanosis   Pulses  Neurovascular:   Pulses palpable and equal bilaterally in all 4 extremities    Patient was alert and oriented x 3 spheres   Skin:   No lesions, ulcers or rashes   Neurologic:   Cranial nerves 2 - 12 grossly intact, sensation intact            Diagnostic Tests:    Admission on 01/11/2017   Component Date Value Ref Range Status   • Glucose 01/11/2017 99  65 - 99 mg/dL Final   • BUN 01/11/2017 14  8 - 23 mg/dL Final   • Creatinine 01/11/2017 1.00  0.76 - 1.27 mg/dL Final   • Sodium 01/11/2017 142  136 - 145 mmol/L Final   • Potassium 01/11/2017 4.2  3.5 - 5.2 mmol/L Final   • Chloride 01/11/2017 104  98 - 107 mmol/L Final   • CO2 01/11/2017 27.8  22.0 - 29.0 mmol/L Final   • Calcium 01/11/2017 9.7  8.6 - 10.5 mg/dL Final   • Total Protein 01/11/2017 7.5  6.0 - 8.5 g/dL Final   • Albumin 01/11/2017 4.20  3.50 - 5.20 g/dL Final   • ALT (SGPT) 01/11/2017 21  1 - 41 U/L Final   • AST (SGOT) 01/11/2017 36  1 - 40 U/L Final   • Alkaline Phosphatase 01/11/2017  56  39 - 117 U/L Final   • Total Bilirubin 01/11/2017 0.3  0.1 - 1.2 mg/dL Final   • eGFR Non African Amer 01/11/2017 75  >60 mL/min/1.73 Final   • Globulin 01/11/2017 3.3  gm/dL Final   • A/G Ratio 01/11/2017 1.3  g/dL Final   • BUN/Creatinine Ratio 01/11/2017 14.0  7.0 - 25.0 Final   • Anion Gap 01/11/2017 10.2  mmol/L Final   • Protime 01/11/2017 13.1  11.7 - 14.2 Seconds Final   • INR 01/11/2017 1.03  0.90 - 1.10 Final   • PTT 01/11/2017 23.8  22.7 - 35.4 seconds Final   • WBC 01/11/2017 15.88* 4.50 - 10.70 10*3/mm3 Final   • RBC 01/11/2017 4.56* 4.60 - 6.00 10*6/mm3 Final   • Hemoglobin 01/11/2017 13.3* 13.7 - 17.6 g/dL Final   • Hematocrit 01/11/2017 41.1  40.4 - 52.2 % Final   • MCV 01/11/2017 90.1  79.8 - 96.2 fL Final   • MCH 01/11/2017 29.2  27.0 - 32.7 pg Final   • MCHC 01/11/2017 32.4* 32.6 - 36.4 g/dL Final   • RDW 01/11/2017 13.2  11.5 - 14.5 % Final   • RDW-SD 01/11/2017 43.2  37.0 - 54.0 fl Final   • MPV 01/11/2017 10.4  6.0 - 12.0 fL Final   • Platelets 01/11/2017 311  140 - 500 10*3/mm3 Final   • Neutrophil % 01/11/2017 77.9* 42.7 - 76.0 % Final   • Lymphocyte % 01/11/2017 14.2* 19.6 - 45.3 % Final   • Monocyte % 01/11/2017 5.0  5.0 - 12.0 % Final   • Eosinophil % 01/11/2017 2.2  0.3 - 6.2 % Final   • Basophil % 01/11/2017 0.1  0.0 - 1.5 % Final   • Immature Grans % 01/11/2017 0.6* 0.0 - 0.5 % Final   • Neutrophils, Absolute 01/11/2017 12.37* 1.90 - 8.10 10*3/mm3 Final   • Lymphocytes, Absolute 01/11/2017 2.26  0.90 - 4.80 10*3/mm3 Final   • Monocytes, Absolute 01/11/2017 0.79  0.20 - 1.20 10*3/mm3 Final   • Eosinophils, Absolute 01/11/2017 0.35  0.00 - 0.70 10*3/mm3 Final   • Basophils, Absolute 01/11/2017 0.02  0.00 - 0.20 10*3/mm3 Final   • Immature Grans, Absolute 01/11/2017 0.09* 0.00 - 0.03 10*3/mm3 Final   • Glucose 01/11/2017 97  70 - 130 mg/dL Final   • Glucose 01/11/2017 280* 70 - 130 mg/dL Final   • Glucose 01/11/2017 188* 70 - 130 mg/dL Final   • WBC 01/12/2017 9.73  4.50 - 10.70  10*3/mm3 Final   • RBC 01/12/2017 4.38* 4.60 - 6.00 10*6/mm3 Final   • Hemoglobin 01/12/2017 12.8* 13.7 - 17.6 g/dL Final   • Hematocrit 01/12/2017 38.6* 40.4 - 52.2 % Final   • MCV 01/12/2017 88.1  79.8 - 96.2 fL Final   • MCH 01/12/2017 29.2  27.0 - 32.7 pg Final   • MCHC 01/12/2017 33.2  32.6 - 36.4 g/dL Final   • RDW 01/12/2017 13.3  11.5 - 14.5 % Final   • RDW-SD 01/12/2017 42.7  37.0 - 54.0 fl Final   • MPV 01/12/2017 10.6  6.0 - 12.0 fL Final   • Platelets 01/12/2017 292  140 - 500 10*3/mm3 Final   • Glucose 01/12/2017 222* 65 - 99 mg/dL Final   • BUN 01/12/2017 12  8 - 23 mg/dL Final   • Creatinine 01/12/2017 1.02  0.76 - 1.27 mg/dL Final   • Sodium 01/12/2017 139  136 - 145 mmol/L Final   • Potassium 01/12/2017 4.1  3.5 - 5.2 mmol/L Final   • Chloride 01/12/2017 101  98 - 107 mmol/L Final   • CO2 01/12/2017 24.2  22.0 - 29.0 mmol/L Final   • Calcium 01/12/2017 8.9  8.6 - 10.5 mg/dL Final   • eGFR Non African Amer 01/12/2017 74  >60 mL/min/1.73 Final   • BUN/Creatinine Ratio 01/12/2017 11.8  7.0 - 25.0 Final   • Anion Gap 01/12/2017 13.8  mmol/L Final   • Hemoglobin A1C 01/12/2017 7.51* 4.80 - 5.60 % Final   • Glucose 01/12/2017 221* 70 - 130 mg/dL Final       Xr Tibia Fibula 2 View Right    Result Date: 1/11/2017  Narrative: RIGHT LOWER LEG X-RAY  HISTORY: Postreduction of right tibia-fibula fracture.  FINDINGS: Three x-ray images of the right lower leg are provided and show splint material around the distal lower leg and foot. This surrounds a spiral, comminuted distal tibia shaft fracture, and a comminuted distal fibular shaft fracture. Those are reduced such that the major fracture fragments are in near-anatomic position.      Impression: Comminuted right tibial and fibular fractures are reduced to near-anatomic position with splint material in place.  This report was finalized on 1/11/2017 9:22 PM by Dr. Doron Sharp MD.      Xr Tibia Fibula 2 View Right    Addendum Date: 1/11/2017 Addendum:    ADDENDUM TO ANKLE AND TIBIA AND FIBULA X-RAYS  There is a typographical error in the report:  It states the fracture involves the distal femur, it should state the fracture involves the distal tibia.  This report was finalized on 1/11/2017 12:45 PM by Dr. Lucas Sauceda MD.      Result Date: 1/11/2017  Narrative: RIGHT ANKLE AND TIBIA AND FIBULA X-RAYS  CLINICAL HISTORY: Patient fell off ladder. Severe pain.  AP and lateral views of the right tibia and fibula and ankle demonstrate acute comminuted fractures involving the shafts of the distal femur and fibula with marked angulation at both fracture sites. No bone is identified extending beyond the confines of the skin. The fractures do not extend into the ankle joint which appears grossly intact. The knee joint also appears intact.  This report was finalized on 1/11/2017 11:52 AM by Dr. Lucas Sauceda MD.      Xr Ankle 2 View Right    Addendum Date: 1/11/2017 Addendum:   ADDENDUM TO ANKLE AND TIBIA AND FIBULA X-RAYS  There is a typographical error in the report:  It states the fracture involves the distal femur, it should state the fracture involves the distal tibia.  This report was finalized on 1/11/2017 12:45 PM by Dr. Lucas Sauceda MD.      Result Date: 1/11/2017  Narrative: RIGHT ANKLE AND TIBIA AND FIBULA X-RAYS  CLINICAL HISTORY: Patient fell off ladder. Severe pain.  AP and lateral views of the right tibia and fibula and ankle demonstrate acute comminuted fractures involving the shafts of the distal femur and fibula with marked angulation at both fracture sites. No bone is identified extending beyond the confines of the skin. The fractures do not extend into the ankle joint which appears grossly intact. The knee joint also appears intact.  This report was finalized on 1/11/2017 11:52 AM by Dr. Lucas Sauceda MD.          Assessment:  Patient Active Problem List   Diagnosis   • Closed fracture of distal end of fibula with tibia           Plan:  The patient  voiced understanding of the risks, benefits, and alternative forms of treatment that were discussed and the patient consents to proceed with open reduction with intramedullary nail for his right tibia fracture.  I did explain the risk and expected outcomes to wrist include blood clot infection neurovascular deficit leg discrepancy and even death.  The patient completely understands and wishes to proceed..     Date: 1/12/2017  Marcos Lam PA-C       Electronically signed by Marcos Lam PA-C at 1/12/2017  7:38 AM

## 2017-01-13 NOTE — PLAN OF CARE
Problem: Patient Care Overview (Adult)  Goal: Plan of Care Review  Outcome: Ongoing (interventions implemented as appropriate)    01/13/17 1123   Coping/Psychosocial Response Interventions   Plan Of Care Reviewed With patient   Patient Care Overview   Progress progress towards functional goals is fair   Outcome Evaluation   Outcome Summary/Follow up Plan Patient is a 65 y/o male who was living independently prior to admission but recently suffered a tib/fib fracture on his R LE. He currently requires min A for all mobility and would benefit from skilled PT while in acute care to increase level of independence prior to D/C         Problem: Inpatient Physical Therapy  Goal: Bed Mobility Goal LTG- PT  Outcome: Ongoing (interventions implemented as appropriate)    01/13/17 1123   Bed Mobility PT LTG   Bed Mobility PT LTG, Date Established 01/13/17   Bed Mobility PT LTG, Time to Achieve 1 wk   Bed Mobility PT LTG, Activity Type all bed mobility   Bed Mobility PT LTG, [place] Level conditional independence   Bed Mobility PT Goal LTG, Assist Device bed rails       Goal: Transfer Training Goal 2 LTG- PT  Outcome: Ongoing (interventions implemented as appropriate)    01/13/17 1123   Transfer Training 2 PT LTG   Transfer Training PT 2 LTG, Date Established 01/13/17   Transfer Training PT 2 LTG, Time to Achieve 1 wk   Transfer Training PT 2 LTG, Activity Type all transfers   Transfer Training PT 2 LTG, [place] Level conditional independence   Transfer Training PT 2 LTG, Assist Device walker, rolling       Goal: Gait Training Goal LTG- PT  Outcome: Ongoing (interventions implemented as appropriate)    01/13/17 1123   Gait Training PT LTG   Gait Training Goal PT LTG, Date Established 01/13/17   Gait Training Goal PT LTG, Time to Achieve 1 wk   Gait Training Goal PT LTG, [place] Level conditional independence   Gait Training Goal PT LTG, Assist Device walker, rolling   Gait Training Goal PT LTG, Distance to Achieve  15       Goal: Stair Training Goal LTG- PT  Outcome: Ongoing (interventions implemented as appropriate)    01/13/17 1123   Stair Training PT LTG   Stair Training Goal PT LTG, Date Established 01/13/17   Stair Training Goal PT LTG, Time to Achieve 1 wk   Stair Training Goal PT LTG, Number of Steps 3   Stair Training Goal PT LTG, [place] Level conditional independence   Stair Training Goal PT LTG, Assist Device 1 handrail

## 2017-01-13 NOTE — PROGRESS NOTES
Orthopedic Progress Note    Subjective :   Doing ok.  Knee pain after surgery.    Objective :    Vital signs in last 24 hours:  Temp:  [97 °F (36.1 °C)-99.4 °F (37.4 °C)] 98.5 °F (36.9 °C)  Heart Rate:  [] 100  Resp:  [12-24] 20  BP: (138-209)/() 149/81  Vitals:    01/12/17 2350 01/13/17 0000 01/13/17 0300 01/13/17 0634   BP:  172/84 162/82 149/81   BP Location:  Left arm Left arm Left arm   Patient Position:  Lying Lying Lying   Pulse:  109 105 100   Resp:  16 24 20   Temp:  97.9 °F (36.6 °C) 99.4 °F (37.4 °C) 98.5 °F (36.9 °C)   TempSrc:  Oral Oral Oral   SpO2: 98% 97% 99% 98%   Weight:       Height:           PHYSICAL EXAM:  Patient is calm, in no acute distress, awake and oriented x 3.  Dressing with some saturation distal.  No signs of infection.  Swelling is appropriate.  Ecchymosis is appropriate in amount.  Homans test is negative.  Patient is neurovascularly intact distally.      LABS:    Results from last 7 days  Lab Units 01/13/17  0355   WBC 10*3/mm3 17.57*   HEMOGLOBIN g/dL 12.1*   HEMATOCRIT % 36.1*   PLATELETS 10*3/mm3 291       Results from last 7 days  Lab Units 01/13/17  0355   SODIUM mmol/L 137   POTASSIUM mmol/L 3.7   CHLORIDE mmol/L 97*   TOTAL CO2 mmol/L 24.7   BUN mg/dL 11   CREATININE mg/dL 0.98   GLUCOSE mg/dL 218*   CALCIUM mg/dL 8.7       Results from last 7 days  Lab Units 01/11/17  1108   INR  1.03   APTT seconds 23.8       ASSESSMENT:  Status post right tibial nail.    Plan:  Continue Physical Therapy, NWB RLE..  Continue SCDs, Continue Lovenox while in hospital followed by Aspirin 325 mg daily x 2 weeks at discharge for DVT prophylaxis.  Dispo planning for home with [facility] when medically stable and pain controlled.      Colton Nascimento MD    Date: 1/13/2017  Time: 7:26 AM

## 2017-01-13 NOTE — PROGRESS NOTES
Acute Care - Physical Therapy Initial Evaluation   Jose Raul     Patient Name: Lucas Deluca  : 1950  MRN: [id number]  Today's Date: 2017      Date of Referral to PT: 17  Referring Physician: Dr. Colton Nascimento MD      Admit Date: 2017     Visit Dx:    ICD-10-CM ICD-9-CM   1. Closed fracture of distal end of fibula with tibia, right, initial encounter S82.301A 827.0    S82.831A    2. Fall, initial encounter W19.XXXA E888.9   3. Difficulty walking R26.2 719.7     Patient Active Problem List   Diagnosis   • Closed fracture of distal end of fibula with tibia     Past Medical History   Diagnosis Date   • Diabetes mellitus    • Hyperlipidemia    • Hypertension    • Macular degeneration      Past Surgical History   Procedure Laterality Date   • Coronary artery bypass graft          • Coronary angioplasty with stent placement     • Tonsillectomy     • Pr treat tibial shaft fx, intramed implant Right 2017     Procedure: TIBIA INTRAMEDULLARY NAIL/SHANNON INSERTION;  Surgeon: Colton Nascimento MD;  Location: [facility];  Service: Orthopedics          PT ASSESSMENT (last 72 hours)      PT Evaluation       17 1117 17 0237    Rehab Evaluation    Document Type evaluation  -     Subjective Information agree to therapy;complains of;pain  -KH     Patient Effort, Rehab Treatment good  -KH     Symptoms Noted During/After Treatment increased pain  -KH     General Information    Patient Profile Review yes  -KH     Referring Physician Dr. Colton Nascimento MD  -KH     General Observations laying in bed with friend present  -KH     Pertinent History Of Current Problem distal tib/fib fracture  -     Precautions/Limitations fall precautions;non-weight bearing status   NWB R LE  -KH     Prior Level of Function independent:  -KH     Equipment Currently Used at Home walker, standard  -KH walker, standard  -QN    Plans/Goals Discussed With patient  -KH     Risks Reviewed patient:  -KH      Benefits Reviewed patient:  -KH     Barriers to Rehab none identified  -KH     Living Environment    Lives With alone  -KH     Living Arrangements house  -KH     Home Accessibility stairs to enter home  -KH     Number of Stairs to Enter Home 3  -KH     Stair Railings at Home outside, present on right side  -KH     Type of Financial/Environmental Concern none  -KH     Transportation Available car  -KH     Living Environment Comment Patient will need to hop up the steps  -     Clinical Impression    Date of Referral to PT 01/12/17  -     PT Diagnosis difficulty walking  -KH     Prognosis good  -KH     Functional Level At Time Of Evaluation Min A  -KH     Criteria for Skilled Therapeutic Interventions Met yes;treatment indicated  -     Pathology/Pathophysiology Noted (Describe Specifically for Each System) musculoskeletal  -     Impairments Found (describe specific impairments) gait, locomotion, and balance;aerobic capacity/endurance  -     Functional Limitations in Following Categories (Describe Specific Limitations) self-care;home management  -     Disability: Inability to Perform Actions/Activities of Required Roles (describe specific disability) community/leisure  -     Risk Reduction/Prevention (Describe Specific Areas of risk reduction/prevention) risk factors;recurrence of condition;secondary impairments  -     Rehab Potential good, to achieve stated therapy goals  -     Pain Assessment    Pain Assessment 0-10  -     Pain Score 3  -     Post Pain Score 5  -KH     Pain Type Acute pain  -     Pain Location Knee  -     Pain Orientation Right  -     Pain Intervention(s) Repositioned  -     Vision Assessment/Intervention    Visual Impairment WFL  -     Cognitive Assessment/Intervention    Current Cognitive/Communication Assessment functional  -     Orientation Status oriented x 4  -KH     Follows Commands/Answers Questions 100% of the time  -     Personal Safety WNL/WFL  -      Personal Safety Interventions fall prevention program maintained;gait belt;nonskid shoes/slippers when out of bed  -     Short/Long Term Memory intact short term memory;intact long term memory  -     ROM (Range of Motion)    General ROM no range of motion deficits identified  -     General ROM Detail R LE NT decondary to cast  -     MMT (Manual Muscle Testing)    General MMT Assessment no strength deficits identified  -     General MMT Assessment Detail R lower leg NT secondary to cast. Grossly 4+/5 otherwise  -     Bed Mobility, Assessment/Treatment    Bed Mobility, Assistive Device bed rails;head of bed elevated  -     Bed Mob, Supine to Sit, [place] minimum assist (75% patient effort)  -     Bed Mob, Sit to Supine, [place] not tested  -     Bed Mobility, Impairments strength decreased;pain  -     Transfer Assessment/Treatment    Transfers, Sit-Stand [place] minimum assist (75% patient effort)  -     Transfers, Stand-Sit [place] minimum assist (75% patient effort)  -     Transfers, Sit-Stand-Sit, Assist Device rolling walker  -     Transfer, Maintain Weight Bearing Status able to maintain weight bearing status  -     Transfer, Safety Issues weight-shifting ability decreased;balance decreased during turns  -     Transfer, Impairments strength decreased;pain  -     Transfer, Comment Elevated heigh of bed for transfer  -     Gait Assessment/Treatment    Gait, [place] Level minimum assist (75% patient effort)  -     Gait, Assistive Device rolling walker  -     Gait, Distance (Feet) 3  -     Gait, Gait Pattern Analysis 2-point gait  -     Gait, Gait Deviations ben decreased;step length decreased;weight-shifting ability decreased  -     Gait, Maintain Weight Bearing Status able to maintain weight bearing status  -     Gait, Safety Issues balance decreased during turns;weight-shifting ability decreased  -     Gait, Impairments impaired  balance;strength decreased;pain  -     Gait, Comment Patient able to rely on UE and hop but limited by endurance and unable to hop further than bed to chair this AM  -     Positioning and Restraints    Pre-Treatment Position in bed  -     Post Treatment Position chair  -     In Chair reclined;encouraged to call for assist;call light within reach;RLE elevated  -       01/11/17 1655 01/11/17 1653    General Information    Equipment Currently Used at Home  none  -JT    Living Environment    Lives With alone  -JT     Living Arrangements house  -JT     Home Accessibility stairs to enter home  -JT     Number of Stairs to Enter Home 3  -JT     Stair Railings at Home outside, present on left side  -JT     Type of Financial/Environmental Concern none  -JT     Transportation Available car  -JT       User Key  (r) = Recorded By, (t) = Taken By, (c) = Cosigned By    Initials Name Provider Type    PEDRO PABLO Peraza RN Registered Nurse    BAYLEE Gutierrez, RN Registered Nurse    HANNAH Zapata, PT Physical Therapist          Physical Therapy Education     Title: PT OT SLP Therapies (Active)     Topic: Physical Therapy (Active)     Point: Mobility training (Done)    Learning Progress Summary    Learner Readiness Method Response Comment Documented by Status   Patient Acceptance [facility] 01/13/17 1123 Done               Point: Body mechanics (Done)    Learning Progress Summary    Learner Readiness Method Response Comment Documented by Status   Patient Acceptance [facility] 01/13/17 1123 Done               Point: Precautions (Done)    Learning Progress Summary    Learner Readiness Method Response Comment Documented by Status   Patient Acceptance E [facility] 01/13/17 1123 Done                      User Key     Initials Effective Dates Name Provider Type Discipline     06/22/16 -  Merari Zapata PT Physical Therapist PT                PT Recommendation and Plan  Anticipated Equipment Needs At Discharge: front wheeled walker,  standard cane  Anticipated Discharge Disposition: inpatient rehabilitation facility  Planned Therapy Interventions: bed mobility training, home exercise program, gait training, postural re-education, stair training, strengthening, stretching, transfer training  PT Frequency: daily  Plan of Care Review  Plan Of Care Reviewed With: patient  Progress: progress towards functional goals is fair  Outcome Summary/Follow up Plan: Patient is a 65 y/o male who was living independently prior to admission but recently suffered a tib/fib fracture on his R LE. He currently requires min A for all mobility and would benefit from skilled PT while in acute care to increase level of independence prior to D/C          IP PT Goals       01/13/17 1123          Bed Mobility PT LTG    Bed Mobility PT LTG, Date Established 01/13/17  -KH      Bed Mobility PT LTG, Time to Achieve 1 wk  -KH      Bed Mobility PT LTG, Activity Type all bed mobility  -KH      Bed Mobility PT LTG, [place] Level conditional independence  -KH      Bed Mobility PT Goal  LTG, Assist Device bed rails  -KH      Transfer Training 2 PT LTG    Transfer Training PT 2 LTG, Date Established 01/13/17  -KH      Transfer Training PT 2 LTG, Time to Achieve 1 wk  -KH      Transfer Training PT 2 LTG, Activity Type all transfers  -KH      Transfer Training PT 2 LTG, [place] Level conditional independence  -KH      Transfer Training PT 2 LTG, Assist Device walker, rolling  -KH      Gait Training PT LTG    Gait Training Goal PT LTG, Date Established 01/13/17  -KH      Gait Training Goal PT LTG, Time to Achieve 1 wk  -KH      Gait Training Goal PT LTG, [place] Level conditional independence  -KH      Gait Training Goal PT LTG, Assist Device walker, rolling  -KH      Gait Training Goal PT LTG, Distance to Achieve 15  -KH      Stair Training PT LTG    Stair Training Goal PT LTG, Date Established 01/13/17  -KH      Stair Training Goal PT LTG, Time to Achieve 1 wk  -KH       Stair Training Goal PT LTG, Number of Steps 3  -KH      Stair Training Goal PT LTG, [place] Level conditional independence  -KH      Stair Training Goal PT LTG, Assist Device 1 handrail  -HANNAH        User Key  (r) = Recorded By, (t) = Taken By, (c) = Cosigned By    Initials Name Provider Type    HANNAH Zapata PT Physical Therapist                Outcome Measures       01/13/17 1100          How much help from another person do you currently need...    Turning from your back to your side while in flat bed without using bedrails? 3  -KH      Moving from lying on back to sitting on the side of a flat bed without bedrails? 3  -KH      Moving to and from a bed to a chair (including a wheelchair)? 3  -KH      Standing up from a chair using your arms (e.g., wheelchair, bedside chair)? 3  -KH      Climbing 3-5 steps with a railing? 2  -KH      To walk in hospital room? 2  -KH      AM-PAC 6 Clicks Score 16  -KH      Functional Assessment    Outcome Measure Options AM-PAC 6 Clicks Basic Mobility (PT)  -HANNAH        User Key  (r) = Recorded By, (t) = Taken By, (c) = Cosigned By    Initials Name Provider Type    HANNAH Zapata PT Physical Therapist           Time Calculation:         PT Charges       01/13/17 1129          Time Calculation    Start Time 1057  -      Stop Time 1120  -      Time Calculation (min) 23 min  -KH      PT Received On 01/13/17  -HANNAH      PT - Next Appointment 01/14/17  -HANNAH      PT Goal Re-Cert Due Date 01/20/17  -HANNAH        User Key  (r) = Recorded By, (t) = Taken By, (c) = Cosigned By    Initials Name Provider Type    HANNAH Zapata PT Physical Therapist          Therapy Charges for Today     Code Description Service Date Service Provider Modifiers Qty    02282094538 HC PT EVAL MOD COMPLEXITY 2 1/13/2017 Merari Zapata PT GP 1          PT G-Codes  Outcome Measure Options: AM-PAC 6 Clicks Basic Mobility (PT)      Merari Zapata PT  1/13/2017

## 2017-01-14 VITALS
OXYGEN SATURATION: 97 % | HEART RATE: 96 BPM | DIASTOLIC BLOOD PRESSURE: 72 MMHG | BODY MASS INDEX: 27.92 KG/M2 | RESPIRATION RATE: 16 BRPM | HEIGHT: 70 IN | WEIGHT: 195 LBS | TEMPERATURE: 95.9 F | SYSTOLIC BLOOD PRESSURE: 129 MMHG

## 2017-01-14 PROBLEM — E11.9 TYPE 2 DIABETES MELLITUS: Status: ACTIVE | Noted: 2017-01-14

## 2017-01-14 PROBLEM — I25.10 CAD (CORONARY ARTERY DISEASE): Status: ACTIVE | Noted: 2017-01-14

## 2017-01-14 LAB
ALBUMIN SERPL-MCNC: 3.4 G/DL (ref 3.5–5.2)
ALBUMIN/GLOB SERPL: 1 G/DL
ALP SERPL-CCNC: 49 U/L (ref 39–117)
ALT SERPL W P-5'-P-CCNC: 13 U/L (ref 1–41)
ANION GAP SERPL CALCULATED.3IONS-SCNC: 13 MMOL/L
AST SERPL-CCNC: 15 U/L (ref 1–40)
BASOPHILS # BLD AUTO: 0.01 10*3/MM3 (ref 0–0.2)
BASOPHILS NFR BLD AUTO: 0.1 % (ref 0–1.5)
BILIRUB SERPL-MCNC: 0.5 MG/DL (ref 0.1–1.2)
BUN BLD-MCNC: 13 MG/DL (ref 8–23)
BUN/CREAT SERPL: 14 (ref 7–25)
CALCIUM SPEC-SCNC: 8.9 MG/DL (ref 8.6–10.5)
CHLORIDE SERPL-SCNC: 102 MMOL/L (ref 98–107)
CO2 SERPL-SCNC: 25 MMOL/L (ref 22–29)
CREAT BLD-MCNC: 0.93 MG/DL (ref 0.76–1.27)
DEPRECATED RDW RBC AUTO: 43.9 FL (ref 37–54)
EOSINOPHIL # BLD AUTO: 0.16 10*3/MM3 (ref 0–0.7)
EOSINOPHIL NFR BLD AUTO: 1.5 % (ref 0.3–6.2)
ERYTHROCYTE [DISTWIDTH] IN BLOOD BY AUTOMATED COUNT: 13.3 % (ref 11.5–14.5)
GFR SERPL CREATININE-BSD FRML MDRD: 82 ML/MIN/1.73
GLOBULIN UR ELPH-MCNC: 3.5 GM/DL
GLUCOSE BLD-MCNC: 125 MG/DL (ref 65–99)
GLUCOSE BLDC GLUCOMTR-MCNC: 103 MG/DL (ref 70–130)
GLUCOSE BLDC GLUCOMTR-MCNC: 205 MG/DL (ref 70–130)
GLUCOSE BLDC GLUCOMTR-MCNC: 215 MG/DL (ref 70–130)
HCT VFR BLD AUTO: 33.9 % (ref 40.4–52.2)
HGB BLD-MCNC: 10.9 G/DL (ref 13.7–17.6)
IMM GRANULOCYTES # BLD: 0.03 10*3/MM3 (ref 0–0.03)
IMM GRANULOCYTES NFR BLD: 0.3 % (ref 0–0.5)
LYMPHOCYTES # BLD AUTO: 2.1 10*3/MM3 (ref 0.9–4.8)
LYMPHOCYTES NFR BLD AUTO: 19.2 % (ref 19.6–45.3)
MCH RBC QN AUTO: 29.1 PG (ref 27–32.7)
MCHC RBC AUTO-ENTMCNC: 32.2 G/DL (ref 32.6–36.4)
MCV RBC AUTO: 90.4 FL (ref 79.8–96.2)
MONOCYTES # BLD AUTO: 1.82 10*3/MM3 (ref 0.2–1.2)
MONOCYTES NFR BLD AUTO: 16.7 % (ref 5–12)
NEUTROPHILS # BLD AUTO: 6.79 10*3/MM3 (ref 1.9–8.1)
NEUTROPHILS NFR BLD AUTO: 62.2 % (ref 42.7–76)
PLATELET # BLD AUTO: 269 10*3/MM3 (ref 140–500)
PMV BLD AUTO: 11 FL (ref 6–12)
POTASSIUM BLD-SCNC: 3.7 MMOL/L (ref 3.5–5.2)
PROT SERPL-MCNC: 6.9 G/DL (ref 6–8.5)
RBC # BLD AUTO: 3.75 10*6/MM3 (ref 4.6–6)
SODIUM BLD-SCNC: 140 MMOL/L (ref 136–145)
WBC NRBC COR # BLD: 10.91 10*3/MM3 (ref 4.5–10.7)

## 2017-01-14 PROCEDURE — 80053 COMPREHEN METABOLIC PANEL: CPT | Performed by: HOSPITALIST

## 2017-01-14 PROCEDURE — 90674 CCIIV4 VAC NO PRSV 0.5 ML IM: CPT | Performed by: INTERNAL MEDICINE

## 2017-01-14 PROCEDURE — 25010000002 ENOXAPARIN PER 10 MG: Performed by: ORTHOPAEDIC SURGERY

## 2017-01-14 PROCEDURE — 25010000002 PNEUMOCOCCAL VAC POLYVALENT PER 0.5 ML: Performed by: INTERNAL MEDICINE

## 2017-01-14 PROCEDURE — 90732 PPSV23 VACC 2 YRS+ SUBQ/IM: CPT | Performed by: INTERNAL MEDICINE

## 2017-01-14 PROCEDURE — 63710000001 INSULIN ASPART PER 5 UNITS: Performed by: HOSPITALIST

## 2017-01-14 PROCEDURE — 97110 THERAPEUTIC EXERCISES: CPT | Performed by: PHYSICAL THERAPIST

## 2017-01-14 PROCEDURE — G0009 ADMIN PNEUMOCOCCAL VACCINE: HCPCS | Performed by: INTERNAL MEDICINE

## 2017-01-14 PROCEDURE — 25010000002 INFLUENZA VAC SPLIT QUAD 0.5 ML SUSPENSION PREFILLED SYRINGE: Performed by: INTERNAL MEDICINE

## 2017-01-14 PROCEDURE — 82962 GLUCOSE BLOOD TEST: CPT

## 2017-01-14 PROCEDURE — 85025 COMPLETE CBC W/AUTO DIFF WBC: CPT | Performed by: HOSPITALIST

## 2017-01-14 RX ORDER — HYDROCODONE BITARTRATE AND ACETAMINOPHEN 7.5; 325 MG/1; MG/1
1 TABLET ORAL EVERY 4 HOURS PRN
Qty: 30 TABLET | Refills: 0 | Status: SHIPPED | OUTPATIENT
Start: 2017-01-14 | End: 2017-01-21

## 2017-01-14 RX ORDER — POLYETHYLENE GLYCOL 3350 17 G/17G
17 POWDER, FOR SOLUTION ORAL DAILY
Qty: 510 G | Refills: 0 | Status: SHIPPED | OUTPATIENT
Start: 2017-01-14 | End: 2017-02-13

## 2017-01-14 RX ORDER — HYDROCODONE BITARTRATE AND ACETAMINOPHEN 7.5; 325 MG/1; MG/1
2 TABLET ORAL EVERY 4 HOURS PRN
Status: DISCONTINUED | OUTPATIENT
Start: 2017-01-14 | End: 2017-01-14 | Stop reason: HOSPADM

## 2017-01-14 RX ORDER — PSEUDOEPHEDRINE HCL 30 MG
100 TABLET ORAL 2 TIMES DAILY
Qty: 60 CAPSULE | Refills: 0 | Status: SHIPPED | OUTPATIENT
Start: 2017-01-14 | End: 2017-02-13

## 2017-01-14 RX ORDER — INSULIN GLARGINE 100 [IU]/ML
55 INJECTION, SOLUTION SUBCUTANEOUS NIGHTLY
Refills: 12
Start: 2017-01-14 | End: 2021-12-01 | Stop reason: SDUPTHER

## 2017-01-14 RX ORDER — HYDROCODONE BITARTRATE AND ACETAMINOPHEN 7.5; 325 MG/1; MG/1
1 TABLET ORAL EVERY 4 HOURS PRN
Status: DISCONTINUED | OUTPATIENT
Start: 2017-01-14 | End: 2017-01-14 | Stop reason: HOSPADM

## 2017-01-14 RX ORDER — ACETAMINOPHEN 325 MG/1
650 TABLET ORAL EVERY 4 HOURS PRN
Refills: 0
Start: 2017-01-14 | End: 2021-12-01 | Stop reason: SDUPTHER

## 2017-01-14 RX ADMIN — LISINOPRIL 20 MG: 20 TABLET ORAL at 08:54

## 2017-01-14 RX ADMIN — HYDROCODONE BITARTRATE AND ACETAMINOPHEN 2 TABLET: 7.5; 325 TABLET ORAL at 16:56

## 2017-01-14 RX ADMIN — HYDROCODONE BITARTRATE AND ACETAMINOPHEN 2 TABLET: 7.5; 325 TABLET ORAL at 12:36

## 2017-01-14 RX ADMIN — INFLUENZA A VIRUS A/CALIFORNIA/7/2009 X-179A (H1N1) ANTIGEN (FORMALDEHYDE INACTIVATED), INFLUENZA A VIRUS A/HONG KONG/4801/2014 X-263B (H3N2) ANTIGEN (FORMALDEHYDE INACTIVATED), INFLUENZA B VIRUS B/PHUKET/3073/2013 ANTIGEN (FORMALDEHYDE INACTIVATED), AND INFLUENZA B VIRUS B/BRISBANE/60/2008 ANTIGEN (FORMALDEHYDE INACTIVATED) 0.5 ML: 15; 15; 15; 15 INJECTION, SUSPENSION INTRAMUSCULAR at 17:45

## 2017-01-14 RX ADMIN — HYDROCODONE BITARTRATE AND ACETAMINOPHEN 2 TABLET: 7.5; 325 TABLET ORAL at 03:13

## 2017-01-14 RX ADMIN — AMLODIPINE BESYLATE 2.5 MG: 2.5 TABLET ORAL at 08:54

## 2017-01-14 RX ADMIN — METFORMIN HYDROCHLORIDE 850 MG: 850 TABLET ORAL at 08:54

## 2017-01-14 RX ADMIN — ENOXAPARIN SODIUM 40 MG: 40 INJECTION SUBCUTANEOUS at 08:54

## 2017-01-14 RX ADMIN — PNEUMOCOCCAL VACCINE POLYVALENT 0.5 ML
25; 25; 25; 25; 25; 25; 25; 25; 25; 25; 25; 25; 25; 25; 25; 25; 25; 25; 25; 25; 25; 25; 25 INJECTION, SOLUTION INTRAMUSCULAR; SUBCUTANEOUS at 17:44

## 2017-01-14 RX ADMIN — HYDROCODONE BITARTRATE AND ACETAMINOPHEN 2 TABLET: 7.5; 325 TABLET ORAL at 07:06

## 2017-01-14 RX ADMIN — INSULIN ASPART 5 UNITS: 100 INJECTION, SOLUTION INTRAVENOUS; SUBCUTANEOUS at 12:35

## 2017-01-14 RX ADMIN — ASPIRIN 81 MG: 81 TABLET, CHEWABLE ORAL at 08:54

## 2017-01-14 NOTE — PLAN OF CARE
Problem: Patient Care Overview (Adult)  Goal: Plan of Care Review  Outcome: Ongoing (interventions implemented as appropriate)    01/14/17 0419   Coping/Psychosocial Response Interventions   Plan Of Care Reviewed With patient   Patient Care Overview   Progress improving   Outcome Evaluation   Outcome Summary/Follow up Plan Pt is continuing to do well tonight. Pain is well controlled with po pain meds. Is NWB but can ambulate with assistance short distances. Voiding adequately and v/s stable.        Goal: Adult Individualization and Mutuality  Outcome: Ongoing (interventions implemented as appropriate)  Goal: Discharge Needs Assessment  Outcome: Ongoing (interventions implemented as appropriate)    Problem: Fall Risk (Adult)  Goal: Identify Related Risk Factors and Signs and Symptoms  Outcome: Outcome(s) achieved Date Met:  01/14/17 01/14/17 0419   Fall Risk   Fall Risk: Related Risk Factors gait/mobility problems   Fall Risk: Signs and Symptoms presence of risk factors       Goal: Absence of Falls  Outcome: Ongoing (interventions implemented as appropriate)    01/14/17 0419   Fall Risk (Adult)   Absence of Falls achieves outcome         Problem: Perioperative Period (Adult)  Goal: Signs and Symptoms of Listed Potential Problems Will be Absent or Manageable (Perioperative Period)  Outcome: Ongoing (interventions implemented as appropriate)    01/14/17 0419   Perioperative Period   Problems Assessed (Perioperative Period) all   Problems Present (Perioperative Period) pain         Problem: Mobility, Physical Impaired (Adult)  Goal: Identify Related Risk Factors and Signs and Symptoms  Outcome: Ongoing (interventions implemented as appropriate)    01/14/17 0419   Mobility, Physical Impaired   Physical Mobility, Impaired: Related Risk Factors injury;pain/discomfort;surgery/procedure   Signs and Symptoms (Physical Mobility Impaired) decreased balance       Goal: Enhanced Mobility Skills  Outcome: Ongoing (interventions  implemented as appropriate)    01/14/17 0419   Mobility, Physical Impaired (Adult)   Enhanced Mobility Skills achieves outcome       Goal: Enhanced Functionality Ability  Outcome: Ongoing (interventions implemented as appropriate)    01/14/17 0419   Mobility, Physical Impaired (Adult)   Enhanced Functionality Ability achieves outcome

## 2017-01-14 NOTE — THERAPY DISCHARGE NOTE
Acute Care - Physical Therapy Treatment Note/Discharge  [facility]     Patient Name: Lucas Deluca  : 1950  MRN: [id number]  Today's Date: 2017  Onset of Illness/Injury or Date of Surgery Date: 17  Date of Referral to PT: 17  Referring Physician: Pily    Admit Date: 2017    Visit Dx:    ICD-10-CM ICD-9-CM   1. Closed fracture of distal end of fibula with tibia, right, initial encounter S82.301A 827.0    S82.831A    2. Fall, initial encounter W19.XXXA E888.9   3. Difficulty walking R26.2 719.7     Patient Active Problem List   Diagnosis   • Closed fracture of distal end of fibula with tibia       Physical Therapy Education     Title: PT OT SLP Therapies (Done)     Topic: Physical Therapy (Done)     Point: Mobility training (Done)    Learning Progress Summary    Learner Readiness Method Response Comment Documented by Status   Patient Eager E [facility] 17 1524 Done    Acceptance E [facility] 17 1123 Done               Point: Home exercise program (Done)    Learning Progress Summary    Learner Readiness Method Response Comment Documented by Status   Patient Eager E [facility] 17 1524 Done               Point: Body mechanics (Done)    Learning Progress Summary    Learner Readiness Method Response Comment Documented by Status   Patient Eager E [facility] 17 1524 Done    Acceptance E [facility] 17 1123 Done               Point: Precautions (Done)    Learning Progress Summary    Learner Readiness Method Response Comment Documented by Status   Patient Eager E [facility] 17 1524 Done    Acceptance E [facility] 17 1123 Done                      User Key     Initials Effective Dates Name Provider Type Discipline     16 -  Car Knight, PT Physical Therapist PT     16 -  Merari Zapata, PT Physical Therapist PT                    IP PT Goals       17 1524 17 1123       Bed Mobility PT LTG    Bed Mobility PT LTG, Date Established  17  -     Bed  Mobility PT LTG, Time to Achieve  1 wk  -KH     Bed Mobility PT LTG, Activity Type  all bed mobility  -KH     Bed Mobility PT LTG, [place] Level  conditional independence  -KH     Bed Mobility PT Goal  LTG, Assist Device  bed rails  -KH     Transfer Training 2 PT LTG    Transfer Training PT 2 LTG, Date Established  01/13/17  -KH     Transfer Training PT 2 LTG, Time to Achieve  1 wk  -KH     Transfer Training PT 2 LTG, Activity Type  all transfers  -KH     Transfer Training PT 2 LTG, [place] Level  conditional independence  -KH     Transfer Training PT 2 LTG, Assist Device  walker, rolling  -KH     Gait Training PT LTG    Gait Training Goal PT LTG, Date Established  01/13/17  -KH     Gait Training Goal PT LTG, Time to Achieve  1 wk  -KH     Gait Training Goal PT LTG, [place] Level  conditional independence  -KH     Gait Training Goal PT LTG, Assist Device  walker, rolling  -KH     Gait Training Goal PT LTG, Distance to Achieve  15  -KH     Stair Training PT LTG    Stair Training Goal PT LTG, Date Established  01/13/17  -KH     Stair Training Goal PT LTG, Time to Achieve 1 day  -DH 1 wk  -KH     Stair Training Goal PT LTG, Number of Steps  3  -KH     Stair Training Goal PT LTG, [place] Level minimum assist (75% patient effort)  -DH conditional independence  -KH     Stair Training Goal PT LTG, Assist Device 1 handrail  -DH 1 handrail  -KH     Stair Training Goal PT LTG, Outcome goal met  -        User Key  (r) = Recorded By, (t) = Taken By, (c) = Cosigned By    Initials Name Provider Type     Car Knight, FRED Physical Therapist    HANNAH Zapata PT Physical Therapist              Adult Rehabilitation Note       01/14/17 1500          Rehab Assessment/Intervention    Discipline physical therapist  -      Document Type progress note  -      Subjective Information agree to therapy  -      Recorded by [] Car Knight, PT      Pain Assessment    Pain Assessment 0-10  -      Pain  Score 6  -      Recorded by [] Car Knight PT      Cognitive Assessment/Intervention    Current Cognitive/Communication Assessment functional  -      Orientation Status oriented x 4  -      Personal Safety WNL/WFL  -      Personal Safety Interventions fall prevention program maintained  -      Recorded by [] Car Knight PT      Stairs Assessment/Treatment    Stairs, Comment --   mod a x 1 single HR  -DH      Recorded by [] Car Knight PT        User Key  (r) = Recorded By, (t) = Taken By, (c) = Cosigned By    Initials Name Effective Dates     Car Knight PT 06/22/16 -           PT Recommendation and Plan  Anticipated Equipment Needs At Discharge: front wheeled walker, standard cane  Anticipated Discharge Disposition: inpatient rehabilitation facility  Planned Therapy Interventions: bed mobility training, home exercise program, gait training, postural re-education, stair training, strengthening, stretching, transfer training  PT Frequency: daily  Plan of Care Review  Plan Of Care Reviewed With: patient  Progress: improving  Outcome Summary/Follow up Plan: Pt is continuing to do well tonight. Pain is well controlled with po pain meds. Is NWB but can ambulate with assistance short distances. Voiding adequately and v/s stable.           Outcome Measures       01/14/17 1500 01/13/17 1423 01/13/17 1100    How much help from another person do you currently need...    Turning from your back to your side while in flat bed without using bedrails?   3  -KH    Moving from lying on back to sitting on the side of a flat bed without bedrails?   3  -KH    Moving to and from a bed to a chair (including a wheelchair)?   3  -KH    Standing up from a chair using your arms (e.g., wheelchair, bedside chair)?   3  -KH    Climbing 3-5 steps with a railing?   2  -KH    To walk in hospital room?   2  -KH    AM-PAC 6 Clicks Score   16  -KH    How much help from another is currently needed...    Putting on  and taking off regular lower body clothing?  3  -ER     Bathing (including washing, rinsing, and drying)  3  -ER     Toileting (which includes using toilet bed pan or urinal)  3  -ER     Putting on and taking off regular upper body clothing  4  -ER     Taking care of personal grooming (such as brushing teeth)  4  -ER     Eating meals  4  -ER     Score  21  -ER     Functional Assessment    Outcome Measure Options AM-PAC 6 Clicks Basic Mobility (PT)  - AM-PAC 6 Clicks Daily Activity (OT)  -ER AM-PAC 6 Clicks Basic Mobility (PT)  -      User Key  (r) = Recorded By, (t) = Taken By, (c) = Cosigned By    Initials Name Provider Type    ER Roxana Flores, OTR Occupational Therapist    FLOYD Knight, PT Physical Therapist    HANNAH Zapata, PT Physical Therapist           Time Calculation:         PT Charges       01/14/17 1525          Time Calculation    Start Time 1500  -      Stop Time 1520  -DH      Time Calculation (min) 20 min  -        User Key  (r) = Recorded By, (t) = Taken By, (c) = Cosigned By    Initials Name Provider Type     Car Knight, PT Physical Therapist          Therapy Charges for Today     Code Description Service Date Service Provider Modifiers Qty    [id number] HC PT THER PROC EA 15 MIN 1/14/2017 Car Knight, PT GP 1          PT G-Codes  Outcome Measure Options: AM-PAC 6 Clicks Basic Mobility (PT)    PT Discharge Summary  Anticipated Discharge Disposition: inpatient rehabilitation facility    Car Knight, PT  1/14/2017

## 2017-01-14 NOTE — DISCHARGE INSTR - ACTIVITY
Do not apply any weight to right leg until otherwise instructed by Dr. Nascimento.  Keep dressing clean and dry until your follow up visit. You may take a sponge bath to avoid getting your bandage wet.  If you notice any excessive discharge, foul smell, or pus like substance from bandage, call Dr. Nascimento's office.  If you run a persistent fever (100.5 or greater) notify the MD.

## 2017-01-15 NOTE — PLAN OF CARE
Problem: Patient Care Overview (Adult)  Goal: Plan of Care Review  Outcome: Outcome(s) achieved Date Met:  01/14/17 01/14/17 2016   Coping/Psychosocial Response Interventions   Plan Of Care Reviewed With patient   Patient Care Overview   Progress improving   Outcome Evaluation   Outcome Summary/Follow up Plan patient able to transfer safely and maintain nwb restrictions. pain adequately controlled with po meds. worked on managing stairs with PT. patient feels prepared and ready for d/c home.       Goal: Adult Individualization and Mutuality  Outcome: Outcome(s) achieved Date Met:  01/14/17  Goal: Discharge Needs Assessment  Outcome: Outcome(s) achieved Date Met:  01/14/17 01/13/17 1237 01/14/17 2016   Discharge Needs Assessment   Equipment Needed After Discharge walker, standard --    Discharge Disposition --  home or self-care   Living Environment   Transportation Available car;family or friend will provide --          Problem: Fall Risk (Adult)  Goal: Absence of Falls  Outcome: Outcome(s) achieved Date Met:  01/14/17 01/14/17 2016   Fall Risk (Adult)   Absence of Falls achieves outcome         Problem: Perioperative Period (Adult)  Goal: Signs and Symptoms of Listed Potential Problems Will be Absent or Manageable (Perioperative Period)  Outcome: Outcome(s) achieved Date Met:  01/14/17 01/14/17 2016   Perioperative Period   Problems Assessed (Perioperative Period) all   Problems Present (Perioperative Period) pain         Problem: Mobility, Physical Impaired (Adult)  Goal: Identify Related Risk Factors and Signs and Symptoms  Outcome: Outcome(s) achieved Date Met:  01/14/17 01/14/17 2016   Mobility, Physical Impaired   Physical Mobility, Impaired: Related Risk Factors injury;ordered restrictions;pain/discomfort   Signs and Symptoms (Physical Mobility Impaired) decreased balance;inability to purposefully move in environment       Goal: Enhanced Mobility Skills  Outcome: Outcome(s) achieved Date Met:   01/14/17  Goal: Enhanced Functionality Ability  Outcome: Outcome(s) achieved Date Met:  01/14/17

## 2017-01-16 NOTE — DISCHARGE SUMMARY
DATE OF ADMISSION: 01/11/2017  DATE OF DISCHARGE: 01/14/2017    PRIMARY CARE PHYSICIAN: None.     DISCHARGE DIAGNOSES:  1. Complicated right tibial fracture for which the patient is status post tibia intramedullary nail kallie insertion by Dr. Colton Nascimento on 01/12/2017. Postoperative the patient is doing well.  2. Type 2 diabetes.  3. Coronary artery disease with prior stent and coronary artery bypass graft.  4. Hyperlipidemia.  5. Hypertension.    HISTORY/HOSPITAL COURSE: This is a 66-year-old gentleman who came to the hospital because he fell while at work injuring his right leg. He underwent intramedullary nailing of the tibia. The patient is nonweight-bearing, currently has a splint in place. He has gotten physical therapy today. He is able to make it up 3 steps. So, the plan is for the patient to go home today.    The patient’s diabetes here is slightly elevated. His hemoglobin A1c is 7.5 at home. He is on Lantus 63 units and sliding scale insulin of 10-15 t.i.d. with meals. We are going to decrease his Lantus just slightly as he has got decreased appetite and we do not want him to have hypoglycemia, but he can quickly get back to his Lantus 63 units at night and also he can restart his metformin on discharge.    As far as the fracture is concerned, we will call Orthopedics for final recommendations, but the patient currently is splinted. So, presumably the patient will stay in the splint until seen in their office in about a week or so. The patient is nonweight-bearing in the lower extremity until then. Orthopedics has written the patient is okay for aspirin on discharge. I have asked the nurse to make sure that Orthopedics is also okay for Plavix as he does have the stent, so I would like for the patient to be on aspirin and Plavix if at all possible.    The patient is taking pain medication here, so on discharge the patient will be sent out with pain medication. Risks and benefits discussed of the pain  medication.     DIAGNOSTIC DATA: Tibia/fibula right intraoperative study. Tibia/fibula study that showed the comminuted right tibia/fibular fracture reduced. There is some question concerning the tibia fracture extending beyond the _____. It says that no bone is identified extending beyond the confines of the skin. BUN and creatinine are normal. Liver enzymes normal. White blood cell count is 10.9, hemoglobin is 10.9, platelets are 269,000, differential is fairly unremarkable. The patient’s preoperative EKG shows nonspecific T-wave abnormalities, but the patient has no chest pain, tolerated surgery well, no complaint.    DISCHARGE MEDICATIONS:   1. Tylenol.  2. Aspirin 81 p.o. daily.  3. Plavix 75 p.o. daily.  4. Norco 7.5/325 take 1 tablet q.4 h. p.r.n. pain.  5. NovoLog 10-15 units t.i.d. with meals depending on blood sugar.  6. Lantus 55 units subcutaneous at bedtime.  7. Metformin 850 p.o. b.i.d.   8. Fenofibrate p.o. daily.  9. Zocor 10 p.o. daily.  10. Lisinopril 20 twice daily.  11. Norvasc 2.5 daily.  12. Docusate 100 b.i.d.   13. MiraLax 1 packet p.o. daily.    FOLLOWUP:  1. The patient will need to follow up with Dr. Colton Nascimento in approximately 1 week for further evaluation.  2. The patient can follow up with primary care doctor in 1-2 weeks. The patient needs to be monitoring blood sugars at home preferably trying to keep them less than 150 for adequate healing.     Greater than 30 minutes spent in discharge planning.      Aquiles Toribio M.D.  ALBARO:rené  D:   01/14/2017 16:04:34  T:   01/14/2017 16:53:38  Job ID:   [id number]  Document ID:   [id number]  cc:     (no pcp)

## 2018-02-27 ENCOUNTER — OFFICE VISIT CONVERTED (OUTPATIENT)
Dept: CARDIOLOGY | Facility: CLINIC | Age: 68
End: 2018-02-27
Attending: SPECIALIST

## 2018-03-26 ENCOUNTER — HOSPITAL ENCOUNTER (OUTPATIENT)
Dept: OTHER | Facility: HOSPITAL | Age: 68
Setting detail: SPECIMEN
Discharge: HOME | End: 2018-03-26
Attending: INTERNAL MEDICINE | Admitting: INTERNAL MEDICINE

## 2018-04-23 NOTE — PROGRESS NOTES
[facility]     LOS: 2 days     Subjective:  No cp  No soa  Eating ok  Constipated  Right leg pain    Objective:    Vital Signs:  Temp:  [97.7 °F (36.5 °C)-99.4 °F (37.4 °C)] 99.4 °F (37.4 °C)  Heart Rate:  [] 96  Resp:  [16-24] 16  BP: (140-209)/() 157/71    In operating room      Results Review:    GLUCOSE   Date Value Ref Range Status   01/13/2017 218 (H) 65 - 99 mg/dL Final   01/12/2017 222 (H) 65 - 99 mg/dL Final   01/11/2017 99 65 - 99 mg/dL Final       Results from last 7 days  Lab Units 01/13/17  0355   WBC 10*3/mm3 17.57*   HEMOGLOBIN g/dL 12.1*   HEMATOCRIT % 36.1*   PLATELETS 10*3/mm3 291       Results from last 7 days  Lab Units 01/13/17  0355  01/11/17  1108   SODIUM mmol/L 137  < > 142   POTASSIUM mmol/L 3.7  < > 4.2   CHLORIDE mmol/L 97*  < > 104   TOTAL CO2 mmol/L 24.7  < > 27.8   BUN mg/dL 11  < > 14   CREATININE mg/dL 0.98  < > 1.00   CALCIUM mg/dL 8.7  < > 9.7   BILIRUBIN mg/dL  --   --  0.3   ALK PHOS U/L  --   --  56   ALT (SGPT) U/L  --   --  21   AST (SGOT) U/L  --   --  36   GLUCOSE mg/dL 218*  < > 99   < > = values in this interval not displayed.    Results from last 7 days  Lab Units 01/11/17  1108   INR  1.03   APTT seconds 23.8                 I have reviewed daily medications and changes in CPOE    Scheduled meds    amLODIPine 2.5 mg Oral Daily   docusate sodium 100 mg Oral BID   enoxaparin 40 mg Subcutaneous Q24H   insulin aspart 0-14 Units Subcutaneous Q6H   insulin detemir 50 Units Subcutaneous Nightly   lisinopril 20 mg Oral BID   metFORMIN 850 mg Oral BID With Meals   polyethylene glycol 17 g Oral BID   simvastatin 10 mg Oral Nightly         dextrose 5 % and sodium chloride 0.45 % with KCl 20 mEq/L 75 mL/hr Last Rate: 75 mL/hr (01/13/17 0204)   lactated ringers 9 mL/hr Last Rate: 9 mL/hr (01/12/17 1420)   lactated ringers 100 mL/hr Last Rate: 100 mL/hr (01/12/17 2220)     PRN meds  •  acetaminophen  •  dextrose  •  dextrose  •  glucagon  (human recombinant)  •  HYDROcodone-acetaminophen **FOLLOWED BY** [START ON 1/21/2017] HYDROcodone-acetaminophen  •  HYDROmorphone  •  influenza vaccine  •  ondansetron  •  pneumococcal polysaccharide 23-valent  •  sodium chloride  •  Insert peripheral IV **AND** sodium chloride    Assessment:    Active Problems:    Closed fracture of distal end of fibula with tibia    1. Right ankle fracture- currently in the OR     2. DM2 with cardiovascular complications. BS high    3. CAD with CABG (2011) and stent (2012). Restart aspirin 81 tonight, discussed with Dr Molina.     4. Lipids    5. HTN- high, norvasc and lisinopril    6. Leukocytosis- f/u tomorrow, afebrile and feels well        Aquiles Toribio MD  01/13/17  5:25 PM       Detail Level: Generalized

## 2018-07-03 ENCOUNTER — OFFICE VISIT CONVERTED (OUTPATIENT)
Dept: CARDIOLOGY | Facility: CLINIC | Age: 68
End: 2018-07-03
Attending: SPECIALIST

## 2018-11-06 ENCOUNTER — CONVERSION ENCOUNTER (OUTPATIENT)
Dept: CARDIOLOGY | Facility: CLINIC | Age: 68
End: 2018-11-06

## 2018-11-06 ENCOUNTER — OFFICE VISIT CONVERTED (OUTPATIENT)
Dept: CARDIOLOGY | Facility: CLINIC | Age: 68
End: 2018-11-06
Attending: SPECIALIST

## 2019-03-19 ENCOUNTER — OFFICE VISIT CONVERTED (OUTPATIENT)
Dept: CARDIOLOGY | Facility: CLINIC | Age: 69
End: 2019-03-19
Attending: SPECIALIST

## 2019-03-19 ENCOUNTER — CONVERSION ENCOUNTER (OUTPATIENT)
Dept: CARDIOLOGY | Facility: CLINIC | Age: 69
End: 2019-03-19

## 2019-05-23 NOTE — ED PROCEDURE NOTE
PRE-SEDATION ASSESSMENT    CONSENT  Consent for procedure and sedation obtained: Yes    MEDICAL HISTORY  Significant medical/surgical history: Yes  Past Complications with Sedation/Anesthesia: No  Significant Family History: No  Smoking History: No  Alcohol/Drug abuse: No  Possible Pregnancy (LMP): No  Cardiac History: Yes  Respiratory History: Yes    PHYSICAL EXAM  History and Physical Reviewed: Patient has valid H&P within 30 days. I have reviewed and there are no changes.  Airway Anatomy : Class II  Heart : Normal  Lungs : Normal  LOC/Mental Status : Normal    OTHER FINDINGS  Reviewed current medications and allergies: Yes  Pertinent lab/diagnostic test reviewed: Yes    SEDATION RISK ASSESSMENT  Risk Status ASA: Class II - Normal patient with mild systemic disease  Plan for Sedation: Moderate Sedation  Indications for Procedure/Pre-Procedure Diagnosis and Planned Procedure: Hx gastric ulcer  EKG Monitoring: Yes    NARRATIVE FINDINGS      Place of Service:[facility] EMERGENCY DEPARTMENT  Patient Name:Lucas Deluca  :1950    Procedure  Splint Application  Date/Time: 2017 3:16 PM  Performed by: BERE WEBER  Authorized by: BERE WEBER   Consent: Verbal consent obtained.  Risks and benefits: risks, benefits and alternatives were discussed  Consent given by: patient  Patient understanding: patient states understanding of the procedure being performed  Patient consent: the patient's understanding of the procedure matches consent given  Procedure consent: procedure consent matches procedure scheduled  Patient identity confirmed: verbally with patient  Location details: right ankle  Splint type: ankle stirrup (Posterior splint w/ stirrup)  Supplies used: Ortho-Glass  Post-procedure: The splinted body part was neurovascularly unchanged following the procedure.  Patient tolerance: Patient tolerated the procedure well with no immediate complications                Shlomo Epperson  17 1527       Bere Weber MD  17 [id number]     Statement Selected

## 2019-09-27 ENCOUNTER — CONVERSION ENCOUNTER (OUTPATIENT)
Dept: CARDIOLOGY | Facility: CLINIC | Age: 69
End: 2019-09-27

## 2019-09-27 ENCOUNTER — OFFICE VISIT CONVERTED (OUTPATIENT)
Dept: CARDIOLOGY | Facility: CLINIC | Age: 69
End: 2019-09-27
Attending: SPECIALIST

## 2020-02-21 ENCOUNTER — HOSPITAL ENCOUNTER (OUTPATIENT)
Dept: OTHER | Facility: HOSPITAL | Age: 70
Discharge: HOME | End: 2020-02-21
Attending: FAMILY MEDICINE

## 2020-02-21 LAB
25(OH)D3 SERPL-MCNC: 61.9 NG/ML (ref 30–100)
ALBUMIN SERPL-MCNC: 4.1 G/DL (ref 3.5–5)
ALP SERPL-CCNC: 61 U/L (ref 56–155)
ALT SERPL-CCNC: 18 U/L (ref 10–40)
ANION GAP SERPL CALC-SCNC: 17 MMOL/L (ref 8–19)
APPEARANCE UR: CLEAR
AST SERPL-CCNC: 16 U/L (ref 15–50)
BASOPHILS # BLD AUTO: 0.02 10*3/UL (ref 0–0.2)
BASOPHILS NFR BLD AUTO: 0.3 % (ref 0–3)
BILIRUB SERPL-MCNC: 0.32 MG/DL (ref 0.2–1.3)
BILIRUB UR QL: NEGATIVE
BUN SERPL-MCNC: 18 MG/DL (ref 5–25)
BUN/CREAT SERPL: 18 {RATIO} (ref 6–20)
CALCIUM SERPL-MCNC: 9.9 MG/DL (ref 8.7–10.4)
CHLORIDE SERPL-SCNC: 102 MMOL/L (ref 99–111)
CHOLEST SERPL-MCNC: 188 MG/DL (ref 107–200)
CHOLEST/HDLC SERPL: 5.7 {RATIO} (ref 3–6)
COLOR UR: YELLOW
CONV ABS IMM GRAN: 0.03 10*3/UL (ref 0–0.2)
CONV BACTERIA: NEGATIVE
CONV BILI, CONJUGATED: <0.2 MG/DL (ref 0–0.6)
CONV CO2: 24 MMOL/L (ref 22–32)
CONV COLLECTION SOURCE (UA): ABNORMAL
CONV CREATININE URINE, RANDOM: 41.9 MG/DL (ref 10–300)
CONV IMMATURE GRAN: 0.4 % (ref 0–1.8)
CONV MICROALBUM.,U,RANDOM: 13.1 MG/L (ref 0–20)
CONV TOTAL PROTEIN: 7.5 G/DL (ref 6.3–8.2)
CONV UNCONJUGATED BILIRUBIN: 0.1 MG/DL (ref 0–1.1)
CONV UROBILINOGEN IN URINE BY AUTOMATED TEST STRIP: 0.2 {EHRLICHU}/DL (ref 0.1–1)
CREAT UR-MCNC: 0.98 MG/DL (ref 0.7–1.2)
DEPRECATED RDW RBC AUTO: 42.5 FL (ref 35.1–43.9)
EOSINOPHIL # BLD AUTO: 0.31 10*3/UL (ref 0–0.7)
EOSINOPHIL # BLD AUTO: 4.1 % (ref 0–7)
ERYTHROCYTE [DISTWIDTH] IN BLOOD BY AUTOMATED COUNT: 13 % (ref 11.6–14.4)
EST. AVERAGE GLUCOSE BLD GHB EST-MCNC: 212 MG/DL
FERRITIN SERPL-MCNC: 115 NG/ML (ref 30–300)
FOLATE SERPL-MCNC: >20 NG/ML (ref 4.8–20)
GFR SERPLBLD BASED ON 1.73 SQ M-ARVRAT: >60 ML/MIN/{1.73_M2}
GLUCOSE SERPL-MCNC: 165 MG/DL (ref 70–99)
GLUCOSE UR QL: >=1000 MG/DL
HBA1C MFR BLD: 9 % (ref 3.5–5.7)
HCT VFR BLD AUTO: 44.7 % (ref 42–52)
HDLC SERPL-MCNC: 33 MG/DL (ref 40–60)
HGB BLD-MCNC: 14.4 G/DL (ref 14–18)
HGB UR QL STRIP: NEGATIVE
IRON SATN MFR SERPL: 22 % (ref 20–55)
IRON SERPL-MCNC: 85 UG/DL (ref 70–180)
KETONES UR QL STRIP: NEGATIVE MG/DL
LDLC SERPL CALC-MCNC: 118 MG/DL (ref 70–100)
LEUKOCYTE ESTERASE UR QL STRIP: NEGATIVE
LYMPHOCYTES # BLD AUTO: 2.14 10*3/UL (ref 1–5)
LYMPHOCYTES NFR BLD AUTO: 28.6 % (ref 20–45)
MCH RBC QN AUTO: 28.7 PG (ref 27–31)
MCHC RBC AUTO-ENTMCNC: 32.2 G/DL (ref 33–37)
MCV RBC AUTO: 89 FL (ref 80–96)
MICROALBUMIN/CREAT UR: 31.3 MG/G{CRE} (ref 0–25)
MONOCYTES # BLD AUTO: 0.62 10*3/UL (ref 0.2–1.2)
MONOCYTES NFR BLD AUTO: 8.3 % (ref 3–10)
NEUTROPHILS # BLD AUTO: 4.35 10*3/UL (ref 2–8)
NEUTROPHILS NFR BLD AUTO: 58.3 % (ref 30–85)
NITRITE UR QL STRIP: POSITIVE
NRBC CBCN: 0 % (ref 0–0.7)
OSMOLALITY SERPL CALC.SUM OF ELEC: 294 MOSM/KG (ref 273–304)
PH UR STRIP.AUTO: 5.5 [PH] (ref 5–8)
PLATELET # BLD AUTO: 289 10*3/UL (ref 130–400)
PMV BLD AUTO: 11.1 FL (ref 9.4–12.4)
POTASSIUM SERPL-SCNC: 4.3 MMOL/L (ref 3.5–5.3)
PROT UR QL: NEGATIVE MG/DL
RBC # BLD AUTO: 5.02 10*6/UL (ref 4.7–6.1)
RBC #/AREA URNS HPF: ABNORMAL /[HPF]
SODIUM SERPL-SCNC: 139 MMOL/L (ref 135–147)
SP GR UR: 1.03 (ref 1–1.03)
TIBC SERPL-MCNC: 392 UG/DL (ref 245–450)
TRANSFERRIN SERPL-MCNC: 274 MG/DL (ref 215–365)
TRIGL SERPL-MCNC: 183 MG/DL (ref 40–150)
VIT B12 SERPL-MCNC: 959 PG/ML (ref 211–911)
VLDLC SERPL-MCNC: 37 MG/DL (ref 5–37)
WBC # BLD AUTO: 7.47 10*3/UL (ref 4.8–10.8)
WBC #/AREA URNS HPF: ABNORMAL /[HPF]

## 2020-03-17 ENCOUNTER — OFFICE VISIT CONVERTED (OUTPATIENT)
Dept: CARDIOLOGY | Facility: CLINIC | Age: 70
End: 2020-03-17
Attending: SPECIALIST

## 2020-04-27 ENCOUNTER — HOSPITAL ENCOUNTER (OUTPATIENT)
Dept: GENERAL RADIOLOGY | Facility: HOSPITAL | Age: 70
Discharge: HOME | End: 2020-04-27
Attending: FAMILY MEDICINE

## 2020-08-19 ENCOUNTER — OFFICE VISIT CONVERTED (OUTPATIENT)
Dept: UROLOGY | Facility: CLINIC | Age: 70
End: 2020-08-19
Attending: UROLOGY

## 2020-08-24 ENCOUNTER — HOSPITAL ENCOUNTER (OUTPATIENT)
Dept: CT IMAGING | Facility: HOSPITAL | Age: 70
Discharge: HOME | End: 2020-08-24
Attending: UROLOGY

## 2020-09-22 ENCOUNTER — OFFICE VISIT CONVERTED (OUTPATIENT)
Dept: UROLOGY | Facility: CLINIC | Age: 70
End: 2020-09-22
Attending: UROLOGY

## 2020-10-13 ENCOUNTER — OFFICE VISIT CONVERTED (OUTPATIENT)
Dept: CARDIOLOGY | Facility: CLINIC | Age: 70
End: 2020-10-13
Attending: SPECIALIST

## 2020-10-13 ENCOUNTER — CONVERSION ENCOUNTER (OUTPATIENT)
Dept: CARDIOLOGY | Facility: CLINIC | Age: 70
End: 2020-10-13

## 2020-10-29 ENCOUNTER — TELEPHONE CONVERTED (OUTPATIENT)
Dept: UROLOGY | Facility: CLINIC | Age: 70
End: 2020-10-29
Attending: UROLOGY

## 2020-12-08 ENCOUNTER — HOSPITAL ENCOUNTER (OUTPATIENT)
Dept: ULTRASOUND IMAGING | Facility: HOSPITAL | Age: 70
Discharge: HOME | End: 2020-12-08
Attending: UROLOGY

## 2020-12-31 ENCOUNTER — PROCEDURE VISIT CONVERTED (OUTPATIENT)
Dept: UROLOGY | Facility: CLINIC | Age: 70
End: 2020-12-31
Attending: UROLOGY

## 2021-02-09 ENCOUNTER — HOSPITAL ENCOUNTER (OUTPATIENT)
Dept: NUCLEAR MEDICINE | Facility: HOSPITAL | Age: 71
Discharge: HOME | End: 2021-02-09
Attending: SPECIALIST

## 2021-02-10 ENCOUNTER — HOSPITAL ENCOUNTER (OUTPATIENT)
Dept: PREADMISSION TESTING | Facility: HOSPITAL | Age: 71
Discharge: HOME | End: 2021-02-10
Attending: UROLOGY

## 2021-02-10 LAB
ANION GAP SERPL CALC-SCNC: 14 MMOL/L (ref 8–19)
BASOPHILS # BLD AUTO: 0.04 10*3/UL (ref 0–0.2)
BASOPHILS NFR BLD AUTO: 0.6 % (ref 0–3)
BUN SERPL-MCNC: 20 MG/DL (ref 5–25)
BUN/CREAT SERPL: 19 {RATIO} (ref 6–20)
CALCIUM SERPL-MCNC: 10.1 MG/DL (ref 8.7–10.4)
CHLORIDE SERPL-SCNC: 102 MMOL/L (ref 99–111)
CONV ABS IMM GRAN: 0.04 10*3/UL (ref 0–0.2)
CONV CO2: 28 MMOL/L (ref 22–32)
CONV IMMATURE GRAN: 0.6 % (ref 0–1.8)
CREAT UR-MCNC: 1.06 MG/DL (ref 0.7–1.2)
DEPRECATED RDW RBC AUTO: 42.6 FL (ref 35.1–43.9)
EOSINOPHIL # BLD AUTO: 0.19 10*3/UL (ref 0–0.7)
EOSINOPHIL # BLD AUTO: 2.9 % (ref 0–7)
ERYTHROCYTE [DISTWIDTH] IN BLOOD BY AUTOMATED COUNT: 13.3 % (ref 11.6–14.4)
GFR SERPLBLD BASED ON 1.73 SQ M-ARVRAT: >60 ML/MIN/{1.73_M2}
GLUCOSE SERPL-MCNC: 155 MG/DL (ref 70–99)
HCT VFR BLD AUTO: 49.3 % (ref 42–52)
HGB BLD-MCNC: 15.9 G/DL (ref 14–18)
INR PPP: 0.95 (ref 2–3)
LYMPHOCYTES # BLD AUTO: 2.03 10*3/UL (ref 1–5)
LYMPHOCYTES NFR BLD AUTO: 30.5 % (ref 20–45)
MCH RBC QN AUTO: 28.2 PG (ref 27–31)
MCHC RBC AUTO-ENTMCNC: 32.3 G/DL (ref 33–37)
MCV RBC AUTO: 87.6 FL (ref 80–96)
MONOCYTES # BLD AUTO: 0.58 10*3/UL (ref 0.2–1.2)
MONOCYTES NFR BLD AUTO: 8.7 % (ref 3–10)
NEUTROPHILS # BLD AUTO: 3.77 10*3/UL (ref 2–8)
NEUTROPHILS NFR BLD AUTO: 56.7 % (ref 30–85)
NRBC CBCN: 0 % (ref 0–0.7)
OSMOLALITY SERPL CALC.SUM OF ELEC: 296 MOSM/KG (ref 273–304)
PLATELET # BLD AUTO: 268 10*3/UL (ref 130–400)
PMV BLD AUTO: 10.7 FL (ref 9.4–12.4)
POTASSIUM SERPL-SCNC: 3.8 MMOL/L (ref 3.5–5.3)
PROTHROMBIN TIME: 10.5 S (ref 9.4–12)
RBC # BLD AUTO: 5.63 10*6/UL (ref 4.7–6.1)
SODIUM SERPL-SCNC: 140 MMOL/L (ref 135–147)
WBC # BLD AUTO: 6.65 10*3/UL (ref 4.8–10.8)

## 2021-02-11 LAB — SARS-COV-2 RNA SPEC QL NAA+PROBE: NOT DETECTED

## 2021-02-12 LAB — BACTERIA UR CULT: NORMAL

## 2021-02-17 ENCOUNTER — HOSPITAL ENCOUNTER (OUTPATIENT)
Dept: PREADMISSION TESTING | Facility: HOSPITAL | Age: 71
Discharge: HOME | End: 2021-02-17
Attending: UROLOGY

## 2021-02-18 LAB — SARS-COV-2 RNA SPEC QL NAA+PROBE: NOT DETECTED

## 2021-02-22 ENCOUNTER — HOSPITAL ENCOUNTER (OUTPATIENT)
Dept: PERIOP | Facility: HOSPITAL | Age: 71
Setting detail: HOSPITAL OUTPATIENT SURGERY
Discharge: HOME | End: 2021-02-23
Attending: UROLOGY

## 2021-02-22 LAB
ABO GROUP BLD: NORMAL
BLD GP AB SCN SERPL QL: NORMAL
CONV ABD CONTROL: NORMAL
GLUCOSE BLD-MCNC: 180 MG/DL (ref 70–99)
GLUCOSE BLD-MCNC: 189 MG/DL (ref 70–99)
GLUCOSE BLD-MCNC: 195 MG/DL (ref 70–99)
RH BLD: NORMAL

## 2021-02-23 LAB
ANION GAP SERPL CALC-SCNC: 18 MMOL/L (ref 8–19)
BASOPHILS # BLD AUTO: 0.02 10*3/UL (ref 0–0.2)
BASOPHILS NFR BLD AUTO: 0.2 % (ref 0–3)
BUN SERPL-MCNC: 18 MG/DL (ref 5–25)
BUN/CREAT SERPL: 18 {RATIO} (ref 6–20)
CALCIUM SERPL-MCNC: 8.7 MG/DL (ref 8.7–10.4)
CHLORIDE SERPL-SCNC: 105 MMOL/L (ref 99–111)
CONV ABS IMM GRAN: 0.05 10*3/UL (ref 0–0.2)
CONV CO2: 22 MMOL/L (ref 22–32)
CONV IMMATURE GRAN: 0.5 % (ref 0–1.8)
CREAT UR-MCNC: 1 MG/DL (ref 0.7–1.2)
DEPRECATED RDW RBC AUTO: 41.1 FL (ref 35.1–43.9)
EOSINOPHIL # BLD AUTO: 0 % (ref 0–7)
EOSINOPHIL # BLD AUTO: 0 10*3/UL (ref 0–0.7)
ERYTHROCYTE [DISTWIDTH] IN BLOOD BY AUTOMATED COUNT: 13.1 % (ref 11.6–14.4)
GFR SERPLBLD BASED ON 1.73 SQ M-ARVRAT: >60 ML/MIN/{1.73_M2}
GLUCOSE BLD-MCNC: 135 MG/DL (ref 70–99)
GLUCOSE BLD-MCNC: 230 MG/DL (ref 70–99)
GLUCOSE SERPL-MCNC: 156 MG/DL (ref 70–99)
HCT VFR BLD AUTO: 42.3 % (ref 42–52)
HGB BLD-MCNC: 13.9 G/DL (ref 14–18)
LYMPHOCYTES # BLD AUTO: 1.32 10*3/UL (ref 1–5)
LYMPHOCYTES NFR BLD AUTO: 13.7 % (ref 20–45)
MCH RBC QN AUTO: 28.6 PG (ref 27–31)
MCHC RBC AUTO-ENTMCNC: 32.9 G/DL (ref 33–37)
MCV RBC AUTO: 87 FL (ref 80–96)
MONOCYTES # BLD AUTO: 0.58 10*3/UL (ref 0.2–1.2)
MONOCYTES NFR BLD AUTO: 6 % (ref 3–10)
NEUTROPHILS # BLD AUTO: 7.68 10*3/UL (ref 2–8)
NEUTROPHILS NFR BLD AUTO: 79.6 % (ref 30–85)
NRBC CBCN: 0 % (ref 0–0.7)
OSMOLALITY SERPL CALC.SUM OF ELEC: 295 MOSM/KG (ref 273–304)
PLATELET # BLD AUTO: 196 10*3/UL (ref 130–400)
PMV BLD AUTO: 10.4 FL (ref 9.4–12.4)
POTASSIUM SERPL-SCNC: 4.5 MMOL/L (ref 3.5–5.3)
RBC # BLD AUTO: 4.86 10*6/UL (ref 4.7–6.1)
SODIUM SERPL-SCNC: 140 MMOL/L (ref 135–147)
WBC # BLD AUTO: 9.65 10*3/UL (ref 4.8–10.8)

## 2021-03-04 ENCOUNTER — CONVERSION ENCOUNTER (OUTPATIENT)
Dept: SURGERY | Facility: CLINIC | Age: 71
End: 2021-03-04

## 2021-03-04 ENCOUNTER — OFFICE VISIT CONVERTED (OUTPATIENT)
Dept: UROLOGY | Facility: CLINIC | Age: 71
End: 2021-03-04
Attending: UROLOGY

## 2021-03-12 ENCOUNTER — CONVERSION ENCOUNTER (OUTPATIENT)
Dept: SURGERY | Facility: CLINIC | Age: 71
End: 2021-03-12

## 2021-03-12 ENCOUNTER — OFFICE VISIT CONVERTED (OUTPATIENT)
Dept: UROLOGY | Facility: CLINIC | Age: 71
End: 2021-03-12
Attending: UROLOGY

## 2021-04-27 ENCOUNTER — HOSPITAL ENCOUNTER (OUTPATIENT)
Dept: ULTRASOUND IMAGING | Facility: HOSPITAL | Age: 71
Discharge: HOME | End: 2021-04-27
Attending: UROLOGY

## 2021-05-06 ENCOUNTER — OFFICE VISIT CONVERTED (OUTPATIENT)
Dept: UROLOGY | Facility: CLINIC | Age: 71
End: 2021-05-06
Attending: UROLOGY

## 2021-05-06 LAB
BILIRUB UR QL STRIP: NEGATIVE
COLOR UR: YELLOW
CONV CLARITY OF URINE: CLEAR
CONV PROTEIN IN URINE BY AUTOMATED TEST STRIP: NEGATIVE
CONV UROBILINOGEN IN URINE BY AUTOMATED TEST STRIP: NORMAL
GLUCOSE UR QL: NORMAL
HGB UR QL STRIP: NORMAL
KETONES UR QL STRIP: NEGATIVE
LEUKOCYTE ESTERASE UR QL STRIP: NORMAL
NITRITE UR QL STRIP: NEGATIVE
PH UR STRIP.AUTO: 5 [PH]
SP GR UR: 1.01

## 2021-05-10 NOTE — H&P
History and Physical      Patient Name: Lucas Deluca   Patient ID: [id number]   Sex: Male   YOB: 1950    Primary Care Provider: Sarina Javed MD   Referring Provider: Sarina Javed MD    Visit Date: August 19, 2020    Provider: Connie Livingston MD   Location: [facility]   Location Address: [address] [place] [address]  [id number]   Location Phone: [phone]          Chief Complaint  · Patient is here for urological concerns      History Of Present Illness  Lucas Deluca is a 69 year old /White male who presents to the office today for further evaluation of urinary frequency.      4 mo ago thought that he was passing a kidney stone; seen by PCP and told that he had UTI.  Had severe abdominal pain.     Has had 2 UTIs that have been treated with antibiotics, first 3/2/2020 treated via Macrobid and again treated 4/13/2020 with Augmentin.  UTI symptoms noted initially as lower back pain which resolved with Macrobid but then came back and he was placed on Augmentin. UA and cultures not available for review.   No prior h/o UTI.     3 months ago suddenly started having nocturnal enuresis; to the point of wearing brief and incontinence and pad. Also gets up 3-4x at night. Does not even realize that he is going.     Current urinary symptoms include urinary frequency at the end of urination.  And burning at the end of the penis.  Feels that symptoms are better controlled during the day; able to hold; rare daytime leakage.   Denies hematuria  Denies hesitancy  Denies sensation of incomplete emptying.  Reports good strong stream.   Not on any BPH medications.  Denies history of BPH.  Denies h/o renal insufficiency; has h/o DM    Had PSA drawn 2-3 weeks ago.     Initial PVR today 691; repeat after voiding 611 cc. Feels like he could void again; not a painful or bothersome sense of urgency.     No prior imaging       Past Medical History  Diabetes; Heart Disease; High  "blood pressure         Past Surgical History  Cardiac         Medication List  amlodipine 5 mg oral tablet; carvedilol 25 mg oral tablet; clopidogrel 75 mg oral tablet; Coreg 25 mg oral tablet; finasteride 5 mg oral tablet; Jardiance 10 mg oral tablet; Lantus U-100 Insulin 100 unit/mL subcutaneous solution; Lipitor 20 mg oral tablet; lisinopril 20 mg oral tablet; Novolog Flexpen U-100 Insulin 100 unit/mL (3 mL) subcutaneous insulin pen; tamsulosin 0.4 mg oral capsule; Tricor 145 mg oral tablet; vitamin B complex oral tablet; Vitamin D3 25 mcg (1,000 unit) oral capsule         Allergy List  NO KNOWN DRUG ALLERGIES       Allergies Reconciled  Family Medical History  Family history of breast cancer; -Father's Family History Unknown; -Mother's Family History Unknown         Social History  Tobacco (Former)         Review of Systems  · Constitutional  o Denies  o : chills, fever  · Eyes  o Denies  o : yellowish discoloration of eyes  · HENT  o Denies  o : difficulty swallowing  · Cardiovascular  o Denies  o : chest pain on exertion  · Respiratory  o Denies  o : shortness of breath  · Gastrointestinal  o Denies  o : nausea, vomiting  · Genitourinary  o Denies  o : frequency of urine, urgency with urine  · Neurologic  o Denies  o : tingling or numbness  · Musculoskeletal  o Denies  o : joint pain  · Endocrine  o Denies  o : weight gain, weight loss  · Heme-Lymph  o Denies  o : easy bleeding, easy bruising      Vitals  Date Time BP Position Site L\R Cuff Size HR RR TEMP (F) WT  HT  BMI kg/m2 BSA m2 O2 Sat        08/19/2020 09:49 AM       14  208lbs 0oz 5'  11\" 29.01 2.17           Physical Examination  · Constitutional  o Appearance  o : Well nourished, well developed patient in no acute distress.  · Eyes  o Conjunctivae  o : Conjunctivae normal  o Sclerae  o : Sclerae white  · Ears, Nose, Mouth and Throat  o Ears  o :   § Hearing  § : Intact to conversational voice both ears  § Ears  § : Normal  o Nose  o :   § External " Nose  § : Appearance normal  · Neck  o Inspection/Palpation  o : Normal appearance, trachea midline  · Respiratory  o Respiratory Effort  o : Breathing unlabored without accessory muscle use  o Inspection of Chest  o : Normal appearance, no retractions  o Auscultation of Lungs  o : Normal breath sounds  · Cardiovascular  o Heart  o : Normal Rate  · Gastrointestinal  o Abdominal Examination  o : Appears soft and nondistended  · Genitourinary  o Bladder  o : nontender to palpation  · Lymphatic  o Neck  o : No apparent lymphadenopathy present  · Skin and Subcutaneous Tissue  o General Inspection  o : No rashes, lesions, or areas of discoloration present  o General Palpation  o : Skin Turgor Normal  · Neurologic  o Mental Status Examination  o :   § Orientation  § : Alert and Oriented X3  o Gait and Station  o :   § Gait Screening  § : Ambulating without difficulty  · Psychiatric  o Mood and Affect  o : Mood normal, affect appropriate          Results  · In-Office Procedures  o Lab procedure  § Automated Dipstick Urinalysis (Surg Spec) WITHOUT Micro [facility] (01323)   § Color Ur: Yellow   § Clarity Ur: Clear   § Glucose Ur Ql Strip: >=1000 mg/dL   § Bilirub Ur Ql Strip: Negative   § Ketones Ur Ql Strip: Negative   § Sp Gr Ur Qn: 1.015   § Hgb Ur Ql Strip: Negative   § pH Ur-LsCnc: 5.5   § Prot Ur Ql Strip: Negative   § Urobilinogen Ur Strip-mCnc: 0.2 E.U./dL   § Nitrite Ur Ql Strip: Negative   § WBC Est Ur Ql Strip: Negative   o Surgical procedure  § IOP - Bladder Scan/Residual Urine (48725)   § Specimen vol Ur: 691   § Bladder Scan/Residual Urine (09661)   § Specimen vol Ur: 611       Assessment  · BPH (benign prostatic hyperplasia)     600.00/N40.0  · Urinary frequency     788.41/R35.0  · Urinary retention     788.20/R33.9    Problems Reconciled  Plan  · Orders  o CT Abdomen and Pelvis without IV Contrast [facility]; suggest oral prep (allergic--drink Readicat; not allegic but with renal failure--drink Omnipaque) (71480) -  600.00/N40.0, 788.20/R33.9 - 08/24/2020  · Medications  o Medications have been Reconciled  o Transition of Care or Provider Policy  · Instructions  o Electronically Identified Patient Education Materials Provided Electronically     Patient with significantly elevated postvoid residual greater than 600 despite efforts to further empty discussed with patient that this is likely due to BPH however, could be due to diabetic uropathy or other medical condition affecting his bladder.    Believe this to be the etiology of patient's symptoms including urinary frequency and nocturnal enuresis  Patient to initiate self-catheterization 3 times daily  Trial of intermittent self-catheterization.  Patient is agreeable to self cath and was taught and able to demonstrate self-catheterization technique.  Prelubricated self catheter with coude tip to ease insertion with BPH was used.  Instructions were provided as well as sample catheters.     Start Flomax and finasteride; side effects of each of the medications discussed including lower blood pressure sexual side effects, fatigue, and breast enlargement and tenderness    Obtain PSA from PCP for review  Will assess upper tracts via CT scan abdomen and pelvis  Follow-up in 4-66 weeks for follow-up  All questions addressed    Total time for encounter greater than 45 minutes due to review of records, counseling and coordination of care which dominated greater than 51% of the encounter.               Electronically Signed by: Connie Livingston MD -Author on August 19, 2020 05:28:29 PM

## 2021-05-10 NOTE — PROCEDURES
Procedure Note      Patient Name: Lucas Deluca   Patient ID: [id number]   Sex: Male   YOB: 1950    Primary Care Provider: Sarina Javed MD   Referring Provider: Sarina Javed MD    Visit Date: December 31, 2020    Provider: Connie Livingston MD   Location: [facility]   Location Address: [address] [place] [address]  [id number]   Location Phone: [phone]          Cystoscopy Procedure:  PROCEDURE: Flexible cystoscope was passed per urethra into the bladder without difficulty after proper consent. There was an area of apparent narrowing at the bulbomembranous urethra but the cystoscope had no issue passing through it. The prostate was notably enlarged with coapting lateral lobes but not elongated. Upon retroflex of the cystoscope patient noted to have a sizable median lobe. The bladder was inspected in a systematic meridian fashion. There were no tumors, lesions, stones, within the bladder. There are mild trabeculations, and bladder appeared of large capacity. No diverticula. Of note, there was no increased vascularity as well. Both ureteral orifices were identified and were normal in appearance. The flexible cystoscope was removed. The patient tolerated the procedure well.   FOLLOW UP OFFICE NOTE: Continues to cath at least 4 times a day. Had renal ultrasound prior to today's visit which was without hydronephrosis.           Assessment  · Urinary retention     788.20/R33.9  · BPH (benign prostatic hyperplasia)     600.00/N40.0    Problems Reconciled  Plan  · Orders  o Cystoscopy (67927) - 788.20/R33.9, 600.00/N40.0 - 12/31/2020  · Medications  o Medications have been Reconciled  o Transition of Care or Provider Policy  · Instructions  o Electronically Identified Patient Education Materials Provided Electronically     Tolerated procedure well.  Instructions provided.  Renal ultrasound results discussed with patient; no evidence of bilateral  hydronephrosis.  Patient to continue cathing per routine.    Discussed findings of cystoscopy with patient.  Patient With large bladder, enlarged prostate with large median lobe on CT scan.  No bladder masses or tumors appreciated.    Believe patient would likely benefit from bladder outlet procedure; discussed option of performing transurethral resection of prostate.  This procedure would be performed in hopes of patient spontaneously voiding and no longer needing to self catheterize.  Discussed at length however, that results after TURP are not guaranteed and he could have to continue to cath.  Also aware that the entire prostate is not removed thus there will be regrowth of the prostate over a period of time and could have recurrence of symptoms. Risks benefits and alternatives of transurethral resection of prostate discussed with patient.  Other risks including but not limited to bleeding, infection, pain, need for further procedures, need for decompression of his bladder via catheter or self cath, worsening urinary symptoms, sexual side effects, urinary incontinence, or damage to surrounding structures.  Patient was understanding of these risks.  Believes that he wants to proceed with TURP, will call to schedule.  Otherwise plan for follow-up in 6 months or earlier as needed  All questions addressed             Electronically Signed by: Connie Livingston MD -Author on December 31, 2020 11:57:35 AM

## 2021-05-13 ENCOUNTER — OFFICE VISIT CONVERTED (OUTPATIENT)
Dept: CARDIOLOGY | Facility: CLINIC | Age: 71
End: 2021-05-13
Attending: SPECIALIST

## 2021-05-13 NOTE — PROGRESS NOTES
Progress Note      Patient Name: Lucas Deluca   Patient ID: [id number]   Sex: Male   YOB: 1950    Primary Care Provider: Sarina Javed MD   Referring Provider: Sarina Javed MD    Visit Date: September 22, 2020    Provider: Connie Livingston MD   Location: [facility]   Location Address: [address] [place] [address]  [id number]   Location Phone: [phone]          Chief Complaint  · Pt is here for urological concerns      History Of Present Illness  Lucas Deluca is a 69 year old /White male who presents to the office today for further evaluation of urinary frequency.      4 mo ago thought that he was passing a kidney stone; seen by PCP and told that he had UTI.  Had severe abdominal pain.     Has had 2 UTIs that have been treated with antibiotics, first 3/2/2020 treated via Macrobid and again treated 4/13/2020 with Augmentin.  UTI symptoms noted initially as lower back pain which resolved with Macrobid but then came back and he was placed on Augmentin. UA and cultures not available for review.   No prior h/o UTI.     3 months ago suddenly started having nocturnal enuresis; to the point of wearing brief and incontinence and pad. Also gets up 3-4x at night. Does not even realize that he is going.     Current urinary symptoms include urinary frequency at the end of urination.  And burning at the end of the penis.  Feels that symptoms are better controlled during the day; able to hold; rare daytime leakage.   Denies hematuria  Denies hesitancy  Denies sensation of incomplete emptying.  Reports good strong stream.   Not on any BPH medications.  Denies history of BPH.  Denies h/o renal insufficiency; has h/o DM    Had PSA drawn 2-3 weeks ago.     Initial PVR today 691; repeat after voiding 611 cc. Feels like he could void again; not a painful or bothersome sense of urgency.     No prior imaging    Update 9/22/20: Presents for follow up; cathing 3x  "a day. Getting around 800cc in upon waking; 700cc at midday, and 800cc prior to sleep. Urinary frequency, nocturia, and nocturnal enuresis have resolved since starting to cathing. Denies issues cathing. Denies flank or back pain. Taking Flomax and finasteride without adverse side effects.    Had CT scan prior to today's visit.    CT scan 8/24/2020: Mildly enlarged prostate; moderate distention of bladder; moderate right hydro-nephrosis without obstructing ureteral stone       Past Medical History  Diabetes; Heart Disease; High blood pressure         Past Surgical History  Cardiac         Medication List  amlodipine 5 mg oral tablet; carvedilol 25 mg oral tablet; clopidogrel 75 mg oral tablet; Coreg 25 mg oral tablet; finasteride 5 mg oral tablet; Jardiance 10 mg oral tablet; Lantus U-100 Insulin 100 unit/mL subcutaneous solution; Lipitor 20 mg oral tablet; lisinopril 20 mg oral tablet; Novolog Flexpen U-100 Insulin 100 unit/mL (3 mL) subcutaneous insulin pen; tamsulosin 0.4 mg oral capsule; Tricor 145 mg oral tablet; vitamin B complex oral tablet; Vitamin D3 25 mcg (1,000 unit) oral capsule         Allergy List  NO KNOWN DRUG ALLERGIES       Allergies Reconciled  Family Medical History  Family history of breast cancer; -Father's Family History Unknown; -Mother's Family History Unknown         Social History  Tobacco (Former)         Review of Systems  · Constitutional  o Denies  o : chills, fever  · Gastrointestinal  o Denies  o : nausea, vomiting, diarrhea      Vitals  Date Time BP Position Site L\R Cuff Size HR RR TEMP (F) WT  HT  BMI kg/m2 BSA m2 O2 Sat        09/22/2020 10:44 AM       14  208lbs 0oz 5'  11\" 29.01 2.17           Physical Examination  · Constitutional  o Appearance  o : Well nourished, well developed patient in no acute distress.  · Eyes  o Conjunctivae  o : Conjunctivae normal  o Sclerae  o : Sclerae white  · Ears, Nose, Mouth and Throat  o Ears  o :   § Hearing  § : Intact to conversational " voice both ears  § Ears  § : Normal  o Nose  o :   § External Nose  § : Appearance normal  · Respiratory  o Respiratory Effort  o : Breathing unlabored without accessory muscle use  o Inspection of Chest  o : Normal appearance, no retractions  o Auscultation of Lungs  o : Normal breath sounds  · Cardiovascular  o Heart  o : Normal Rate  · Skin and Subcutaneous Tissue  o General Inspection  o : No rashes, lesions, or areas of discoloration present  o General Palpation  o : Skin Turgor Normal  · Neurologic  o Mental Status Examination  o :   § Orientation  § : Alert and Oriented X3  o Gait and Station  o :   § Gait Screening  § : Ambulating without difficulty  · Psychiatric  o Mood and Affect  o : Mood normal, affect appropriate          Results  · In-Office Procedures  o Surgical procedure  § IOP - Bladder Scan/Residual Urine (95334)   § Specimen vol Ur: 241        [facility]      PACS RADIOLOGY REPORT    Patient: PHILLIP LANDRY Acct: #[id number] Report: #[id number]    UNIT #: [id number]  DOS: 20 1507  INSURANCE:MEDICARE PART A & B ORDER #:[id number]  LOCATION:CT   : 1950    PROVIDERS  ADMITTING:   ATTENDING: CURTIS KEITH  FAMILY:  [facility] ORDERING:  CURTIS KEITH   OTHER:  DICTATING:  Phillip Larose MD    REQ #:[id number]   EXAM:[id number] - CT ABDOMEN PELVIS wo CONTRAST  REASON FOR EXAM:  BPH,URINARY RENTENTION  REASON FOR VISIT:  BPH,URINARY RENTENTION    *******Signed******     PROCEDURE: CT ABDOMEN PELVIS WITHOUT CONTRAST     COMPARISON: [facility], CR, XR CHEST 2 VIEWS, 2013, 15:46.  [facility], CT, ABD/PELVIS STONE PROTOCOL W/O, 2010, 23:18.     INDICATIONS: BPH,URINARY RENTENTION     TECHNIQUE: CT images were created without intravenous contrast.       PROTOCOL:   Standard imaging protocol performed      RADIATION:   DLP: 924mGy*cm    Automated exposure control was  utilized to minimize radiation dose.      FINDINGS:   Within the lung bases is a 4 mm nodule along the right minor fissure, likely an intrapulmonary   lymph node.     There is hepatic steatosis.  The unenhanced gallbladder, adrenal glands, left kidney, spleen, and   pancreas are unremarkable.  There is scarring along the right kidney.  There is moderate right   hydroureteronephrosis, but with no obstructing ureteral stone identified.     The stomach appears normal.  The small bowel appears normal in caliber and configuration.  The   colon appears normal.  The appendix appears normal.  There is no ascites or loculated collection.    No abnormally enlarged lymph nodes are identified.  There is atherosclerotic plaque in the   abdominal aorta.     The rectum is unremarkable.  The prostate is mildly enlarged measuring 4.7 cm in transverse   dimension.  There is moderate distention of the urinary bladder.     No aggressive osseous lesions are identified.  A right hip arthroplasty is noted.  There is a mild   superior endplate compression deformity at L1.     CONCLUSION:   1. Mildly enlarged prostate.  2. Moderate distention of urinary bladder.  3. Hepatic steatosis.  4. Moderate right hydroureteronephrosis, but without an obstructing ureteral stone identified.  5. Mild superior endplate compression deformity at L1, which is age-indeterminate.  Correlate with   point tenderness and history of recent trauma.            PHILLIP BURNHAM MD         Electronically Signed and Approved By: PHILLIP BURNHAM MD on 8/24/2020 at 15:51                     Until signed, this is an unconfirmed preliminary report that may contain  errors and is subject to change.                RODD1:  D:08/24/20 1551         Assessment  · BPH (benign prostatic hyperplasia)     600.00/N40.0  · Urinary frequency     788.41/R35.0  · Urinary retention     788.20/R33.9    Problems Reconciled  Plan  · Medications  o Medications have been  Reconciled  o Transition of Care or Provider Policy  · Instructions  o Electronically Identified Patient Education Materials Provided Electronically     Doing well with self cath; CT imaging reviewed, discussed with patient.      Patient with moderately distended bladder and right hydronephrosisthis was performed only 5 days after him starting to cath; will follow with repeat imaging in about 3 months    Will have labs with PCP in November; have asked patient to have those sent to us for review    Patient to continue cathing 34 times daily  Continue Flomax and finasteride; aware that maximum effects of finasteride can take up to 34 months; refill sent  Plan for UDS and renal ultrasound in 3 months  Follow-up after UDS and renal ultrasound  All questions addressed        Total time for encounter greater than 20 minutes due to review of records, counseling and coordination of care which dominated greater than 51% of the encounter.               Electronically Signed by: Connie Livingston MD -Author on September 22, 2020 11:23:08 AM

## 2021-05-13 NOTE — PROGRESS NOTES
Progress Note      Patient Name: Lucas Deluca   Patient ID: [id number]   Sex: Male   YOB: 1950    Primary Care Provider: Sarina Javed MD   Referring Provider: Sarina Javed MD    Visit Date: October 29, 2020    Provider: Connie Livingston MD   Location: [facility]   Location Address: [address] [place] [address]  [id number]   Location Phone: [phone]          Chief Complaint  · Pt is here for urological concerns     Telephone Visit- presents for evaluation via telephone.   Verbal consent obtained before beginning visit.   The following staff were present during this visit: Josie Leal LPN  Total time for encounter: 12 minutes       History Of Present Illness  Lucas Deluca is a 69 year old /White male who presents to the office today for further evaluation of urinary frequency.      4 mo ago thought that he was passing a kidney stone; seen by PCP and told that he had UTI.  Had severe abdominal pain.     Has had 2 UTIs that have been treated with antibiotics, first 3/2/2020 treated via Macrobid and again treated 4/13/2020 with Augmentin.  UTI symptoms noted initially as lower back pain which resolved with Macrobid but then came back and he was placed on Augmentin. UA and cultures not available for review.   No prior h/o UTI.     3 months ago suddenly started having nocturnal enuresis; to the point of wearing brief and incontinence and pad. Also gets up 3-4x at night. Does not even realize that he is going.     Current urinary symptoms include urinary frequency at the end of urination.  And burning at the end of the penis.  Feels that symptoms are better controlled during the day; able to hold; rare daytime leakage.   Denies hematuria  Denies hesitancy  Denies sensation of incomplete emptying.  Reports good strong stream.   Not on any BPH medications.  Denies history of BPH.  Denies h/o renal insufficiency; has h/o DM    Had PSA drawn 2-3  weeks ago.     Initial PVR today 691; repeat after voiding 611 cc. Feels like he could void again; not a painful or bothersome sense of urgency.     No prior imaging    Update 9/22/20: Presents for follow up; cathing 3x a day. Getting around 800cc in upon waking; 700cc at midday, and 800cc prior to sleep. Urinary frequency, nocturia, and nocturnal enuresis have resolved since starting to cathing. Denies issues cathing. Denies flank or back pain. Taking Flomax and finasteride without adverse side effects.    Had CT scan prior to today's visit.    Update 10/29/2020: Patient presents for follow-up after UDS.  Did not have her US performed prior.  Patient states he is generally doing well.  Continues to self cath 3 times daily.  Gets volumes of 700 cc each time.  Denies issues cathing; does not like to cath; does not like the idea of self cathing.  No other complaints today.  Denies changes.    ___________  CT scan 8/24/2020: Mildly enlarged prostate; moderate distention of bladder; moderate right hydro-nephrosis without obstructing ureteral stone    UDS, 10/15/2020: During filling bladder with increased sensation, normal capacity.  Evidence of involuntary bladder contractions causing increased urgency no urge incontinence.  During voiding, bladder with normal detrusor contraction.  Flow was moderate with an elongated rate.  Flow charted obstructed with AG #113.3.  PVR: 198 cc EMG normal.       Past Medical History  Diabetes; Heart Disease; High blood pressure         Past Surgical History  Cardiac         Medication List  amlodipine 5 mg oral tablet; carvedilol 25 mg oral tablet; clopidogrel 75 mg oral tablet; Coreg 25 mg oral tablet; finasteride 5 mg oral tablet; Jardiance 10 mg oral tablet; Lantus U-100 Insulin 100 unit/mL subcutaneous solution; Lipitor 20 mg oral tablet; lisinopril 20 mg oral tablet; Mobic 7.5 mg oral tablet; Novolog Flexpen U-100 Insulin 100 unit/mL (3 mL) subcutaneous insulin pen; tamsulosin 0.4  mg oral capsule; Tricor 145 mg oral tablet; vitamin B complex oral tablet; Vitamin D3 25 mcg (1,000 unit) oral capsule         Allergy List  NO KNOWN DRUG ALLERGIES       Allergies Reconciled  Family Medical History  Family history of breast cancer; -Father's Family History Unknown; -Mother's Family History Unknown         Social History  Tobacco (Former)         Review of Systems  · Constitutional  o Denies  o : chills, fever  · Gastrointestinal  o Denies  o : nausea, vomiting              Assessment  · BPH (benign prostatic hyperplasia)     600.00/N40.0  · Urinary frequency     788.41/R35.0  · Urinary retention     788.20/R33.9    Problems Reconciled  Plan  · Orders  o BMP Non-fasting [facility] (70652) - 600.00/N40.0, 788.41/R35.0, 788.20/R33.9 - 10/29/2020  o Renal Ultrasound-bilateral (74316, 65280) - 600.00/N40.0, 788.20/R33.9 - 10/29/2020  o Physician Telephone Evaluation, 11-20 minutes (07298) - 600.00/N40.0, 788.41/R35.0, 788.20/R33.9 - 10/29/2020  · Medications  o Medications have been Reconciled  o Transition of Care or Provider Policy  · Instructions  o Electronically Identified Patient Education Materials Provided Electronically     Doing well with self cath; encourage patient to increase self cath to 4 times daily to keep volumes less than 500 cc  Continue Flomax and finasteride    UDS results discussed with patientdetrusor with normal contraction; flow charted as obstructive with AG #113.3; discussed that this is likely due to bladder outlet obstruction from enlarged prostate.  Reassuring that patient detrusor has a normal contraction.    Believe patient would likely benefit from a bladder outlet procedure and may be able to cease cathing once performed.    Will need BMP and renal ultrasound to assess upper tracts now that is successfully performing self cath and to assess for resolution of right-sided hydronephrosis.    Plan for further evaluation and management discussion of bladder outlet procedure via  cystoscopy    Schedule cystoscopy with renal ultrasound and BMP prior  All questions addressed             Electronically Signed by: Connie Livingston MD -Author on October 29, 2020 10:21:55 AM

## 2021-05-13 NOTE — PROGRESS NOTES
"   Progress Note      Patient Name: Lucas Deluca   Patient ID: [id number]   Sex: Male   YOB: 1950    Primary Care Provider: Sarina Javed MD   Referring Provider: Sarina Javed MD    Visit Date: October 13, 2020    Provider: Stephane Dow MD   Location: [facility] [place] [place] [facility]   Location Address: [address] [place] [address], [facility]   [place], [place]  [id number]   Location Phone: [phone]          Chief Complaint  · Coronary artery disease   · Hypertension       History Of Present Illness  Lucas Deluca is a 70 year old /White male with a history of coronary artery disease and hypertension. He denies any chest pain or shortness of breath.   CURRENT MEDICATIONS: include Norvasc 5 mg daily; Proscar 3 mg daily; Plavix 75 mg daily; Coreg 25 mg b.i.d.; Lisinopril 20 mg b.i.d.; ASA 81 mg daily; TriCor 145 mg daily; Atorvastatin 20 mg daily; NovoLog; Lantus; Vitamin D3; Flomax Vitamin B12; Viteyes; AREDs; Jardiance 10 mg daily. The dosage and frequency of the medications were reviewed with the patient.   PAST MEDICAL HISTORY: Coronary artery disease status post PTCA/stent; diabetes mellitus; hyperlipidemia; hypertension.   PSYCHOSOCIAL HISTORY: He rarely drinks alcohol. He previously used tobacco but quit.       Review of Systems  · Cardiovascular  o Denies  o : palpitations (fast, fluttering, or skipping beats), swelling (feet, ankles, hands), shortness of breath while walking or lying flat, chest pain or angina pectoris   · Respiratory  o Denies  o : chronic or frequent cough, asthma or wheezing      Vitals  Date Time BP Position Site L\R Cuff Size HR RR TEMP (F) WT  HT  BMI kg/m2 BSA m2 O2 Sat FR L/min FiO2 HC       10/13/2020 10:15 /78 Sitting    68 - R   210lbs 0oz 5'  11\" 29.29 2.18       10/13/2020 10:15 /80 Sitting    68 - R                   Physical Examination  · Constitutional  o Appearance  o : Awake, alert, cooperative, pleasant.  · Respiratory  o Inspection " of Chest  o : No chest wall deformities, moving equal.  o Auscultation of Lungs  o : Good air entry with vesicular breath sounds.  · Cardiovascular  o Heart  o :   § Auscultation of Heart  § : S1 and S2 regular. No S3. No S4. No murmurs.  o Peripheral Vascular System  o :   § Extremities  § : Peripheral pulses were well felt. No edema. No cyanosis.  · Gastrointestinal  o Abdominal Examination  o : No masses or organomegaly noted.     EKG was performed in the office today.  Indication:       coronary artery disease.  Results:          sinus rhythm with non-specific ST wave changes in the anterior leads.  Comparison:    No change from previous EKG.               Assessment     ASSESSMENT AND PLAN:    1.  Essential hypertension controlled, white-coat hypertension:  Continue current dose of Norvasc.  2.  Coronary artery disease, without angina, history of PTCA/stent, stable:  Continue current dose of ASA.  3.  Hyperlipidemia:  Continue Lipitor, managed by his PMD.  4.  See me back in 6 months.    Stephane Dow MD, [facility]  KEZIA/loretta           This note was transcribed by Rose Ibrahim.  [email or web address]/[email or web address]  The above service was transcribed by Rose Ibrahim, and I attest to the accuracy of the note.  KEZIA               Electronically Signed by: Rose Ibrahim-, -Author on October 19, 2020 07:07:10 AM  Electronically Co-signed by: Stephane Dow MD -Reviewer on October 20, 2020 12:50:35 PM

## 2021-05-14 VITALS
HEART RATE: 68 BPM | BODY MASS INDEX: 29.4 KG/M2 | SYSTOLIC BLOOD PRESSURE: 154 MMHG | DIASTOLIC BLOOD PRESSURE: 78 MMHG | HEIGHT: 71 IN | WEIGHT: 210 LBS

## 2021-05-14 VITALS — WEIGHT: 210.25 LBS | BODY MASS INDEX: 29.44 KG/M2 | RESPIRATION RATE: 12 BRPM | HEIGHT: 71 IN

## 2021-05-14 VITALS — WEIGHT: 201.5 LBS | BODY MASS INDEX: 28.21 KG/M2 | RESPIRATION RATE: 16 BRPM | HEIGHT: 71 IN

## 2021-05-14 VITALS — WEIGHT: 208 LBS | HEIGHT: 71 IN | RESPIRATION RATE: 14 BRPM | BODY MASS INDEX: 29.12 KG/M2

## 2021-05-14 NOTE — PROGRESS NOTES
Progress Note      Patient Name: Lucas Deluca   Patient ID: [id number]   Sex: Male   YOB: 1950    Primary Care Provider: Sarina Javed MD   Referring Provider: Sarina Javed MD    Visit Date: March 4, 2021    Provider: Connie Livingston MD   Location: [facility]   Location Address: [address] [place] [address]  [id number]   Location Phone: [phone]          Chief Complaint  · Pt is here for urological concerns      History Of Present Illness  Lucas Deluca is a 69 year old /White male who presents to the office today for further evaluation of urinary frequency.      4 mo ago thought that he was passing a kidney stone; seen by PCP and told that he had UTI.  Had severe abdominal pain.     Has had 2 UTIs that have been treated with antibiotics, first 3/2/2020 treated via Macrobid and again treated 4/13/2020 with Augmentin.  UTI symptoms noted initially as lower back pain which resolved with Macrobid but then came back and he was placed on Augmentin. UA and cultures not available for review.   No prior h/o UTI.     3 months ago suddenly started having nocturnal enuresis; to the point of wearing brief and incontinence and pad. Also gets up 3-4x at night. Does not even realize that he is going.     Current urinary symptoms include urinary frequency at the end of urination.  And burning at the end of the penis.  Feels that symptoms are better controlled during the day; able to hold; rare daytime leakage.   Denies hematuria  Denies hesitancy  Denies sensation of incomplete emptying.  Reports good strong stream.   Not on any BPH medications.  Denies history of BPH.  Denies h/o renal insufficiency; has h/o DM    Had PSA drawn 2-3 weeks ago.     Initial PVR today 691; repeat after voiding 611 cc. Feels like he could void again; not a painful or bothersome sense of urgency.     No prior imaging    Update 9/22/20: Presents for follow up; cathing 3x a  day. Getting around 800cc in upon waking; 700cc at midday, and 800cc prior to sleep. Urinary frequency, nocturia, and nocturnal enuresis have resolved since starting to cathing. Denies issues cathing. Denies flank or back pain. Taking Flomax and finasteride without adverse side effects.    Had CT scan prior to today's visit.    Update 10/29/2020: Patient presents for follow-up after UDS.  Did not have her US performed prior.  Patient states he is generally doing well.  Continues to self cath 3 times daily.  Gets volumes of 700 cc each time.  Denies issues cathing; does not like to cath; does not like the idea of self cathing.  No other complaints today.  Denies changes.    Update 3/4/2021: Patient presents postop follow-up from TURP.  Patient removed catheter this morning around 5 AM; has voided 3 times.  Denies significant sensation of incomplete emptying.  Denies dysuria or pain with voiding.  No complaints.  Final PVR today 250 cc    ___________  Prostate chips pathology, 2/22/2021: Adenocarcinoma prostate, [place] 3+3 = 6 in 1 of multiple chips involving approximately 1% of tissue    CT scan 8/24/2020: Mildly enlarged prostate; moderate distention of bladder; moderate right hydro-nephrosis without obstructing ureteral stone    UDS, 10/15/2020: During filling bladder with increased sensation, normal capacity.  Evidence of involuntary bladder contractions causing increased urgency no urge incontinence.  During voiding, bladder with normal detrusor contraction.  Flow was moderate with an elongated rate.  Flow charted obstructed with AG #113.3.  PVR: 198 cc EMG normal.       Past Medical History  Diabetes; Heart Disease; High blood pressure         Past Surgical History  Cardiac         Medication List  amlodipine 5 mg oral tablet; carvedilol 25 mg oral tablet; clopidogrel 75 mg oral tablet; Coreg 25 mg oral tablet; finasteride 5 mg oral tablet; Jardiance 10 mg oral tablet; Lantus U-100 Insulin 100 unit/mL  "subcutaneous solution; Lipitor 20 mg oral tablet; lisinopril 20 mg oral tablet; Mobic 7.5 mg oral tablet; Novolog Flexpen U-100 Insulin 100 unit/mL (3 mL) subcutaneous insulin pen; tamsulosin 0.4 mg oral capsule; Tricor 145 mg oral tablet; vitamin B complex oral tablet; Vitamin D3 25 mcg (1,000 unit) oral capsule         Allergy List  NO KNOWN DRUG ALLERGIES       Allergies Reconciled  Family Medical History  Family history of breast cancer; -Father's Family History Unknown; -Mother's Family History Unknown         Social History  Tobacco (Former)         Review of Systems  · Constitutional  o Denies  o : chills, fever  · Gastrointestinal  o Denies  o : nausea, vomiting      Vitals  Date Time BP Position Site L\R Cuff Size HR RR TEMP (F) WT  HT  BMI kg/m2 BSA m2 O2 Sat FR L/min FiO2 HC       03/04/2021 02:10 PM       12  210lbs 4oz 5'  11\" 29.32 2.19             Physical Examination  · Constitutional  o Appearance  o : Well nourished, well developed patient in no acute distress.  · Eyes  o Conjunctivae  o : Conjunctivae normal  o Sclerae  o : Sclerae white  · Ears, Nose, Mouth and Throat  o Ears  o :   § Hearing  § : Intact to conversational voice both ears  § Ears  § : Normal  o Nose  o :   § External Nose  § : Appearance normal  · Genitourinary  o Bladder  o : nontender to palpation  · Skin and Subcutaneous Tissue  o General Inspection  o : No rashes, lesions, or areas of discoloration present  o General Palpation  o : Skin Turgor Normal  · Neurologic  o Mental Status Examination  o :   § Orientation  § : Alert and Oriented X3  o Gait and Station  o :   § Gait Screening  § : Ambulating without difficulty  · Psychiatric  o Mood and Affect  o : Mood normal, affect appropriate          Results  · In-Office Procedures  o Surgical procedure  § IOP - Bladder Scan/Residual Urine (29504)   § Specimen vol Ur: 467mL   § Bladder Scan/Residual Urine (91520)   § Specimen vol Ur: 250       Assessment  · BPH (benign prostatic " hyperplasia)     600.00/N40.0  · Urinary frequency     788.41/R35.0  · Urinary retention     788.20/R33.9  · Prostate cancer     185/C61    Problems Reconciled  Plan  · Orders  o PSA ultrasensitive DIAGNOSTIC [facility] ([address]88) - 185/C61 - 09/08/2021  · Medications  o Medications have been Reconciled  o Transition of Care or Provider Policy  · Instructions  o Electronically Identified Patient Education Materials Provided Electronically     Doing well after TURP  Spontaneously voiding; given PVR today of 250 compared to preop PVR; no indication for intermittent cath at this time  We will continue to closely monitor  Patient to follow-up next week for repeat PVR    TURP pathologydiscussed with patient at length; detected [place] 3+3 = 6 adenocarcinoma of the prostate.  The patient was counseled regarding diagnostic results, prognosis and risks and benefits of treatment options.    I discussed the management options for prostate cancer with the patient at length and the risks and benefits of each.  Recommend pursuing active surveillance at this point.  Active surveillance is considered standard of care and the preferred option for a patient with low clinical risk prostate cancer. The risks of surveillance include progression of disease, failure of clinical staging to detect advanced disease, need for strict follow up, need for repeat prostate biopsies, and patient anxiety related to PSA. Although the evidence suggests that patients who progress to treatment on surveillance have survival similar to those treated at diagnosis.   I discussed surgical management with the patient including RRP, RALP. I discussed the risks of surgery including disease recurrence, bleeding, infection, injury to the bowel, erectile dysfunction, and urinary incontinence. If large series are examined, it appears radiation and surgery have similar 10 year progression free survivals. I think he is a candidate for RALP, but recommended to proceed at this  time with active surveillance. I have discussed my own operative experience, and have offered for the patient to discuss the operation with other urologists for a second opinion.   I discussed radiation therapy with the patient and encouraged him to seek an opinion of a radiation oncologist if desired. Mentioned the different radiation modalities including IMRT, Cyberknife, proton therapy, brachytherapy, and brachytherapy with external beam boost. I have advised the patient that at 10 years the outcomes from radiation and surgery appear similar. The risk of radiation include ED, radiation cystitis and proctitis.   Alternative therapies include HIFU, cryotherapy and focal therapy for the prostate though I advised the patient that these therapies are not NCCN guidelines, and not considered standard of care. Risks of these therapies include failure, ED, rectourethral fistula, urethral stricture.          Patient participated in the discussion, asked appropriate questions.    Informational handouts and pamphlets provided.    Patient agreeable to proceed with Active Surveillance at this point. Aware that MRI prostate and/or confirmatory bx to be performed within 2 years of diagnosis.   Obtain prior PSAs from PCP for review  Will start interval monitoring of PSA with plan for MRI and biopsy per protocol in future.    Follow up in 6 months with PSA prior  Patient encouraged to call with any questions or concerns.   All questions addressed at this time.    Follow-up next week repeat PVR             Electronically Signed by: Connie Livingston MD -Author on March 8, 2021 02:46:37 PM

## 2021-05-14 NOTE — PROGRESS NOTES
Progress Note      Patient Name: Lucas Deluca   Patient ID: [id number]   Sex: Male   YOB: 1950    Primary Care Provider: Sarina Javed MD   Referring Provider: Sarina Javed MD    Visit Date: March 12, 2021    Provider: Connie Livingston MD   Location: [facility]   Location Address: [address] [place] [address]  [id number]   Location Phone: [phone]          Chief Complaint  · Pt is here for urological concerns      History Of Present Illness  Lucas Deluca is a 69 year old /White male who presents to the office today for further evaluation of urinary frequency.      4 mo ago thought that he was passing a kidney stone; seen by PCP and told that he had UTI.  Had severe abdominal pain.     Has had 2 UTIs that have been treated with antibiotics, first 3/2/2020 treated via Macrobid and again treated 4/13/2020 with Augmentin.  UTI symptoms noted initially as lower back pain which resolved with Macrobid but then came back and he was placed on Augmentin. UA and cultures not available for review.   No prior h/o UTI.     3 months ago suddenly started having nocturnal enuresis; to the point of wearing brief and incontinence and pad. Also gets up 3-4x at night. Does not even realize that he is going.     Current urinary symptoms include urinary frequency at the end of urination.  And burning at the end of the penis.  Feels that symptoms are better controlled during the day; able to hold; rare daytime leakage.   Denies hematuria  Denies hesitancy  Denies sensation of incomplete emptying.  Reports good strong stream.   Not on any BPH medications.  Denies history of BPH.  Denies h/o renal insufficiency; has h/o DM    Had PSA drawn 2-3 weeks ago.     Initial PVR today 691; repeat after voiding 611 cc. Feels like he could void again; not a painful or bothersome sense of urgency.     No prior imaging    Update 9/22/20: Presents for follow up; cathing 3x a  "day. Getting around 800cc in upon waking; 700cc at midday, and 800cc prior to sleep. Urinary frequency, nocturia, and nocturnal enuresis have resolved since starting to cathing. Denies issues cathing. Denies flank or back pain. Taking Flomax and finasteride without adverse side effects.    Had CT scan prior to today's visit.    Update 10/29/2020: Patient presents for follow-up after UDS.  Did not have her US performed prior.  Patient states he is generally doing well.  Continues to self cath 3 times daily.  Gets volumes of 700 cc each time.  Denies issues cathing; does not like to cath; does not like the idea of self cathing.  No other complaints today.  Denies changes.    Update 3/4/2021: Patient presents postop follow-up from TURP.  Patient removed catheter this morning around 5 AM; has voided 3 times.  Denies significant sensation of incomplete emptying.  Denies dysuria or pain with voiding.  No complaints.  Final PVR today 250 cc    Update 3/12/2021: Patient reports \"doing great.\"  Voiding with good strong stream; no straining or hesitancy.  Denies sensation of incomplete emptying.  Wants to know if he can go dancing.  No complaints today.   cc  ___________  PSA  7/14/2020: 1.6    Prostate chips pathology, 2/22/2021: Adenocarcinoma prostate, [place] 3+3 = 6 in 1 of multiple chips involving approximately 1% of tissue    CT scan 8/24/2020: Mildly enlarged prostate; moderate distention of bladder; moderate right hydro-nephrosis without obstructing ureteral stone    UDS, 10/15/2020: During filling bladder with increased sensation, normal capacity.  Evidence of involuntary bladder contractions causing increased urgency no urge incontinence.  During voiding, bladder with normal detrusor contraction.  Flow was moderate with an elongated rate.  Flow charted obstructed with AG #113.3.  PVR: 198 cc EMG normal.       Past Medical History  Diabetes; Heart Disease; High blood pressure         Past Surgical " "History  Cardiac         Medication List  amlodipine 5 mg oral tablet; carvedilol 25 mg oral tablet; clopidogrel 75 mg oral tablet; Coreg 25 mg oral tablet; finasteride 5 mg oral tablet; Jardiance 10 mg oral tablet; Lantus U-100 Insulin 100 unit/mL subcutaneous solution; Lipitor 20 mg oral tablet; lisinopril 20 mg oral tablet; Mobic 7.5 mg oral tablet; Novolog Flexpen U-100 Insulin 100 unit/mL (3 mL) subcutaneous insulin pen; tamsulosin 0.4 mg oral capsule; Tricor 145 mg oral tablet; vitamin B complex oral tablet; Vitamin D3 25 mcg (1,000 unit) oral capsule         Allergy List  NO KNOWN DRUG ALLERGIES       Allergies Reconciled  Family Medical History  Family history of breast cancer; -Father's Family History Unknown; -Mother's Family History Unknown         Social History  Tobacco (Former)         Review of Systems  · Constitutional  o Denies  o : fever, chills  · Gastrointestinal  o Denies  o : nausea, vomiting      Vitals  Date Time BP Position Site L\R Cuff Size HR RR TEMP (F) WT  HT  BMI kg/m2 BSA m2 O2 Sat FR L/min FiO2 HC       03/12/2021 11:54 AM       16  201lbs 8oz 5'  11\" 28.1 2.14             Physical Examination  · Constitutional  o Appearance  o : Well nourished, well developed patient in no acute distress.  · Eyes  o Conjunctivae  o : Conjunctivae normal  o Sclerae  o : Sclerae white  · Neurologic  o Mental Status Examination  o :   § Orientation  § : Alert and Oriented X3  o Gait and Station  o :   § Gait Screening  § : Ambulating without difficulty  · Psychiatric  o Mood and Affect  o : Mood normal, affect appropriate          Results  · In-Office Procedures  o Surgical procedure  § IOP - Bladder Scan/Residual Urine (35832)   § Specimen vol Ur: 328       Assessment  · BPH (benign prostatic hyperplasia)     600.00/N40.0  · Incomplete bladder emptying     788.21/R33.9  · Urinary frequency     788.41/R35.0  · Prostate cancer     185/C61    Problems Reconciled  Plan  · Orders  o Renal " Ultrasound-bilateral (21547, 30336) - 600.00/N40.0 - 05/13/2021  · Medications  o Medications have been Reconciled  o Transition of Care or Provider Policy  · Instructions  o Electronically Identified Patient Education Materials Provided Electronically     Doing well after TURP; voiding with acceptable PVR given extent of retention prior to TURP  Continue to monitor PVR  Assess upper tracts after TURP via renal ultrasound in 2 months   Prostate cancer Viv[place] 6 and TURP specimen pathologyno history of PSA elevation    Obtain PSA in 6 months  Follow-up 2 months with renal ultrasound prior; repeat PVR  All question addressed                 Electronically Signed by: Connie Livingston MD -Author on March 13, 2021 08:10:39 AM

## 2021-05-15 VITALS
SYSTOLIC BLOOD PRESSURE: 170 MMHG | WEIGHT: 206 LBS | HEIGHT: 71 IN | HEART RATE: 64 BPM | BODY MASS INDEX: 28.84 KG/M2 | DIASTOLIC BLOOD PRESSURE: 82 MMHG

## 2021-05-15 VITALS
HEART RATE: 62 BPM | WEIGHT: 204 LBS | DIASTOLIC BLOOD PRESSURE: 86 MMHG | HEIGHT: 71 IN | BODY MASS INDEX: 28.56 KG/M2 | SYSTOLIC BLOOD PRESSURE: 162 MMHG

## 2021-05-15 VITALS — RESPIRATION RATE: 14 BRPM | HEIGHT: 71 IN | BODY MASS INDEX: 29.12 KG/M2 | WEIGHT: 208 LBS

## 2021-05-15 VITALS
HEART RATE: 62 BPM | DIASTOLIC BLOOD PRESSURE: 76 MMHG | WEIGHT: 205 LBS | SYSTOLIC BLOOD PRESSURE: 166 MMHG | BODY MASS INDEX: 28.7 KG/M2 | HEIGHT: 71 IN

## 2021-05-16 VITALS
HEART RATE: 66 BPM | HEIGHT: 71 IN | BODY MASS INDEX: 28 KG/M2 | DIASTOLIC BLOOD PRESSURE: 78 MMHG | WEIGHT: 200 LBS | SYSTOLIC BLOOD PRESSURE: 142 MMHG

## 2021-05-16 VITALS
BODY MASS INDEX: 27.86 KG/M2 | WEIGHT: 199 LBS | HEART RATE: 68 BPM | SYSTOLIC BLOOD PRESSURE: 156 MMHG | DIASTOLIC BLOOD PRESSURE: 86 MMHG | HEIGHT: 71 IN

## 2021-05-16 VITALS
SYSTOLIC BLOOD PRESSURE: 138 MMHG | BODY MASS INDEX: 28.84 KG/M2 | HEIGHT: 71 IN | WEIGHT: 206 LBS | DIASTOLIC BLOOD PRESSURE: 78 MMHG | HEART RATE: 62 BPM

## 2021-06-05 NOTE — PROGRESS NOTES
"   Progress Note      Patient Name: Lucas Deluca   Patient ID: [id number]   Sex: Male   YOB: 1950    Primary Care Provider: Sarina Javed MD   Referring Provider: Sarina Javed MD    Visit Date: May 13, 2021    Provider: Stephane Dow MD   Location: [facility]   Location Address: [address] [place] [address], [facility]   [place], [place]  [id number]   Location Phone: [phone]          Chief Complaint     Hypertension and coronary artery disease.       History Of Present Illness  Lucas Deluca is a 70 year old /White male with history of coronary artery disease and hypertension. Blood pressure is well controlled at home, in the 130-140/70 range, but a little high here today.   CURRENT MEDICATIONS: Medications have been reviewed and are as stated.   PAST MEDICAL HISTORY: Coronary artery disease status post PTCA/stent; diabetes mellitus; hyperlipidemia; hypertension.   PSYCHOSOCIAL HISTORY: Rarely consumes alcohol. Previously smoked, but quit.      ALLERGIES: No known drug allergies.       Review of Systems  · Cardiovascular  o Denies  o : palpitations (fast, fluttering, or skipping beats), swelling (feet, ankles, hands), shortness of breath while walking or lying flat, chest pain or angina pectoris   · Respiratory  o Denies  o : chronic or frequent cough      Vitals  Date Time BP Position Site L\R Cuff Size HR RR TEMP (F) WT  HT  BMI kg/m2 BSA m2 O2 Sat FR L/min FiO2 HC       05/13/2021 10:54 /74 Sitting    68 - R   204lbs 16oz 5'  11\" 28.59 2.16       05/13/2021 10:54 /80 Sitting                       Physical Examination  · Constitutional  o Appearance  o : Awake, alert, cooperative, pleasant.  · Respiratory  o Inspection of Chest  o : No chest wall deformities, moving equal.  o Auscultation of Lungs  o : Good air entry with vesicular breath sounds.  · Cardiovascular  o Heart  o :   § Auscultation of Heart  § : S1 and S2 regular. No S3. No S4. No " murmurs.  o Peripheral Vascular System  o :   § Extremities  § : Peripheral pulses were well felt. No edema. No cyanosis.  · Gastrointestinal  o Abdominal Examination  o : No masses or organomegaly noted.          Assessment     ASSESSMENT & PLAN:    1.  Essential hypertension, controlled, white coat hypertension. Blood pressure controlled at home, in the        130/70 range.  Continue lisinopril 20 mg twice a day.  Continue Norvasc 5 mg once a day.  Increase        Norvasc to 10 mg if blood pressure remains persistently high.  Continue carvedilol.   2.  Coronary artery disease with history of PTCA/stent, stable.  Continue current dose of aspirin.   3.  Hyperlipidemia.  Continue Lipitor.  Check lipids for adequate control on the next visit.   4.  See me back in 6 months.     Stephane Dow MD, [facility]  KEZIA/rt                Electronically Signed by: Leda Levi-, Other -Author on May 15, 2021 09:42:51 AM  Electronically Co-signed by: Stephane Dow MD -Reviewer on May 17, 2021 08:47:48 AM

## 2021-06-05 NOTE — PROGRESS NOTES
Progress Note      Patient Name: Lucas Deluca   Patient ID: [id number]   Sex: Male   YOB: 1950    Primary Care Provider: Sarina Javed MD   Referring Provider: Sarina Javed MD    Visit Date: May 6, 2021    Provider: Connie Livingston MD   Location: [facility]   Location Address: [address] [place] [address]  [id number]   Location Phone: [phone]          Chief Complaint  · Pt is here for urological concerns      History Of Present Illness  Lucas Deluca is a 69 year old /White male who presents to the office today for further evaluation of urinary frequency.      4 mo ago thought that he was passing a kidney stone; seen by PCP and told that he had UTI.  Had severe abdominal pain.     Has had 2 UTIs that have been treated with antibiotics, first 3/2/2020 treated via Macrobid and again treated 4/13/2020 with Augmentin.  UTI symptoms noted initially as lower back pain which resolved with Macrobid but then came back and he was placed on Augmentin. UA and cultures not available for review.   No prior h/o UTI.     3 months ago suddenly started having nocturnal enuresis; to the point of wearing brief and incontinence and pad. Also gets up 3-4x at night. Does not even realize that he is going.     Current urinary symptoms include urinary frequency at the end of urination.  And burning at the end of the penis.  Feels that symptoms are better controlled during the day; able to hold; rare daytime leakage.   Denies hematuria  Denies hesitancy  Denies sensation of incomplete emptying.  Reports good strong stream.   Not on any BPH medications.  Denies history of BPH.  Denies h/o renal insufficiency; has h/o DM    Had PSA drawn 2-3 weeks ago.     Initial PVR today 691; repeat after voiding 611 cc. Feels like he could void again; not a painful or bothersome sense of urgency.     No prior imaging    Update 9/22/20: Presents for follow up; cathing 3x a day.  "Getting around 800cc in upon waking; 700cc at midday, and 800cc prior to sleep. Urinary frequency, nocturia, and nocturnal enuresis have resolved since starting to cathing. Denies issues cathing. Denies flank or back pain. Taking Flomax and finasteride without adverse side effects.    Had CT scan prior to today's visit.    Update 10/29/2020: Patient presents for follow-up after UDS.  Did not have her US performed prior.  Patient states he is generally doing well.  Continues to self cath 3 times daily.  Gets volumes of 700 cc each time.  Denies issues cathing; does not like to cath; does not like the idea of self cathing.  No other complaints today.  Denies changes.    Update 3/4/2021: Patient presents postop follow-up from TURP.  Patient removed catheter this morning around 5 AM; has voided 3 times.  Denies significant sensation of incomplete emptying.  Denies dysuria or pain with voiding.  No complaints.  Final PVR today 250 cc    Update 3/12/2021: Patient reports \"doing great.\"  Voiding with good strong stream; no straining or hesitancy.  Denies sensation of incomplete emptying.  Wants to know if he can go dancing.  No complaints today.   cc    Update 5/6/2021: Patient presents for follow-up states he is doing very well.  States he is voiding with good strong stream denies sensation of incomplete emptying.  Pleased with results after TURP.  Gets every 3 month lab checks with PCP; wonders if PCP can order his PSA testing for surveillance of his prostate cancer.  PVR today 198 cc  ___________  Renal ultrasound, 4/27/2021: No hydronephrosis or acute sonographic abnormality    PSA  7/14/2020: 1.6    Prostate chips pathology, 2/22/2021: Adenocarcinoma prostate, [place] 3+3 = 6 in 1 of multiple chips involving approximately 1% of tissue    CT scan 8/24/2020: Mildly enlarged prostate; moderate distention of bladder; moderate right hydro-nephrosis without obstructing ureteral stone    UDS, 10/15/2020: During " "filling bladder with increased sensation, normal capacity.  Evidence of involuntary bladder contractions causing increased urgency no urge incontinence.  During voiding, bladder with normal detrusor contraction.  Flow was moderate with an elongated rate.  Flow charted obstructed with AG #113.3.  PVR: 198 cc EMG normal.       Past Medical History  Diabetes; Heart Disease; High blood pressure         Past Surgical History  Cardiac         Medication List  amlodipine 5 mg oral tablet; carvedilol 25 mg oral tablet; clopidogrel 75 mg oral tablet; Coreg 25 mg oral tablet; finasteride 5 mg oral tablet; Jardiance 10 mg oral tablet; Lantus U-100 Insulin 100 unit/mL subcutaneous solution; Lipitor 20 mg oral tablet; lisinopril 20 mg oral tablet; Mobic 7.5 mg oral tablet; Novolog Flexpen U-100 Insulin 100 unit/mL (3 mL) subcutaneous insulin pen; tamsulosin 0.4 mg oral capsule; Tricor 145 mg oral tablet; vitamin B complex oral tablet; Vitamin D3 25 mcg (1,000 unit) oral capsule         Allergy List  NO KNOWN DRUG ALLERGIES       Allergies Reconciled  Family Medical History  Family history of breast cancer; -Father's Family History Unknown; -Mother's Family History Unknown         Social History  Tobacco (Former)         Review of Systems  · Constitutional  o Denies  o : fever, chills  · Gastrointestinal  o Denies  o : nausea, vomiting      Vitals  Date Time BP Position Site L\R Cuff Size HR RR TEMP (F) WT  HT  BMI kg/m2 BSA m2 O2 Sat FR L/min FiO2        05/06/2021 02:20 PM       14  206lbs 6oz 5'  11\" 28.78 2.17             Physical Examination  · Constitutional  o Appearance  o : Well nourished, well developed patient in no acute distress.  · Eyes  o Conjunctivae  o : Conjunctivae normal  o Sclerae  o : Sclerae white  · Ears, Nose, Mouth and Throat  o Ears  o :   § Hearing  § : Intact to conversational voice both ears  § Ears  § : Normal  o Nose  o :   § External Nose  § : Appearance normal  · Genitourinary  o Bladder  o : " nontender to palpation  · Skin and Subcutaneous Tissue  o General Inspection  o : No rashes, lesions, or areas of discoloration present  o General Palpation  o : Skin Turgor Normal  · Neurologic  o Mental Status Examination  o :   § Orientation  § : Alert and Oriented X3  o Gait and Station  o :   § Gait Screening  § : Ambulating without difficulty  · Psychiatric  o Mood and Affect  o : Mood normal, affect appropriate          Results  · In-Office Procedures  o Surgical procedure  § IOP - Bladder Scan/Residual Urine (58255)   § Specimen vol Ur: 198   o Lab procedure  § Automated Dipstick Urinalysis (Surg Spec) WITHOUT Micro HMH (17885)   § Color Ur: Yellow   § Clarity Ur: Clear   § Glucose Ur Ql Strip: >=1000 mg/dL   § Bilirub Ur Ql Strip: Negative   § Ketones Ur Ql Strip: Negative   § Sp Gr Ur Qn: 1.015   § Hgb Ur Ql Strip: Trace-Intact   § pH Ur-LsCnc: 5.0   § Prot Ur Ql Strip: Negative   § Urobilinogen Ur Strip-mCnc: 0.2 E.U./dL   § Nitrite Ur Ql Strip: Negative   § WBC Est Ur Ql Strip: Trace       Assessment  · BPH (benign prostatic hyperplasia)     600.00/N40.0  · Incomplete bladder emptying     788.21/R33.9  · Urinary frequency     788.41/R35.0  · Prostate cancer     185/C61    Problems Reconciled  Plan  · Medications  o Medications have been Reconciled  o Transition of Care or Provider Policy  · Instructions  o Electronically Identified Patient Education Materials Provided Electronically     Continues to do well after TURP; voiding with acceptable PVR  Continue to monitor PVR at subsequent follow-up    Renal ultrasound imaging reviewed, discussed with patient; no hydronephrosis or evidence of upper tract deterioration despite history of urinary retention; now seems to be voiding well with adequate emptying    Prostate cancer [place] 6 and TURP specimen pathologyno history of PSA elevation  PSA checks per PCP; obtain in 3 months with routine labs then subsequently again in 3 months.  After this, we will plan  for every 6 month PSA checks per PCP.  Patient states he will communicate this with PCP.    Follow-up in 5-6 months with PSA per PCP prior; repeat PVR  All question addressed                 Electronically Signed by: Connie Livingston MD -Author on May 6, 2021 02:59:58 PM

## 2021-07-15 VITALS — RESPIRATION RATE: 14 BRPM | BODY MASS INDEX: 28.89 KG/M2 | HEIGHT: 71 IN | WEIGHT: 206.37 LBS

## 2021-07-15 VITALS
HEART RATE: 68 BPM | SYSTOLIC BLOOD PRESSURE: 164 MMHG | HEIGHT: 71 IN | BODY MASS INDEX: 28.7 KG/M2 | WEIGHT: 205 LBS | DIASTOLIC BLOOD PRESSURE: 74 MMHG

## 2021-10-05 DIAGNOSIS — R35.0 BENIGN PROSTATIC HYPERPLASIA WITH URINARY FREQUENCY: Primary | ICD-10-CM

## 2021-10-05 DIAGNOSIS — N40.1 BENIGN PROSTATIC HYPERPLASIA WITH URINARY FREQUENCY: Primary | ICD-10-CM

## 2021-10-05 RX ORDER — FINASTERIDE 5 MG/1
TABLET, FILM COATED ORAL
Qty: 90 TABLET | Refills: 3 | Status: SHIPPED | OUTPATIENT
Start: 2021-10-05 | End: 2022-12-06

## 2021-10-05 RX ORDER — TAMSULOSIN HYDROCHLORIDE 0.4 MG/1
CAPSULE ORAL
Qty: 90 CAPSULE | Refills: 3 | Status: SHIPPED | OUTPATIENT
Start: 2021-10-05 | End: 2022-06-03

## 2021-11-09 RX ORDER — CLOPIDOGREL BISULFATE 75 MG/1
TABLET ORAL
Qty: 90 TABLET | Refills: 1 | Status: SHIPPED | OUTPATIENT
Start: 2021-11-09 | End: 2022-05-09

## 2021-11-09 RX ORDER — LISINOPRIL 20 MG/1
TABLET ORAL
Qty: 180 TABLET | Refills: 1 | Status: SHIPPED | OUTPATIENT
Start: 2021-11-09 | End: 2021-12-01 | Stop reason: SDUPTHER

## 2021-11-09 RX ORDER — CARVEDILOL 25 MG/1
TABLET ORAL
Qty: 180 TABLET | Refills: 1 | Status: SHIPPED | OUTPATIENT
Start: 2021-11-09 | End: 2022-05-09

## 2021-11-09 RX ORDER — ATORVASTATIN CALCIUM 20 MG/1
TABLET, FILM COATED ORAL
Qty: 90 TABLET | Refills: 1 | Status: SHIPPED | OUTPATIENT
Start: 2021-11-09 | End: 2021-12-14 | Stop reason: SDUPTHER

## 2021-11-11 ENCOUNTER — TELEPHONE (OUTPATIENT)
Dept: UROLOGY | Facility: CLINIC | Age: 71
End: 2021-11-11

## 2021-11-11 NOTE — TELEPHONE ENCOUNTER
Pt has an appt on 12/01 and needs an order put in for lab work. His PCP did a PSA in September, it's Dr. Powell in [place]. If other bloodwork please let him know what is needed.

## 2021-11-15 RX ORDER — AMLODIPINE BESYLATE 5 MG/1
TABLET ORAL
Qty: 90 TABLET | Refills: 1 | Status: SHIPPED | OUTPATIENT
Start: 2021-11-15 | End: 2021-12-14 | Stop reason: SDUPTHER

## 2021-11-15 NOTE — TELEPHONE ENCOUNTER
[facility] EXAM NOTE      Chief Complaint   Patient presents with     Abdominal Pain     X2WEEKS SX: BLOATING (ACID REFLUX)       Due to language barrier, a phone  was present during the history-taking and subsequent discussion (and for  the physical exam) with this patient.      ASSESSMENT & PLAN    Geronimo was seen today for abdominal pain.    Diagnoses and all orders for this visit:    Gastritis: from what I can gather has hx of gastritis/reflux. Patient feels symptoms consistent with this. Out of medications for awhile. Will refill TUMS and Prilosec. Discussed when to take. F?u in 2 weeks via telephone visit. Consider lab work/h pylori test if still having pain (lab closed at appt time). Consider other sources such as pancreatitis, liver gall bladder at next visit if no improvement in pain. Exam benign today. Not currently having the pain.   -     omeprazole (PRILOSEC) 20 MG capsule; TAKE 1 CAPSULE BY MOUTH EVERY DAY  -     calcium, as carbonate, (TUMS) 200 mg calcium (500 mg) chewable tablet; 3 po qd    Cervical stenosis of spinal canal: at the last minute asking for something for her neck pain. Given gastritis trial tylenol. Also recommended f/u with spine clinic. She does not remember this appt.   -     acetaminophen (TYLENOL EXTRA STRENGTH) 500 MG tablet; Take 2 tablets (1,000 mg total) by mouth every 6 (six) hours as needed for pain.      Based on my observations with patient today I am wondering about a learning disability/ cognitive delay.      HISTORY    Geronimo Dumont is a 54 y.o.  female who presents for the following issues:    Acid reflux x 2 weeks  Also causes her to have back pain.   Had this problem since 2015 but doesn't occur very often  Last time she had this pain she fainted and they took her to the doctor? Nothing on saw on the chart regarding this  Also feels the burning sensation in the stomach  She is a poor historian. Keeps repeating herself. Interpretor also appears confused. At  Patient last OV 05.13.21      Medication was documented at that visit.       Next OV  12.14.21   times the interpretor just said she was repeating herself.   Consistently was not answering my quesitons directly. Started telling me about all her health hx although not worried about those things today.     No nausea or vomiting. Stools normal.     MEDICATIONS    Current Outpatient Medications on File Prior to Visit   Medication Sig Dispense Refill     cholecalciferol, vitamin D3, 2,000 unit Tab 1 po daily 90 tablet 1     gabapentin (NEURONTIN) 300 MG capsule Take 300 mg by mouth.       ibuprofen (ADVIL,MOTRIN) 400 MG tablet 1 po two times a day prn pain 40 tablet 1     ondansetron (ZOFRAN) 4 MG tablet Take 1 tablet (4 mg total) by mouth every 6 (six) hours. 12 tablet 0     sucralfate (CARAFATE) 1 gram tablet Take 1 g by mouth 4 (four) times a day.       [DISCONTINUED] calcium, as carbonate, (TUMS) 200 mg calcium (500 mg) chewable tablet 3 po qd 90 tablet 6     [DISCONTINUED] omeprazole (PRILOSEC) 20 MG capsule TAKE 1 CAPSULE BY MOUTH EVERY DAY 90 capsule 3     Current Facility-Administered Medications on File Prior to Visit   Medication Dose Route Frequency Provider Last Rate Last Admin     bupivacaine (PF) 0.25% injection 1.25 mg (MARCAINE)  0.5 mL Intra-articular Once Beltran Alva MD           Pertinent past medical, surgical, social and family history reviewed and updated in [facility].    Social History     Socioeconomic History     Marital status:      Spouse name: Not on file     Number of children: Not on file     Years of education: Not on file     Highest education level: Not on file   Occupational History     Not on file   Social Needs     Financial resource strain: Not on file     Food insecurity     Worry: Not on file     Inability: Not on file     Transportation needs     Medical: Not on file     Non-medical: Not on file   Tobacco Use     Smoking status: Never Smoker     Smokeless tobacco: Never Used   Substance and Sexual Activity     Alcohol use: Not on file     Drug use: Not on file      Sexual activity: Not on file   Lifestyle     Physical activity     Days per week: Not on file     Minutes per session: Not on file     Stress: Not on file   Relationships     Social connections     Talks on phone: Not on file     Gets together: Not on file     Attends Anabaptism service: Not on file     Active member of club or organization: Not on file     Attends meetings of clubs or organizations: Not on file     Relationship status: Not on file     Intimate partner violence     Fear of current or ex partner: Not on file     Emotionally abused: Not on file     Physically abused: Not on file     Forced sexual activity: Not on file   Other Topics Concern     Not on file   Social History Narrative     Not on file         REVIEW OF SYSTEMS    ROS: 10 pt review of systems performed and all negative except noted in HPI.     PHYSICAL EXAM    /76 (Patient Site: Right Arm, Patient Position: Sitting, Cuff Size: Adult Regular)   Pulse 76   Wt 122 lb 6 oz (55.5 kg)   BMI 27.93 kg/m    GEN:  54 y.o. female sitting comfortably in no apparent distress.   HEENT: EOMI, no scleral icterus, buccal mucosa moist  Neck: Supple   CHEST/LUNG: No respiratory distress, good air flow to bases, CTAB   CV: RRR, S1, S2 normal; no murmurs, rubs or gallops  ABD:  Soft, non-tender, non distended, no guarding,  No masses. Does not have pain currently but points to epigastric as the worse of her pain and the RUQ as to where she gets pain sometimes.   MSK:  Strength grossly normal  SKIN: warm, dry, no rashes or lesions  NEURO: Gait normal, coordination intact  PSYCH:  Mood and affect appropriate      At the end of the encounter, I discussed results, diagnosis, medications. Discussed red flags for immediate return to clinic/ER, as well as indications for follow up if no improvement. Patient and/or caregiver understood and agreed to plan. Patient was stable for discharge.        Elaine Aggarwal

## 2021-12-01 ENCOUNTER — OFFICE VISIT (OUTPATIENT)
Dept: UROLOGY | Facility: CLINIC | Age: 71
End: 2021-12-01

## 2021-12-01 VITALS — HEIGHT: 71 IN | WEIGHT: 209.6 LBS | RESPIRATION RATE: 14 BRPM | BODY MASS INDEX: 29.34 KG/M2

## 2021-12-01 DIAGNOSIS — C61 PROSTATE CANCER (HCC): ICD-10-CM

## 2021-12-01 DIAGNOSIS — N40.1 BENIGN PROSTATIC HYPERPLASIA WITH NOCTURIA: Primary | ICD-10-CM

## 2021-12-01 DIAGNOSIS — R35.1 BENIGN PROSTATIC HYPERPLASIA WITH NOCTURIA: Primary | ICD-10-CM

## 2021-12-01 PROCEDURE — 99213 OFFICE O/P EST LOW 20 MIN: CPT | Performed by: UROLOGY

## 2021-12-01 PROCEDURE — 51798 US URINE CAPACITY MEASURE: CPT | Performed by: UROLOGY

## 2021-12-01 RX ORDER — FENOFIBRATE 145 MG/1
TABLET, COATED ORAL
COMMUNITY
Start: 2021-10-12 | End: 2022-02-03 | Stop reason: SDUPTHER

## 2021-12-01 RX ORDER — INSULIN GLARGINE 100 [IU]/ML
INJECTION, SOLUTION SUBCUTANEOUS
COMMUNITY
End: 2023-04-06 | Stop reason: SDUPTHER

## 2021-12-01 RX ORDER — LISINOPRIL 20 MG/1
TABLET ORAL
COMMUNITY
Start: 2021-03-02 | End: 2022-05-09

## 2021-12-01 RX ORDER — EMPAGLIFLOZIN 10 MG/1
TABLET, FILM COATED ORAL
COMMUNITY
Start: 2021-10-26 | End: 2023-04-06

## 2021-12-01 RX ORDER — MELOXICAM 7.5 MG/1
15 TABLET ORAL DAILY
COMMUNITY
End: 2022-06-03

## 2021-12-01 RX ORDER — INSULIN ASPART 100 [IU]/ML
INJECTION, SOLUTION INTRAVENOUS; SUBCUTANEOUS
COMMUNITY

## 2021-12-01 NOTE — PROGRESS NOTES
UROLOGY OFFICE FOLLOW-UP NOTE    Subjective   HPI  Lucas Deluca is a 71 y.o. male who presents to the office today for further evaluation of urinary frequency.      4 mo ago thought that he was passing a kidney stone; seen by PCP and told that he had UTI.  Had severe abdominal pain.     Has had 2 UTIs that have been treated with antibiotics, first 3/2/2020 treated via Macrobid and again treated 4/13/2020 with Augmentin.  UTI symptoms noted initially as lower back pain which resolved with Macrobid but then came back and he was placed on Augmentin. UA and cultures not available for review.   No prior h/o UTI.     3 months ago suddenly started having nocturnal enuresis; to the point of wearing brief and incontinence and pad. Also gets up 3-4x at night. Does not even realize that he is going.     Current urinary symptoms include urinary frequency at the end of urination.  And burning at the end of the penis.  Feels that symptoms are better controlled during the day; able to hold; rare daytime leakage.   Denies hematuria  Denies hesitancy  Denies sensation of incomplete emptying.  Reports good strong stream.   Not on any BPH medications.  Denies history of BPH.  Denies h/o renal insufficiency; has h/o DM    Had PSA drawn 2-3 weeks ago.     Initial PVR today 691; repeat after voiding 611 cc. Feels like he could void again; not a painful or bothersome sense of urgency.     No prior imaging    Update 9/22/20: Presents for follow up; cathing 3x a day. Getting around 800cc in upon waking; 700cc at midday, and 800cc prior to sleep. Urinary frequency, nocturia, and nocturnal enuresis have resolved since starting to cathing. Denies issues cathing. Denies flank or back pain. Taking Flomax and finasteride without adverse side effects.    Had CT scan prior to today's visit.    Update 10/29/2020: Patient presents for follow-up after UDS.  Did not have her US performed prior.  Patient states he is generally doing well.   "Continues to self cath 3 times daily.  Gets volumes of 700 cc each time.  Denies issues cathing; does not like to cath; does not like the idea of self cathing.  No other complaints today.  Denies changes.    Update 3/4/2021: Patient presents postop follow-up from TURP.  Patient removed catheter this morning around 5 AM; has voided 3 times.  Denies significant sensation of incomplete emptying.  Denies dysuria or pain with voiding.  No complaints.  Final PVR today 250 cc    Update 3/12/2021: Patient reports \"doing great.\"  Voiding with good strong stream; no straining or hesitancy.  Denies sensation of incomplete emptying.  Wants to know if he can go dancing.  No complaints today.   cc    Update 5/6/2021: Patient presents for follow-up states he is doing very well.  States he is voiding with good strong stream denies sensation of incomplete emptying.  Pleased with results after TURP.  Gets every 3 month lab checks with PCP; wonders if PCP can order his PSA testing for surveillance of his prostate cancer.  PVR today 198 cc    Update 12/1/21: Presents for routine follow up with psa prior.  Gets up at night every 2 hours; able to go right back to sleep. Has had 2 accidents at nighttime since last visit. Continues to take flomax and finasteride. Reports good strong stream. Denies significant daytime urinary symptoms.  Has retrograde ejaculation.  ___________  Renal ultrasound, 4/27/2021: No hydronephrosis or acute sonographic abnormality    PSA  9/20/2021: 0.3  7/14/2020: 1.6    Prostate chips pathology, 2/22/2021: Adenocarcinoma prostate, Roach 3+3 = 6 in 1 of multiple chips involving approximately 1% of tissue    CT scan 8/24/2020: Mildly enlarged prostate; moderate distention of bladder; moderate right hydro-nephrosis without obstructing ureteral stone    UDS, 10/15/2020: During filling bladder with increased sensation, normal capacity.  Evidence of involuntary bladder contractions causing increased urgency no " urge incontinence.  During voiding, bladder with normal detrusor contraction.  Flow was moderate with an elongated rate.  Flow charted obstructed with AG #113.3.  PVR: 198 cc EMG normal.          Results for orders placed or performed in visit on 05/06/21   Urinalysis without microscopic (no culture) -   Result Value Ref Range    Leukocytes, UA Trace     Nitrite, UA Negative     Urobilinogen, UA 0.2 E.U./dL     Protein, UA Negative     pH, UA 5.0     Blood, UA Trace-Intact     Specific [place], UA 1.015     Ketones, UA Negative     Bilirubin, UA Negative     Glucose, UA >=1000 mg/dL     Appearance Clear     Color, UA Yellow          Medical History:  Past Medical History:   Diagnosis Date   • Diabetes mellitus (HCC)    • Hyperlipidemia    • Hypertension    • Macular degeneration         Social History:  Social History     Socioeconomic History   • Marital status:    Tobacco Use   • Smoking status: Former Smoker   • Smokeless tobacco: Former User   Vaping Use   • Vaping Use: Never used   Substance and Sexual Activity   • Alcohol use: No   • Drug use: No   • Sexual activity: Defer        Family History:  Family History   Problem Relation Age of Onset   • Heart disease Father    • Cancer Mother    • Diabetes Mother    • Cancer Maternal Aunt    • Cancer Maternal Uncle    • Cancer Maternal Grandmother    • Cancer Maternal Grandfather         Surgical History:  Past Surgical History:   Procedure Laterality Date   • CORONARY ANGIOPLASTY WITH STENT PLACEMENT     • CORONARY ARTERY BYPASS GRAFT      2004   • NV TREAT TIBIAL SHAFT FX, INTRAMED IMPLANT Right 1/12/2017    Procedure: TIBIA INTRAMEDULLARY NAIL/SHANNON INSERTION;  Surgeon: Colton Nascimento MD;  Location: [facility];  Service: Orthopedics   • TONSILLECTOMY          Allergies:  No Known Allergies     Current Medications:  Current Outpatient Medications   Medication Sig Dispense Refill   • amLODIPine (NORVASC) 5 MG tablet TAKE 1 TABLET DAILY 90 tablet 1   •  "aspirin 81 MG chewable tablet Chew 81 mg Daily.     • atorvastatin (LIPITOR) 20 MG tablet TAKE 1 TABLET DAILY 90 tablet 1   • B Complex Vitamins (VITAMIN B COMPLEX PO) vitamin B complex oral tablet take 1 tablet by oral route daily   Active     • carvedilol (COREG) 25 MG tablet TAKE 1 TABLET TWICE A  tablet 1   • Cholecalciferol 25 MCG (1000 UT) capsule Vitamin D3 25 mcg (1,000 unit) oral capsule take 1 capsule by oral route 2 times a day   Active     • clopidogrel (PLAVIX) 75 MG tablet TAKE 1 TABLET DAILY 90 tablet 1   • fenofibrate (TRICOR) 145 MG tablet      • finasteride (PROSCAR) 5 MG tablet TAKE 1 TABLET DAILY 90 tablet 3   • insulin aspart (NovoLOG FlexPen) 100 UNIT/ML solution pen-injector sc pen Novolog Flexpen U-100 Insulin 100 unit/mL (3 mL) subcutaneous insulin pen inject by subcutaneous route per prescriber's instructions. Insulin dosing requires individualization.   Active     • insulin glargine (Lantus) 100 UNIT/ML injection Lantus U-100 Insulin 100 unit/mL subcutaneous solution inject by subcutaneous route as per insulin protocol   Active     • Jardiance 10 MG tablet tablet      • lisinopril (PRINIVIL,ZESTRIL) 20 MG tablet lisinopril 20 mg oral tablet take 1 tablet by oral route 2 times a day 3/2/2021  Active     • meloxicam (Mobic) 7.5 MG tablet Mobic 7.5 mg oral tablet take 1 tablet (7.5 mg) by oral route once daily   Active     • tamsulosin (FLOMAX) 0.4 MG capsule 24 hr capsule TAKE 1 CAPSULE DAILY 1/2 HOUR FOLLOWING THE SAME MEAL EACH DAY 90 capsule 3     No current facility-administered medications for this visit.       Review of systems  Constitutional: Denies fever chills  GI: Denies nausea, vomiting    Objective     Vital Signs:   Resp 14   Ht 180.3 cm (71\")   Wt 95.1 kg (209 lb 9.6 oz)   BMI 29.23 kg/m²       Physical exam  No acute distress, well-nourished  Awake alert and oriented  Mood normal; affect normal    Bladder Scan interpretation 12/01/2021    Estimation of residual " urine via B[facility] Bladder Scan  Residual Urine: 136 ml  Indication: No diagnosis found.   Position: Supine  Examination: Incremental scanning of the suprapubic area using 2.0 MHz transducer using copious amounts of acoustic gel.   Findings: An anechoic area was demonstrated which represented the bladder, with measurement of residual urine as noted. I inspected this myself. In that the residual urine was stable and lower from prior, refer to plan for treatment and plan necessary at this time.     Problem List:  Patient Active Problem List   Diagnosis   • Closed fracture of distal end of fibula with tibia   • Type 2 diabetes mellitus (HCC)   • CAD (coronary artery disease)       Assessment/Plan   Diagnoses and all orders for this visit:    1. Benign prostatic hyperplasia with nocturia (Primary)    2. Prostate cancer (HCC)  -     PSA DIAGNOSTIC; Future        Continues to do well, history of TURP; voiding with acceptable PVR  Continue to monitor PVR at subsequent follow-up    Prostate cancer Vivian 6 on TURP specimen pathology  Most recent PSA 0.3; patient on finasteride  Continue to monitor via PSA in 6 months    Not significantly bothered by nocutria at this point, continue to monitor  6 month psa check    Stop BPH meds one at a time; flomax first, monitor symptoms then finasteride if ok; will have to be mindful if off finasteride when assessing psa    Follow up 6 month, repeat pvr, psa prior or earlier as needed  All questions addressed    Signed:  Connie Fraire MD  12/01/21  11:22 EST    Total time for encounter was 22 minutes including same day preparation prior to encounter (e.g. reviewing labs, prior notes), face to face time, counseling and coordination of care and ordering medications, test, or procedures.

## 2021-12-12 PROBLEM — E78.2 HYPERLIPEMIA, MIXED: Status: ACTIVE | Noted: 2021-12-12

## 2021-12-12 PROBLEM — I10 HYPERTENSION, ESSENTIAL: Status: ACTIVE | Noted: 2021-12-12

## 2021-12-12 NOTE — PROGRESS NOTES
[facility]  Cardiology progress Note    Patient Name: Lucas Deluca  : 1950    CHIEF COMPLAINT  Hypertension.      Subjective   Subjective     HISTORY OF PRESENT ILLNESS    Lucas Deluca is a 71 y.o. male with history of CORONARY ARTERY DISEASE, hypertension and hyperlipidemia.  No chest pain or shortness of breath.    Review of Systems:   Constitutional no fever,  no weight loss   Skin no rash   Otolaryngeal no difficulty swallowing   Cardiovascular See HPI   Pulmonary no cough, no sputum production   Gastrointestinal no constipation, no diarrhea   Genitourinary no dysuria, no hematuria   Hematologic no easy bruisability, no abnormal bleeding   Musculoskeletal no muscle pain   Neurologic no dizziness, no falls         Personal History     Social History:  reports that he quit smoking about 30 years ago. He has quit using smokeless tobacco. He reports that he does not drink alcohol and does not use drugs.    Home Medications:  Current Outpatient Medications on File Prior to Visit   Medication Sig   • amLODIPine (NORVASC) 5 MG tablet TAKE 1 TABLET DAILY   • aspirin 81 MG chewable tablet Chew 81 mg Daily.   • atorvastatin (LIPITOR) 20 MG tablet TAKE 1 TABLET DAILY   • B Complex Vitamins (VITAMIN B COMPLEX PO) vitamin B complex oral tablet take 1 tablet by oral route daily   Active   • carvedilol (COREG) 25 MG tablet TAKE 1 TABLET TWICE A DAY   • Cholecalciferol 25 MCG (1000 UT) capsule Vitamin D3 25 mcg (1,000 unit) oral capsule take 1 capsule by oral route 2 times a day   Active   • clopidogrel (PLAVIX) 75 MG tablet TAKE 1 TABLET DAILY   • fenofibrate (TRICOR) 145 MG tablet    • finasteride (PROSCAR) 5 MG tablet TAKE 1 TABLET DAILY   • insulin aspart (NovoLOG FlexPen) 100 UNIT/ML solution pen-injector sc pen Novolog Flexpen U-100 Insulin 100 unit/mL (3 mL) subcutaneous insulin pen inject by subcutaneous route per prescriber's instructions. Insulin dosing requires individualization.   Active    • insulin glargine (Lantus) 100 UNIT/ML injection Lantus U-100 Insulin 100 unit/mL subcutaneous solution inject by subcutaneous route as per insulin protocol   Active   • Jardiance 10 MG tablet tablet    • lisinopril (PRINIVIL,ZESTRIL) 20 MG tablet lisinopril 20 mg oral tablet take 1 tablet by oral route 2 times a day 3/2/2021  Active   • meloxicam (Mobic) 7.5 MG tablet Take 15 mg by mouth Daily.   • tamsulosin (FLOMAX) 0.4 MG capsule 24 hr capsule TAKE 1 CAPSULE DAILY 1/2 HOUR FOLLOWING THE SAME MEAL EACH DAY     No current facility-administered medications on file prior to visit.     Allergies:  No Known Allergies    Objective    Objective       Vitals:   Heart Rate:  [58-60] 60  BP: (148-152)/(66-68) 148/68  Body mass index is 28.45 kg/m².     Physical Exam:   Constitutional: Awake, alert, No acute distress    Eyes: PERRLA, sclerae anicteric, no conjunctival injection   HENT: NCAT, mucous membranes moist   Neck: Supple, no thyromegaly, no lymphadenopathy, trachea midline   Respiratory: Clear to auscultation bilaterally, nonlabored respirations    Cardiovascular: RRR, no murmurs or rubs. Palpable pedal pulses bilaterally   Musculoskeletal: No bilateral ankle edema, no cyanosis to extremities   Psychiatric: Appropriate affect, cooperative   Neurologic: Oriented x 3, strength symmetric in all extremities, Cranial Nerves grossly intact to confrontation, speech clear   Skin: No rashes.    Result Review    Result Review:  I have personally reviewed the available results from  [x]  Laboratory  [x]  EKG  [x]  Cardiology  [x]  Medications  [x]  Old records  []  Other:     ECG 12 Lead    Date/Time: 12/14/2021 12:33 PM  Performed by: Stephane Dow MD  Authorized by: Stephane Dow MD   Comparison: compared with previous ECG   Similar to previous ECG  Rhythm: sinus rhythm  Conduction: non-specific intraventricular conduction delay    Clinical impression: abnormal EKG  Comments: Normal sinus rhythm.  No  significant changes compared to previous EKG.            Impression/Plan:  1.  Coronary artery s/p PTCA/stent stable: Continue aspirin 81 mg a day.  Continue Plavix 75 mg once a day. continue carvedilol 25 mg twice daily.  No chest pain.  2.  Hyperlipidemia: Lipitor 40 mg once a day.  Lipid profile reviewed. .  Recheck lipid and hepatic profile in 3 months  3.  Essential hypertension Uncontrolled: Continue lisinopril 20 mg twice a day.  Increase amlodipine 10 mg a day.  Monitor blood pressure regularly at home.           Stephane Dow MD   12/14/21   12:26 EST

## 2021-12-14 ENCOUNTER — OFFICE VISIT (OUTPATIENT)
Dept: CARDIOLOGY | Facility: CLINIC | Age: 71
End: 2021-12-14

## 2021-12-14 VITALS
DIASTOLIC BLOOD PRESSURE: 68 MMHG | SYSTOLIC BLOOD PRESSURE: 148 MMHG | WEIGHT: 204 LBS | HEIGHT: 71 IN | BODY MASS INDEX: 28.56 KG/M2 | HEART RATE: 60 BPM

## 2021-12-14 DIAGNOSIS — Z95.5 HISTORY OF CORONARY ANGIOPLASTY WITH INSERTION OF STENT: ICD-10-CM

## 2021-12-14 DIAGNOSIS — I25.10 CORONARY ARTERY DISEASE INVOLVING NATIVE CORONARY ARTERY OF NATIVE HEART WITHOUT ANGINA PECTORIS: ICD-10-CM

## 2021-12-14 DIAGNOSIS — E78.2 HYPERLIPEMIA, MIXED: ICD-10-CM

## 2021-12-14 DIAGNOSIS — I10 HYPERTENSION, ESSENTIAL: Primary | ICD-10-CM

## 2021-12-14 PROCEDURE — 99214 OFFICE O/P EST MOD 30 MIN: CPT | Performed by: SPECIALIST

## 2021-12-14 PROCEDURE — 93000 ELECTROCARDIOGRAM COMPLETE: CPT | Performed by: SPECIALIST

## 2021-12-14 RX ORDER — ATORVASTATIN CALCIUM 40 MG/1
20 TABLET, FILM COATED ORAL DAILY
Qty: 90 TABLET | Refills: 3 | Status: SHIPPED | OUTPATIENT
Start: 2021-12-14 | End: 2022-05-10 | Stop reason: SDUPTHER

## 2021-12-14 RX ORDER — AMLODIPINE BESYLATE 10 MG/1
5 TABLET ORAL DAILY
Qty: 90 TABLET | Refills: 3 | Status: SHIPPED | OUTPATIENT
Start: 2021-12-14 | End: 2021-12-17 | Stop reason: SDUPTHER

## 2021-12-17 RX ORDER — AMLODIPINE BESYLATE 10 MG/1
10 TABLET ORAL DAILY
Qty: 90 TABLET | Refills: 3 | Status: SHIPPED | OUTPATIENT
Start: 2021-12-17 | End: 2022-07-05 | Stop reason: SDUPTHER

## 2022-02-03 RX ORDER — FENOFIBRATE 145 MG/1
145 TABLET, COATED ORAL DAILY
Qty: 90 TABLET | Refills: 3 | Status: SHIPPED | OUTPATIENT
Start: 2022-02-03 | End: 2022-07-05 | Stop reason: SDUPTHER

## 2022-04-28 RX ORDER — AMLODIPINE BESYLATE 5 MG/1
TABLET ORAL
Qty: 90 TABLET | Refills: 3 | OUTPATIENT
Start: 2022-04-28

## 2022-05-09 RX ORDER — CLOPIDOGREL BISULFATE 75 MG/1
TABLET ORAL
Qty: 90 TABLET | Refills: 0 | Status: SHIPPED | OUTPATIENT
Start: 2022-05-09 | End: 2022-08-08

## 2022-05-09 RX ORDER — CARVEDILOL 25 MG/1
TABLET ORAL
Qty: 180 TABLET | Refills: 0 | Status: SHIPPED | OUTPATIENT
Start: 2022-05-09 | End: 2022-08-08

## 2022-05-09 RX ORDER — LISINOPRIL 20 MG/1
TABLET ORAL
Qty: 180 TABLET | Refills: 0 | Status: SHIPPED | OUTPATIENT
Start: 2022-05-09 | End: 2022-07-13 | Stop reason: SDUPTHER

## 2022-05-10 RX ORDER — ATORVASTATIN CALCIUM 40 MG/1
20 TABLET, FILM COATED ORAL DAILY
Qty: 90 TABLET | Refills: 3 | Status: SHIPPED | OUTPATIENT
Start: 2022-05-10 | End: 2022-05-10

## 2022-05-10 RX ORDER — ATORVASTATIN CALCIUM 40 MG/1
20 TABLET, FILM COATED ORAL DAILY
Qty: 90 TABLET | Refills: 3 | Status: SHIPPED | OUTPATIENT
Start: 2022-05-10 | End: 2022-07-05 | Stop reason: SDUPTHER

## 2022-05-10 NOTE — TELEPHONE ENCOUNTER
After discussion with pt and spouse, Dr. Kang would like to have pt receive insulin now and recheck blood sugars per protocol.  Writer spoke with anesthesia and will follow sliding scale insulin orders.  Depending on results, surgery may not be cancelled for today.   LOV 12/14/2021    FOV 07/05/2022    Medication was noted at LOV

## 2022-06-01 ENCOUNTER — LAB (OUTPATIENT)
Dept: LAB | Facility: HOSPITAL | Age: 72
End: 2022-06-01

## 2022-06-01 DIAGNOSIS — C61 PROSTATE CANCER: ICD-10-CM

## 2022-06-01 DIAGNOSIS — E78.2 HYPERLIPEMIA, MIXED: ICD-10-CM

## 2022-06-01 LAB
ALBUMIN SERPL-MCNC: 4.4 G/DL (ref 3.5–5.2)
ALP SERPL-CCNC: 60 U/L (ref 39–117)
ALT SERPL W P-5'-P-CCNC: 21 U/L (ref 1–41)
AST SERPL-CCNC: 16 U/L (ref 1–40)
BILIRUB CONJ SERPL-MCNC: <0.2 MG/DL (ref 0–0.3)
BILIRUB INDIRECT SERPL-MCNC: NORMAL MG/DL
BILIRUB SERPL-MCNC: 0.3 MG/DL (ref 0–1.2)
CHOLEST SERPL-MCNC: 185 MG/DL (ref 0–200)
HDLC SERPL-MCNC: 35 MG/DL (ref 40–60)
LDLC SERPL CALC-MCNC: 122 MG/DL (ref 0–100)
LDLC/HDLC SERPL: 3.38 {RATIO}
PROT SERPL-MCNC: 6.8 G/DL (ref 6–8.5)
PSA SERPL-MCNC: 0.29 NG/ML (ref 0–4)
TRIGL SERPL-MCNC: 158 MG/DL (ref 0–150)
VLDLC SERPL-MCNC: 28 MG/DL (ref 5–40)

## 2022-06-01 PROCEDURE — 80061 LIPID PANEL: CPT

## 2022-06-01 PROCEDURE — 80076 HEPATIC FUNCTION PANEL: CPT

## 2022-06-01 PROCEDURE — 36415 COLL VENOUS BLD VENIPUNCTURE: CPT

## 2022-06-01 PROCEDURE — 84153 ASSAY OF PSA TOTAL: CPT

## 2022-06-01 RX ORDER — LANCETS
EACH MISCELLANEOUS
COMMUNITY
Start: 2022-04-22

## 2022-06-01 RX ORDER — BLOOD SUGAR DIAGNOSTIC
STRIP MISCELLANEOUS
COMMUNITY
Start: 2022-04-22

## 2022-06-01 RX ORDER — PEN NEEDLE, DIABETIC 32GX 5/32"
NEEDLE, DISPOSABLE MISCELLANEOUS
COMMUNITY
Start: 2022-04-22

## 2022-06-03 ENCOUNTER — OFFICE VISIT (OUTPATIENT)
Dept: UROLOGY | Facility: CLINIC | Age: 72
End: 2022-06-03

## 2022-06-03 VITALS — BODY MASS INDEX: 27.47 KG/M2 | HEIGHT: 71 IN | WEIGHT: 196.2 LBS

## 2022-06-03 DIAGNOSIS — C61 PROSTATE CANCER: ICD-10-CM

## 2022-06-03 DIAGNOSIS — N40.0 BENIGN PROSTATIC HYPERPLASIA WITHOUT LOWER URINARY TRACT SYMPTOMS: Primary | ICD-10-CM

## 2022-06-03 LAB — SPECIMEN VOL 24H UR: NORMAL L

## 2022-06-03 PROCEDURE — 99213 OFFICE O/P EST LOW 20 MIN: CPT | Performed by: UROLOGY

## 2022-06-03 PROCEDURE — 51798 US URINE CAPACITY MEASURE: CPT | Performed by: UROLOGY

## 2022-06-03 NOTE — PROGRESS NOTES
UROLOGY OFFICE follow-up NOTE    Subjective   HPI  Lucas Deluca is a 71 y.o. male. who presents to the office today for further evaluation of urinary frequency.      4 mo ago thought that he was passing a kidney stone; seen by PCP and told that he had UTI.  Had severe abdominal pain.      Has had 2 UTIs that have been treated with antibiotics, first 3/2/2020 treated via Macrobid and again treated 4/13/2020 with Augmentin.  UTI symptoms noted initially as lower back pain which resolved with Macrobid but then came back and he was placed on Augmentin. UA and cultures not available for review.   No prior h/o UTI.      3 months ago suddenly started having nocturnal enuresis; to the point of wearing brief and incontinence and pad. Also gets up 3-4x at night. Does not even realize that he is going.      Current urinary symptoms include urinary frequency at the end of urination.  And burning at the end of the penis.  Feels that symptoms are better controlled during the day; able to hold; rare daytime leakage.   Denies hematuria  Denies hesitancy  Denies sensation of incomplete emptying.  Reports good strong stream.   Not on any BPH medications.  Denies history of BPH.  Denies h/o renal insufficiency; has h/o DM     Had PSA drawn 2-3 weeks ago.      Initial PVR today 691; repeat after voiding 611 cc. Feels like he could void again; not a painful or bothersome sense of urgency.      No prior imaging     Update 9/22/20: Presents for follow up; cathing 3x a day. Getting around 800cc in upon waking; 700cc at midday, and 800cc prior to sleep. Urinary frequency, nocturia, and nocturnal enuresis have resolved since starting to cathing. Denies issues cathing. Denies flank or back pain. Taking Flomax and finasteride without adverse side effects.    Had CT scan prior to today's visit.     Update 10/29/2020: Patient presents for follow-up after UDS.  Did not have her US performed prior.  Patient states he is generally doing  "well.  Continues to self cath 3 times daily.  Gets volumes of 700 cc each time.  Denies issues cathing; does not like to cath; does not like the idea of self cathing.  No other complaints today.  Denies changes.     Update 3/4/2021: Patient presents postop follow-up from TURP.  Patient removed catheter this morning around 5 AM; has voided 3 times.  Denies significant sensation of incomplete emptying.  Denies dysuria or pain with voiding.  No complaints.  Final PVR today 250 cc     Update 3/12/2021: Patient reports \"doing great.\"  Voiding with good strong stream; no straining or hesitancy.  Denies sensation of incomplete emptying.  Wants to know if he can go dancing.  No complaints today.   cc     Update 5/6/2021: Patient presents for follow-up states he is doing very well.  States he is voiding with good strong stream denies sensation of incomplete emptying.  Pleased with results after TURP.  Gets every 3 month lab checks with PCP; wonders if PCP can order his PSA testing for surveillance of his prostate cancer.  PVR today 198 cc     Update 12/1/21: Presents for routine follow up with psa prior.  Gets up at night every 2 hours; able to go right back to sleep. Has had 2 accidents at nighttime since last visit. Continues to take flomax and finasteride. Reports good strong stream. Denies significant daytime urinary symptoms.  Has retrograde ejaculation.    Update 6/3/2022: Patient presents for routine follow-up of BPH history of TURP and Lewis 6 prostate cancer on active surveillance with PSA prior.  Patient doing well; no voiding complaints.  No longer taking Flomax.  Does note antegrade ejaculation.    ___________  Renal ultrasound, 4/27/2021: No hydronephrosis or acute sonographic abnormality     PSA  6/1/22: 0.28  9/20/2021: 0.3  7/14/2020: 1.6     Prostate chips pathology, 2/22/2021: Adenocarcinoma prostate, Vivian 3+3 = 6 in 1 of multiple chips involving approximately 1% of tissue     CT scan 8/24/2020: " Mildly enlarged prostate; moderate distention of bladder; moderate right hydro-nephrosis without obstructing ureteral stone     UDS, 10/15/2020: During filling bladder with increased sensation, normal capacity.  Evidence of involuntary bladder contractions causing increased urgency no urge incontinence.  During voiding, bladder with normal detrusor contraction.  Flow was moderate with an elongated rate.  Flow charted obstructed with AG #113.3.  PVR: 198 cc EMG normal.            Results for orders placed or performed in visit on 22   Bladder Scan   Result Value Ref Range    Volume 105ml          Medical History:  Past Medical History:   Diagnosis Date   • Diabetes mellitus (HCC)    • Hyperlipidemia    • Hypertension    • Macular degeneration         Social History:  Social History     Socioeconomic History   • Marital status:    Tobacco Use   • Smoking status: Former Smoker     Quit date: 1991     Years since quittin.4   • Smokeless tobacco: Former User   Vaping Use   • Vaping Use: Never used   Substance and Sexual Activity   • Alcohol use: No   • Drug use: No   • Sexual activity: Defer        Family History:  Family History   Problem Relation Age of Onset   • Heart disease Father    • Cancer Mother    • Diabetes Mother    • Cancer Maternal Aunt    • Cancer Maternal Uncle    • Cancer Maternal Grandmother    • Cancer Maternal Grandfather         Surgical History:  Past Surgical History:   Procedure Laterality Date   • CORONARY ANGIOPLASTY WITH STENT PLACEMENT     • CORONARY ARTERY BYPASS GRAFT         • AR TREAT TIBIAL SHAFT FX, INTRAMED IMPLANT Right 2017    Procedure: TIBIA INTRAMEDULLARY NAIL/SHANNON INSERTION;  Surgeon: Colton Nascimento MD;  Location: [facility];  Service: Orthopedics   • TONSILLECTOMY          Allergies:  No Known Allergies     Current Medications:  Current Outpatient Medications   Medication Sig Dispense Refill   • amLODIPine (NORVASC) 10 MG tablet Take 1 tablet  "by mouth Daily. 90 tablet 3   • aspirin 81 MG chewable tablet Chew 81 mg Daily.     • atorvastatin (LIPITOR) 40 MG tablet Take 0.5 tablets by mouth Daily. 90 tablet 3   • B Complex Vitamins (VITAMIN B COMPLEX PO) vitamin B complex oral tablet take 1 tablet by oral route daily   Active     • carvedilol (COREG) 25 MG tablet TAKE 1 TABLET TWICE A  tablet 0   • Cholecalciferol 25 MCG (1000 UT) capsule Vitamin D3 25 mcg (1,000 unit) oral capsule take 1 capsule by oral route 2 times a day   Active     • clopidogrel (PLAVIX) 75 MG tablet TAKE 1 TABLET DAILY 90 tablet 0   • fenofibrate (TRICOR) 145 MG tablet Take 1 tablet by mouth Daily. 90 tablet 3   • finasteride (PROSCAR) 5 MG tablet TAKE 1 TABLET DAILY 90 tablet 3   • insulin aspart (NovoLOG FlexPen) 100 UNIT/ML solution pen-injector sc pen Novolog Flexpen U-100 Insulin 100 unit/mL (3 mL) subcutaneous insulin pen inject by subcutaneous route per prescriber's instructions. Insulin dosing requires individualization.   Active     • insulin glargine (Lantus) 100 UNIT/ML injection Lantus U-100 Insulin 100 unit/mL subcutaneous solution inject by subcutaneous route as per insulin protocol   Active     • Jardiance 10 MG tablet tablet      • lisinopril (PRINIVIL,ZESTRIL) 20 MG tablet TAKE 1 TABLET TWICE A  tablet 0   • Microlet Lancets misc      • PRECISION XTRA TEST STRIPS test strip      • Sure Comfort Pen Needles 32G X 6 MM misc        No current facility-administered medications for this visit.       Review of systems  A review of systems was performed, and positive findings are noted in the HPI.    Objective     Vital Signs:   Ht 180.3 cm (71\")   Wt 89 kg (196 lb 3.2 oz)   BMI 27.36 kg/m²       Physical exam  No acute distress, well-nourished  Awake alert and oriented  Mood normal; affect normal    Bladder Scan interpretation 06/03/2022    Estimation of residual urine via "[employer] [facility] Bladder Scan  Performed by: Ana Anaya RN  Residual Urine: 105 " ml  Indication: Benign prostatic hyperplasia without lower urinary tract symptoms    Prostate cancer (HCC)   Position: Supine  Examination: Incremental scanning of the suprapubic area using 2.0 MHz transducer using copious amounts of acoustic gel.   Findings: An anechoic area was demonstrated which represented the bladder, with measurement of residual urine as noted. I inspected this myself. In that the residual urine was stable, refer to plan for treatment and plan necessary at this time.     Problem List:  Patient Active Problem List   Diagnosis   • Closed fracture of distal end of fibula with tibia   • Diabetes (HCC)   • Coronary artery disease involving native coronary artery of native heart without angina pectoris   • Benign prostatic hyperplasia with nocturia   • Prostate cancer (HCC)   • High blood pressure   • Hyperlipemia, mixed   • History of coronary angioplasty with insertion of stent       Assessment & Plan   Diagnoses and all orders for this visit:    1. Benign prostatic hyperplasia without lower urinary tract symptoms (Primary)  -     Bladder Scan    2. Prostate cancer (HCC)  -     PSA DIAGNOSTIC; Future      Continues to do well, history of TURP; voiding with acceptable PVR, Stable  Continue to monitor PVR      Prostate cancer Vivian 6 on TURP specimen pathology  Most recent PSA stable, 0.28 from 0.3; patient on finasteride  Continue to monitor via PSA in 6 months     Follow up 6 month, repeat pvr, psa prior or earlier as needed  All questions addressed

## 2022-06-30 ENCOUNTER — LAB (OUTPATIENT)
Dept: LAB | Facility: HOSPITAL | Age: 72
End: 2022-06-30

## 2022-06-30 ENCOUNTER — OFFICE VISIT (OUTPATIENT)
Dept: ORTHOPEDIC SURGERY | Facility: CLINIC | Age: 72
End: 2022-06-30

## 2022-06-30 VITALS — WEIGHT: 194 LBS | BODY MASS INDEX: 27.16 KG/M2 | TEMPERATURE: 96.7 F | HEIGHT: 71 IN

## 2022-06-30 DIAGNOSIS — R52 PAIN: Primary | ICD-10-CM

## 2022-06-30 LAB — CRP SERPL-MCNC: 0.38 MG/DL (ref 0–0.5)

## 2022-06-30 PROCEDURE — 86140 C-REACTIVE PROTEIN: CPT | Performed by: ORTHOPAEDIC SURGERY

## 2022-06-30 PROCEDURE — 99204 OFFICE O/P NEW MOD 45 MIN: CPT | Performed by: ORTHOPAEDIC SURGERY

## 2022-06-30 PROCEDURE — 36415 COLL VENOUS BLD VENIPUNCTURE: CPT | Performed by: ORTHOPAEDIC SURGERY

## 2022-06-30 PROCEDURE — 73502 X-RAY EXAM HIP UNI 2-3 VIEWS: CPT | Performed by: ORTHOPAEDIC SURGERY

## 2022-06-30 NOTE — PROGRESS NOTES
Patient: Lucas Deluca  YOB: 1950 71 y.o. male  Medical Record Number: [id number]    Chief Complaints:   Chief Complaint   Patient presents with   • Right Hip - Establish Care, Pain       History of Present Illness:Lucas Deluca is a 71 y.o. male who presents with  Right hip pain -  Primarily posterior -  -ache/ pain with activity-  Had partial replacement 5 years ago for fx -  didd well for a few year but is now developed some pain over the past 2 years.  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 90 tablet 3   • aspirin 81 MG chewable tablet Chew 81 mg Daily.     • atorvastatin (LIPITOR) 40 MG tablet Take 0.5 tablets by mouth Daily. 90 tablet 3   • B Complex Vitamins (VITAMIN B COMPLEX PO) vitamin B complex oral tablet take 1 tablet by oral route daily   Active     • carvedilol (COREG) 25 MG tablet TAKE 1 TABLET TWICE A  tablet 0   • Cholecalciferol 25 MCG (1000 UT) capsule Vitamin D3 25 mcg (1,000 unit) oral capsule take 1 capsule by oral route 2 times a day   Active     • clopidogrel (PLAVIX) 75 MG tablet TAKE 1 TABLET DAILY 90 tablet 0   • fenofibrate (TRICOR) 145 MG tablet Take 1 tablet by mouth Daily. 90 tablet 3   • finasteride (PROSCAR) 5 MG tablet TAKE 1 TABLET DAILY 90 tablet 3   • insulin aspart (NovoLOG FlexPen) 100 UNIT/ML solution pen-injector sc pen Novolog Flexpen U-100 Insulin 100 unit/mL (3 mL) subcutaneous insulin pen inject by subcutaneous route per prescriber's instructions. Insulin dosing requires individualization.   Active     • insulin glargine (Lantus) 100 UNIT/ML injection Lantus U-100 Insulin 100 unit/mL subcutaneous solution inject by subcutaneous  "route as per insulin protocol   Active     • Jardiance 10 MG tablet tablet      • lisinopril (PRINIVIL,ZESTRIL) 20 MG tablet TAKE 1 TABLET TWICE A  tablet 0   • Microlet Lancets misc      • PRECISION XTRA TEST STRIPS test strip      • Sure Comfort Pen Needles 32G X 6 MM misc        No current facility-administered medications for this visit.         The following portions of the patient's history were reviewed and updated as appropriate: allergies, current medications, past family history, past medical history, past social history, past surgical history and problem list.    Review of Systems:   A 14 point review of systems was performed. All systems negative except pertinent positives/negative listed in HPI above    Physical Exam:   Vitals:    06/30/22 0913   Temp: 96.7 °F (35.9 °C)   Weight: 88 kg (194 lb)   Height: 180.3 cm (71\")       General: A and O x 3, ASA, NAD    SCLERA:    Normal    DENTITION:   Normal   Hip:  right    LEG ALIGNMENT:     Neutral        LEG LENGTH DISCREPANCY   :    none    GAIT:     Slight Antalgic    SKIN:     No abnormality    RANGE OF MOTION:     slightlyLimited by joint irritability    STRENGTH:     Slightly Limited by joint irratibility    DISTAL PULSES:    Paplable    DISTAL SENSATION :   Intact    LYMPHATICS:     No   lymphadenopathy    OTHER:          + / -  Stinchfeld test      -    log roll      -   Tenderness to palpation trochanteric bursa       Radiology:  Xrays 2views right hip  (AP bilateral hips, and lateral of the hip) ordered and reviewed for evaluation of hip pain  demonstrating a unipolar hemiarthroplasty of the right hip.  There appears to be essentially no cartilage space between the head and his acetabulum.  There is a what I believe is a partially porous-coated press-fit stem.  Is difficult to determine if there are spot welds I do not see divergent lucencies but I do see a hint of lucency around the stem tip and some cortical hypertrophy.  I have a low " suspicion that the stem is loose but not 0.    Assessment/Plan: Painful right hip.  He is 5 years out from a unipolar hemiarthroplasty for fracture.  He is a very active individual and I fear he may have worn out the articular surface.  He has posterior related symptoms someone to get a bone scan to further investigate it.  I am also going to order a sed rate and C-reactive protein.  I will call him back with the results if the bone scan is equivocal we may need to consider a diagnostic injection.  If the bone scan points to where of the articular surface the neck step would be conversion to total hip.  We will come up with further plans after obtaining the above results.      Hebert Jung MD  6/30/2022

## 2022-07-05 ENCOUNTER — OFFICE VISIT (OUTPATIENT)
Dept: CARDIOLOGY | Facility: CLINIC | Age: 72
End: 2022-07-05

## 2022-07-05 VITALS
BODY MASS INDEX: 27.75 KG/M2 | HEART RATE: 63 BPM | DIASTOLIC BLOOD PRESSURE: 71 MMHG | WEIGHT: 198.2 LBS | HEIGHT: 71 IN | SYSTOLIC BLOOD PRESSURE: 155 MMHG

## 2022-07-05 DIAGNOSIS — E78.2 HYPERLIPEMIA, MIXED: ICD-10-CM

## 2022-07-05 DIAGNOSIS — I10 HYPERTENSION, ESSENTIAL: ICD-10-CM

## 2022-07-05 DIAGNOSIS — I25.10 CORONARY ARTERY DISEASE INVOLVING NATIVE CORONARY ARTERY OF NATIVE HEART WITHOUT ANGINA PECTORIS: Primary | ICD-10-CM

## 2022-07-05 PROCEDURE — 99214 OFFICE O/P EST MOD 30 MIN: CPT | Performed by: SPECIALIST

## 2022-07-05 RX ORDER — AMLODIPINE BESYLATE 10 MG/1
10 TABLET ORAL DAILY
Qty: 90 TABLET | Refills: 3 | Status: SHIPPED | OUTPATIENT
Start: 2022-07-05 | End: 2023-02-20

## 2022-07-05 RX ORDER — FENOFIBRATE 145 MG/1
145 TABLET, COATED ORAL DAILY
Qty: 90 TABLET | Refills: 3 | Status: SHIPPED | OUTPATIENT
Start: 2022-07-05

## 2022-07-05 RX ORDER — ATORVASTATIN CALCIUM 40 MG/1
40 TABLET, FILM COATED ORAL DAILY
Qty: 90 TABLET | Refills: 3 | Status: SHIPPED | OUTPATIENT
Start: 2022-07-05 | End: 2023-01-05

## 2022-07-05 RX ORDER — ATORVASTATIN CALCIUM 40 MG/1
20 TABLET, FILM COATED ORAL DAILY
Qty: 90 TABLET | Refills: 3 | Status: SHIPPED | OUTPATIENT
Start: 2022-07-05 | End: 2022-07-05 | Stop reason: SDUPTHER

## 2022-07-08 ENCOUNTER — TELEPHONE (OUTPATIENT)
Dept: ORTHOPEDIC SURGERY | Facility: CLINIC | Age: 72
End: 2022-07-08

## 2022-07-08 NOTE — TELEPHONE ENCOUNTER
Spoke with patient let him know the bone scan was approved by his insurance 7/7/22 so it will take another 2-4 days due to the weekend for a  to call him, patient voiced understanding

## 2022-07-08 NOTE — TELEPHONE ENCOUNTER
Caller: Lucas Deluca    Relationship to patient: Self    Best call back number: [phone]    Type of visit: BONE SCAN    Requested date: ASAP    Additional notes: PATIENT STATES HE HASN'T HEARD FROM ANYONE ABOUT GETTING THIS SCHEDULED

## 2022-07-13 RX ORDER — LISINOPRIL 20 MG/1
20 TABLET ORAL DAILY
Qty: 30 TABLET | Refills: 0 | Status: SHIPPED | OUTPATIENT
Start: 2022-07-13 | End: 2023-01-16

## 2022-07-13 RX ORDER — LISINOPRIL 20 MG/1
20 TABLET ORAL 2 TIMES DAILY
Qty: 180 TABLET | Refills: 2 | Status: SHIPPED | OUTPATIENT
Start: 2022-07-13

## 2022-07-13 NOTE — TELEPHONE ENCOUNTER
LOV 07/05/2022    FU OV01/05/2023    Medication was noted at LOV    Pt called and stated that pharmacy hadn't received there medication so Im resending it.

## 2022-07-22 ENCOUNTER — HOSPITAL ENCOUNTER (OUTPATIENT)
Dept: NUCLEAR MEDICINE | Facility: HOSPITAL | Age: 72
Discharge: HOME | End: 2022-07-22

## 2022-07-22 DIAGNOSIS — R52 PAIN: ICD-10-CM

## 2022-07-22 PROCEDURE — 0 TECHNETIUM MEDRONATE KIT: Performed by: ORTHOPAEDIC SURGERY

## 2022-07-22 PROCEDURE — A9503 TC99M MEDRONATE: HCPCS | Performed by: ORTHOPAEDIC SURGERY

## 2022-07-22 PROCEDURE — 78300 BONE IMAGING LIMITED AREA: CPT

## 2022-07-22 RX ORDER — TC 99M MEDRONATE 20 MG/10ML
21.8 INJECTION, POWDER, LYOPHILIZED, FOR SOLUTION INTRAVENOUS
Status: COMPLETED | OUTPATIENT
Start: 2022-07-22 | End: 2022-07-22

## 2022-07-22 RX ADMIN — Medication 21.8 MILLICURIE: at 10:30

## 2022-07-26 ENCOUNTER — TRANSCRIBE ORDERS (OUTPATIENT)
Dept: ADMINISTRATIVE | Facility: HOSPITAL | Age: 72
End: 2022-07-26

## 2022-07-26 DIAGNOSIS — I70.213 ATHEROSCLER OF NATIVE ARTERY OF BOTH LEGS WITH INTERMIT CLAUDICATION: Primary | ICD-10-CM

## 2022-07-26 DIAGNOSIS — R60.9 EDEMA, UNSPECIFIED TYPE: ICD-10-CM

## 2022-07-29 ENCOUNTER — TELEPHONE (OUTPATIENT)
Dept: ORTHOPEDIC SURGERY | Facility: CLINIC | Age: 72
End: 2022-07-29

## 2022-08-01 NOTE — TELEPHONE ENCOUNTER
Sent patient a my chart message stating [facility] and CAR would go over his MRI and someone should call him with results this week

## 2022-08-08 RX ORDER — CLOPIDOGREL BISULFATE 75 MG/1
TABLET ORAL
Qty: 90 TABLET | Refills: 3 | Status: SHIPPED | OUTPATIENT
Start: 2022-08-08

## 2022-08-08 RX ORDER — CARVEDILOL 25 MG/1
TABLET ORAL
Qty: 180 TABLET | Refills: 3 | Status: SHIPPED | OUTPATIENT
Start: 2022-08-08

## 2022-08-10 ENCOUNTER — HOSPITAL ENCOUNTER (OUTPATIENT)
Dept: CARDIOLOGY | Facility: HOSPITAL | Age: 72
Discharge: HOME | End: 2022-08-10
Admitting: FAMILY MEDICINE

## 2022-08-10 DIAGNOSIS — R60.9 EDEMA, UNSPECIFIED TYPE: ICD-10-CM

## 2022-08-10 DIAGNOSIS — I70.213 ATHEROSCLER OF NATIVE ARTERY OF BOTH LEGS WITH INTERMIT CLAUDICATION: ICD-10-CM

## 2022-08-10 PROCEDURE — 93924 LWR XTR VASC STDY BILAT: CPT

## 2022-08-10 PROCEDURE — 93924 LWR XTR VASC STDY BILAT: CPT | Performed by: SURGERY

## 2022-08-11 LAB
BH CV LOWER ARTERIAL LEFT ABI RATIO: 1.1
BH CV LOWER ARTERIAL LEFT CALF RATIO: 1.1
BH CV LOWER ARTERIAL LEFT DORSALIS PEDIS SYS MAX: 163
BH CV LOWER ARTERIAL LEFT GREAT TOE SYS MAX: 151
BH CV LOWER ARTERIAL LEFT LOW THIGH RATIO: 1
BH CV LOWER ARTERIAL LEFT LOW THIGH SYS MAX: 151
BH CV LOWER ARTERIAL LEFT POPLITEAL SYS MAX: 168
BH CV LOWER ARTERIAL LEFT POST EX ABI RATIO: 1.1
BH CV LOWER ARTERIAL LEFT POST TIBIAL SYS MAX: 168
BH CV LOWER ARTERIAL LEFT TBI RATIO: 0.99
BH CV LOWER ARTERIAL RIGHT ABI RATIO: 1
BH CV LOWER ARTERIAL RIGHT CALF RATIO: 1.1
BH CV LOWER ARTERIAL RIGHT DORSALIS PEDIS SYS MAX: 144
BH CV LOWER ARTERIAL RIGHT GREAT TOE SYS MAX: 118
BH CV LOWER ARTERIAL RIGHT LOW THIGH RATIO: 0.9
BH CV LOWER ARTERIAL RIGHT LOW THIGH SYS MAX: 142
BH CV LOWER ARTERIAL RIGHT POPLITEAL SYS MAX: 180
BH CV LOWER ARTERIAL RIGHT POST EX ABI RATIO: 1
BH CV LOWER ARTERIAL RIGHT POST TIBIAL SYS MAX: 153
BH CV LOWER ARTERIAL RIGHT TBI RATIO: 0.78
MAXIMAL PREDICTED HEART RATE: 151 BPM
STRESS TARGET HR: 128 BPM
UPPER ARTERIAL LEFT ARM BRACHIAL SYS MAX: 152 MMHG
UPPER ARTERIAL RIGHT ARM BRACHIAL SYS MAX: 144 MMHG

## 2022-09-01 ENCOUNTER — OFFICE VISIT (OUTPATIENT)
Dept: ORTHOPEDIC SURGERY | Facility: CLINIC | Age: 72
End: 2022-09-01

## 2022-09-01 VITALS — RESPIRATION RATE: 16 BRPM | BODY MASS INDEX: 27.72 KG/M2 | HEIGHT: 71 IN | TEMPERATURE: 96.9 F | WEIGHT: 198 LBS

## 2022-09-01 DIAGNOSIS — R52 PAIN: Primary | ICD-10-CM

## 2022-09-01 PROCEDURE — 99213 OFFICE O/P EST LOW 20 MIN: CPT | Performed by: ORTHOPAEDIC SURGERY

## 2022-09-01 NOTE — PROGRESS NOTES
Patient: Lucas Deluca  YOB: 1950 72 y.o. male  Medical Record Number: [id number]    Chief Complaint:   Chief Complaint   Patient presents with   • Right Hip - Follow-up       History of Present Illness:Lucas Deluca is a 72 y.o. male who presents for follow-up of right hip unipolar hemiarthroplasty he is 6 or so years out.  He is having worsening pain within the groin and hip region with weightbearing or activity.  He feels it has progressed to the point where he is becoming limited in his activities of daily living.  He is diabetic his last hemoglobin A1c was 10.  He also has a complex cardiac history and has had stents placed he is on Plavix.    Allergies: No Known Allergies    Medications:   Current Outpatient Medications   Medication Sig Dispense Refill   • amLODIPine (NORVASC) 10 MG tablet Take 1 tablet by mouth Daily. 90 tablet 3   • aspirin 81 MG chewable tablet Chew 81 mg Daily.     • atorvastatin (LIPITOR) 40 MG tablet Take 1 tablet by mouth Daily. 90 tablet 3   • B Complex Vitamins (VITAMIN B COMPLEX PO) vitamin B complex oral tablet take 1 tablet by oral route daily   Active     • carvedilol (COREG) 25 MG tablet TAKE 1 TABLET TWICE A  tablet 3   • Cholecalciferol 25 MCG (1000 UT) capsule Vitamin D3 25 mcg (1,000 unit) oral capsule take 1 capsule by oral route 2 times a day   Active     • clopidogrel (PLAVIX) 75 MG tablet TAKE 1 TABLET DAILY 90 tablet 3   • fenofibrate (TRICOR) 145 MG tablet Take 1 tablet by mouth Daily. 90 tablet 3   • finasteride (PROSCAR) 5 MG tablet TAKE 1 TABLET DAILY 90 tablet 3   • insulin aspart (NovoLOG FlexPen) 100 UNIT/ML solution pen-injector sc pen Novolog Flexpen U-100 Insulin 100 unit/mL (3 mL) subcutaneous insulin pen inject by subcutaneous route per prescriber's instructions. Insulin dosing requires individualization.   Active     • insulin glargine (Lantus) 100 UNIT/ML injection Lantus U-100 Insulin 100 unit/mL subcutaneous solution inject by  "subcutaneous route as per insulin protocol   Active     • Jardiance 10 MG tablet tablet      • lisinopril (PRINIVIL,ZESTRIL) 20 MG tablet Take 1 tablet by mouth 2 (Two) Times a Day. 180 tablet 2   • Microlet Lancets misc      • PRECISION XTRA TEST STRIPS test strip      • Sure Comfort Pen Needles 32G X 6 MM misc      • lisinopril (PRINIVIL,ZESTRIL) 20 MG tablet Take 1 tablet by mouth Daily. 30 tablet 0     No current facility-administered medications for this visit.         The following portions of the patient's history were reviewed and updated as appropriate: allergies, current medications, past family history, past medical history, past social history, past surgical history and problem list.    Review of Systems:   A 14 point review of systems was performed. All systems negative except pertinent positives/negative listed in HPI above    Physical Exam:   Vitals:    09/01/22 1318   Resp: 16   Temp: 96.9 °F (36.1 °C)   Weight: 89.8 kg (198 lb)   Height: 180.3 cm (71\")       General: A and O x 3, ASA, NAD    SCLERA:    Normal    DENTITION:   Normal  There is pain with flexion internal rotation of the right hip with a positive Stinchfield test he has well-healed posterior lateral surgical incision the calf is soft he walks with an antalgic gait    Radiology:    Xrays 2views right hip (AP bilateral hips and lateral hip) taken previously demonstrating unipolar hemiarthroplasty press-fit there is complete loss of the acetabular cartilage space with metal on bone articulation      Assessment/Plan:  Right hip painful unipolar arthroplasty. I think conversion to DEREJE is the next step. In order to schedule needs: cardiac clearance - off plavix x 7 days preop op and 2) HbA1c < 8.0  -  Currently 10.  After he has achieved both goals he can call back and we will schedule.  Would plan on posterior approach through previous incision.      eHbert Jung MD  9/1/2022  "

## 2022-10-24 ENCOUNTER — TRANSCRIBE ORDERS (OUTPATIENT)
Dept: ADMINISTRATIVE | Facility: HOSPITAL | Age: 72
End: 2022-10-24

## 2022-10-24 ENCOUNTER — HOSPITAL ENCOUNTER (OUTPATIENT)
Dept: GENERAL RADIOLOGY | Facility: HOSPITAL | Age: 72
Discharge: HOME | End: 2022-10-24
Admitting: FAMILY MEDICINE

## 2022-10-24 DIAGNOSIS — G89.29 HEEL PAIN, CHRONIC, LEFT: ICD-10-CM

## 2022-10-24 DIAGNOSIS — M79.672 HEEL PAIN, CHRONIC, LEFT: ICD-10-CM

## 2022-10-24 DIAGNOSIS — M79.672 LEFT FOOT PAIN: Primary | ICD-10-CM

## 2022-10-24 DIAGNOSIS — M79.672 LEFT FOOT PAIN: ICD-10-CM

## 2022-10-24 PROCEDURE — 73650 X-RAY EXAM OF HEEL: CPT

## 2022-10-24 PROCEDURE — 73630 X-RAY EXAM OF FOOT: CPT

## 2022-12-01 ENCOUNTER — LAB (OUTPATIENT)
Dept: LAB | Facility: HOSPITAL | Age: 72
End: 2022-12-01

## 2022-12-01 DIAGNOSIS — C61 PROSTATE CANCER: ICD-10-CM

## 2022-12-01 LAB — PSA SERPL-MCNC: 0.19 NG/ML (ref 0–4)

## 2022-12-01 PROCEDURE — 84153 ASSAY OF PSA TOTAL: CPT

## 2022-12-01 PROCEDURE — 36415 COLL VENOUS BLD VENIPUNCTURE: CPT

## 2022-12-05 NOTE — PROGRESS NOTES
UROLOGY OFFICE follow-up NOTE    Subjective   HPI  Lucas Deluca is a 72 y.o. male. Who presents to the office today for further evaluation of urinary frequency.      4 mo ago thought that he was passing a kidney stone; seen by PCP and told that he had UTI.  Had severe abdominal pain.      Has had 2 UTIs that have been treated with antibiotics, first 3/2/2020 treated via Macrobid and again treated 4/13/2020 with Augmentin.  UTI symptoms noted initially as lower back pain which resolved with Macrobid but then came back and he was placed on Augmentin. UA and cultures not available for review.   No prior h/o UTI.      3 months ago suddenly started having nocturnal enuresis; to the point of wearing brief and incontinence and pad. Also gets up 3-4x at night. Does not even realize that he is going.      Current urinary symptoms include urinary frequency at the end of urination.  And burning at the end of the penis.  Feels that symptoms are better controlled during the day; able to hold; rare daytime leakage.   Denies hematuria  Denies hesitancy  Denies sensation of incomplete emptying.  Reports good strong stream.   Not on any BPH medications.  Denies history of BPH.  Denies h/o renal insufficiency; has h/o DM     Had PSA drawn 2-3 weeks ago.      Initial PVR today 691; repeat after voiding 611 cc. Feels like he could void again; not a painful or bothersome sense of urgency.      No prior imaging     Update 9/22/20: Presents for follow up; cathing 3x a day. Getting around 800cc in upon waking; 700cc at midday, and 800cc prior to sleep. Urinary frequency, nocturia, and nocturnal enuresis have resolved since starting to cathing. Denies issues cathing. Denies flank or back pain. Taking Flomax and finasteride without adverse side effects.    Had CT scan prior to today's visit.     Update 10/29/2020: Patient presents for follow-up after UDS.  Did not have her US performed prior.  Patient states he is generally doing  "well.  Continues to self cath 3 times daily.  Gets volumes of 700 cc each time.  Denies issues cathing; does not like to cath; does not like the idea of self cathing.  No other complaints today.  Denies changes.     Update 3/4/2021: Patient presents postop follow-up from TURP.  Patient removed catheter this morning around 5 AM; has voided 3 times.  Denies significant sensation of incomplete emptying.  Denies dysuria or pain with voiding.  No complaints.  Final PVR today 250 cc     Update 3/12/2021: Patient reports \"doing great.\"  Voiding with good strong stream; no straining or hesitancy.  Denies sensation of incomplete emptying.  Wants to know if he can go dancing.  No complaints today.   cc     Update 5/6/2021: Patient presents for follow-up states he is doing very well.  States he is voiding with good strong stream denies sensation of incomplete emptying.  Pleased with results after TURP.  Gets every 3 month lab checks with PCP; wonders if PCP can order his PSA testing for surveillance of his prostate cancer.  PVR today 198 cc     Update 12/1/21: Presents for routine follow up with psa prior.  Gets up at night every 2 hours; able to go right back to sleep. Has had 2 accidents at nighttime since last visit. Continues to take flomax and finasteride. Reports good strong stream. Denies significant daytime urinary symptoms.  Has retrograde ejaculation.     Update 6/3/2022: Patient presents for routine follow-up of BPH history of TURP and [place] [facility] prostate cancer on active surveillance with PSA prior.  Patient doing well; no voiding complaints.  No longer taking Flomax.  Does note antegrade ejaculation.    Update 12/6/2022: Patient presents for follow-up of prostate cancer active surveillance, PSA prior; history of BPH with outlet obstruction managed with TURP. The patient reports that he is doing well.  The patient reports that he has been waking up every 2 hours to urinate. He states that he is not " "comfortable enough to not urinate and if he does not go to the bathroom when he wakes he will leak. The patient reports that he is sleeping with a pad. He states that he drinks water constantly.     ___________  Renal ultrasound, 4/27/2021: No hydronephrosis or acute sonographic abnormality     PSA  12/1/2022: 0.191  6/1/22: 0.28  9/20/2021: 0.3  7/14/2020: 1.6     Prostate chips pathology, 2/22/2021: Adenocarcinoma prostate, [place] 3+3 = 6 in 1 of multiple chips involving approximately 1% of tissue     CT scan 8/24/2020: Mildly enlarged prostate; moderate distention of bladder; moderate right hydro-nephrosis without obstructing ureteral stone     UDS, 10/15/2020: During filling bladder with increased sensation, normal capacity.  Evidence of involuntary bladder contractions causing increased urgency no urge incontinence.  During voiding, bladder with normal detrusor contraction.  Flow was moderate with an elongated rate.  Flow charted obstructed with AG #113.3.  PVR: 198 cc EMG normal.         Results for orders placed or performed in visit on 12/06/22   Bladder Scan   Result Value Ref Range    Urine Volume 0          Review of systems  A review of systems was performed, and positive findings are noted in the HPI.    Objective     Vital Signs:   Resp 16   Ht 180.3 cm (70.98\")   Wt 91.5 kg (201 lb 12.8 oz)   BMI 28.16 kg/m²       Physical exam  No acute distress, well-nourished  Awake alert and oriented  Mood normal; affect normal    Bladder Scan interpretation 12/06/2022    Estimation of residual urine via [email or web address] [facility] [place] Bladder Scan  Performed by: Ana Anaya RN  Residual Urine: 0 ml  Indication: Prostate cancer (HCC)    Benign prostatic hyperplasia with nocturia   Position: Supine  Examination: Incremental scanning of the suprapubic area using 2.0 MHz transducer using copious amounts of acoustic gel.   Findings: An anechoic area was demonstrated which represented the bladder, with measurement of residual " urine as noted. I inspected this myself. In that the residual urine was stable or insignificant, refer to plan for treatment and plan necessary at this time.     Problem List:  Patient Active Problem List   Diagnosis   • Closed fracture of distal end of fibula with tibia   • Diabetes (HCC)   • Coronary artery disease involving native coronary artery of native heart without angina pectoris   • Benign prostatic hyperplasia with nocturia   • Prostate cancer (HCC)   • High blood pressure   • Hyperlipemia, mixed   • History of coronary angioplasty with insertion of stent       Assessment & Plan    Diagnoses and all orders for this visit:    1. Prostate cancer (HCC) (Primary)  -     PSA DIAGNOSTIC; Future    2. Benign prostatic hyperplasia with nocturia  -     Bladder Scan    history of TURP; voiding without PVR, Stable  Continue to monitor PVR     Nocturia with enuresis   I advised the patient to try to catheterize himself before bed to ensure complete bladder decompression prior to sleep.  I advised the patient to back off his fluids that he drinks in the evening 2.5 to 3 hours before sleep.  I advised the patient to raise his feet above heart level in the afternoons or evenings to start mobilizing some fluid that pools in the day.    Patient to notify us or arrange earlier follow-up if no significant improvement with the above plan as could start a dedicated urgency medicine to take at night.    Also to notify us if improvement with nightly cathing so that order may be sent and catheter supplies provided.       Prostate cancer [place] [facility] on TURP specimen pathology  Most recent PSA stable, 0.191 from 0.28 from 0.3; patient on finasteride  Continue to monitor via PSA in 6 months     Follow up 6 month, repeat pvr, psa prior or earlier as needed  All questions addressed      Transcribed from ambient dictation for Connie Fraire MD by Aminta Willis.  12/06/22   10:21 EST    Patient or patient representative verbalized  consent to the visit recording.  I have personally performed the services described in this document as transcribed by the above individual, and it is both accurate and complete.

## 2022-12-06 ENCOUNTER — OFFICE VISIT (OUTPATIENT)
Dept: UROLOGY | Facility: CLINIC | Age: 72
End: 2022-12-06

## 2022-12-06 VITALS — RESPIRATION RATE: 16 BRPM | HEIGHT: 71 IN | WEIGHT: 201.8 LBS | BODY MASS INDEX: 28.25 KG/M2

## 2022-12-06 DIAGNOSIS — C61 PROSTATE CANCER: Primary | ICD-10-CM

## 2022-12-06 DIAGNOSIS — R35.0 BENIGN PROSTATIC HYPERPLASIA WITH URINARY FREQUENCY: ICD-10-CM

## 2022-12-06 DIAGNOSIS — N40.1 BENIGN PROSTATIC HYPERPLASIA WITH NOCTURIA: ICD-10-CM

## 2022-12-06 DIAGNOSIS — R35.1 BENIGN PROSTATIC HYPERPLASIA WITH NOCTURIA: ICD-10-CM

## 2022-12-06 DIAGNOSIS — N40.1 BENIGN PROSTATIC HYPERPLASIA WITH URINARY FREQUENCY: ICD-10-CM

## 2022-12-06 LAB — URINE VOLUME: 0

## 2022-12-06 PROCEDURE — 51798 US URINE CAPACITY MEASURE: CPT | Performed by: UROLOGY

## 2022-12-06 PROCEDURE — 99212 OFFICE O/P EST SF 10 MIN: CPT | Performed by: UROLOGY

## 2022-12-06 RX ORDER — FINASTERIDE 5 MG/1
TABLET, FILM COATED ORAL
Qty: 90 TABLET | Refills: 3 | Status: SHIPPED | OUTPATIENT
Start: 2022-12-06

## 2023-01-05 ENCOUNTER — LAB (OUTPATIENT)
Dept: LAB | Facility: HOSPITAL | Age: 73
End: 2023-01-05
Payer: MEDICARE

## 2023-01-05 ENCOUNTER — OFFICE VISIT (OUTPATIENT)
Dept: CARDIOLOGY | Facility: CLINIC | Age: 73
End: 2023-01-05
Payer: MEDICARE

## 2023-01-05 VITALS
HEIGHT: 71 IN | BODY MASS INDEX: 27.58 KG/M2 | DIASTOLIC BLOOD PRESSURE: 68 MMHG | HEART RATE: 74 BPM | SYSTOLIC BLOOD PRESSURE: 132 MMHG | WEIGHT: 197 LBS

## 2023-01-05 DIAGNOSIS — I10 ESSENTIAL HYPERTENSION: ICD-10-CM

## 2023-01-05 DIAGNOSIS — Z98.61 CAD S/P PERCUTANEOUS CORONARY ANGIOPLASTY: Primary | ICD-10-CM

## 2023-01-05 DIAGNOSIS — I25.10 CAD S/P PERCUTANEOUS CORONARY ANGIOPLASTY: Primary | ICD-10-CM

## 2023-01-05 DIAGNOSIS — E78.2 HYPERLIPEMIA, MIXED: ICD-10-CM

## 2023-01-05 PROBLEM — Z95.5 HISTORY OF CORONARY ANGIOPLASTY WITH INSERTION OF STENT: Status: RESOLVED | Noted: 2021-12-14 | Resolved: 2023-01-05

## 2023-01-05 LAB
ALBUMIN SERPL-MCNC: 4.2 G/DL (ref 3.5–5.2)
ALP SERPL-CCNC: 74 U/L (ref 39–117)
ALT SERPL W P-5'-P-CCNC: 16 U/L (ref 1–41)
AST SERPL-CCNC: 17 U/L (ref 1–40)
BILIRUB CONJ SERPL-MCNC: <0.2 MG/DL (ref 0–0.3)
BILIRUB INDIRECT SERPL-MCNC: NORMAL MG/DL
BILIRUB SERPL-MCNC: 0.2 MG/DL (ref 0–1.2)
CHOLEST SERPL-MCNC: 231 MG/DL (ref 0–200)
HDLC SERPL-MCNC: 30 MG/DL (ref 40–60)
LDLC SERPL CALC-MCNC: 118 MG/DL (ref 0–100)
LDLC/HDLC SERPL: 3.57 {RATIO}
PROT SERPL-MCNC: 7.1 G/DL (ref 6–8.5)
TRIGL SERPL-MCNC: 469 MG/DL (ref 0–150)
VLDLC SERPL-MCNC: 83 MG/DL (ref 5–40)

## 2023-01-05 PROCEDURE — 36415 COLL VENOUS BLD VENIPUNCTURE: CPT

## 2023-01-05 PROCEDURE — 99214 OFFICE O/P EST MOD 30 MIN: CPT | Performed by: NURSE PRACTITIONER

## 2023-01-05 PROCEDURE — 80076 HEPATIC FUNCTION PANEL: CPT

## 2023-01-05 PROCEDURE — 80061 LIPID PANEL: CPT

## 2023-01-05 RX ORDER — ROSUVASTATIN CALCIUM 10 MG/1
10 TABLET, COATED ORAL NIGHTLY
Qty: 30 TABLET | Refills: 3 | Status: SHIPPED | OUTPATIENT
Start: 2023-01-05 | End: 2023-03-20

## 2023-01-05 RX ORDER — CHOLECALCIFEROL (VITAMIN D3) 25 MCG
500 TABLET,CHEWABLE ORAL DAILY
COMMUNITY

## 2023-01-05 NOTE — PROGRESS NOTES
Chief Complaint  Follow-up and Hyperlipidemia    Subjective            History of Present Illness  Lucas Deluca is a 72-year-old white/ male patient who presents to the office today for follow-up.  He has CAD with prior stenting, hypertension, and hyperlipidemia.  He reports adverse effect from taking Lipitor in the form of shoulder pain.  He states that he has been taking it every other day for the past 6 weeks and tolerating it much better.  He denies any chest pain, shortness of breath, lightheadedness/dizziness, palpitations, or edema.  He reports compliance with the rest of his medications.    PMH  Past Medical History:   Diagnosis Date   • CAD S/P percutaneous coronary angioplasty 2017   • Diabetes mellitus (HCC)    • Essential hypertension 2021   • Hyperlipidemia    • Hypertension    • Macular degeneration          ALLERGY  No Known Allergies       SURGICALHX  Past Surgical History:   Procedure Laterality Date   • CORONARY ANGIOPLASTY WITH STENT PLACEMENT     • CORONARY ARTERY BYPASS GRAFT         • HI TX TIBL SHFT FX IMED IMPLT W/WO SCREWS&/CERCLA Right 2017    Procedure: TIBIA INTRAMEDULLARY NAIL/SHANNON INSERTION;  Surgeon: Colton Nascimento MD;  Location: [facility];  Service: Orthopedics   • TONSILLECTOMY            SOC  Social History     Socioeconomic History   • Marital status:    Tobacco Use   • Smoking status: Former     Types: Cigarettes     Quit date: 1991     Years since quittin.0   • Smokeless tobacco: Former   Vaping Use   • Vaping Use: Never used   Substance and Sexual Activity   • Alcohol use: No   • Drug use: No   • Sexual activity: Defer         FAMHX  Family History   Problem Relation Age of Onset   • Heart disease Father    • Cancer Mother    • Diabetes Mother    • Cancer Maternal Aunt    • Cancer Maternal Uncle    • Cancer Maternal Grandmother    • Cancer Maternal Grandfather           MEDSIGONLY  Current Outpatient Medications on File  "Prior to Visit   Medication Sig   • amLODIPine (NORVASC) 10 MG tablet Take 1 tablet by mouth Daily.   • aspirin 81 MG chewable tablet Chew 81 mg Daily.   • atorvastatin (LIPITOR) 40 MG tablet Take 1 tablet by mouth Daily.   • B Complex Vitamins (VITAMIN B COMPLEX PO) vitamin B complex oral tablet take 1 tablet by oral route daily   Active   • carvedilol (COREG) 25 MG tablet TAKE 1 TABLET TWICE A DAY   • Cholecalciferol 25 MCG (1000 UT) capsule Vitamin D3 25 mcg (1,000 unit) oral capsule take 1 capsule by oral route 2 times a day   Active   • clopidogrel (PLAVIX) 75 MG tablet TAKE 1 TABLET DAILY   • cyanocobalamin (VITAMIN B-12) 2500 MCG tablet tablet Take 500 mcg by mouth Daily.   • fenofibrate (TRICOR) 145 MG tablet Take 1 tablet by mouth Daily.   • finasteride (PROSCAR) 5 MG tablet TAKE 1 TABLET DAILY   • insulin aspart (NovoLOG FlexPen) 100 UNIT/ML solution pen-injector sc pen Novolog Flexpen U-100 Insulin 100 unit/mL (3 mL) subcutaneous insulin pen inject by subcutaneous route per prescriber's instructions. Insulin dosing requires individualization.   Active   • insulin glargine (Lantus) 100 UNIT/ML injection Lantus U-100 Insulin 100 unit/mL subcutaneous solution inject by subcutaneous route as per insulin protocol   Active   • Jardiance 10 MG tablet tablet    • lisinopril (PRINIVIL,ZESTRIL) 20 MG tablet Take 1 tablet by mouth 2 (Two) Times a Day.   • Microlet Lancets misc    • Multiple Vitamins-Minerals (VITEYES AREDS FORMULA PO) Take  by mouth.   • PRECISION XTRA TEST STRIPS test strip    • Sure Comfort Pen Needles 32G X 6 MM misc    • lisinopril (PRINIVIL,ZESTRIL) 20 MG tablet Take 1 tablet by mouth Daily.     No current facility-administered medications on file prior to visit.         Objective   /68   Pulse 74   Ht 180.3 cm (70.98\")   Wt 89.4 kg (197 lb)   BMI 27.49 kg/m²       Physical Exam  HENT:      Head: Normocephalic.   Neck:      Vascular: No carotid bruit.   Cardiovascular:      Rate and " Rhythm: Normal rate and regular rhythm.      Pulses: Normal pulses.      Heart sounds: Normal heart sounds. No murmur heard.  Pulmonary:      Effort: Pulmonary effort is normal.      Breath sounds: Normal breath sounds.   Musculoskeletal:      Cervical back: Neck supple.      Right lower leg: No edema.      Left lower leg: No edema.   Skin:     General: Skin is dry.      Capillary Refill: Capillary refill takes less than 2 seconds.   Neurological:      Mental Status: He is alert and oriented to person, place, and time.   Psychiatric:         Behavior: Behavior normal.       Result Review :   The following data was reviewed by: JASON Girard on 01/05/2023:  No results found for: PROBNP  CMP    CMP 6/1/22   Total Protein 6.8   Albumin 4.40   Total Bilirubin 0.3   Alkaline Phosphatase 60   AST (SGOT) 16   ALT (SGPT) 21              No results found for: TSH   No results found for: FREET4   No results found for: DDIMERQUANT  No results found for: MG   No results found for: DIGOXIN   No results found for: TROPONINT        Lipid Panel    Lipid Panel 6/1/22   Total Cholesterol 185   Triglycerides 158 (A)   HDL Cholesterol 35 (A)   VLDL Cholesterol 28   LDL Cholesterol  122 (A)   LDL/HDL Ratio 3.38   (A) Abnormal value                    Assessment and Plan    Diagnoses and all orders for this visit:    1. CAD S/P percutaneous coronary angioplasty (Primary)  He denies any anginal symptoms, continue aspirin 81 mg daily and Plavix 75 mg daily.    2. Essential hypertension  Currently controlled and without adverse effect from medication, continue amlodipine 10 mg daily, carvedilol 25 mg twice daily, and lisinopril 20 mg twice daily.  Low-sodium diet discussed.    3. Hyperlipemia, mixed  He is not currently tolerating Lipitor due to adverse effect of shoulder pain.  Switch to rosuvastatin 10 mg nightly and repeat fasting lipid and hepatic function panel in 3 months.  -     Lipid Panel; Future  -     Hepatic Function  Panel; Future    Other orders  -     rosuvastatin (CRESTOR) 10 MG tablet; Take 1 tablet by mouth Every Night.  Dispense: 30 tablet; Refill: 3            Follow Up   Return in about 6 months (around 7/5/2023) for Follow up with Dr Dow.    Patient was given instructions and counseling regarding his condition or for health maintenance advice. Please see specific information pulled into the AVS if appropriate.     Lucas FLORA Deluca  reports that he quit smoking about 31 years ago. His smoking use included cigarettes. He has quit using smokeless tobacco.           Georgette Talavera, APRN  01/05/23  09:00 EST    Dictated Utilizing Dragon Dictation

## 2023-02-20 DIAGNOSIS — I10 ESSENTIAL HYPERTENSION: Primary | ICD-10-CM

## 2023-02-20 RX ORDER — AMLODIPINE BESYLATE 10 MG/1
TABLET ORAL
Qty: 90 TABLET | Refills: 3 | Status: SHIPPED | OUTPATIENT
Start: 2023-02-20

## 2023-03-20 RX ORDER — ROSUVASTATIN CALCIUM 10 MG/1
TABLET, COATED ORAL
Qty: 30 TABLET | Refills: 3 | Status: SHIPPED | OUTPATIENT
Start: 2023-03-20

## 2023-04-05 NOTE — PROGRESS NOTES
Chief Complaint  Diabetes (New pt, est care )    Referred By: Sarina Javed,*    Subjective          Lucas Deluca presents to [facility] for diabetes medication management    History of Present Illness    Visit type:  to establish care  Diabetes type:  Type 2  Age at time of dx/Year of dx/Number of years:  2004  Family History of Diabetes: mother and sister  Current diabetes status/concerns/issues: The patient has been referred to our office by his primary care provider as well as his sister who is also a patient in our clinic.  The patient has had extensive surgery on his right leg and is needing additional surgery on his hip where he had a partial hip replacement in the past however, the surgeon wants his A1c under better control before he can have his surgery done  Other current health concerns: Bilateral torn rotator cuffs  Current Diabetes symptoms:    Polyuria: Yes   Polydipsia: Yes   Polyphagia: No   Blurred vision: No   Excessive fatigue: No  Known Diabetes complications:  Neuropathy: None; Location: N/A  Renal: None  Eyes: With Macular degeneration; Location: Bilateral  Amputation/Wounds: None  GI: None  Cardiovascular: Hypertension, Hyperlipidemia, CAD and Other: Coronary artery stents and CABG  ED: Patient Reported  Other: None  Hospitalizations/ED/911 secondary to DM?  No  Hypoglycemia:  Level 1 hypoglycemia (54 mg/dL - 70 mg/dL); Frequency - maybe once a month  Hypoglycemia Symptoms:  sweating  Current Diabetes treatment: Lantus 63 units every evening, NovoLog 32 units with each meal twice a day and Jardiance 25 mg once a day in the evening.  Prior diabetes treatments: Metformin  Using ACEI or ARB: Yes, Lisinopril, Managed by other provider  Using Statin: Yes, Rosuvastatin, Managed by other provider  Blood glucose device:  Meter  Blood glucose monitoring frequency:  1  Blood glucose range/average:  200+ and as high as over 400     Glucose Source: Patient  Reported  Dietary behavior:  Avoids sugary drinks, Number of meals each day - 2; Number of snacks each day - occasional, History of Diabetes Nutrition Counseling - YES, he likes to drink milk  Activity/Exercise:  goes to the gym 3 times a week; dances 5 times a week  Last Eye Exam: 2023; Location: he had cataract surgery recently  Last Foot Exam: None  Diabetes Education Hx: None  Social Determinants of Health: Transportation; he is worried about declining vision that will lead to an inability to drive    Past Medical History:   Diagnosis Date   • CAD S/P percutaneous coronary angioplasty 2017   • Diabetes mellitus    • Essential hypertension 2021   • Hyperlipidemia    • Hypertension    • Macular degeneration      Past Surgical History:   Procedure Laterality Date   • CATARACT EXTRACTION Bilateral    • CORONARY ANGIOPLASTY WITH STENT PLACEMENT     • CORONARY ARTERY BYPASS GRAFT         • NE TX TIBL SHFT FX IMED IMPLT W/WO SCREWS&/CERCLA Right 2017    Procedure: TIBIA INTRAMEDULLARY NAIL/SHANNON INSERTION;  Surgeon: Colton Nascimento MD;  Location: [facility];  Service: Orthopedics   • TONSILLECTOMY       Family History   Problem Relation Age of Onset   • Cancer Mother    • Diabetes Mother    • Heart disease Father    • Diabetes Sister    • Cancer Maternal Grandmother    • Cancer Maternal Grandfather    • Cancer Maternal Aunt    • Cancer Maternal Uncle      Social History     Socioeconomic History   • Marital status:    Tobacco Use   • Smoking status: Former     Types: Cigarettes     Quit date: 1991     Years since quittin.3   • Smokeless tobacco: Former   Vaping Use   • Vaping Use: Never used   Substance and Sexual Activity   • Alcohol use: No   • Drug use: No   • Sexual activity: Defer     No Known Allergies    Current Outpatient Medications:   •  amLODIPine (NORVASC) 10 MG tablet, TAKE ONE-HALF (1/2) TABLET DAILY, Disp: 90 tablet, Rfl: 3  •  aspirin 81 MG chewable  tablet, Chew 1 tablet Daily., Disp: , Rfl:   •  B Complex Vitamins (VITAMIN B COMPLEX PO), vitamin B complex oral tablet take 1 tablet by oral route daily   Active, Disp: , Rfl:   •  carvedilol (COREG) 25 MG tablet, TAKE 1 TABLET TWICE A DAY, Disp: 180 tablet, Rfl: 3  •  Cholecalciferol 25 MCG (1000 UT) capsule, Vitamin D3 25 mcg (1,000 unit) oral capsule take 1 capsule by oral route 2 times a day   Active, Disp: , Rfl:   •  clopidogrel (PLAVIX) 75 MG tablet, TAKE 1 TABLET DAILY, Disp: 90 tablet, Rfl: 3  •  cyanocobalamin (VITAMIN B-12) 2500 MCG tablet tablet, Take 500 mcg by mouth Daily., Disp: , Rfl:   •  empagliflozin (JARDIANCE) 25 MG tablet tablet, Take 1 tablet by mouth Daily., Disp: , Rfl:   •  fenofibrate (TRICOR) 145 MG tablet, Take 1 tablet by mouth Daily., Disp: 90 tablet, Rfl: 3  •  finasteride (PROSCAR) 5 MG tablet, TAKE 1 TABLET DAILY, Disp: 90 tablet, Rfl: 3  •  insulin aspart (NovoLOG FlexPen) 100 UNIT/ML solution pen-injector sc pen, Novolog Flexpen U-100 Insulin 100 unit/mL (3 mL) subcutaneous insulin pen inject by subcutaneous route per prescriber's instructions. Insulin dosing requires individualization.   Active, Disp: , Rfl:   •  insulin glargine (Lantus) 100 UNIT/ML injection, Inject 40 Units under the skin into the appropriate area as directed 2 (Two) Times a Day for 90 days., Disp: 72 mL, Rfl: 1  •  lisinopril (PRINIVIL,ZESTRIL) 20 MG tablet, Take 1 tablet by mouth 2 (Two) Times a Day., Disp: 180 tablet, Rfl: 2  •  Microlet Lancets misc, , Disp: , Rfl:   •  Multiple Vitamins-Minerals (VITEYES AREDS FORMULA PO), Take  by mouth., Disp: , Rfl:   •  PRECISION XTRA TEST STRIPS test strip, , Disp: , Rfl:   •  rosuvastatin (CRESTOR) 10 MG tablet, TAKE 1 TABLET EVERY NIGHT, Disp: 30 tablet, Rfl: 3  •  Sure Comfort Pen Needles 32G X 6 MM misc, , Disp: , Rfl:     Objective     Vitals:    04/06/23 1249   BP: 139/65   BP Location: Right arm   Patient Position: Sitting   Cuff Size: Adult   Pulse: 75  "  Temp: 97.4 °F (36.3 °C)   SpO2: 96%   Weight: 93 kg (205 lb)   Height: 180.3 cm (71\")   PainSc:   1     Body mass index is 28.59 kg/m².    Physical Exam  Constitutional:       Appearance: Normal appearance.      Comments: Overweight (BMI 25 - 29.9) Pt Current BMI = 28.59    HENT:      Head: Normocephalic and atraumatic.      Right Ear: External ear normal.      Left Ear: External ear normal.      Nose: Nose normal.   Eyes:      Extraocular Movements: Extraocular movements intact.      Conjunctiva/sclera: Conjunctivae normal.   Pulmonary:      Effort: Pulmonary effort is normal.   Musculoskeletal:         General: Normal range of motion.      Cervical back: Normal range of motion.   Skin:     General: Skin is warm and dry.   Neurological:      General: No focal deficit present.      Mental Status: He is alert and oriented to person, place, and time. Mental status is at baseline.   Psychiatric:         Mood and Affect: Mood normal.         Behavior: Behavior normal.         Thought Content: Thought content normal.         Judgment: Judgment normal.             Result Review :   The following data was reviewed by: JASON Burnham on 04/06/2023:        Labs received from the patient's PCP show hemoglobin A1c of 11% indicating uncontrolled type 2 diabetes.    Glucose   Date Value Ref Range Status   04/06/2023 288 (H) 70 - 99 mg/dL Final     Comment:     Serial Number: [id number]:  [id number]     Point of care glucose in the office was elevated at 288 mg/dL    Total Cholesterol   Date Value Ref Range Status   01/05/2023 231 (H) 0 - 200 mg/dL Final   06/01/2022 185 0 - 200 mg/dL Final     Triglycerides   Date Value Ref Range Status   01/05/2023 469 (H) 0 - 150 mg/dL Final   06/01/2022 158 (H) 0 - 150 mg/dL Final     HDL Cholesterol   Date Value Ref Range Status   01/05/2023 30 (L) 40 - 60 mg/dL Final   06/01/2022 35 (L) 40 - 60 mg/dL Final     LDL Cholesterol    Date Value Ref Range Status   01/05/2023 " 118 (H) 0 - 100 mg/dL Final   06/01/2022 122 (H) 0 - 100 mg/dL Final     Labs collected on 1/5/2023 show hyperlipidemia with hypertriglyceridemia and hypercholesterolemia            Assessment: The patient's A1c per his report is elevated.  His primary concern is getting his A1c down below 8% so he can have hip surgery on his right hip.  The patient is taking large amounts of insulin currently.  He reports glucose levels are consistently above 200 currently.  He denies poor dietary behavior and is very active.      Diagnoses and all orders for this visit:    1. Uncontrolled type 2 diabetes mellitus with hyperglycemia (Primary)    2. Macular degeneration of both eyes, unspecified type    3. Overweight (BMI 25.0-29.9)    Other orders  -     POC Glucose  -     SCANNED - LABS  -     insulin glargine (Lantus) 100 UNIT/ML injection; Inject 40 Units under the skin into the appropriate area as directed 2 (Two) Times a Day for 90 days.  Dispense: 72 mL; Refill: 1        Plan: We will change the patient's Lantus to taking 35 units twice a day instead of the current 63 units once a day.  He will stay on this dose for period of 1 week then he will increase to 40 units twice a day and remain on that dose until his follow-up appointment.  He will continue taking the NovoLog as previously prescribed.  He was advised to move his Jardiance to mornings to help minimize nocturnal diuresis.  We will recheck his A1c in a couple of months to see if it has come down to the level needed for his surgery.    The patient will monitor his blood glucose levels 2-3 times a day.  If he develops problematic hyperglycemia or hypoglycemia or adverse drug reactions, he will contact the office for further instructions.        Follow Up     Return in about 2 months (around 6/6/2023) for Medication Management.    Patient was given instructions and counseling regarding his condition or for health maintenance advice. Please see specific information pulled  into the AVS if appropriate.     Magaly Woodson, JASON  04/06/2023      Dictated Utilizing Dragon Dictation.  Please note that portions of this note were completed with a voice recognition program.  Part of this note may be an electronic transcription/translation of spoken language to printed text using the Dragon Dictation System.

## 2023-04-06 ENCOUNTER — OFFICE VISIT (OUTPATIENT)
Dept: DIABETES SERVICES | Facility: HOSPITAL | Age: 73
End: 2023-04-06
Payer: MEDICARE

## 2023-04-06 VITALS
WEIGHT: 205 LBS | HEART RATE: 75 BPM | BODY MASS INDEX: 28.7 KG/M2 | TEMPERATURE: 97.4 F | SYSTOLIC BLOOD PRESSURE: 139 MMHG | DIASTOLIC BLOOD PRESSURE: 65 MMHG | OXYGEN SATURATION: 96 % | HEIGHT: 71 IN

## 2023-04-06 DIAGNOSIS — E66.3 OVERWEIGHT (BMI 25.0-29.9): ICD-10-CM

## 2023-04-06 DIAGNOSIS — E11.65 UNCONTROLLED TYPE 2 DIABETES MELLITUS WITH HYPERGLYCEMIA: Primary | ICD-10-CM

## 2023-04-06 DIAGNOSIS — H35.30 MACULAR DEGENERATION OF BOTH EYES, UNSPECIFIED TYPE: ICD-10-CM

## 2023-04-06 LAB — GLUCOSE BLDC GLUCOMTR-MCNC: 288 MG/DL (ref 70–99)

## 2023-04-06 PROCEDURE — 3078F DIAST BP <80 MM HG: CPT | Performed by: NURSE PRACTITIONER

## 2023-04-06 PROCEDURE — 82962 GLUCOSE BLOOD TEST: CPT | Performed by: NURSE PRACTITIONER

## 2023-04-06 PROCEDURE — 1159F MED LIST DOCD IN RCRD: CPT | Performed by: NURSE PRACTITIONER

## 2023-04-06 PROCEDURE — G0463 HOSPITAL OUTPT CLINIC VISIT: HCPCS | Performed by: NURSE PRACTITIONER

## 2023-04-06 PROCEDURE — 3075F SYST BP GE 130 - 139MM HG: CPT | Performed by: NURSE PRACTITIONER

## 2023-04-06 PROCEDURE — 1160F RVW MEDS BY RX/DR IN RCRD: CPT | Performed by: NURSE PRACTITIONER

## 2023-04-06 PROCEDURE — 99204 OFFICE O/P NEW MOD 45 MIN: CPT | Performed by: NURSE PRACTITIONER

## 2023-04-06 RX ORDER — INSULIN GLARGINE 100 [IU]/ML
40 INJECTION, SOLUTION SUBCUTANEOUS 2 TIMES DAILY
Qty: 72 ML | Refills: 1 | Status: SHIPPED | OUTPATIENT
Start: 2023-04-06 | End: 2023-07-05

## 2023-04-06 NOTE — PATIENT INSTRUCTIONS
Change her Lantus to taking 35 units in the morning and 35 units in the evening approximately 12 hours apart;  After 1 week increase to 40 units twice a day    Continue taking your NovoLog as previously prescribed    Begin taking your Jardiance in the morning instead of in the evenings

## 2023-04-10 RX ORDER — LISINOPRIL 20 MG/1
TABLET ORAL
Qty: 180 TABLET | Refills: 3 | Status: SHIPPED | OUTPATIENT
Start: 2023-04-10

## 2023-05-09 RX ORDER — FENOFIBRATE 145 MG/1
TABLET, COATED ORAL
Qty: 90 TABLET | Refills: 3 | Status: SHIPPED | OUTPATIENT
Start: 2023-05-09

## 2023-05-11 ENCOUNTER — TELEPHONE (OUTPATIENT)
Dept: CARDIOLOGY | Facility: CLINIC | Age: 73
End: 2023-05-11
Payer: MEDICARE

## 2023-05-11 NOTE — TELEPHONE ENCOUNTER
HELLO,     WE RECEIVED A FAX FROM [facility].     DR. STEVEN SENT IN AN RX FOR FENOFIBRATE TABS 145MG AND THAT IS UNAVAILABLE.     I HAVE SCANNED IN THE FORM.     THANK YOU.

## 2023-06-02 ENCOUNTER — LAB (OUTPATIENT)
Dept: LAB | Facility: HOSPITAL | Age: 73
End: 2023-06-02
Payer: MEDICARE

## 2023-06-02 DIAGNOSIS — C61 PROSTATE CANCER: ICD-10-CM

## 2023-06-02 LAB — PSA SERPL-MCNC: 0.13 NG/ML (ref 0–4)

## 2023-06-02 PROCEDURE — 36415 COLL VENOUS BLD VENIPUNCTURE: CPT

## 2023-06-02 PROCEDURE — 84153 ASSAY OF PSA TOTAL: CPT

## 2023-06-02 RX ORDER — ATORVASTATIN CALCIUM 40 MG/1
0.5 TABLET, FILM COATED ORAL DAILY
COMMUNITY
Start: 2023-05-05

## 2023-06-06 ENCOUNTER — OFFICE VISIT (OUTPATIENT)
Dept: UROLOGY | Facility: CLINIC | Age: 73
End: 2023-06-06
Payer: MEDICARE

## 2023-06-06 VITALS — RESPIRATION RATE: 16 BRPM | BODY MASS INDEX: 28.7 KG/M2 | HEIGHT: 71 IN | WEIGHT: 205 LBS

## 2023-06-06 DIAGNOSIS — C61 PROSTATE CANCER: Primary | ICD-10-CM

## 2023-06-06 DIAGNOSIS — N40.1 BENIGN PROSTATIC HYPERPLASIA WITH NOCTURIA: ICD-10-CM

## 2023-06-06 DIAGNOSIS — R35.1 BENIGN PROSTATIC HYPERPLASIA WITH NOCTURIA: ICD-10-CM

## 2023-06-06 DIAGNOSIS — N39.41 URGE INCONTINENCE: ICD-10-CM

## 2023-06-06 RX ORDER — TOLTERODINE 2 MG/1
2 CAPSULE, EXTENDED RELEASE ORAL DAILY
Qty: 90 CAPSULE | Refills: 0 | Status: SHIPPED | OUTPATIENT
Start: 2023-06-06

## 2023-06-06 NOTE — PROGRESS NOTES
UROLOGY OFFICE follow-up NOTE    Subjective   HPI  Lucas Deluca is a 72 y.o. male. Who presents to the office today for further evaluation of urinary frequency.      4 mo ago thought that he was passing a kidney stone; seen by PCP and told that he had UTI.  Had severe abdominal pain.      Has had 2 UTIs that have been treated with antibiotics, first 3/2/2020 treated via Macrobid and again treated 4/13/2020 with Augmentin.  UTI symptoms noted initially as lower back pain which resolved with Macrobid but then came back and he was placed on Augmentin. UA and cultures not available for review.   No prior h/o UTI.      3 months ago suddenly started having nocturnal enuresis; to the point of wearing brief and incontinence and pad. Also gets up 3-4x at night. Does not even realize that he is going.      Current urinary symptoms include urinary frequency at the end of urination.  And burning at the end of the penis.  Feels that symptoms are better controlled during the day; able to hold; rare daytime leakage.   Denies hematuria  Denies hesitancy  Denies sensation of incomplete emptying.  Reports good strong stream.   Not on any BPH medications.  Denies history of BPH.  Denies h/o renal insufficiency; has h/o DM     Had PSA drawn 2-3 weeks ago.      Initial PVR today 691; repeat after voiding 611 cc. Feels like he could void again; not a painful or bothersome sense of urgency.      No prior imaging     Update 9/22/20: Presents for follow up; cathing 3x a day. Getting around 800cc in upon waking; 700cc at midday, and 800cc prior to sleep. Urinary frequency, nocturia, and nocturnal enuresis have resolved since starting to cathing. Denies issues cathing. Denies flank or back pain. Taking Flomax and finasteride without adverse side effects.    Had CT scan prior to today's visit.     Update 10/29/2020: Patient presents for follow-up after UDS.  Did not have her US performed prior.  Patient states he is generally doing  "well.  Continues to self cath 3 times daily.  Gets volumes of 700 cc each time.  Denies issues cathing; does not like to cath; does not like the idea of self cathing.  No other complaints today.  Denies changes.     Update 3/4/2021: Patient presents postop follow-up from TURP.  Patient removed catheter this morning around 5 AM; has voided 3 times.  Denies significant sensation of incomplete emptying.  Denies dysuria or pain with voiding.  No complaints.  Final PVR today 250 cc     Update 3/12/2021: Patient reports \"doing great.\"  Voiding with good strong stream; no straining or hesitancy.  Denies sensation of incomplete emptying.  Wants to know if he can go dancing.  No complaints today.   cc     Update 5/6/2021: Patient presents for follow-up states he is doing very well.  States he is voiding with good strong stream denies sensation of incomplete emptying.  Pleased with results after TURP.  Gets every 3 month lab checks with PCP; wonders if PCP can order his PSA testing for surveillance of his prostate cancer.  PVR today 198 cc     Update 12/1/21: Presents for routine follow up with psa prior.  Gets up at night every 2 hours; able to go right back to sleep. Has had 2 accidents at nighttime since last visit. Continues to take flomax and finasteride. Reports good strong stream. Denies significant daytime urinary symptoms.  Has retrograde ejaculation.     Update 6/3/2022: Patient presents for routine follow-up of BPH history of TURP and [place] [facility] prostate cancer on active surveillance with PSA prior.  Patient doing well; no voiding complaints.  No longer taking Flomax.  Does note antegrade ejaculation.     Update 12/6/2022: Patient presents for follow-up of prostate cancer active surveillance, PSA prior; history of BPH with outlet obstruction managed with TURP. The patient reports that he is doing well.  The patient reports that he has been waking up every 2 hours to urinate. He states that he is not " comfortable enough to not urinate and if he does not go to the bathroom when he wakes he will leak. The patient reports that he is sleeping with a pad. He states that he drinks water constantly.    Update 6/6/2023: Patient presents for follow-up prostate cancer on active surveillance, BPH with LUTS.  PSA prior to today's visit.  The patient reports that he is still leaking at night. He states that he did try to catheterize before bed, and it did make a difference.   The patient reports that he is up 3 to 4 times every night to urinate. He states that when he urinates at night, he is full.   The patient reports that he drinks until bed. He states that he has to keep hydrated because of his blood sugar. The patient reports that he is diabetic. He states that his sugars are not controlled.   The patient reports that he is still taking finasteride for his prostate.   He states that he does not have a lot of trouble with symptoms during the day.   The patient reports that he is not sure that at night that he feels the urges. He states that he uses a CPAP machine. T  He states that he would like to try a dedicated urgency medication for nighttime.     ___________  Renal ultrasound, 4/27/2021: No hydronephrosis or acute sonographic abnormality     PSA  6/2/2023: 0.129  12/1/2022: 0.191  6/1/22: 0.28  9/20/2021: 0.3  7/14/2020: 1.6     Prostate chips pathology, 2/22/2021: Adenocarcinoma prostate, [place] 3+3 = 6 in 1 of multiple chips involving approximately 1% of tissue     CT scan 8/24/2020: Mildly enlarged prostate; moderate distention of bladder; moderate right hydro-nephrosis without obstructing ureteral stone     UDS, 10/15/2020: During filling bladder with increased sensation, normal capacity.  Evidence of involuntary bladder contractions causing increased urgency no urge incontinence.  During voiding, bladder with normal detrusor contraction.  Flow was moderate with an elongated rate.  Flow charted obstructed with  AG #113.3.  PVR: 198 cc EMG normal.      Results for orders placed or performed in visit on 06/02/23   PSA DIAGNOSTIC    Specimen: Blood   Result Value Ref Range    PSA 0.129 0.000 - 4.000 ng/mL       Review of systems  A review of systems was performed, and positive findings are noted in the HPI.    Objective     Vital Signs:   There were no vitals taken for this visit.      Physical exam  No acute distress, well-nourished  Awake alert and oriented  Mood normal; affect normal      Problem List:  Patient Active Problem List   Diagnosis    Closed fracture of distal end of fibula with tibia    Diabetes (HCC)    CAD S/P percutaneous coronary angioplasty    Benign prostatic hyperplasia with nocturia    Prostate cancer    Essential hypertension    Hyperlipemia, mixed    Macular degeneration               Assessment & Plan   Diagnoses and all orders for this visit:    1. Prostate cancer (Primary)    2. Benign prostatic hyperplasia with nocturia    3. Urge incontinence  -     tolterodine LA (DETROL LA) 2 MG 24 hr capsule; Take 1 capsule by mouth Daily.  Dispense: 90 capsule; Refill: 0        Prostate cancer [place] 6 on TURP specimen pathology  Most recent PSA stable, 0.129 from 0.191 from 0.28 from 0.3; patient on finasteride  Continue to monitor via PSA in 6 months    - We discussed the possibility of starting him on a dedicated urgency medication that we could try at night to see if that does not calm down things and provide him some more rest again.  He would like to try something.  Prescription sent to pharmacy.  - We discussed the most common side effects are dry mouth and constipation.  - We will start him on a low dose so that we can monitor for side effects and then if we need to increase, we can.    Follow-up in 3 months.   All questions and concerns have been addressed at this time.      Transcribed from ambient dictation for Connie Fraire MD by Kalen Peralta.  06/06/23   11:20 EDT    Patient or patient  representative verbalized consent to the visit recording.  I have personally performed the services described in this document as transcribed by the above individual, and it is both accurate and complete.

## 2023-06-07 NOTE — PROGRESS NOTES
Chief Complaint  Diabetes (Follow up, med mgt, A1c eval )    Referred By: Sarina Javed,*    Subjective          Lucas Deluca presents to [facility] for diabetes medication management    History of Present Illness    Visit type:  follow-up  Diabetes type:  Type 2  Current diabetes status/concerns/issues: He reports his glucose levels are still remaining elevated despite increasing the insulin as prescribed.  Other health concerns: No new health concerns  Current Diabetes symptoms:    Polyuria: Yes   Polydipsia: Yes   Polyphagia: Yes   Blurred vision: Yes   Excessive fatigue: No  Known Diabetes complications:  Neuropathy: None; Location: N/A  Renal: None  Eyes: Other: Macular degeneration ; Location: Bilateral  Amputation/Wounds: None  GI: None  Cardiovascular: Hypertension, Hyperlipidemia, and CAD  ED: Patient Reported  Other: None  Hypoglycemia:  Level 1 hypoglycemia (54 mg/dL - 70 mg/dL); Frequency - he had a low in the 60s; he states he feels this occurred due to taking too much Novolog  Hypoglycemia Symptoms:  sweating  Current diabetes treatment:  Lantus 40 units twice a day, NovoLog 40 units with each meal twice a day and Jardiance 25 mg once a day in the morning.    Blood glucose device:  Meter  Blood glucose monitoring frequency:  1  Blood glucose range/average:  Staying in the 200s on most occasions     Glucose Source: Patient Reported  Diet:  Limits high carb/sweet foods, Avoids sugary drinks  Activity/Exercise:   he tries to make it to the gym 3 times each week    Past Medical History:   Diagnosis Date    CAD S/P percutaneous coronary angioplasty 1/14/2017    Diabetes mellitus     Essential hypertension 12/12/2021    Hyperlipidemia     Hypertension     Macular degeneration      Past Surgical History:   Procedure Laterality Date    CATARACT EXTRACTION Bilateral     CORONARY ANGIOPLASTY WITH STENT PLACEMENT      CORONARY ARTERY BYPASS GRAFT      2004    VT TX  TIBL SHFT FX IMED IMPLT W/WO SCREWS&/CERCLA Right 2017    Procedure: TIBIA INTRAMEDULLARY NAIL/SHANNON INSERTION;  Surgeon: Colton Nascimento MD;  Location: [facility];  Service: Orthopedics    TONSILLECTOMY       Family History   Problem Relation Age of Onset    Cancer Mother     Diabetes Mother     Heart disease Father     Diabetes Sister     Cancer Maternal Grandmother     Cancer Maternal Grandfather     Cancer Maternal Aunt     Cancer Maternal Uncle      Social History     Socioeconomic History    Marital status:    Tobacco Use    Smoking status: Former     Types: Cigarettes     Quit date: 1991     Years since quittin.5    Smokeless tobacco: Former   Vaping Use    Vaping Use: Never used   Substance and Sexual Activity    Alcohol use: No    Drug use: No    Sexual activity: Defer     No Known Allergies    Current Outpatient Medications:     amLODIPine (NORVASC) 10 MG tablet, TAKE ONE-HALF (1/2) TABLET DAILY, Disp: 90 tablet, Rfl: 3    aspirin 81 MG chewable tablet, Chew 1 tablet Daily., Disp: , Rfl:     atorvastatin (LIPITOR) 40 MG tablet, Take 0.5 tablets by mouth Daily., Disp: , Rfl:     B Complex Vitamins (VITAMIN B COMPLEX PO), vitamin B complex oral tablet take 1 tablet by oral route daily   Active, Disp: , Rfl:     carvedilol (COREG) 25 MG tablet, TAKE 1 TABLET TWICE A DAY, Disp: 180 tablet, Rfl: 3    Cholecalciferol 25 MCG (1000 UT) capsule, Vitamin D3 25 mcg (1,000 unit) oral capsule take 1 capsule by oral route 2 times a day   Active, Disp: , Rfl:     clopidogrel (PLAVIX) 75 MG tablet, TAKE 1 TABLET DAILY, Disp: 90 tablet, Rfl: 3    cyanocobalamin (VITAMIN B-12) 2500 MCG tablet tablet, Take 500 mcg by mouth Daily., Disp: , Rfl:     empagliflozin (JARDIANCE) 25 MG tablet tablet, Take 1 tablet by mouth Daily for 180 days., Disp: 90 tablet, Rfl: 1    fenofibrate (TRICOR) 145 MG tablet, TAKE 1 TABLET DAILY, Disp: 90 tablet, Rfl: 3    finasteride (PROSCAR) 5 MG tablet, TAKE 1 TABLET  "DAILY, Disp: 90 tablet, Rfl: 3    insulin aspart (NovoLOG FlexPen) 100 UNIT/ML solution pen-injector sc pen, Inject 40 Units under the skin into the appropriate area as directed 3 (Three) Times a Day With Meals for 180 days., Disp: 108 mL, Rfl: 1    insulin glargine (Lantus) 100 UNIT/ML injection, Inject 45 Units under the skin into the appropriate area as directed 2 (Two) Times a Day for 180 days., Disp: 81 mL, Rfl: 1    lisinopril (PRINIVIL,ZESTRIL) 20 MG tablet, TAKE 1 TABLET TWICE A DAY, Disp: 180 tablet, Rfl: 3    Microlet Lancets misc, 1 each 2 (Two) Times a Day for 180 days., Disp: 200 each, Rfl: 1    Multiple Vitamins-Minerals (VITEYES AREDS FORMULA PO), Take  by mouth., Disp: , Rfl:     PRECISION XTRA TEST STRIPS test strip, Test 1-2 times each day, Disp: 200 each, Rfl: 1    rosuvastatin (CRESTOR) 10 MG tablet, TAKE 1 TABLET EVERY NIGHT, Disp: 30 tablet, Rfl: 3    Sure Comfort Pen Needles 32G X 6 MM misc, , Disp: , Rfl:     tolterodine LA (DETROL LA) 2 MG 24 hr capsule, Take 1 capsule by mouth Daily., Disp: 90 capsule, Rfl: 0    pioglitazone (Actos) 30 MG tablet, Take 1 tablet by mouth Daily for 180 days., Disp: 90 tablet, Rfl: 1    Objective     Vitals:    06/08/23 0917   BP: 135/63   BP Location: Right arm   Patient Position: Sitting   Cuff Size: Adult   Pulse: 66   Temp: 97.1 °F (36.2 °C)   SpO2: 100%   Weight: 91.2 kg (201 lb)   Height: 180.3 cm (71\")   PainSc: 0-No pain     Body mass index is 28.03 kg/m².    Physical Exam  Constitutional:       Appearance: Normal appearance.      Comments: Overweight (BMI 25 - 29.9) Pt Current BMI = 28.03    HENT:      Head: Normocephalic and atraumatic.      Right Ear: External ear normal.      Left Ear: External ear normal.      Nose: Nose normal.   Eyes:      Extraocular Movements: Extraocular movements intact.      Conjunctiva/sclera: Conjunctivae normal.   Pulmonary:      Effort: Pulmonary effort is normal.   Musculoskeletal:         General: Normal range of " motion.      Cervical back: Normal range of motion.   Skin:     General: Skin is warm and dry.   Neurological:      General: No focal deficit present.      Mental Status: He is alert and oriented to person, place, and time. Mental status is at baseline.   Psychiatric:         Mood and Affect: Mood normal.         Behavior: Behavior normal.         Thought Content: Thought content normal.         Judgment: Judgment normal.           Result Review :   The following data was reviewed by: JASON Burnham on 06/08/2023:    Most Recent A1C          6/8/2023    09:24   HGBA1C Most Recent   Hemoglobin A1C 9.6        A1C Last 3 Results          6/8/2023    09:24   HGBA1C Last 3 Results   Hemoglobin A1C 9.6      Point-of-care A1c in the office is 9.6% indicating uncontrolled type 2 diabetes.  This is down from a prior result of 11% collected in January of this year.    Glucose   Date Value Ref Range Status   06/08/2023 191 (H) 70 - 99 mg/dL Final     Comment:     Serial Number: [id number]:  [id number]     Point of care glucose is elevated at 191 mg/dL            Assessment: He has had improvement in his A1c but remains above target.  He has increase his insulin as directed.  He only had one episode of hypoglycemia because he took too much Humalog insulin when eating a snack.  The patient is interested in a esequiel continuous glucose sensor to help facilitate closer monitoring of glucose levels      Diagnoses and all orders for this visit:    1. Uncontrolled type 2 diabetes mellitus with hyperglycemia (Primary)  -     POC Glycosylated Hemoglobin (Hb A1C)  -     pioglitazone (Actos) 30 MG tablet; Take 1 tablet by mouth Daily for 180 days.  Dispense: 90 tablet; Refill: 1  -     empagliflozin (JARDIANCE) 25 MG tablet tablet; Take 1 tablet by mouth Daily for 180 days.  Dispense: 90 tablet; Refill: 1  -     insulin aspart (NovoLOG FlexPen) 100 UNIT/ML solution pen-injector sc pen; Inject 40 Units under the skin into  the appropriate area as directed 3 (Three) Times a Day With Meals for 180 days.  Dispense: 108 mL; Refill: 1  -     insulin glargine (Lantus) 100 UNIT/ML injection; Inject 45 Units under the skin into the appropriate area as directed 2 (Two) Times a Day for 180 days.  Dispense: 81 mL; Refill: 1  -     Microlet Lancets misc; 1 each 2 (Two) Times a Day for 180 days.  Dispense: 200 each; Refill: 1  -     PRECISION XTRA TEST STRIPS test strip; Test 1-2 times each day  Dispense: 200 each; Refill: 1    2. Macular degeneration of both eyes, unspecified type    3. Overweight (BMI 25.0-29.9)    Other orders  -     POC Glucose        Plan: We will have the patient increase the Lantus to taking 45 units twice a day.  We will also add Actos 30 mg once a day to his treatment plan.  The patient will contact the office if he has any adverse reactions to the medication through.  He is to focus on his diet and activity to help promote glucose control as well.  We will send a request to the insurance for the esequiel continuous glucose sensor.    The patient will monitor his blood glucose levels 2-3 times a day or using the continuous glucose sensor if approved.  If he develops problematic hyperglycemia or hypoglycemia or adverse drug reactions, he will contact the office for further instructions.        Follow Up     Return in about 3 months (around 9/8/2023) for Medication Management, CGM Follow-up.    Patient was given instructions and counseling regarding his condition or for health maintenance advice. Please see specific information pulled into the AVS if appropriate.     Magaly Woodson, JASON  06/08/2023      Dictated Utilizing Dragon Dictation.  Please note that portions of this note were completed with a voice recognition program.  Part of this note may be an electronic transcription/translation of spoken language to printed text using the Dragon Dictation System.

## 2023-06-08 ENCOUNTER — OFFICE VISIT (OUTPATIENT)
Dept: DIABETES SERVICES | Facility: HOSPITAL | Age: 73
End: 2023-06-08
Payer: MEDICARE

## 2023-06-08 ENCOUNTER — TELEPHONE (OUTPATIENT)
Dept: DIABETES SERVICES | Facility: HOSPITAL | Age: 73
End: 2023-06-08

## 2023-06-08 VITALS
TEMPERATURE: 97.1 F | DIASTOLIC BLOOD PRESSURE: 63 MMHG | HEART RATE: 66 BPM | HEIGHT: 71 IN | WEIGHT: 201 LBS | BODY MASS INDEX: 28.14 KG/M2 | OXYGEN SATURATION: 100 % | SYSTOLIC BLOOD PRESSURE: 135 MMHG

## 2023-06-08 DIAGNOSIS — H35.30 MACULAR DEGENERATION OF BOTH EYES, UNSPECIFIED TYPE: ICD-10-CM

## 2023-06-08 DIAGNOSIS — E66.3 OVERWEIGHT (BMI 25.0-29.9): ICD-10-CM

## 2023-06-08 DIAGNOSIS — E11.65 UNCONTROLLED TYPE 2 DIABETES MELLITUS WITH HYPERGLYCEMIA: Primary | ICD-10-CM

## 2023-06-08 LAB
EXPIRATION DATE: ABNORMAL
GLUCOSE BLDC GLUCOMTR-MCNC: 191 MG/DL (ref 70–99)
HBA1C MFR BLD: 9.6 %
Lab: ABNORMAL

## 2023-06-08 PROCEDURE — G0463 HOSPITAL OUTPT CLINIC VISIT: HCPCS | Performed by: NURSE PRACTITIONER

## 2023-06-08 PROCEDURE — 99214 OFFICE O/P EST MOD 30 MIN: CPT | Performed by: NURSE PRACTITIONER

## 2023-06-08 PROCEDURE — 3075F SYST BP GE 130 - 139MM HG: CPT | Performed by: NURSE PRACTITIONER

## 2023-06-08 PROCEDURE — 1159F MED LIST DOCD IN RCRD: CPT | Performed by: NURSE PRACTITIONER

## 2023-06-08 PROCEDURE — 1160F RVW MEDS BY RX/DR IN RCRD: CPT | Performed by: NURSE PRACTITIONER

## 2023-06-08 PROCEDURE — 82948 REAGENT STRIP/BLOOD GLUCOSE: CPT | Performed by: NURSE PRACTITIONER

## 2023-06-08 PROCEDURE — 3046F HEMOGLOBIN A1C LEVEL >9.0%: CPT | Performed by: NURSE PRACTITIONER

## 2023-06-08 PROCEDURE — 3078F DIAST BP <80 MM HG: CPT | Performed by: NURSE PRACTITIONER

## 2023-06-08 RX ORDER — INSULIN GLARGINE 100 [IU]/ML
45 INJECTION, SOLUTION SUBCUTANEOUS 2 TIMES DAILY
Qty: 81 ML | Refills: 1 | Status: SHIPPED | OUTPATIENT
Start: 2023-06-08 | End: 2023-12-05

## 2023-06-08 RX ORDER — INSULIN ASPART 100 [IU]/ML
40 INJECTION, SOLUTION INTRAVENOUS; SUBCUTANEOUS
Qty: 108 ML | Refills: 1 | Status: SHIPPED | OUTPATIENT
Start: 2023-06-08 | End: 2023-12-05

## 2023-06-08 RX ORDER — LANCETS
1 EACH MISCELLANEOUS 2 TIMES DAILY
Qty: 200 EACH | Refills: 1 | Status: SHIPPED | OUTPATIENT
Start: 2023-06-08 | End: 2023-12-05

## 2023-06-08 RX ORDER — BLOOD SUGAR DIAGNOSTIC
STRIP MISCELLANEOUS
Qty: 200 EACH | Refills: 1 | Status: SHIPPED | OUTPATIENT
Start: 2023-06-08

## 2023-06-08 RX ORDER — PIOGLITAZONEHYDROCHLORIDE 30 MG/1
30 TABLET ORAL DAILY
Qty: 90 TABLET | Refills: 1 | Status: SHIPPED | OUTPATIENT
Start: 2023-06-08 | End: 2023-12-05

## 2023-06-08 NOTE — TELEPHONE ENCOUNTER
FreeStyle Esvin 2 System, Continuous Glucose Monitoring Package [MEDICARE/MEDICAID]    Esvin 2 [place] - CGMR    Sevin 2 Sensor, change every 14 days - CGMS    Quantity  1  Prescription Details  Directions for CGM use - Use per  directions  Supplier  [employer] [facility]

## 2023-06-08 NOTE — PATIENT INSTRUCTIONS
Increase your Lantus to taking 45 units twice a day    Continue taking NovoLog 40 units with each meal    Continue taking the Jardiance 25 mg once a day in the morning    Begin taking Actos (pioglitazone) 30 mg once a day in the morning.  If you develop any signs of fluid retention with swelling etc. contact Magaly.    If you need assistance in starting the esequiel continuous glucose monitor, you can call the office and make an appointment with the nurse and she can try new on the device

## 2023-06-19 RX ORDER — ROSUVASTATIN CALCIUM 10 MG/1
TABLET, COATED ORAL
Qty: 90 TABLET | Refills: 3 | Status: SHIPPED | OUTPATIENT
Start: 2023-06-19

## 2023-07-26 ENCOUNTER — LAB (OUTPATIENT)
Dept: LAB | Facility: HOSPITAL | Age: 73
End: 2023-07-26
Payer: MEDICARE

## 2023-07-26 DIAGNOSIS — E78.2 HYPERLIPEMIA, MIXED: ICD-10-CM

## 2023-07-26 LAB
ALBUMIN SERPL-MCNC: 4.7 G/DL (ref 3.5–5.2)
ALP SERPL-CCNC: 58 U/L (ref 39–117)
ALT SERPL W P-5'-P-CCNC: 15 U/L (ref 1–41)
AST SERPL-CCNC: 16 U/L (ref 1–40)
BILIRUB CONJ SERPL-MCNC: <0.2 MG/DL (ref 0–0.3)
BILIRUB INDIRECT SERPL-MCNC: NORMAL MG/DL
BILIRUB SERPL-MCNC: 0.2 MG/DL (ref 0–1.2)
CHOLEST SERPL-MCNC: 186 MG/DL (ref 0–200)
HDLC SERPL-MCNC: 44 MG/DL (ref 40–60)
LDLC SERPL CALC-MCNC: 104 MG/DL (ref 0–100)
LDLC/HDLC SERPL: 2.22 {RATIO}
PROT SERPL-MCNC: 7.3 G/DL (ref 6–8.5)
TRIGL SERPL-MCNC: 221 MG/DL (ref 0–150)
VLDLC SERPL-MCNC: 38 MG/DL (ref 5–40)

## 2023-07-26 PROCEDURE — 36415 COLL VENOUS BLD VENIPUNCTURE: CPT

## 2023-07-26 PROCEDURE — 80076 HEPATIC FUNCTION PANEL: CPT

## 2023-07-26 PROCEDURE — 80061 LIPID PANEL: CPT

## 2023-08-12 NOTE — PROGRESS NOTES
[facility]  Cardiology progress Note    Patient Name: Lucas Deluca  : 1950    CHIEF COMPLAINT  Coronary artery disease        Subjective   Subjective     HISTORY OF PRESENT ILLNESS    Lucas Deluca is a 72 y.o. male CORONARY ARTERY DISEASE s/p PTCA/stent.  No chest pain or shortness of breath.    REVIEW OF SYSTEMS    Constitutional:    No fever, no weight loss  Skin:     No rash  Otolaryngeal:    No difficulty swallowing  Cardiovascular: See HPI.  Pulmonary:    No cough, no sputum production    Personal History     Social History:    reports that he quit smoking about 31 years ago. His smoking use included cigarettes. He has quit using smokeless tobacco. He reports that he does not drink alcohol and does not use drugs.    Home Medications:  Current Outpatient Medications on File Prior to Visit   Medication Sig    amLODIPine (NORVASC) 10 MG tablet TAKE ONE-HALF (1/2) TABLET DAILY    aspirin 81 MG chewable tablet Chew 1 tablet Daily.    B Complex Vitamins (VITAMIN B COMPLEX PO) vitamin B complex oral tablet take 1 tablet by oral route daily   Active    carvedilol (COREG) 25 MG tablet TAKE 1 TABLET TWICE A DAY    Cholecalciferol 25 MCG (1000 UT) capsule Vitamin D3 25 mcg (1,000 unit) oral capsule take 1 capsule by oral route 2 times a day   Active    clopidogrel (PLAVIX) 75 MG tablet TAKE 1 TABLET DAILY    cyanocobalamin (VITAMIN B-12) 2500 MCG tablet tablet Take 500 mcg by mouth Daily.    empagliflozin (JARDIANCE) 25 MG tablet tablet Take 1 tablet by mouth Daily for 180 days.    finasteride (PROSCAR) 5 MG tablet TAKE 1 TABLET DAILY    insulin aspart (NovoLOG FlexPen) 100 UNIT/ML solution pen-injector sc pen Inject 40 Units under the skin into the appropriate area as directed 3 (Three) Times a Day With Meals for 180 days.    insulin glargine (Lantus) 100 UNIT/ML injection Inject 45 Units under the skin into the appropriate area as directed 2 (Two) Times a Day for 180 days.    lisinopril  (PRINIVIL,ZESTRIL) 20 MG tablet TAKE 1 TABLET TWICE A DAY    Microlet Lancets misc 1 each 2 (Two) Times a Day for 180 days.    Multiple Vitamins-Minerals (VITEYES AREDS FORMULA PO) Take  by mouth.    pioglitazone (Actos) 30 MG tablet Take 1 tablet by mouth Daily for 180 days.    PRECISION XTRA TEST STRIPS test strip Test 1-2 times each day    rosuvastatin (CRESTOR) 10 MG tablet TAKE 1 TABLET EVERY NIGHT    Sure Comfort Pen Needles 32G X 6 MM misc     tolterodine LA (DETROL LA) 2 MG 24 hr capsule Take 1 capsule by mouth Daily.    [DISCONTINUED] fenofibrate (TRICOR) 145 MG tablet TAKE 1 TABLET DAILY     No current facility-administered medications on file prior to visit.       Past Medical History:   Diagnosis Date    CAD S/P percutaneous coronary angioplasty 1/14/2017    Diabetes mellitus     Essential hypertension 12/12/2021    Hyperlipidemia     Hypertension     Macular degeneration        Allergies:  No Known Allergies    Objective    Objective       Vitals:   Heart Rate:  [66] 66  BP: (176)/(81) 176/81  Body mass index is 30.18 kg/mý.     PHYSICAL EXAM:    General Appearance:   well developed  well nourished  HENT:   oropharynx moist  lips not cyanotic  Neck:  thyroid not enlarged  supple  Respiratory:  no respiratory distress  normal breath sounds  no rales  Cardiovascular:  no jugular venous distention  regular rhythm  apical impulse normal  S1 normal, S2 normal  no S3, no S4   no murmur  no rub, no thrill  carotid pulses normal; no bruit  pedal pulses normal  lower extremity edema: none    Skin:   warm, dry  Psychiatric:  judgement and insight appropriate  normal mood and affect        Result Review:  I have personally reviewed the available results from  [x]  Laboratory  [x]  EKG  [x]  Cardiology  [x]  Medications  [x]  Old records  []  Other:     Procedures  Lab Results   Component Value Date    CHOL 186 07/26/2023    CHOL 231 (H) 01/05/2023    CHOL 185 06/01/2022     Lab Results   Component Value Date     TRIG 221 (H) 07/26/2023    TRIG 469 (H) 01/05/2023    TRIG 158 (H) 06/01/2022     Lab Results   Component Value Date    HDL 44 07/26/2023    HDL 30 (L) 01/05/2023    HDL 35 (L) 06/01/2022     Lab Results   Component Value Date     (H) 07/26/2023     (H) 01/05/2023     (H) 06/01/2022     Lab Results   Component Value Date    VLDL 38 07/26/2023    VLDL 83 (H) 01/05/2023    VLDL 28 06/01/2022        Impression/Plan:  1.  Essential hypertension controlled whitecoat hypertension: Continue lisinopril 20 mg twice a day.  Continue amlodipine 10 mg once a day.  Blood pressure normal at home.  Monitor blood pressure regularly.  2.  Hyperlipidemia: Lipid profile reviewed shows an LDL of 104. increase Crestor to 20 mg once a day..  Monitor lipid and hepatic profile.  3.  Coronary disease s/p PTCA/stent stable: Continue aspirin 81 mg once a day.  Continue Plavix 75 mg once a day.  Continue carvedilol 25 mg twice a day.  No chest pain.           Stephane Dow MD   08/15/23   08:57 EDT

## 2023-08-15 ENCOUNTER — OFFICE VISIT (OUTPATIENT)
Dept: CARDIOLOGY | Facility: CLINIC | Age: 73
End: 2023-08-15
Payer: MEDICARE

## 2023-08-15 VITALS
DIASTOLIC BLOOD PRESSURE: 81 MMHG | HEIGHT: 71 IN | SYSTOLIC BLOOD PRESSURE: 176 MMHG | BODY MASS INDEX: 30.3 KG/M2 | WEIGHT: 216.4 LBS | HEART RATE: 66 BPM

## 2023-08-15 DIAGNOSIS — I10 ESSENTIAL HYPERTENSION: ICD-10-CM

## 2023-08-15 DIAGNOSIS — E78.2 HYPERLIPEMIA, MIXED: ICD-10-CM

## 2023-08-15 DIAGNOSIS — I25.10 CORONARY ARTERY DISEASE INVOLVING NATIVE CORONARY ARTERY OF NATIVE HEART WITHOUT ANGINA PECTORIS: Primary | ICD-10-CM

## 2023-08-15 DIAGNOSIS — Z95.5 HISTORY OF CORONARY ANGIOPLASTY WITH INSERTION OF STENT: ICD-10-CM

## 2023-08-15 PROCEDURE — 3079F DIAST BP 80-89 MM HG: CPT | Performed by: SPECIALIST

## 2023-08-15 PROCEDURE — 99214 OFFICE O/P EST MOD 30 MIN: CPT | Performed by: SPECIALIST

## 2023-08-15 PROCEDURE — 3077F SYST BP >= 140 MM HG: CPT | Performed by: SPECIALIST

## 2023-08-15 PROCEDURE — 1160F RVW MEDS BY RX/DR IN RCRD: CPT | Performed by: SPECIALIST

## 2023-08-15 PROCEDURE — 1159F MED LIST DOCD IN RCRD: CPT | Performed by: SPECIALIST

## 2023-08-15 RX ORDER — CLOPIDOGREL BISULFATE 75 MG/1
75 TABLET ORAL DAILY
Qty: 90 TABLET | Refills: 3 | Status: SHIPPED | OUTPATIENT
Start: 2023-08-15

## 2023-08-15 RX ORDER — AMLODIPINE BESYLATE 10 MG/1
5 TABLET ORAL DAILY
Qty: 90 TABLET | Refills: 3 | Status: SHIPPED | OUTPATIENT
Start: 2023-08-15

## 2023-08-15 RX ORDER — ROSUVASTATIN CALCIUM 20 MG/1
20 TABLET, COATED ORAL NIGHTLY
Qty: 90 TABLET | Refills: 5 | Status: SHIPPED | OUTPATIENT
Start: 2023-08-15

## 2023-08-15 RX ORDER — CARVEDILOL 25 MG/1
25 TABLET ORAL 2 TIMES DAILY
Qty: 180 TABLET | Refills: 3 | Status: SHIPPED | OUTPATIENT
Start: 2023-08-15

## 2023-08-15 RX ORDER — FENOFIBRATE 145 MG/1
145 TABLET, COATED ORAL DAILY
Qty: 90 TABLET | Refills: 3 | Status: SHIPPED | OUTPATIENT
Start: 2023-08-15 | End: 2023-08-21 | Stop reason: SDUPTHER

## 2023-08-15 RX ORDER — LISINOPRIL 20 MG/1
20 TABLET ORAL 2 TIMES DAILY
Qty: 180 TABLET | Refills: 3 | Status: SHIPPED | OUTPATIENT
Start: 2023-08-15

## 2023-08-16 ENCOUNTER — TELEPHONE (OUTPATIENT)
Dept: UROLOGY | Facility: CLINIC | Age: 73
End: 2023-08-16
Payer: MEDICARE

## 2023-08-16 NOTE — TELEPHONE ENCOUNTER
Pt had a PSA done in June. He is scheduled to be seen in September and wants to know if he needs to get another PSA. Can you just let him know.

## 2023-08-16 NOTE — TELEPHONE ENCOUNTER
SPOKE TO PT AND ADVISED, PER LAST OFFICE NOTE, SHE WANTS HIM TO GET A PSA 6 MO FROM HIS LAST VISIT, WHICH WOULD BE DEC. PT EXPRESS[facility] UNDERSTANDING AND IS AGREEABLE.

## 2023-08-17 DIAGNOSIS — N39.41 URGE INCONTINENCE: ICD-10-CM

## 2023-08-18 RX ORDER — TOLTERODINE 2 MG/1
CAPSULE, EXTENDED RELEASE ORAL
Qty: 90 CAPSULE | Refills: 3 | Status: SHIPPED | OUTPATIENT
Start: 2023-08-18

## 2023-08-21 ENCOUNTER — TELEPHONE (OUTPATIENT)
Dept: CARDIOLOGY | Facility: CLINIC | Age: 73
End: 2023-08-21
Payer: MEDICARE

## 2023-08-21 RX ORDER — FENOFIBRATE 145 MG/1
145 TABLET, COATED ORAL DAILY
Qty: 90 TABLET | Refills: 3 | Status: SHIPPED | OUTPATIENT
Start: 2023-08-21

## 2023-08-21 NOTE — TELEPHONE ENCOUNTER
Pt called and stated [facility] cannot fill fenofibrate due to out of stock.  Pt request rx go to [facility] on [address] in [place].  Script sent and pt notified

## 2023-09-01 RX ORDER — FENOFIBRATE 145 MG/1
145 TABLET, COATED ORAL DAILY
Qty: 90 TABLET | Refills: 3 | Status: SHIPPED | OUTPATIENT
Start: 2023-09-01

## 2023-09-05 RX ORDER — INSULIN GLARGINE 100 [IU]/ML
INJECTION, SOLUTION SUBCUTANEOUS
COMMUNITY
Start: 2023-08-15

## 2023-09-06 ENCOUNTER — OFFICE VISIT (OUTPATIENT)
Dept: UROLOGY | Facility: CLINIC | Age: 73
End: 2023-09-06
Payer: MEDICARE

## 2023-09-06 VITALS — HEIGHT: 71 IN | WEIGHT: 214 LBS | BODY MASS INDEX: 29.96 KG/M2 | RESPIRATION RATE: 16 BRPM

## 2023-09-06 DIAGNOSIS — N39.41 URGE INCONTINENCE: Primary | ICD-10-CM

## 2023-09-06 DIAGNOSIS — N40.1 BENIGN PROSTATIC HYPERPLASIA WITH NOCTURIA: ICD-10-CM

## 2023-09-06 DIAGNOSIS — C61 PROSTATE CANCER: ICD-10-CM

## 2023-09-06 DIAGNOSIS — R35.1 BENIGN PROSTATIC HYPERPLASIA WITH NOCTURIA: ICD-10-CM

## 2023-09-06 RX ORDER — TOLTERODINE 2 MG/1
2 CAPSULE, EXTENDED RELEASE ORAL NIGHTLY
Qty: 90 CAPSULE | Refills: 3 | Status: SHIPPED | OUTPATIENT
Start: 2023-09-06

## 2023-09-06 NOTE — PROGRESS NOTES
UROLOGY OFFICE follow up NOTE    Subjective   HPI  Lucas Deluca is a 73 y.o. male. Who presents to the office today for further evaluation of urinary frequency.      4 mo ago thought that he was passing a kidney stone; seen by PCP and told that he had UTI.  Had severe abdominal pain.      Has had 2 UTIs that have been treated with antibiotics, first 3/2/2020 treated via Macrobid and again treated 4/13/2020 with Augmentin.  UTI symptoms noted initially as lower back pain which resolved with Macrobid but then came back and he was placed on Augmentin. UA and cultures not available for review.   No prior h/o UTI.      3 months ago suddenly started having nocturnal enuresis; to the point of wearing brief and incontinence and pad. Also gets up 3-4x at night. Does not even realize that he is going.      Current urinary symptoms include urinary frequency at the end of urination.  And burning at the end of the penis.  Feels that symptoms are better controlled during the day; able to hold; rare daytime leakage.   Denies hematuria  Denies hesitancy  Denies sensation of incomplete emptying.  Reports good strong stream.   Not on any BPH medications.  Denies history of BPH.  Denies h/o renal insufficiency; has h/o DM     Had PSA drawn 2-3 weeks ago.      Initial PVR today 691; repeat after voiding 611 cc. Feels like he could void again; not a painful or bothersome sense of urgency.      No prior imaging     Update 9/22/20: Presents for follow up; cathing 3x a day. Getting around 800cc in upon waking; 700cc at midday, and 800cc prior to sleep. Urinary frequency, nocturia, and nocturnal enuresis have resolved since starting to cathing. Denies issues cathing. Denies flank or back pain. Taking Flomax and finasteride without adverse side effects.    Had CT scan prior to today's visit.     Update 10/29/2020: Patient presents for follow-up after UDS.  Did not have her US performed prior.  Patient states he is generally doing  "well.  Continues to self cath 3 times daily.  Gets volumes of 700 cc each time.  Denies issues cathing; does not like to cath; does not like the idea of self cathing.  No other complaints today.  Denies changes.     Update 3/4/2021: Patient presents postop follow-up from TURP.  Patient removed catheter this morning around 5 AM; has voided 3 times.  Denies significant sensation of incomplete emptying.  Denies dysuria or pain with voiding.  No complaints.  Final PVR today 250 cc     Update 3/12/2021: Patient reports \"doing great.\"  Voiding with good strong stream; no straining or hesitancy.  Denies sensation of incomplete emptying.  Wants to know if he can go dancing.  No complaints today.   cc     Update 5/6/2021: Patient presents for follow-up states he is doing very well.  States he is voiding with good strong stream denies sensation of incomplete emptying.  Pleased with results after TURP.  Gets every 3 month lab checks with PCP; wonders if PCP can order his PSA testing for surveillance of his prostate cancer.  PVR today 198 cc     Update 12/1/21: Presents for routine follow up with psa prior.  Gets up at night every 2 hours; able to go right back to sleep. Has had 2 accidents at nighttime since last visit. Continues to take flomax and finasteride. Reports good strong stream. Denies significant daytime urinary symptoms.  Has retrograde ejaculation.     Update 6/3/2022: Patient presents for routine follow-up of BPH history of TURP and Reynolds 6 prostate cancer on active surveillance with PSA prior.  Patient doing well; no voiding complaints.  No longer taking Flomax.  Does note antegrade ejaculation.     Update 12/6/2022: Patient presents for follow-up of prostate cancer active surveillance, PSA prior; history of BPH with outlet obstruction managed with TURP. The patient reports that he is doing well.  The patient reports that he has been waking up every 2 hours to urinate. He states that he is not " comfortable enough to not urinate and if he does not go to the bathroom when he wakes he will leak. The patient reports that he is sleeping with a pad. He states that he drinks water constantly.     Update 6/6/2023: Patient presents for follow-up prostate cancer on active surveillance, BPH with LUTS.  PSA prior to today's visit.  The patient reports that he is still leaking at night. He states that he did try to catheterize before bed, and it did make a difference.   The patient reports that he is up 3 to 4 times every night to urinate. He states that when he urinates at night, he is full.   The patient reports that he drinks until bed. He states that he has to keep hydrated because of his blood sugar. The patient reports that he is diabetic. He states that his sugars are not controlled.   The patient reports that he is still taking finasteride for his prostate.   He states that he does not have a lot of trouble with symptoms during the day.   The patient reports that he is not sure that at night that he feels the urges. He states that he uses a CPAP machine.   He states that he would like to try a dedicated urgency medication for nighttime.    Update 9/6/23: presents today for follow-up on prostate cancer, BPH, nocturia.  The patient reports that he has noticed a slight improvement in his symptoms since his previous visit. He has adjusted to his nighttime issues and feels that the medication he was prescribed at his last visit has aided with a decrease in urinary leakage. He has been sleeping with a sanitary napkin which has allowed him to achieve better sleep. He denies self catheterizing at this time. He is taking his Detrol LA at night and denies any negative side effects. He does note some mild dry mouth with the medication. He feels that he is able to empty his bladder when he urinates and he has a healthy stream.      ___________  Renal ultrasound, 4/27/2021: No hydronephrosis or acute sonographic  "abnormality     PSA  6/2/2023: 0.129  12/1/2022: 0.191  6/1/22: 0.28  9/20/2021: 0.3  7/14/2020: 1.6     Prostate chips pathology, 2/22/2021: Adenocarcinoma prostate, Vivian 3+3 = 6 in 1 of multiple chips involving approximately 1% of tissue     CT scan 8/24/2020: Mildly enlarged prostate; moderate distention of bladder; moderate right hydro-nephrosis without obstructing ureteral stone     UDS, 10/15/2020: During filling bladder with increased sensation, normal capacity.  Evidence of involuntary bladder contractions causing increased urgency no urge incontinence.  During voiding, bladder with normal detrusor contraction.  Flow was moderate with an elongated rate.  Flow charted obstructed with AG #113.3.  PVR: 198 cc EMG normal.    Review of systems  A review of systems was performed, and positive findings are noted in the HPI.    Objective     Vital Signs:   Resp 16   Ht 180.3 cm (71\")   Wt 97.1 kg (214 lb)   BMI 29.85 kg/m²       Physical exam  No acute distress, well-nourished  Awake alert and oriented  Mood normal; affect normal    Problem List:  Patient Active Problem List   Diagnosis    Closed fracture of distal end of fibula with tibia    Diabetes    CAD S/P percutaneous coronary angioplasty    Benign prostatic hyperplasia with nocturia    Prostate cancer    Essential hypertension    Hyperlipemia, mixed    Macular degeneration       Assessment & Plan   Diagnoses and all orders for this visit:    1. Urge incontinence (Primary)  -     tolterodine LA (DETROL LA) 2 MG 24 hr capsule; Take 1 capsule by mouth Every Night.  Dispense: 90 capsule; Refill: 3    2. Prostate cancer  -     PSA DIAGNOSTIC; Future    3. Benign prostatic hyperplasia with nocturia        - The patient is doing well on his current medication regimen. We discussed the risks and benefits of Detrol LA along with the possibility of increasing his dose in the future if he desires to do so.  Does not wish to increase dose at this time.  refill " sent to pharmacy    We will obtain a bladder scan at his next appointment in 3 months.      - I have placed an order for PSA laboratory test for the patient to complete prior to his next appointment in 12/2023.      Patient encouraged to follow-up in 3 months, with PSA prior  All questions addressed        Transcribed from ambient dictation for Connie Fraire MD by Megan Moulton.  09/06/23   10:14 EDT    Patient or patient representative verbalized consent to the visit recording.  I have personally performed the services described in this document as transcribed by the above individual, and it is both accurate and complete.

## 2023-09-16 NOTE — PROGRESS NOTES
Chief Complaint  Diabetes (Follow up, med mgt, A1c eval, cgm eval, )    Referred By: Sarina Javed,*    Subjective          Lucas Deluca presents to [facility] for diabetes medication management    History of Present Illness    Visit type:  follow-up  Diabetes type:  Type 2  Current diabetes status/concerns/issues:  Blood sugars seem to be doing better.  Other health concerns: He has swelling in the right leg.  He was started on steroids for this.  He just stopped the steroids yesterday.  This is not from the Actos as this has be a recurring issue since his leg fracture and surgery  Current Diabetes symptoms:    Polyuria: No   Polydipsia: No   Polyphagia: No   Blurred vision: No   Excessive fatigue: No  Known Diabetes complications:  Neuropathy: None; Location: N/A  Renal: None  Eyes: Other: Macular degeneration ; Location: Bilateral  Amputation/Wounds: None  GI: None  Cardiovascular: Hypertension, Hyperlipidemia, and CAD  ED: Patient Reported  Other: None  Hypoglycemia:  None reported at this time  Hypoglycemia Symptoms:  No hypoglycemia at this time  Current diabetes treatment:  Lantus 45 units twice a day, NovoLog 40 units with each meal twice a day and Jardiance 25 mg once a day in the morning.  Actos 30 mg once a day was added at the last appointment    Blood glucose device:  The [employer] CGM  Blood glucose monitoring frequency:  Continuous per CGM  Blood glucose range/average:  The 14-day sensor report shows an average glucose of 203mg/dL, with 39% in target range ( mgdL), 39% in the high range (181-250 mg/dL), 22% in the very high range (>250 mg/dL), 0% in the low range (54-70 mg/dL) and 0% in the very low range (<54 mg/dL).   Glucose Source: Device Reviewed  Diet:  Limits high carb/sweet foods, Avoids sugary drinks  Activity/Exercise:  None    Past Medical History:   Diagnosis Date    CAD S/P percutaneous coronary angioplasty 1/14/2017    Diabetes mellitus      Essential hypertension 2021    Hyperlipidemia     Hypertension     Macular degeneration      Past Surgical History:   Procedure Laterality Date    CATARACT EXTRACTION Bilateral     CORONARY ANGIOPLASTY WITH STENT PLACEMENT      CORONARY ARTERY BYPASS GRAFT      2004    CA TX TIBL SHFT FX IMED IMPLT W/WO SCREWS&/CERCLA Right 2017    Procedure: TIBIA INTRAMEDULLARY NAIL/SHANNON INSERTION;  Surgeon: Colton Nascimento MD;  Location: [facility];  Service: Orthopedics    TONSILLECTOMY       Family History   Problem Relation Age of Onset    Cancer Mother     Diabetes Mother     Heart disease Father     Diabetes Sister     Cancer Maternal Grandmother     Cancer Maternal Grandfather     Cancer Maternal Aunt     Cancer Maternal Uncle      Social History     Socioeconomic History    Marital status:    Tobacco Use    Smoking status: Former     Types: Cigarettes     Quit date: 1991     Years since quittin.7    Smokeless tobacco: Former   Vaping Use    Vaping Use: Never used   Substance and Sexual Activity    Alcohol use: No    Drug use: No    Sexual activity: Defer     No Known Allergies    Current Outpatient Medications:     amLODIPine (NORVASC) 10 MG tablet, Take 0.5 tablets by mouth Daily., Disp: 90 tablet, Rfl: 3    aspirin 81 MG chewable tablet, Chew 1 tablet Daily., Disp: , Rfl:     B Complex Vitamins (VITAMIN B COMPLEX PO), vitamin B complex oral tablet take 1 tablet by oral route daily   Active, Disp: , Rfl:     carvedilol (COREG) 25 MG tablet, Take 1 tablet by mouth 2 (Two) Times a Day., Disp: 180 tablet, Rfl: 3    Cholecalciferol 25 MCG (1000 UT) capsule, Vitamin D3 25 mcg (1,000 unit) oral capsule take 1 capsule by oral route 2 times a day   Active, Disp: , Rfl:     clopidogrel (PLAVIX) 75 MG tablet, Take 1 tablet by mouth Daily., Disp: 90 tablet, Rfl: 3    cyanocobalamin (VITAMIN B-12) 2500 MCG tablet tablet, Take 500 mcg by mouth Daily., Disp: , Rfl:     empagliflozin (JARDIANCE) 25 MG  "tablet tablet, Take 1 tablet by mouth Daily for 180 days., Disp: 90 tablet, Rfl: 1    fenofibrate (TRICOR) 145 MG tablet, Take 1 tablet by mouth Daily., Disp: 90 tablet, Rfl: 3    finasteride (PROSCAR) 5 MG tablet, TAKE 1 TABLET DAILY, Disp: 90 tablet, Rfl: 3    insulin aspart (NovoLOG FlexPen) 100 UNIT/ML solution pen-injector sc pen, Inject 40 Units under the skin into the appropriate area as directed 3 (Three) Times a Day With Meals for 180 days., Disp: 108 mL, Rfl: 1    insulin glargine (Lantus) 100 UNIT/ML injection, Inject 45 Units under the skin into the appropriate area as directed 2 (Two) Times a Day for 180 days., Disp: 81 mL, Rfl: 1    Lantus SoloStar 100 UNIT/ML injection pen, , Disp: , Rfl:     lisinopril (PRINIVIL,ZESTRIL) 20 MG tablet, Take 1 tablet by mouth 2 (Two) Times a Day., Disp: 180 tablet, Rfl: 3    Microlet Lancets misc, 1 each 2 (Two) Times a Day for 180 days., Disp: 200 each, Rfl: 1    Multiple Vitamins-Minerals (VITEYES AREDS FORMULA PO), Take  by mouth., Disp: , Rfl:     pioglitazone (Actos) 30 MG tablet, Take 1 tablet by mouth Daily for 180 days., Disp: 90 tablet, Rfl: 1    PRECISION XTRA TEST STRIPS test strip, Test 1-2 times each day, Disp: 200 each, Rfl: 1    rosuvastatin (CRESTOR) 20 MG tablet, Take 1 tablet by mouth Every Night., Disp: 90 tablet, Rfl: 5    Sure Comfort Pen Needles 32G X 6 MM misc, , Disp: , Rfl:     tolterodine LA (DETROL LA) 2 MG 24 hr capsule, Take 1 capsule by mouth Every Night., Disp: 90 capsule, Rfl: 3    Objective     Vitals:    09/18/23 0814   BP: 155/75   BP Location: Right arm   Patient Position: Sitting   Cuff Size: Adult   Pulse: 66   Temp: 97.4 °F (36.3 °C)   SpO2: 96%   Height: 180.3 cm (71\")   PainSc: 0-No pain     Body mass index is 29.85 kg/m².    Physical Exam  Constitutional:       Appearance: Normal appearance. He is normal weight.   HENT:      Head: Normocephalic and atraumatic.      Right Ear: External ear normal.      Left Ear: External ear " normal.      Nose: Nose normal.   Eyes:      Extraocular Movements: Extraocular movements intact.      Conjunctiva/sclera: Conjunctivae normal.   Pulmonary:      Effort: Pulmonary effort is normal.   Musculoskeletal:         General: Normal range of motion.      Cervical back: Normal range of motion.   Skin:     General: Skin is warm and dry.   Neurological:      General: No focal deficit present.      Mental Status: He is alert and oriented to person, place, and time. Mental status is at baseline.   Psychiatric:         Mood and Affect: Mood normal.         Behavior: Behavior normal.         Thought Content: Thought content normal.         Judgment: Judgment normal.           Result Review :   The following data was reviewed by: JASON Burnham on 09/18/2023:    Most Recent A1C          9/18/2023    08:25   HGBA1C Most Recent   Hemoglobin A1C 8.1        A1C Last 3 Results          6/8/2023    09:24 9/18/2023    08:25   HGBA1C Last 3 Results   Hemoglobin A1C 9.6  8.1      A1c collected in the office today is 8.1%, indicating Uncontrolled Type II diabetes.  This result is down from the prior result of 9.6% collected on 6/8/23     Glucose   Date Value Ref Range Status   09/18/2023 147 (H) 70 - 99 mg/dL Final     Comment:     Serial Number: [id number]:  [id number]     Point of care glucose in the office today is within normal limits for nonfasting glucose    Total Cholesterol   Date Value Ref Range Status   07/26/2023 186 0 - 200 mg/dL Final   01/05/2023 231 (H) 0 - 200 mg/dL Final     Triglycerides   Date Value Ref Range Status   07/26/2023 221 (H) 0 - 150 mg/dL Final   01/05/2023 469 (H) 0 - 150 mg/dL Final     HDL Cholesterol   Date Value Ref Range Status   07/26/2023 44 40 - 60 mg/dL Final   01/05/2023 30 (L) 40 - 60 mg/dL Final     LDL Cholesterol    Date Value Ref Range Status   07/26/2023 104 (H) 0 - 100 mg/dL Final   01/05/2023 118 (H) 0 - 100 mg/dL Final     Lipid panel collected on 7/26/2023  shows Hypercholesterolemia and Hypertriglyceridemia            Assessment: He has had improvement in his A1c but remains above target.  The patient needs to have hip replacement but his orthopedic surgeon wants his A1c below 8 before proceeding.  He has recently been treated with steroids over the last week due to swelling in the right lower leg.  This is a recurring issue that has been problematic since having a fracture in the leg with surgery.  He does not feel the swelling is due to the addition of the Actos at the last appointment.  He is having a sharp rise in glucose levels after 9 PM and extending until midnight.  He states he does eat a bag of popcorn for a snack in the evening quite frequently.      Diagnoses and all orders for this visit:    1. Uncontrolled type 2 diabetes mellitus with hyperglycemia (Primary)  -     POC Glycosylated Hemoglobin (Hb A1C)    2. Macular degeneration of both eyes, unspecified type    3. Overweight (BMI 25.0-29.9)    4. Uses self-applied continuous glucose monitoring device    Other orders  -     POC Glucose        Plan: At this time we will increase his Lantus to 48 units twice a day.  The patient is advised to take a small dose of 5 to 10 units of NovoLog if he is eating a large bedtime snack to prevent the hyperglycemia occurring after 9 PM.    The patient will monitor his blood glucose levels using his continuous glucose sensor.  If he develops problematic hyperglycemia or hypoglycemia or adverse drug reactions, he will contact the office for further instructions.        Follow Up     Return in about 3 months (around 12/18/2023) for Medication Management, CGM Follow-up.    Patient was given instructions and counseling regarding his condition or for health maintenance advice. Please see specific information pulled into the AVS if appropriate.     Magaly Woodson, JASON  09/18/2023      Dictated Utilizing Dragon Dictation.  Please note that portions of this note were  completed with a voice recognition program.  Part of this note may be an electronic transcription/translation of spoken language to printed text using the Dragon Dictation System.

## 2023-09-18 ENCOUNTER — OFFICE VISIT (OUTPATIENT)
Dept: DIABETES SERVICES | Facility: HOSPITAL | Age: 73
End: 2023-09-18
Payer: MEDICARE

## 2023-09-18 VITALS
BODY MASS INDEX: 29.85 KG/M2 | OXYGEN SATURATION: 96 % | TEMPERATURE: 97.4 F | SYSTOLIC BLOOD PRESSURE: 155 MMHG | HEIGHT: 71 IN | HEART RATE: 66 BPM | DIASTOLIC BLOOD PRESSURE: 75 MMHG

## 2023-09-18 DIAGNOSIS — H35.30 MACULAR DEGENERATION OF BOTH EYES, UNSPECIFIED TYPE: ICD-10-CM

## 2023-09-18 DIAGNOSIS — Z97.8 USES SELF-APPLIED CONTINUOUS GLUCOSE MONITORING DEVICE: ICD-10-CM

## 2023-09-18 DIAGNOSIS — E11.65 UNCONTROLLED TYPE 2 DIABETES MELLITUS WITH HYPERGLYCEMIA: Primary | ICD-10-CM

## 2023-09-18 DIAGNOSIS — E66.3 OVERWEIGHT (BMI 25.0-29.9): ICD-10-CM

## 2023-09-18 LAB
EXPIRATION DATE: ABNORMAL
GLUCOSE BLDC GLUCOMTR-MCNC: 147 MG/DL (ref 70–99)
HBA1C MFR BLD: 8.1 %
Lab: ABNORMAL

## 2023-09-18 PROCEDURE — 95251 CONT GLUC MNTR ANALYSIS I&R: CPT | Performed by: NURSE PRACTITIONER

## 2023-09-18 PROCEDURE — 99214 OFFICE O/P EST MOD 30 MIN: CPT | Performed by: NURSE PRACTITIONER

## 2023-09-18 PROCEDURE — 1160F RVW MEDS BY RX/DR IN RCRD: CPT | Performed by: NURSE PRACTITIONER

## 2023-09-18 PROCEDURE — 3077F SYST BP >= 140 MM HG: CPT | Performed by: NURSE PRACTITIONER

## 2023-09-18 PROCEDURE — 82948 REAGENT STRIP/BLOOD GLUCOSE: CPT | Performed by: NURSE PRACTITIONER

## 2023-09-18 PROCEDURE — 3052F HG A1C>EQUAL 8.0%<EQUAL 9.0%: CPT | Performed by: NURSE PRACTITIONER

## 2023-09-18 PROCEDURE — G0463 HOSPITAL OUTPT CLINIC VISIT: HCPCS | Performed by: NURSE PRACTITIONER

## 2023-09-18 PROCEDURE — 1159F MED LIST DOCD IN RCRD: CPT | Performed by: NURSE PRACTITIONER

## 2023-09-18 PROCEDURE — 3078F DIAST BP <80 MM HG: CPT | Performed by: NURSE PRACTITIONER

## 2023-09-18 NOTE — PATIENT INSTRUCTIONS
Increase the Lantus to 48 units twice a day    If you are eating a large snack before bed such as a bag of popcorn try using 5 units of NovoLog to see how your glucose levels react you can increase this as needed but do not take more than 10 units before bed.

## 2023-11-06 RX ORDER — INSULIN GLARGINE 100 [IU]/ML
48 INJECTION, SOLUTION SUBCUTANEOUS 2 TIMES DAILY
Qty: 86 ML | Refills: 1 | Status: SHIPPED | OUTPATIENT
Start: 2023-11-06 | End: 2024-05-04

## 2023-11-13 DIAGNOSIS — R35.0 BENIGN PROSTATIC HYPERPLASIA WITH URINARY FREQUENCY: ICD-10-CM

## 2023-11-13 DIAGNOSIS — N40.1 BENIGN PROSTATIC HYPERPLASIA WITH URINARY FREQUENCY: ICD-10-CM

## 2023-11-13 RX ORDER — FINASTERIDE 5 MG/1
TABLET, FILM COATED ORAL
Qty: 90 TABLET | Refills: 3 | Status: SHIPPED | OUTPATIENT
Start: 2023-11-13

## 2023-11-23 DIAGNOSIS — E11.65 UNCONTROLLED TYPE 2 DIABETES MELLITUS WITH HYPERGLYCEMIA: ICD-10-CM

## 2023-11-27 RX ORDER — PIOGLITAZONEHYDROCHLORIDE 30 MG/1
30 TABLET ORAL DAILY
Qty: 90 TABLET | Refills: 3 | Status: SHIPPED | OUTPATIENT
Start: 2023-11-27

## 2023-11-29 DIAGNOSIS — E11.65 UNCONTROLLED TYPE 2 DIABETES MELLITUS WITH HYPERGLYCEMIA: ICD-10-CM

## 2023-11-29 RX ORDER — EMPAGLIFLOZIN 25 MG/1
25 TABLET, FILM COATED ORAL DAILY
Qty: 90 TABLET | Refills: 3 | Status: SHIPPED | OUTPATIENT
Start: 2023-11-29

## 2023-12-04 ENCOUNTER — TELEPHONE (OUTPATIENT)
Dept: UROLOGY | Facility: CLINIC | Age: 73
End: 2023-12-04
Payer: MEDICARE

## 2023-12-04 NOTE — TELEPHONE ENCOUNTER
LM REMINDING PT TO GET PSA DONE. ADVISED IF HE HAS HAD IT DONE ALREADY, TO PLEASE CALL AND LET ME KNOW WHERE. ASKED FOR A CB.

## 2023-12-05 ENCOUNTER — LAB (OUTPATIENT)
Dept: LAB | Facility: HOSPITAL | Age: 73
End: 2023-12-05
Payer: MEDICARE

## 2023-12-05 DIAGNOSIS — C61 PROSTATE CANCER: ICD-10-CM

## 2023-12-05 LAB — PSA SERPL-MCNC: 0.11 NG/ML (ref 0–4)

## 2023-12-05 PROCEDURE — 36415 COLL VENOUS BLD VENIPUNCTURE: CPT

## 2023-12-05 PROCEDURE — 84153 ASSAY OF PSA TOTAL: CPT

## 2023-12-20 NOTE — PROGRESS NOTES
Chief Complaint  Diabetes (Follow up, med mgt, A1c eval, cgm eval )    Referred By: Sarina Javed,*    Subjective          Lucas Deluca presents to [facility] for diabetes medication management    History of Present Illness    Visit type:  follow-up  Diabetes type:  Type 2  Current diabetes status/concerns/issues:  He reports having some lows.  He is tolerating the Actos without difficulty  Other health concerns: No new health concerns; he is pending right hip replacement if his A1c is below 8%  Current Diabetes symptoms:    Polyuria: No   Polydipsia: No   Polyphagia: No   Blurred vision: No   Excessive fatigue: No  Known Diabetes complications:  Neuropathy: None; Location: N/A  Renal: None  Eyes: Other: Macular degeneration ; Location: Bilateral  Amputation/Wounds: None  GI: None  Cardiovascular: Hypertension, Hyperlipidemia, and CAD  ED: Patient Reported  Other: None  Hypoglycemia:  Level 2 (less than 54 mg/dL); Frequency - he reports having a low of 51 mg/dL  Hypoglycemia Symptoms:   he has not noted any symptoms recently  Current diabetes treatment:  Lantus 48 units twice a day, NovoLog 40 units with each meal twice a day and Jardiance 25 mg once a day in the morning.  Actos 30 mg once a day was added at the last appointment    Blood glucose device:  [email or web address] CGM  Blood glucose monitoring frequency:  Continuous per CGM  Blood glucose range/average:  The 14-day sensor report shows an average glucose of 201 mg/dL, with 39% in target range ( mgdL), 39% in the high range (181-250 mg/dL), 22% in the very high range (>250 mg/dL), 0% in the low range (54-70 mg/dL) and 0% in the very low range (<54 mg/dL).   Glucose Source: Device Reviewed  Diet:  Limits high carb/sweet foods, Avoids sugary drinks  Activity/Exercise:  None    Past Medical History:   Diagnosis Date    CAD S/P percutaneous coronary angioplasty 1/14/2017    Diabetes mellitus     Essential hypertension  2021    Hyperlipidemia     Hypertension     Macular degeneration      Past Surgical History:   Procedure Laterality Date    CATARACT EXTRACTION Bilateral     CORONARY ANGIOPLASTY WITH STENT PLACEMENT      CORONARY ARTERY BYPASS GRAFT      2004    ID TX TIBL SHFT FX IMED IMPLT W/WO SCREWS&/CERCLA Right 2017    Procedure: TIBIA INTRAMEDULLARY NAIL/SHANNON INSERTION;  Surgeon: Colton Nascimento MD;  Location: [facility];  Service: Orthopedics    TONSILLECTOMY       Family History   Problem Relation Age of Onset    Cancer Mother     Diabetes Mother     Heart disease Father     Diabetes Sister     Cancer Maternal Grandmother     Cancer Maternal Grandfather     Cancer Maternal Aunt     Cancer Maternal Uncle      Social History     Socioeconomic History    Marital status:    Tobacco Use    Smoking status: Former     Types: Cigarettes     Quit date: 1991     Years since quittin.0    Smokeless tobacco: Former   Vaping Use    Vaping Use: Never used   Substance and Sexual Activity    Alcohol use: No    Drug use: No    Sexual activity: Defer     No Known Allergies    Current Outpatient Medications:     amLODIPine (NORVASC) 10 MG tablet, Take 0.5 tablets by mouth Daily., Disp: 90 tablet, Rfl: 3    aspirin 81 MG chewable tablet, Chew 1 tablet Daily., Disp: , Rfl:     B Complex Vitamins (VITAMIN B COMPLEX PO), vitamin B complex oral tablet take 1 tablet by oral route daily   Active, Disp: , Rfl:     carvedilol (COREG) 25 MG tablet, Take 1 tablet by mouth 2 (Two) Times a Day., Disp: 180 tablet, Rfl: 3    Cholecalciferol 25 MCG (1000 UT) capsule, Vitamin D3 25 mcg (1,000 unit) oral capsule take 1 capsule by oral route 2 times a day   Active, Disp: , Rfl:     clopidogrel (PLAVIX) 75 MG tablet, Take 1 tablet by mouth Daily., Disp: 90 tablet, Rfl: 3    cyanocobalamin (VITAMIN B-12) 2500 MCG tablet tablet, Take 500 mcg by mouth Daily., Disp: , Rfl:     empagliflozin (Jardiance) 25 MG tablet tablet, Take 1  "tablet by mouth Daily., Disp: 90 tablet, Rfl: 1    fenofibrate (TRICOR) 145 MG tablet, Take 1 tablet by mouth Daily., Disp: 90 tablet, Rfl: 3    finasteride (PROSCAR) 5 MG tablet, TAKE 1 TABLET DAILY, Disp: 90 tablet, Rfl: 3    insulin aspart (NovoLOG FlexPen) 100 UNIT/ML solution pen-injector sc pen, Inject 40 Units under the skin into the appropriate area as directed 3 (Three) Times a Day With Meals for 180 days., Disp: 108 mL, Rfl: 1    Insulin Glargine (Lantus SoloStar) 100 UNIT/ML injection pen, Inject 50 Units under the skin into the appropriate area as directed Daily AND 48 Units Every Night. Do all this for 180 days., Disp: 88 mL, Rfl: 1    lisinopril (PRINIVIL,ZESTRIL) 20 MG tablet, Take 1 tablet by mouth 2 (Two) Times a Day., Disp: 180 tablet, Rfl: 3    Multiple Vitamins-Minerals (VITEYES AREDS FORMULA PO), Take  by mouth., Disp: , Rfl:     pioglitazone (ACTOS) 30 MG tablet, Take 1 tablet by mouth Daily., Disp: 90 tablet, Rfl: 1    PRECISION XTRA TEST STRIPS test strip, Test 1-2 times each day, Disp: 200 each, Rfl: 1    rosuvastatin (CRESTOR) 20 MG tablet, Take 1 tablet by mouth Every Night., Disp: 90 tablet, Rfl: 5    Sure Comfort Pen Needles 32G X 6 MM misc, , Disp: , Rfl:     tolterodine LA (DETROL LA) 2 MG 24 hr capsule, Take 1 capsule by mouth Every Night., Disp: 90 capsule, Rfl: 3    Objective     Vitals:    12/21/23 0821   BP: 158/61   BP Location: Left arm   Patient Position: Sitting   Cuff Size: Adult   Pulse: 65   SpO2: 96%   Weight: 100 kg (221 lb)   Height: 180.3 cm (71\")   PainSc:   3     Body mass index is 30.82 kg/m².    Physical Exam  Constitutional:       Appearance: Normal appearance. He is obese.      Comments: Obesity (BMI 30 - 39.9) Pt Current BMI = 30.82    HENT:      Head: Normocephalic and atraumatic.      Right Ear: External ear normal.      Left Ear: External ear normal.      Nose: Nose normal.   Eyes:      Extraocular Movements: Extraocular movements intact.      " Conjunctiva/sclera: Conjunctivae normal.   Pulmonary:      Effort: Pulmonary effort is normal.   Musculoskeletal:         General: Normal range of motion.      Cervical back: Normal range of motion.   Skin:     General: Skin is warm and dry.   Neurological:      General: No focal deficit present.      Mental Status: He is alert and oriented to person, place, and time. Mental status is at baseline.   Psychiatric:         Mood and Affect: Mood normal.         Behavior: Behavior normal.         Thought Content: Thought content normal.         Judgment: Judgment normal.             Result Review :   The following data was reviewed by: JASON Burnham on 12/21/2023:    Most Recent A1C          12/21/2023    08:24   HGBA1C Most Recent   Hemoglobin A1C 7.9        A1C Last 3 Results          6/8/2023    09:24 9/18/2023    08:25 12/21/2023    08:24   HGBA1C Last 3 Results   Hemoglobin A1C 9.6  8.1  7.9      A1c collected in the office today is 7.9%, indicating Uncontrolled Type II diabetes.  This result is down from the prior result of 8.1% collected on 9/18/23     Glucose   Date Value Ref Range Status   12/21/2023 189 (H) 70 - 99 mg/dL Final     Comment:     Serial Number: [id number]:  [id number]     Point of care glucose in the office today is  elevated at 189            Assessment: He has had improvement in his A1c but remains above target.  His glucose levels are elevated throughout the day.  The patient does report having some additional episodes of hypoglycemia.  This is primarily after he is using his mealtime insulin.  He is taking a set dose of the NovoLog insulin and not adjusting based on his carbohydrate intake or glucose level currently.      Diagnoses and all orders for this visit:    1. Uncontrolled type 2 diabetes mellitus with hyperglycemia (Primary)  -     Comprehensive Metabolic Panel; Future  -     Lipid Panel; Future  -     Microalbumin / Creatinine Urine Ratio - Urine, Clean Catch;  Future  -     POC Glycosylated Hemoglobin (Hb A1C)  -     insulin aspart (NovoLOG FlexPen) 100 UNIT/ML solution pen-injector sc pen; Inject 40 Units under the skin into the appropriate area as directed 3 (Three) Times a Day With Meals for 180 days.  Dispense: 108 mL; Refill: 1  -     Insulin Glargine (Lantus SoloStar) 100 UNIT/ML injection pen; Inject 50 Units under the skin into the appropriate area as directed Daily AND 48 Units Every Night. Do all this for 180 days.  Dispense: 88 mL; Refill: 1  -     empagliflozin (Jardiance) 25 MG tablet tablet; Take 1 tablet by mouth Daily.  Dispense: 90 tablet; Refill: 1  -     pioglitazone (ACTOS) 30 MG tablet; Take 1 tablet by mouth Daily.  Dispense: 90 tablet; Refill: 1    2. Macular degeneration of both eyes, unspecified type    3. Uses self-applied continuous glucose monitoring device    4. Obesity (BMI 30-39.9)    Other orders  -     POC Glucose        Plan: We will have the patient increase his morning dose of Lantus to 50 units but remain on 48 units in the evening.  He is to begin adjusting his NovoLog insulin between 30 and 40 units before each meal based on carbohydrate content.  No other changes were made to his treatment plan at this time.  Patient was advised should he be scheduled for surgery and have any problematic glucose levels postoperatively to contact the office for advice.    The patient will monitor his blood glucose levels using his continuous glucose sensor.  If he develops problematic hyperglycemia or hypoglycemia or adverse drug reactions, he will contact the office for further instructions.        Follow Up     Return in about 3 months (around 3/21/2024) for Medication Management, CGM Follow-up.    Patient was given instructions and counseling regarding his condition or for health maintenance advice. Please see specific information pulled into the AVS if appropriate.     Magaly Woodson, APRN  12/21/2023      Dictated Utilizing Dragon  Dictation.  Please note that portions of this note were completed with a voice recognition program.  Part of this note may be an electronic transcription/translation of spoken language to printed text using the Dragon Dictation System.

## 2023-12-21 ENCOUNTER — OFFICE VISIT (OUTPATIENT)
Dept: UROLOGY | Facility: CLINIC | Age: 73
End: 2023-12-21
Payer: MEDICARE

## 2023-12-21 ENCOUNTER — OFFICE VISIT (OUTPATIENT)
Dept: DIABETES SERVICES | Facility: HOSPITAL | Age: 73
End: 2023-12-21
Payer: MEDICARE

## 2023-12-21 VITALS
HEIGHT: 71 IN | SYSTOLIC BLOOD PRESSURE: 158 MMHG | DIASTOLIC BLOOD PRESSURE: 61 MMHG | BODY MASS INDEX: 30.94 KG/M2 | HEART RATE: 65 BPM | WEIGHT: 221 LBS | OXYGEN SATURATION: 96 %

## 2023-12-21 VITALS
SYSTOLIC BLOOD PRESSURE: 178 MMHG | BODY MASS INDEX: 30.94 KG/M2 | DIASTOLIC BLOOD PRESSURE: 78 MMHG | WEIGHT: 221 LBS | HEIGHT: 71 IN

## 2023-12-21 DIAGNOSIS — H35.30 MACULAR DEGENERATION OF BOTH EYES, UNSPECIFIED TYPE: ICD-10-CM

## 2023-12-21 DIAGNOSIS — Z97.8 USES SELF-APPLIED CONTINUOUS GLUCOSE MONITORING DEVICE: ICD-10-CM

## 2023-12-21 DIAGNOSIS — N39.41 URGE INCONTINENCE: Primary | ICD-10-CM

## 2023-12-21 DIAGNOSIS — E11.65 UNCONTROLLED TYPE 2 DIABETES MELLITUS WITH HYPERGLYCEMIA: Primary | ICD-10-CM

## 2023-12-21 DIAGNOSIS — R10.30 INGUINAL PAIN, UNSPECIFIED LATERALITY: ICD-10-CM

## 2023-12-21 DIAGNOSIS — C61 PROSTATE CANCER: ICD-10-CM

## 2023-12-21 DIAGNOSIS — N40.1 BENIGN PROSTATIC HYPERPLASIA WITH NOCTURIA: ICD-10-CM

## 2023-12-21 DIAGNOSIS — R35.1 BENIGN PROSTATIC HYPERPLASIA WITH NOCTURIA: ICD-10-CM

## 2023-12-21 DIAGNOSIS — E66.9 OBESITY (BMI 30-39.9): ICD-10-CM

## 2023-12-21 LAB
EXPIRATION DATE: ABNORMAL
GLUCOSE BLDC GLUCOMTR-MCNC: 189 MG/DL (ref 70–99)
HBA1C MFR BLD: 7.9 % (ref 4.5–5.7)
Lab: ABNORMAL

## 2023-12-21 PROCEDURE — 82948 REAGENT STRIP/BLOOD GLUCOSE: CPT | Performed by: NURSE PRACTITIONER

## 2023-12-21 PROCEDURE — 3077F SYST BP >= 140 MM HG: CPT | Performed by: NURSE PRACTITIONER

## 2023-12-21 PROCEDURE — 3051F HG A1C>EQUAL 7.0%<8.0%: CPT | Performed by: NURSE PRACTITIONER

## 2023-12-21 PROCEDURE — 1160F RVW MEDS BY RX/DR IN RCRD: CPT | Performed by: NURSE PRACTITIONER

## 2023-12-21 PROCEDURE — 1159F MED LIST DOCD IN RCRD: CPT | Performed by: NURSE PRACTITIONER

## 2023-12-21 PROCEDURE — G0463 HOSPITAL OUTPT CLINIC VISIT: HCPCS | Performed by: NURSE PRACTITIONER

## 2023-12-21 PROCEDURE — 95251 CONT GLUC MNTR ANALYSIS I&R: CPT | Performed by: NURSE PRACTITIONER

## 2023-12-21 PROCEDURE — 99214 OFFICE O/P EST MOD 30 MIN: CPT | Performed by: NURSE PRACTITIONER

## 2023-12-21 PROCEDURE — 3078F DIAST BP <80 MM HG: CPT | Performed by: NURSE PRACTITIONER

## 2023-12-21 RX ORDER — INSULIN ASPART 100 [IU]/ML
40 INJECTION, SOLUTION INTRAVENOUS; SUBCUTANEOUS
Qty: 108 ML | Refills: 1 | Status: SHIPPED | OUTPATIENT
Start: 2023-12-21 | End: 2024-06-18

## 2023-12-21 RX ORDER — TOLTERODINE 4 MG/1
4 CAPSULE, EXTENDED RELEASE ORAL NIGHTLY
Start: 2023-12-21

## 2023-12-21 RX ORDER — INSULIN GLARGINE 100 [IU]/ML
INJECTION, SOLUTION SUBCUTANEOUS
Qty: 88 ML | Refills: 1 | Status: SHIPPED | OUTPATIENT
Start: 2023-12-21 | End: 2024-06-18

## 2023-12-21 RX ORDER — PIOGLITAZONEHYDROCHLORIDE 30 MG/1
30 TABLET ORAL DAILY
Qty: 90 TABLET | Refills: 1 | Status: SHIPPED | OUTPATIENT
Start: 2023-12-21

## 2023-12-21 NOTE — PROGRESS NOTES
UROLOGY OFFICE follow up NOTE    Subjective   HPI  Lucas Deluca is a 73 y.o. male.  Who presents to the office today for further evaluation of urinary frequency.      4 mo ago thought that he was passing a kidney stone; seen by PCP and told that he had UTI.  Had severe abdominal pain.      Has had 2 UTIs that have been treated with antibiotics, first 3/2/2020 treated via Macrobid and again treated 4/13/2020 with Augmentin.  UTI symptoms noted initially as lower back pain which resolved with Macrobid but then came back and he was placed on Augmentin. UA and cultures not available for review.   No prior h/o UTI.      3 months ago suddenly started having nocturnal enuresis; to the point of wearing brief and incontinence and pad. Also gets up 3-4x at night. Does not even realize that he is going.      Current urinary symptoms include urinary frequency at the end of urination.  And burning at the end of the penis.  Feels that symptoms are better controlled during the day; able to hold; rare daytime leakage.   Denies hematuria  Denies hesitancy  Denies sensation of incomplete emptying.  Reports good strong stream.   Not on any BPH medications.  Denies history of BPH.  Denies h/o renal insufficiency; has h/o DM     Had PSA drawn 2-3 weeks ago.      Initial PVR today 691; repeat after voiding 611 cc. Feels like he could void again; not a painful or bothersome sense of urgency.      No prior imaging     Update 9/22/20: Presents for follow up; cathing 3x a day. Getting around 800cc in upon waking; 700cc at midday, and 800cc prior to sleep. Urinary frequency, nocturia, and nocturnal enuresis have resolved since starting to cathing. Denies issues cathing. Denies flank or back pain. Taking Flomax and finasteride without adverse side effects.    Had CT scan prior to today's visit.     Update 10/29/2020: Patient presents for follow-up after UDS.  Did not have her US performed prior.  Patient states he is generally doing  "well.  Continues to self cath 3 times daily.  Gets volumes of 700 cc each time.  Denies issues cathing; does not like to cath; does not like the idea of self cathing.  No other complaints today.  Denies changes.     Update 3/4/2021: Patient presents postop follow-up from TURP.  Patient removed catheter this morning around 5 AM; has voided 3 times.  Denies significant sensation of incomplete emptying.  Denies dysuria or pain with voiding.  No complaints.  Final PVR today 250 cc     Update 3/12/2021: Patient reports \"doing great.\"  Voiding with good strong stream; no straining or hesitancy.  Denies sensation of incomplete emptying.  Wants to know if he can go dancing.  No complaints today.   cc     Update 5/6/2021: Patient presents for follow-up states he is doing very well.  States he is voiding with good strong stream denies sensation of incomplete emptying.  Pleased with results after TURP.  Gets every 3 month lab checks with PCP; wonders if PCP can order his PSA testing for surveillance of his prostate cancer.  PVR today 198 cc     Update 12/1/21: Presents for routine follow up with psa prior.  Gets up at night every 2 hours; able to go right back to sleep. Has had 2 accidents at nighttime since last visit. Continues to take flomax and finasteride. Reports good strong stream. Denies significant daytime urinary symptoms.  Has retrograde ejaculation.     Update 6/3/2022: Patient presents for routine follow-up of BPH history of TURP and [place] [facility] prostate cancer on active surveillance with PSA prior.  Patient doing well; no voiding complaints.  No longer taking Flomax.  Does note antegrade ejaculation.     Update 12/6/2022: Patient presents for follow-up of prostate cancer active surveillance, PSA prior; history of BPH with outlet obstruction managed with TURP. The patient reports that he is doing well.  The patient reports that he has been waking up every 2 hours to urinate. He states that he is not " comfortable enough to not urinate and if he does not go to the bathroom when he wakes he will leak. The patient reports that he is sleeping with a pad. He states that he drinks water constantly.     Update 6/6/2023: Patient presents for follow-up prostate cancer on active surveillance, BPH with LUTS.  PSA prior to today's visit.  The patient reports that he is still leaking at night. He states that he did try to catheterize before bed, and it did make a difference.   The patient reports that he is up 3 to 4 times every night to urinate. He states that when he urinates at night, he is full.   The patient reports that he drinks until bed. He states that he has to keep hydrated because of his blood sugar. The patient reports that he is diabetic. He states that his sugars are not controlled.   The patient reports that he is still taking finasteride for his prostate.   He states that he does not have a lot of trouble with symptoms during the day.   The patient reports that he is not sure that at night that he feels the urges. He states that he uses a CPAP machine.   He states that he would like to try a dedicated urgency medication for nighttime.     Update 9/6/23: presents today for follow-up on prostate cancer, BPH, nocturia.  The patient reports that he has noticed a slight improvement in his symptoms since his previous visit. He has adjusted to his nighttime issues and feels that the medication he was prescribed at his last visit has aided with a decrease in urinary leakage. He has been sleeping with a sanitary napkin which has allowed him to achieve better sleep. He denies self catheterizing at this time. He is taking his Detrol LA at night and denies any negative side effects. He does note some mild dry mouth with the medication. He feels that he is able to empty his bladder when he urinates and he has a healthy stream.     Update 12/21/23: Presents today for follow-up on prostate cancer, BPH, nocturia.  The  "patient is doing well. He denies any changes since his last visit. He is still leaking a little. He would like to try a stronger dose. He did see a little bit of improvement with the trial of tolterodine. He denies any side effects. He is still taking finasteride.  Complains of bulges bilaterally, lower abdomen which are new and increasing in size.  Patient notes some fullness.  This is somewhat bothersome to him but no specific pain.       ___________  Renal ultrasound, 4/27/2021: No hydronephrosis or acute sonographic abnormality     PSA  12/5/23: 0.112  6/2/2023: 0.129  12/1/2022: 0.191  6/1/22: 0.28  9/20/2021: 0.3  7/14/2020: 1.6     Prostate chips pathology, 2/22/2021: Adenocarcinoma prostate, [place] 3+3 = 6 in 1 of multiple chips involving approximately 1% of tissue     CT scan 8/24/2020: Mildly enlarged prostate; moderate distention of bladder; moderate right hydro-nephrosis without obstructing ureteral stone     UDS, 10/15/2020: During filling bladder with increased sensation, normal capacity.  Evidence of involuntary bladder contractions causing increased urgency no urge incontinence.  During voiding, bladder with normal detrusor contraction.  Flow was moderate with an elongated rate.  Flow charted obstructed with AG #113.3.  PVR: 198 cc EMG normal.      Review of systems  A review of systems was performed, and positive findings are noted in the HPI.    Objective     Vital Signs:   /78   Ht 180.3 cm (71\")   Wt 100 kg (221 lb)   BMI 30.82 kg/m²       Physical exam  No acute distress, well-nourished  Awake alert and oriented  Mood normal; affect normal  : No penile lesions; scrotum without edema, bilateral descended testis of normal size, shape, consistency; no inguinal hernia bilaterally; lower abdominal examination with suprapubic mons fat pad; evaluation somewhat limited by body habitus    Bladder Scan interpretation 12/21/2023    Estimation of residual urine via BVI [address] [place] Bladder " Scan  Performed by: Ana Anaya,   Residual Urine: 238 ml  Indication: Urge incontinence    Prostate cancer    Benign prostatic hyperplasia with nocturia    Inguinal pain, unspecified laterality   Position: Supine  Examination: Incremental scanning of the suprapubic area using 2.0 MHz transducer using copious amounts of acoustic gel.   Findings: An anechoic area was demonstrated which represented the bladder, with measurement of residual urine as noted. I inspected this myself. In that the residual urine was stable, refer to plan for treatment and plan necessary at this time.     Problem List:  Patient Active Problem List   Diagnosis    Closed fracture of distal end of fibula with tibia    Diabetes    CAD S/P percutaneous coronary angioplasty    Benign prostatic hyperplasia with nocturia    Prostate cancer    Essential hypertension    Hyperlipemia, mixed    Macular degeneration       Assessment & Plan   Diagnoses and all orders for this visit:    1. Urge incontinence (Primary)  -     tolterodine LA (DETROL LA) 4 MG 24 hr capsule; Take 1 capsule by mouth Every Night.    2. Prostate cancer    3. Benign prostatic hyperplasia with nocturia  -     CT Pelvis With Contrast; Future    4. Inguinal pain, unspecified laterality  -     CT Pelvis With Contrast; Future      - The patient will increase his tolterodine to 4 mg daily.  Side effects discussed, prescription sent to pharmacy    - We will obtain a CT scan of the pelvis in 2 months to evaluate for hernia    2. Prostate cancer screening.  - We will obtain a PSA in 2 months.    Follow up in 2 months, PSA prior   All questions and concerns have been addressed at this time.        Transcribed from ambient dictation for Connie Fraire MD by Elizabeth Brown.  12/21/23   14:25 EST    Patient or patient representative verbalized consent to the visit recording.  I have personally performed the services described in this document as transcribed by the above individual, and  it is both accurate and complete.

## 2023-12-21 NOTE — PATIENT INSTRUCTIONS
Increase the morning dose of Lantus to 50 units but remain on 40 units at night.    Evaluate have any carbohydrates you are eating with your meals and if the carbohydrate content is lower than usual decrease the NovoLog to 30 units instead of 40

## 2023-12-26 ENCOUNTER — LAB (OUTPATIENT)
Dept: LAB | Facility: HOSPITAL | Age: 73
End: 2023-12-26
Payer: MEDICARE

## 2023-12-26 ENCOUNTER — TELEPHONE (OUTPATIENT)
Dept: ORTHOPEDIC SURGERY | Facility: CLINIC | Age: 73
End: 2023-12-26

## 2023-12-26 DIAGNOSIS — E11.65 UNCONTROLLED TYPE 2 DIABETES MELLITUS WITH HYPERGLYCEMIA: ICD-10-CM

## 2023-12-26 DIAGNOSIS — C61 PROSTATE CANCER: ICD-10-CM

## 2023-12-26 DIAGNOSIS — I25.10 CORONARY ARTERY DISEASE INVOLVING NATIVE CORONARY ARTERY OF NATIVE HEART WITHOUT ANGINA PECTORIS: ICD-10-CM

## 2023-12-26 LAB
ALBUMIN SERPL-MCNC: 4.1 G/DL (ref 3.5–5.2)
ALBUMIN UR-MCNC: 2.7 MG/DL
ALBUMIN/GLOB SERPL: 1.4 G/DL
ALP SERPL-CCNC: 52 U/L (ref 39–117)
ALT SERPL W P-5'-P-CCNC: 17 U/L (ref 1–41)
ANION GAP SERPL CALCULATED.3IONS-SCNC: 12.8 MMOL/L (ref 5–15)
AST SERPL-CCNC: 12 U/L (ref 1–40)
BILIRUB CONJ SERPL-MCNC: <0.2 MG/DL (ref 0–0.3)
BILIRUB SERPL-MCNC: 0.3 MG/DL (ref 0–1.2)
BUN SERPL-MCNC: 18 MG/DL (ref 8–23)
BUN/CREAT SERPL: 15 (ref 7–25)
CALCIUM SPEC-SCNC: 9.9 MG/DL (ref 8.6–10.5)
CHLORIDE SERPL-SCNC: 107 MMOL/L (ref 98–107)
CHOLEST SERPL-MCNC: 193 MG/DL (ref 0–200)
CO2 SERPL-SCNC: 22.2 MMOL/L (ref 22–29)
CREAT SERPL-MCNC: 1.2 MG/DL (ref 0.76–1.27)
CREAT UR-MCNC: 53.1 MG/DL
EGFRCR SERPLBLD CKD-EPI 2021: 64.7 ML/MIN/1.73
GLOBULIN UR ELPH-MCNC: 2.9 GM/DL
GLUCOSE SERPL-MCNC: 143 MG/DL (ref 65–99)
HDLC SERPL-MCNC: 46 MG/DL (ref 40–60)
LDLC SERPL CALC-MCNC: 115 MG/DL (ref 0–100)
LDLC/HDLC SERPL: 2.41 {RATIO}
MICROALBUMIN/CREAT UR: 50.8 MG/G (ref 0–29)
POTASSIUM SERPL-SCNC: 4 MMOL/L (ref 3.5–5.2)
PROT SERPL-MCNC: 7 G/DL (ref 6–8.5)
PSA SERPL-MCNC: 0.14 NG/ML (ref 0–4)
SODIUM SERPL-SCNC: 142 MMOL/L (ref 136–145)
TRIGL SERPL-MCNC: 181 MG/DL (ref 0–150)
VLDLC SERPL-MCNC: 32 MG/DL (ref 5–40)

## 2023-12-26 PROCEDURE — 82248 BILIRUBIN DIRECT: CPT

## 2023-12-26 PROCEDURE — 36415 COLL VENOUS BLD VENIPUNCTURE: CPT

## 2023-12-26 PROCEDURE — 82043 UR ALBUMIN QUANTITATIVE: CPT

## 2023-12-26 PROCEDURE — 80053 COMPREHEN METABOLIC PANEL: CPT

## 2023-12-26 PROCEDURE — 84153 ASSAY OF PSA TOTAL: CPT

## 2023-12-26 PROCEDURE — 80061 LIPID PANEL: CPT

## 2023-12-26 PROCEDURE — 82570 ASSAY OF URINE CREATININE: CPT

## 2023-12-27 ENCOUNTER — TELEPHONE (OUTPATIENT)
Dept: CARDIOLOGY | Facility: CLINIC | Age: 73
End: 2023-12-27
Payer: MEDICARE

## 2023-12-27 DIAGNOSIS — E78.2 HYPERLIPEMIA, MIXED: Primary | ICD-10-CM

## 2023-12-27 RX ORDER — CHLORAL HYDRATE 500 MG
1000 CAPSULE ORAL
Qty: 90 CAPSULE | Refills: 3 | Status: SHIPPED | OUTPATIENT
Start: 2023-12-27

## 2023-12-27 NOTE — TELEPHONE ENCOUNTER
----- Message from JASON Jeffers sent at 12/27/2023  7:46 AM EST -----  Triglycerides are improved but still elevated. Total and LDL cholesterol are elevated. Recommend omega 3 fatty acid 1000 mg daily. Repeat fasting lipid and hepatic function panel in 3 months. All other labs are good

## 2024-01-09 ENCOUNTER — TELEPHONE (OUTPATIENT)
Dept: UROLOGY | Facility: CLINIC | Age: 74
End: 2024-01-09
Payer: MEDICARE

## 2024-01-09 NOTE — TELEPHONE ENCOUNTER
----- Message from Connie Fraire MD sent at 1/8/2024 10:03 PM EST -----  Please notify patient that PSA is stable, most recent 0.144.  Follow-up as scheduled.  Thank you

## 2024-01-15 ENCOUNTER — HOSPITAL ENCOUNTER (OUTPATIENT)
Dept: PET IMAGING | Facility: HOSPITAL | Age: 74
Discharge: HOME | End: 2024-01-15
Admitting: UROLOGY
Payer: MEDICARE

## 2024-01-15 DIAGNOSIS — N40.1 BENIGN PROSTATIC HYPERPLASIA WITH NOCTURIA: ICD-10-CM

## 2024-01-15 DIAGNOSIS — R10.30 INGUINAL PAIN, UNSPECIFIED LATERALITY: ICD-10-CM

## 2024-01-15 DIAGNOSIS — R35.1 BENIGN PROSTATIC HYPERPLASIA WITH NOCTURIA: ICD-10-CM

## 2024-01-15 PROCEDURE — 72193 CT PELVIS W/DYE: CPT

## 2024-01-15 PROCEDURE — 25510000001 IOPAMIDOL 61 % SOLUTION: Performed by: UROLOGY

## 2024-01-15 RX ADMIN — IOPAMIDOL 85 ML: 612 INJECTION, SOLUTION INTRAVENOUS at 08:00

## 2024-01-24 DIAGNOSIS — E11.65 UNCONTROLLED TYPE 2 DIABETES MELLITUS WITH HYPERGLYCEMIA: ICD-10-CM

## 2024-01-25 RX ORDER — INSULIN ASPART 100 [IU]/ML
INJECTION, SOLUTION INTRAVENOUS; SUBCUTANEOUS
Qty: 105 ML | Refills: 3 | Status: SHIPPED | OUTPATIENT
Start: 2024-01-25

## 2024-01-30 ENCOUNTER — PATIENT MESSAGE (OUTPATIENT)
Dept: DIABETES SERVICES | Facility: HOSPITAL | Age: 74
End: 2024-01-30
Payer: MEDICARE

## 2024-01-30 RX ORDER — INSULIN LISPRO 100 [IU]/ML
40 INJECTION, SOLUTION INTRAVENOUS; SUBCUTANEOUS
Qty: 108 ML | Refills: 1 | Status: SHIPPED | OUTPATIENT
Start: 2024-01-30 | End: 2024-07-28

## 2024-01-30 NOTE — TELEPHONE ENCOUNTER
From: Lucas Deluca  To: Magaly Woodson  Sent: 1/30/2024 9:42 AM EST  Subject: Novilog    [facility] has notified me that they are currently out of noverlog. Can we do a different diabetic medicine and send them the script for it

## 2024-02-01 ENCOUNTER — OFFICE VISIT (OUTPATIENT)
Dept: ORTHOPEDIC SURGERY | Facility: CLINIC | Age: 74
End: 2024-02-01
Payer: MEDICARE

## 2024-02-01 VITALS — BODY MASS INDEX: 31.02 KG/M2 | TEMPERATURE: 97.5 F | HEIGHT: 71 IN | WEIGHT: 221.6 LBS

## 2024-02-01 DIAGNOSIS — Z96.641 STATUS POST RIGHT HIP REPLACEMENT: ICD-10-CM

## 2024-02-01 DIAGNOSIS — R52 PAIN: Primary | ICD-10-CM

## 2024-02-01 PROBLEM — M16.9 OA (OSTEOARTHRITIS) OF HIP: Status: ACTIVE | Noted: 2024-02-01

## 2024-02-01 PROCEDURE — 99214 OFFICE O/P EST MOD 30 MIN: CPT | Performed by: ORTHOPAEDIC SURGERY

## 2024-02-01 RX ORDER — CHLORHEXIDINE GLUCONATE 500 MG/1
CLOTH TOPICAL 2 TIMES DAILY
OUTPATIENT
Start: 2024-02-01

## 2024-02-01 RX ORDER — PREGABALIN 150 MG/1
150 CAPSULE ORAL ONCE
OUTPATIENT
Start: 2024-02-01 | End: 2024-02-01

## 2024-02-01 RX ORDER — MELOXICAM 7.5 MG/1
15 TABLET ORAL ONCE
OUTPATIENT
Start: 2024-02-01 | End: 2024-02-01

## 2024-02-01 NOTE — PROGRESS NOTES
Patient: Lucas Deluca  YOB: 1950 73 y.o. male  Medical Record Number: [id number]    Chief Complaint:   Chief Complaint   Patient presents with    Right Hip - Follow-up       History of Present Illness:Lucas Deluca is a 73 y.o. male who presents for follow-up of right hip.  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  He has his hemoglobin A1c now below 8.  He is working with an endocrinologist and feels that his blood sugars are now lower and should see continued reduction.    Allergies: No Known Allergies    Medications:   Current Outpatient Medications   Medication Sig Dispense Refill    amLODIPine (NORVASC) 10 MG tablet Take 0.5 tablets by mouth Daily. 90 tablet 3    aspirin 81 MG chewable tablet Chew 1 tablet Daily.      B Complex Vitamins (VITAMIN B COMPLEX PO) vitamin B complex oral tablet take 1 tablet by oral route daily   Active      carvedilol (COREG) 25 MG tablet Take 1 tablet by mouth 2 (Two) Times a Day. 180 tablet 3    Cholecalciferol 25 MCG (1000 UT) capsule Vitamin D3 25 mcg (1,000 unit) oral capsule take 1 capsule by oral route 2 times a day   Active      clopidogrel (PLAVIX) 75 MG tablet Take 1 tablet by mouth Daily. 90 tablet 3    cyanocobalamin (VITAMIN B-12) 2500 MCG tablet tablet Take 500 mcg by mouth Daily.      empagliflozin (Jardiance) 25 MG tablet tablet Take 1 tablet by mouth Daily. 90 tablet 1    fenofibrate (TRICOR) 145 MG tablet Take 1 tablet by mouth Daily. 90 tablet 3    finasteride (PROSCAR) 5 MG tablet TAKE 1 TABLET DAILY 90 tablet 3    Insulin Glargine (Lantus SoloStar) 100 UNIT/ML injection pen Inject 50 Units under the skin into the appropriate area as directed Daily AND 48 Units Every Night. Do all this for 180 days. 88 mL 1    Insulin Lispro, 1 Unit Dial, (HumaLOG KwikPen) 100 UNIT/ML solution pen-injector Inject 40 Units under the  "skin into the appropriate area as directed 3 (Three) Times a Day Before Meals for 180 days. 108 mL 1    lisinopril (PRINIVIL,ZESTRIL) 20 MG tablet Take 1 tablet by mouth 2 (Two) Times a Day. 180 tablet 3    Multiple Vitamins-Minerals (VITEYES AREDS FORMULA PO) Take  by mouth.      NovoLOG FlexPen 100 UNIT/ML solution pen-injector sc pen INJECT 40 UNITS UNDER THE SKIN INTO THE APPROPRIATE AREA AS DIRECTED THREE TIMES A DAY WITH MEALS (Patient not taking: Reported on 2/1/2024) 105 mL 3    pioglitazone (ACTOS) 30 MG tablet Take 1 tablet by mouth Daily. 90 tablet 1    PRECISION XTRA TEST STRIPS test strip Test 1-2 times each day 200 each 1    rosuvastatin (CRESTOR) 20 MG tablet Take 1 tablet by mouth Every Night. 90 tablet 5    Sure Comfort Pen Needles 32G X 6 MM misc       tolterodine LA (DETROL LA) 4 MG 24 hr capsule Take 1 capsule by mouth Every Night.      Omega-3 Fatty Acids (fish oil) 1000 MG capsule capsule Take 1 capsule by mouth Daily With Breakfast. (Patient not taking: Reported on 2/1/2024) 90 capsule 3     No current facility-administered medications for this visit.         The following portions of the patient's history were reviewed and updated as appropriate: allergies, current medications, past family history, past medical history, past social history, past surgical history and problem list.    Review of Systems:   Pertinent positives/negative listed in HPI above    Physical Exam:   Vitals:    02/01/24 1552   Temp: 97.5 °F (36.4 °C)   Weight: 101 kg (221 lb 9.6 oz)   Height: 180.3 cm (71\")   PainSc:   5   PainLoc: Hip       General: A and O x 3, ASA, NAD      Hip:  right    LEG ALIGNMENT:     Neutral        LEG LENGTH DISCREPANCY   :    none    GAIT:     Antalgic    SKIN:     No abnormality    RANGE OF MOTION:     Limited by joint irritability    STRENGTH:     Limited by joint irratibility    DISTAL PULSES:    Paplable    DISTAL SENSATION :   Intact    LYMPHATICS:     No   lymphadenopathy    OTHER:       "    +   Stinchfeld test      -    log roll      -   Tenderness to palpation trochanteric bursa      Radiology:    Xrays 2views right hip (AP bilateral hips and lateral hip) were ordered and reviewed for evaluation of hip pain demonstrating a right partial hip replacement the joint space above the hemiarthroplasty is completely gone with metal rubbing on the subchondral bone.  It may be a Yodit/Osteonics implant.  It was performed at the [facility] in [facility] July 2015.  I will attempt to obtain operative note/implant stickers.  Comparison views:     Assessment/Plan:  Right hip unipolar hemiarthroplasty with acetabular wear.  Unfortunately is a very active individual and is quite limited in his activities.  The plan at this point is conversion from partial to total hip replacement we will place an acetabular component I believe he has an Omnifit stem but will try and obtain the operative note from 2015 July from [facility] in [facility] to confirm.  Continuation of conservative management vs. DEREJE discussed.  The patient wishes to proceed with total hip replacement.  At this point the patient has failed the full gamut of conservative treatment and stating complete understanding of the risks/benefits/ anternatives wishes to proceed with surgical treatment.    Risk and benefits of surgery were reviewed.  Including, but not limited to, blood clots, anesthesia risk, infection, leg length discrepancy, fracture, skin/leg numbness, failure of the implant, need for future surgeries, continued pain, hematoma, need for transfusion, and death, among others.  The patient understands and wishes to proceed.     The spectrum of treatment options were discussed with the patient in detail including both the nonoperative and operative treatment modalities and their respective risks and benefits.  After thorough discussion, the patient has elected to undergo surgical treatment.  The details  of the surgical procedure were explained including the location of probable incisions and a description of the likely implants to be used.  Models and diagrams were used as educational resources. The patient understands the likely convalescence after surgery, as well as the rehabilitation required.  We thoroughly discussed the risks, benefits, and alternatives to surgery.  The risks include but are not limited to the risk of infection, joint stiffness, blood clots (including DVT and/or pulmonary embolus along with the risk of death), neurologic and/or vascular injury, fracture, dislocation, nonunion, malunion, need for further surgery including hardware failure requiring revision, and continued pain.  It was explained that if tissue has been repaired or reconstructed, there is also a chance of failure which may require further management.  Following the completion of the discussion, the patient expressed understanding of this planned course of care, all their questions were answered and consent will be obtained preoperatively.    Operative Plan: Posterior approach revision from partial to total Hip Replacement 23 HR STAY   He will need cardiac clearance and permission to be off Plavix for 7 days preop.  Diagnoses and all orders for this visit:    1. Pain (Primary)  -     XR Hip With or Without Pelvis 2 - 3 View Right        Hebert Jung MD  2/1/2024

## 2024-02-12 ENCOUNTER — TELEPHONE (OUTPATIENT)
Dept: ORTHOPEDIC SURGERY | Facility: CLINIC | Age: 74
End: 2024-02-12
Payer: MEDICARE

## 2024-02-14 PROBLEM — Z96.641 STATUS POST RIGHT HIP REPLACEMENT: Status: ACTIVE | Noted: 2024-02-01

## 2024-02-15 ENCOUNTER — OFFICE VISIT (OUTPATIENT)
Dept: CARDIOLOGY | Facility: CLINIC | Age: 74
End: 2024-02-15
Payer: MEDICARE

## 2024-02-15 VITALS
HEIGHT: 71 IN | WEIGHT: 225 LBS | HEART RATE: 72 BPM | DIASTOLIC BLOOD PRESSURE: 58 MMHG | BODY MASS INDEX: 31.5 KG/M2 | SYSTOLIC BLOOD PRESSURE: 147 MMHG

## 2024-02-15 DIAGNOSIS — E78.2 HYPERLIPEMIA, MIXED: ICD-10-CM

## 2024-02-15 DIAGNOSIS — I10 ESSENTIAL HYPERTENSION: ICD-10-CM

## 2024-02-15 DIAGNOSIS — I25.10 CAD S/P PERCUTANEOUS CORONARY ANGIOPLASTY: Primary | ICD-10-CM

## 2024-02-15 DIAGNOSIS — Z98.61 CAD S/P PERCUTANEOUS CORONARY ANGIOPLASTY: Primary | ICD-10-CM

## 2024-02-15 PROCEDURE — 3077F SYST BP >= 140 MM HG: CPT | Performed by: NURSE PRACTITIONER

## 2024-02-15 PROCEDURE — 93000 ELECTROCARDIOGRAM COMPLETE: CPT | Performed by: NURSE PRACTITIONER

## 2024-02-15 PROCEDURE — 1159F MED LIST DOCD IN RCRD: CPT | Performed by: NURSE PRACTITIONER

## 2024-02-15 PROCEDURE — 99214 OFFICE O/P EST MOD 30 MIN: CPT | Performed by: NURSE PRACTITIONER

## 2024-02-15 PROCEDURE — 3078F DIAST BP <80 MM HG: CPT | Performed by: NURSE PRACTITIONER

## 2024-02-15 PROCEDURE — 1160F RVW MEDS BY RX/DR IN RCRD: CPT | Performed by: NURSE PRACTITIONER

## 2024-02-15 RX ORDER — NITROGLYCERIN 0.4 MG/1
TABLET SUBLINGUAL
Qty: 100 TABLET | Refills: 3 | Status: SHIPPED | OUTPATIENT
Start: 2024-02-15

## 2024-02-15 RX ORDER — OMEGA-3/DHA/EPA/FISH OIL 300-1000MG
2 CAPSULE ORAL DAILY
Qty: 180 CAPSULE | Refills: 1 | Status: SHIPPED | OUTPATIENT
Start: 2024-02-15

## 2024-02-15 NOTE — PROGRESS NOTES
Chief Complaint  Follow-up    Subjective            History of Present Illness  Lucas Deluca is a 73-year-old male patient who presents to the office today for follow-up.  He has CAD with prior stenting, hypertension, and hyperlipidemia.  He is compliant with medication.  He checks blood pressure at home with systolic typically in the 130's. He denies any chest pain, shortness of breath, lightheadedness/dizziness, palpitations, or edema.    PMH  Past Medical History:   Diagnosis Date    CAD S/P percutaneous coronary angioplasty 2017    Diabetes mellitus     Essential hypertension 2021    Hyperlipidemia     Hypertension     Macular degeneration          ALLERGY  No Known Allergies       SURGICALHX  Past Surgical History:   Procedure Laterality Date    CATARACT EXTRACTION Bilateral     CORONARY ANGIOPLASTY WITH STENT PLACEMENT      CORONARY ARTERY BYPASS GRAFT          FL TX TIBL SHFT FX IMED IMPLT W/WO SCREWS&/CERCLA Right 2017    Procedure: TIBIA INTRAMEDULLARY NAIL/SHANNON INSERTION;  Surgeon: Colton Nascimento MD;  Location: [facility];  Service: Orthopedics    TONSILLECTOMY            SOC  Social History     Socioeconomic History    Marital status:    Tobacco Use    Smoking status: Former     Types: Cigarettes     Quit date: 1991     Years since quittin.1    Smokeless tobacco: Former   Vaping Use    Vaping Use: Never used   Substance and Sexual Activity    Alcohol use: No    Drug use: No    Sexual activity: Defer         FAMHX  Family History   Problem Relation Age of Onset    Cancer Mother     Diabetes Mother     Heart disease Father     Diabetes Sister     Cancer Maternal Grandmother     Cancer Maternal Grandfather     Cancer Maternal Aunt     Cancer Maternal Uncle           MEDSIGONLY  Current Outpatient Medications on File Prior to Visit   Medication Sig    amLODIPine (NORVASC) 10 MG tablet Take 0.5 tablets by mouth Daily.    aspirin 81 MG chewable tablet Chew 1  tablet Daily.    B Complex Vitamins (VITAMIN B COMPLEX PO) vitamin B complex oral tablet take 1 tablet by oral route daily   Active    carvedilol (COREG) 25 MG tablet Take 1 tablet by mouth 2 (Two) Times a Day.    Cholecalciferol 25 MCG (1000 UT) capsule Vitamin D3 25 mcg (1,000 unit) oral capsule take 1 capsule by oral route 2 times a day   Active    clopidogrel (PLAVIX) 75 MG tablet Take 1 tablet by mouth Daily.    cyanocobalamin (VITAMIN B-12) 2500 MCG tablet tablet Take 500 mcg by mouth Daily.    empagliflozin (Jardiance) 25 MG tablet tablet Take 1 tablet by mouth Daily.    fenofibrate (TRICOR) 145 MG tablet Take 1 tablet by mouth Daily.    finasteride (PROSCAR) 5 MG tablet TAKE 1 TABLET DAILY    Insulin Glargine (Lantus SoloStar) 100 UNIT/ML injection pen Inject 50 Units under the skin into the appropriate area as directed Daily AND 48 Units Every Night. Do all this for 180 days.    Insulin Lispro, 1 Unit Dial, (HumaLOG KwikPen) 100 UNIT/ML solution pen-injector Inject 40 Units under the skin into the appropriate area as directed 3 (Three) Times a Day Before Meals for 180 days.    lisinopril (PRINIVIL,ZESTRIL) 20 MG tablet Take 1 tablet by mouth 2 (Two) Times a Day.    Multiple Vitamins-Minerals (VITEYES AREDS FORMULA PO) Take  by mouth.    NovoLOG FlexPen 100 UNIT/ML solution pen-injector sc pen INJECT 40 UNITS UNDER THE SKIN INTO THE APPROPRIATE AREA AS DIRECTED THREE TIMES A DAY WITH MEALS    pioglitazone (ACTOS) 30 MG tablet Take 1 tablet by mouth Daily.    PRECISION XTRA TEST STRIPS test strip Test 1-2 times each day    rosuvastatin (CRESTOR) 20 MG tablet Take 1 tablet by mouth Every Night.    Sure Comfort Pen Needles 32G X 6 MM misc     tolterodine LA (DETROL LA) 4 MG 24 hr capsule Take 1 capsule by mouth Every Night.    Omega-3 Fatty Acids (fish oil) 1000 MG capsule capsule Take 1 capsule by mouth Daily With Breakfast. (Patient not taking: Reported on 2/1/2024)     No current facility-administered  "medications on file prior to visit.         Objective   /58   Pulse 72   Ht 180.3 cm (71\")   Wt 102 kg (225 lb)   BMI 31.38 kg/m²       Physical Exam  HENT:      Head: Normocephalic.   Neck:      Vascular: No carotid bruit.   Cardiovascular:      Rate and Rhythm: Normal rate and regular rhythm.      Pulses: Normal pulses.      Heart sounds: Normal heart sounds. No murmur heard.  Pulmonary:      Effort: Pulmonary effort is normal.      Breath sounds: Normal breath sounds.   Musculoskeletal:      Cervical back: Neck supple.      Right lower leg: No edema.      Left lower leg: No edema.   Skin:     General: Skin is dry.   Neurological:      Mental Status: He is alert and oriented to person, place, and time.   Psychiatric:         Behavior: Behavior normal.       ECG 12 Lead    Date/Time: 2/15/2024 9:56 AM  Performed by: Georgette Talavera APRN    Authorized by: Georgette Talavera APRN  Comparison: compared with previous ECG from 12/14/2021  Similar to previous ECG  Rhythm: sinus rhythm  Rate: normal  BPM: 78  Conduction: conduction normal  QRS axis: normal  Other findings: non-specific ST-T wave changes    Clinical impression: abnormal EKG        Result Review :   The following data was reviewed by: JASON Girard on 02/15/2024:  No results found for: \"PROBNP\"  CMP          12/26/2023    11:25   CMP   Glucose 143    BUN 18    Creatinine 1.20    EGFR 64.7    Sodium 142    Potassium 4.0    Chloride 107    Calcium 9.9    Total Protein 7.0    Albumin 4.1    Globulin 2.9    Total Bilirubin 0.3    Alkaline Phosphatase 52    AST (SGOT) 12    ALT (SGPT) 17    Albumin/Globulin Ratio 1.4    BUN/Creatinine Ratio 15.0    Anion Gap 12.8         No results found for: \"TSH\"   No results found for: \"FREET4\"   No results found for: \"DDIMERQUANT\"  No results found for: \"MG\"   No results found for: \"DIGOXIN\"   No results found for: \"TROPONINT\"        Lipid Panel          12/26/2023    11:25   Lipid Panel   Total " Cholesterol 193    Triglycerides 181    HDL Cholesterol 46    VLDL Cholesterol 32    LDL Cholesterol  115    LDL/HDL Ratio 2.41             Assessment and Plan    Diagnoses and all orders for this visit:    1. CAD S/P percutaneous coronary angioplasty (Primary)  He denies any anginal symptoms, continue aspirin 81 mg daily and Plavix 75 mg daily.    2. Essential hypertension  Slightly elevated in office today, check blood pressure twice a day for the next two weeks, blood pressure log provided for patient.  Will review log once available to me will make any necessary medication adjustments needed at that time.  For now continue carvedilol 25 mg twice daily and lisinopril 20 mg twice daily.    3. Hyperlipemia, mixed  Last lipid panel was 12/26/2023 with  which is above goal range.  Recommend omega-3 fatty acid 2000 mg daily, continue rosuvastatin 20 mg nightly and fenofibrate 145 mg daily.  Repeat fasting lipid and hepatic function panel prior to next office visit.  -     Lipid Panel; Future  -     Hepatic Function Panel; Future    Other orders  -     nitroglycerin (NITROSTAT) 0.4 MG SL tablet; 1 under the tongue as needed for angina, may repeat q5mins for up three doses  Dispense: 100 tablet; Refill: 3  -     fish oil-omega-3 fatty acids 1000 MG capsule; Take 2 capsules by mouth Daily.  Dispense: 180 capsule; Refill: 1  -     ECG 12 Lead            Follow Up   Return in about 6 months (around 8/15/2024) for Follow up with Dr Dow.    Patient was given instructions and counseling regarding his condition or for health maintenance advice. Please see specific information pulled into the AVS if appropriate.     Lucas Deluca  reports that he quit smoking about 32 years ago. His smoking use included cigarettes. He has quit using smokeless tobacco.         Georgette Talavera, JASON  02/15/24  08:42 EST    Dictated Utilizing Dragon Dictation

## 2024-02-21 ENCOUNTER — OFFICE VISIT (OUTPATIENT)
Dept: UROLOGY | Facility: CLINIC | Age: 74
End: 2024-02-21
Payer: MEDICARE

## 2024-02-21 VITALS
DIASTOLIC BLOOD PRESSURE: 65 MMHG | WEIGHT: 225.2 LBS | HEART RATE: 73 BPM | SYSTOLIC BLOOD PRESSURE: 166 MMHG | HEIGHT: 71 IN | BODY MASS INDEX: 31.53 KG/M2

## 2024-02-21 DIAGNOSIS — R10.30 INGUINAL PAIN, UNSPECIFIED LATERALITY: ICD-10-CM

## 2024-02-21 DIAGNOSIS — C61 PROSTATE CANCER: ICD-10-CM

## 2024-02-21 DIAGNOSIS — N40.1 BENIGN PROSTATIC HYPERPLASIA WITH NOCTURIA: ICD-10-CM

## 2024-02-21 DIAGNOSIS — R35.1 BENIGN PROSTATIC HYPERPLASIA WITH NOCTURIA: ICD-10-CM

## 2024-02-21 DIAGNOSIS — N39.41 URGE INCONTINENCE: Primary | ICD-10-CM

## 2024-02-21 NOTE — PROGRESS NOTES
UROLOGY OFFICE follow up NOTE    Subjective   HPI  Lucas Deluca is a 73 y.o. male. Who presents to the office today for further evaluation of urinary frequency.      4 mo ago thought that he was passing a kidney stone; seen by PCP and told that he had UTI.  Had severe abdominal pain.      Has had 2 UTIs that have been treated with antibiotics, first 3/2/2020 treated via Macrobid and again treated 4/13/2020 with Augmentin.  UTI symptoms noted initially as lower back pain which resolved with Macrobid but then came back and he was placed on Augmentin. UA and cultures not available for review.   No prior h/o UTI.      3 months ago suddenly started having nocturnal enuresis; to the point of wearing brief and incontinence and pad. Also gets up 3-4x at night. Does not even realize that he is going.      Current urinary symptoms include urinary frequency at the end of urination.  And burning at the end of the penis.  Feels that symptoms are better controlled during the day; able to hold; rare daytime leakage.   Denies hematuria  Denies hesitancy  Denies sensation of incomplete emptying.  Reports good strong stream.   Not on any BPH medications.  Denies history of BPH.  Denies h/o renal insufficiency; has h/o DM     Had PSA drawn 2-3 weeks ago.      Initial PVR today 691; repeat after voiding 611 cc. Feels like he could void again; not a painful or bothersome sense of urgency.      No prior imaging     Update 9/22/20: Presents for follow up; cathing 3x a day. Getting around 800cc in upon waking; 700cc at midday, and 800cc prior to sleep. Urinary frequency, nocturia, and nocturnal enuresis have resolved since starting to cathing. Denies issues cathing. Denies flank or back pain. Taking Flomax and finasteride without adverse side effects.    Had CT scan prior to today's visit.     Update 10/29/2020: Patient presents for follow-up after UDS.  Did not have her US performed prior.  Patient states he is generally doing  "well.  Continues to self cath 3 times daily.  Gets volumes of 700 cc each time.  Denies issues cathing; does not like to cath; does not like the idea of self cathing.  No other complaints today.  Denies changes.     Update 3/4/2021: Patient presents postop follow-up from TURP.  Patient removed catheter this morning around 5 AM; has voided 3 times.  Denies significant sensation of incomplete emptying.  Denies dysuria or pain with voiding.  No complaints.  Final PVR today 250 cc     Update 3/12/2021: Patient reports \"doing great.\"  Voiding with good strong stream; no straining or hesitancy.  Denies sensation of incomplete emptying.  Wants to know if he can go dancing.  No complaints today.   cc     Update 5/6/2021: Patient presents for follow-up states he is doing very well.  States he is voiding with good strong stream denies sensation of incomplete emptying.  Pleased with results after TURP.  Gets every 3 month lab checks with PCP; wonders if PCP can order his PSA testing for surveillance of his prostate cancer.  PVR today 198 cc     Update 12/1/21: Presents for routine follow up with psa prior.  Gets up at night every 2 hours; able to go right back to sleep. Has had 2 accidents at nighttime since last visit. Continues to take flomax and finasteride. Reports good strong stream. Denies significant daytime urinary symptoms.  Has retrograde ejaculation.     Update 6/3/2022: Patient presents for routine follow-up of BPH history of TURP and [place] 6 prostate cancer on active surveillance with PSA prior.  Patient doing well; no voiding complaints.  No longer taking Flomax.  Does note antegrade ejaculation.     Update 12/6/2022: Patient presents for follow-up of prostate cancer active surveillance, PSA prior; history of BPH with outlet obstruction managed with TURP. The patient reports that he is doing well.  The patient reports that he has been waking up every 2 hours to urinate. He states that he is not " comfortable enough to not urinate and if he does not go to the bathroom when he wakes he will leak. The patient reports that he is sleeping with a pad. He states that he drinks water constantly.     Update 6/6/2023: Patient presents for follow-up prostate cancer on active surveillance, BPH with LUTS.  PSA prior to today's visit.  The patient reports that he is still leaking at night. He states that he did try to catheterize before bed, and it did make a difference.   The patient reports that he is up 3 to 4 times every night to urinate. He states that when he urinates at night, he is full.   The patient reports that he drinks until bed. He states that he has to keep hydrated because of his blood sugar. The patient reports that he is diabetic. He states that his sugars are not controlled.   The patient reports that he is still taking finasteride for his prostate.   He states that he does not have a lot of trouble with symptoms during the day.   The patient reports that he is not sure that at night that he feels the urges. He states that he uses a CPAP machine.   He states that he would like to try a dedicated urgency medication for nighttime.     Update 9/6/23: presents today for follow-up on prostate cancer, BPH, nocturia.  The patient reports that he has noticed a slight improvement in his symptoms since his previous visit. He has adjusted to his nighttime issues and feels that the medication he was prescribed at his last visit has aided with a decrease in urinary leakage. He has been sleeping with a sanitary napkin which has allowed him to achieve better sleep. He denies self catheterizing at this time. He is taking his Detrol LA at night and denies any negative side effects. He does note some mild dry mouth with the medication. He feels that he is able to empty his bladder when he urinates and he has a healthy stream.      Update 12/21/23: Presents today for follow-up on prostate cancer, BPH, nocturia.  The  "patient is doing well. He denies any changes since his last visit. He is still leaking a little. He would like to try a stronger dose. He did see a little bit of improvement with the trial of tolterodine. He denies any side effects. He is still taking finasteride.  Complains of bulges bilaterally, lower abdomen which are new and increasing in size.  Patient notes some fullness.  This is somewhat bothersome to him but no specific pain.    Update 2/21/2023: Presents today for follow-up on prostate cancer, BPH, nocturia, and inguinal pain.  CT scan prior.  Plan to tolterodine increased to 4 mg daily.  The patient is doing well. He feels like he empties his bladder well. He has doubled the medication that is a blue tablet and starts with an F and he is not having any problems at all now.  PSA drawn earlier than expected with other lab draws.  The patient has hip surgery scheduled for 05/08/2024.         ___________  Renal ultrasound, 4/27/2021: No hydronephrosis or acute sonographic abnormality     PSA  12/26/2023: 0.144  12/5/23: 0.112  6/2/2023: 0.129  12/1/2022: 0.191  6/1/22: 0.28  9/20/2021: 0.3  7/14/2020: 1.6     Prostate chips pathology, 2/22/2021: Adenocarcinoma prostate, Vivian 3+3 = 6 in 1 of multiple chips involving approximately 1% of tissue     CT scan 8/24/2020: Mildly enlarged prostate; moderate distention of bladder; moderate right hydro-nephrosis without obstructing ureteral stone     UDS, 10/15/2020: During filling bladder with increased sensation, normal capacity.  Evidence of involuntary bladder contractions causing increased urgency no urge incontinence.  During voiding, bladder with normal detrusor contraction.  Flow was moderate with an elongated rate.  Flow charted obstructed with AG #113.3.  PVR: 198 cc EMG normal.       Review of systems  A review of systems was performed, and positive findings are noted in the HPI.    Objective     Vital Signs:   /65   Pulse 73   Ht 180.3 cm (71\")   " Wt 102 kg (225 lb 3.2 oz)   BMI 31.41 kg/m²       Physical exam  No acute distress, well-nourished  Awake alert and oriented  Mood normal; affect normal    Problem List:  Patient Active Problem List   Diagnosis    Closed fracture of distal end of fibula with tibia    Diabetes    CAD S/P percutaneous coronary angioplasty    Benign prostatic hyperplasia with nocturia    Prostate cancer    Essential hypertension    Hyperlipemia, mixed    Macular degeneration    OA (osteoarthritis) of hip    Status post right hip replacement       Assessment & Plan   Diagnoses and all orders for this visit:    1. Urge incontinence (Primary)    2. Prostate cancer  -     PSA DIAGNOSTIC; Future    3. Benign prostatic hyperplasia with nocturia    4. Inguinal pain, unspecified laterality      Elevated PSA.  The patient's PSA is stable. We will recheck his PSA at the end of 06/2024.     We reviewed the results of the CT scan of the pelvis; no evidence of hernia or soft tissue abnormality, provided reassurance.    Urge incontinence.  The patient will confirm the name of the medication he doubled. I would recommend doubling up on the tolterodine and only taking finasteride at 5 mg.    Follow-up in 07/2024, PSA prior  All questions and concerns have been addressed at this time.          Transcribed from ambient dictation for Connie Fraire MD by Aminta Willis.  02/21/24   11:49 EST    Patient or patient representative verbalized consent to the visit recording.  I have personally performed the services described in this document as transcribed by the above individual, and it is both accurate and complete.

## 2024-03-08 ENCOUNTER — TELEPHONE (OUTPATIENT)
Dept: CARDIOLOGY | Facility: CLINIC | Age: 74
End: 2024-03-08
Payer: MEDICARE

## 2024-03-08 NOTE — TELEPHONE ENCOUNTER
Per Georgette GOMEZ: moderate risk dr april goodwin hip surgery, stop aspirin 7 days prior and plavix 5 days prior

## 2024-03-12 NOTE — PROGRESS NOTES
Chief Complaint  Diabetes (Follow up, med mgt, A1c eval, cgm eval, needs foot exam )    Referred By: Sarina Javed,*    Subjective          Lucas Deluca presents to [facility] for diabetes medication management    History of Present Illness    Visit type:  follow-up  Diabetes type:  Type 2  Current diabetes status/concerns/issues:  He reports he having some lows during the day  Other health concerns:  He is on antibiotics for wounds located on the right lower leg  Current Diabetes symptoms:    Polyuria: No   Polydipsia: No   Polyphagia: No   Blurred vision: No   Excessive fatigue: No  Known Diabetes complications:  Neuropathy: None; Location: N/A  Renal: Stage II mild (GFR = 60-89 mL/min) and Microalbuminuria  Eyes: Other: Macular degeneration ; Location: Bilateral  Amputation/Wounds: None  GI: None  Cardiovascular: Hypertension, Hyperlipidemia, and CAD  ED: Patient Reported  Other: None  Hypoglycemia:   He reports having some lows in the 50s  Hypoglycemia Symptoms:   None reported at this time  Current diabetes treatment:  Lantus 50 units in the AM and 48 in the PM, NovoLog 40 units with each meal twice a day and Jardiance 25 mg once a day in the morning.  Actos 30 mg once a day   Blood glucose device:  [employer] CGM  Blood glucose monitoring frequency:  Continuous per CGM  Blood glucose range/average:  The 14-day sensor report shows an average glucose of 185 mg/dL, with 48% in target range ( mgdL), 38% in the high range (181-250 mg/dL), 14% in the very high range (>250 mg/dL), 0% in the low range (54-70 mg/dL) and 0% in the very low range (<54 mg/dL).   Glucose Source: Device Reviewed  Diet:  Limits high carb/sweet foods, Avoids sugary drinks  Activity/Exercise:   gym 3 day a week; dancing 4 days a week    Past Medical History:   Diagnosis Date    CAD S/P percutaneous coronary angioplasty 1/14/2017    Diabetes mellitus     Essential hypertension 12/12/2021     Hyperlipidemia     Hypertension     Macular degeneration      Past Surgical History:   Procedure Laterality Date    CATARACT EXTRACTION Bilateral     CORONARY ANGIOPLASTY WITH STENT PLACEMENT      CORONARY ARTERY BYPASS GRAFT          AK TX TIBL SHFT FX IMED IMPLT W/WO SCREWS&/CERCLA Right 2017    Procedure: TIBIA INTRAMEDULLARY NAIL/SHANNON INSERTION;  Surgeon: Colton Nascimento MD;  Location: [facility];  Service: Orthopedics    TONSILLECTOMY       Family History   Problem Relation Age of Onset    Cancer Mother     Diabetes Mother     Heart disease Father     Diabetes Sister     Cancer Maternal Grandmother     Cancer Maternal Grandfather     Cancer Maternal Aunt     Cancer Maternal Uncle      Social History     Socioeconomic History    Marital status:    Tobacco Use    Smoking status: Former     Current packs/day: 0.00     Types: Cigarettes     Quit date: 1991     Years since quittin.2    Smokeless tobacco: Former   Vaping Use    Vaping status: Never Used   Substance and Sexual Activity    Alcohol use: No    Drug use: No    Sexual activity: Defer     No Known Allergies    Current Outpatient Medications:     amLODIPine (NORVASC) 10 MG tablet, Take 0.5 tablets by mouth Daily., Disp: 90 tablet, Rfl: 3    aspirin 81 MG chewable tablet, Chew 1 tablet Daily., Disp: , Rfl:     B Complex Vitamins (VITAMIN B COMPLEX PO), vitamin B complex oral tablet take 1 tablet by oral route daily   Active, Disp: , Rfl:     carvedilol (COREG) 25 MG tablet, Take 1 tablet by mouth 2 (Two) Times a Day., Disp: 180 tablet, Rfl: 3    cephalexin (KEFLEX) 500 MG capsule, TAKE 1 CAPSULE BY MOUTH 3 TIMES A DAY FOR 10 DAYS, Disp: , Rfl:     Cholecalciferol 25 MCG (1000 UT) capsule, Vitamin D3 25 mcg (1,000 unit) oral capsule take 1 capsule by oral route 2 times a day   Active, Disp: , Rfl:     clopidogrel (PLAVIX) 75 MG tablet, Take 1 tablet by mouth Daily., Disp: 90 tablet, Rfl: 3    cyanocobalamin (VITAMIN B-12) 2500  MCG tablet tablet, Take 500 mcg by mouth Daily., Disp: , Rfl:     empagliflozin (Jardiance) 25 MG tablet tablet, Take 1 tablet by mouth Daily., Disp: 90 tablet, Rfl: 1    fenofibrate (TRICOR) 145 MG tablet, Take 1 tablet by mouth Daily., Disp: 90 tablet, Rfl: 3    finasteride (PROSCAR) 5 MG tablet, TAKE 1 TABLET DAILY, Disp: 90 tablet, Rfl: 3    fish oil-omega-3 fatty acids 1000 MG capsule, Take 2 capsules by mouth Daily., Disp: 180 capsule, Rfl: 1    Insulin Glargine (Lantus SoloStar) 100 UNIT/ML injection pen, Inject 50 Units under the skin into the appropriate area as directed 2 (Two) Times a Day for 180 days., Disp: 88 mL, Rfl: 1    Insulin Lispro, 1 Unit Dial, (HumaLOG KwikPen) 100 UNIT/ML solution pen-injector, Inject 40 Units under the skin into the appropriate area as directed 3 (Three) Times a Day Before Meals for 180 days., Disp: 108 mL, Rfl: 1    lisinopril (PRINIVIL,ZESTRIL) 20 MG tablet, Take 1 tablet by mouth 2 (Two) Times a Day., Disp: 180 tablet, Rfl: 3    Multiple Vitamins-Minerals (VITEYES AREDS FORMULA PO), Take  by mouth., Disp: , Rfl:     mupirocin (BACTROBAN) 2 % ointment, APPLY TOPICALLY TO AFFECTED WOUNDS 2 TIMES A DAY, Disp: , Rfl:     nitroglycerin (NITROSTAT) 0.4 MG SL tablet, 1 under the tongue as needed for angina, may repeat q5mins for up three doses, Disp: 100 tablet, Rfl: 3    NovoLOG FlexPen 100 UNIT/ML solution pen-injector sc pen, INJECT 40 UNITS UNDER THE SKIN INTO THE APPROPRIATE AREA AS DIRECTED THREE TIMES A DAY WITH MEALS, Disp: 105 mL, Rfl: 3    pioglitazone (ACTOS) 30 MG tablet, Take 1 tablet by mouth Daily., Disp: 90 tablet, Rfl: 1    PRECISION XTRA TEST STRIPS test strip, Test 1-2 times each day, Disp: 200 each, Rfl: 1    rosuvastatin (CRESTOR) 20 MG tablet, Take 1 tablet by mouth Every Night., Disp: 90 tablet, Rfl: 5    Sure Comfort Pen Needles 32G X 6 MM misc, Use 1 each 5 (Five) Times a Day., Disp: 450 each, Rfl: 1    tolterodine LA (DETROL LA) 4 MG 24 hr capsule, Take  "1 capsule by mouth Every Night., Disp: , Rfl:     Objective     Vitals:    03/14/24 0855   Pulse: 75   SpO2: 96%   Weight: 101 kg (223 lb)   Height: 180.3 cm (71\")   PainSc: 0-No pain     Body mass index is 31.1 kg/m².    Physical Exam  Constitutional:       Appearance: Normal appearance. He is obese.      Comments: Obesity (BMI 30 - 39.9) Pt Current BMI = 31.1    HENT:      Head: Normocephalic and atraumatic.      Right Ear: External ear normal.      Left Ear: External ear normal.      Nose: Nose normal.   Eyes:      Extraocular Movements: Extraocular movements intact.      Conjunctiva/sclera: Conjunctivae normal.   Cardiovascular:      Pulses:           Dorsalis pedis pulses are detected w/ Doppler on the right side and detected w/ Doppler on the left side.        Posterior tibial pulses are detected w/ Doppler on the right side and detected w/ Doppler on the left side.   Pulmonary:      Effort: Pulmonary effort is normal.   Musculoskeletal:         General: Normal range of motion.      Cervical back: Normal range of motion.        Feet:    Feet:      Right foot:      Protective Sensation: 5 sites tested.  4 sites sensed.      Skin integrity: Callus present.      Toenail Condition: Right toenails are normal.      Left foot:      Protective Sensation: 5 sites tested.  5 sites sensed.      Skin integrity: Callus present.      Toenail Condition: Left toenails are normal.   Skin:     General: Skin is warm and dry.      Findings: Lesion and rash present.          Neurological:      General: No focal deficit present.      Mental Status: He is alert and oriented to person, place, and time. Mental status is at baseline.   Psychiatric:         Mood and Affect: Mood normal.         Behavior: Behavior normal.         Thought Content: Thought content normal.         Judgment: Judgment normal.         Diabetic Foot Exam Performed and Monofilament Test Performed    Result Review :   The following data was reviewed by: Magaly " JASON Pena on 03/14/2024:    Most Recent A1C          3/14/2024    09:08   HGBA1C Most Recent   Hemoglobin A1C 7.8        A1C Last 3 Results          9/18/2023    08:25 12/21/2023    08:24 3/14/2024    09:08   HGBA1C Last 3 Results   Hemoglobin A1C 8.1  7.9  7.8      A1c collected in the office today is 7.8%, indicating Uncontrolled Type II diabetes.  This result is down from the prior result of 7.9% collected on 12/21/23     Glucose   Date Value Ref Range Status   03/14/2024 83 70 - 99 mg/dL Final     Comment:     Serial Number: [id number]:  [id number]     Point of care glucose in the office today is within normal limits for nonfasting glucose    Creatinine   Date Value Ref Range Status   12/26/2023 1.20 0.76 - 1.27 mg/dL Final     eGFR   Date Value Ref Range Status   12/26/2023 64.7 >60.0 mL/min/1.73 Final     Labs collected on 12/26/23 show Stage II mild (GFR = 60-89mL/min)    Microalbumin, Urine   Date Value Ref Range Status   12/26/2023 2.7 mg/dL Final     Creatinine, Urine   Date Value Ref Range Status   12/26/2023 53.1 mg/dL Final     Microalbumin/Creatinine Ratio   Date Value Ref Range Status   12/26/2023 50.8 (H) 0.0 - 29.0 mg/g Final   02/21/2020 31.3 (H) 0.0 - 25.0 mg/g[Cre] Final     Urine microalbuminuria collected on 12/26/23 is positive for microalbuminuria    Total Cholesterol   Date Value Ref Range Status   12/26/2023 193 0 - 200 mg/dL Final   07/26/2023 186 0 - 200 mg/dL Final     Triglycerides   Date Value Ref Range Status   12/26/2023 181 (H) 0 - 150 mg/dL Final   07/26/2023 221 (H) 0 - 150 mg/dL Final     HDL Cholesterol   Date Value Ref Range Status   12/26/2023 46 40 - 60 mg/dL Final   07/26/2023 44 40 - 60 mg/dL Final     LDL Cholesterol    Date Value Ref Range Status   12/26/2023 115 (H) 0 - 100 mg/dL Final   07/26/2023 104 (H) 0 - 100 mg/dL Final     Lipid panel collected on 12/26/23 shows Hyperlipidemia, Hypercholesterolemia, and Hypertriglyceridemia            Assessment: He  has had minimal improvement in his A1c.  His glucose levels are better during the daytime hours after increasing the Lantus.  They do remain elevated during the overnight hours.  Patient reports having episodes of hypoglycemia during the day.  This is most likely due to overtreatment of carbohydrate intake.  The patient is routinely taking 40 units of NovoLog at every meal regardless of the amount of carbohydrates that are eaten.  We did talk about adjusting dose of insulin based on carbohydrate amounts in each meal.  Patient also has wounds on the right shin.  See wound photo.  The patient is currently being treated with antibiotics but was cautioned to monitor the wounds very closely as there is a slight appearance of some cellulitis adjacent to the wounds.      Diagnoses and all orders for this visit:    1. Uncontrolled type 2 diabetes mellitus with hyperglycemia (Primary)  -     POC Glycosylated Hemoglobin (Hb A1C)  -     empagliflozin (Jardiance) 25 MG tablet tablet; Take 1 tablet by mouth Daily.  Dispense: 90 tablet; Refill: 1  -     Insulin Glargine (Lantus SoloStar) 100 UNIT/ML injection pen; Inject 50 Units under the skin into the appropriate area as directed 2 (Two) Times a Day for 180 days.  Dispense: 88 mL; Refill: 1  -     pioglitazone (ACTOS) 30 MG tablet; Take 1 tablet by mouth Daily.  Dispense: 90 tablet; Refill: 1  -     Sure Comfort Pen Needles 32G X 6 MM misc; Use 1 each 5 (Five) Times a Day.  Dispense: 450 each; Refill: 1    2. Macular degeneration of both eyes, unspecified type    3. Abrasion of skin of right lower leg    4. Uses self-applied continuous glucose monitoring device    Other orders  -     POC Glucose        Plan: We will have the patient increase his Lantus to taking 50 units twice a day.  The patient will change his NovoLog insulin to using a small, medium, large dosing based on carbohydrates.  He will take 20, 30, or 40 units of insulin based on the carbohydrate content of the  meal.  He will follow-up with his primary care provider regarding the wounds on his legs.  If there is any worsening of the wounds he is recommended to present to the emergency room otherwise.    The patient will monitor his blood glucose levels using the CGM.  If he develops problematic hyperglycemia or hypoglycemia or adverse drug reactions, he will contact the office for further instructions.        Follow Up     Return in about 3 months (around 6/14/2024) for Medication Management, CGM Follow-up.    Patient was given instructions and counseling regarding his condition or for health maintenance advice. Please see specific information pulled into the AVS if appropriate.     Magaly Woodson, APRN  03/14/2024      Dictated Utilizing Dragon Dictation.  Please note that portions of this note were completed with a voice recognition program.  Part of this note may be an electronic transcription/translation of spoken language to printed text using the Dragon Dictation System.

## 2024-03-14 ENCOUNTER — OFFICE VISIT (OUTPATIENT)
Dept: DIABETES SERVICES | Facility: HOSPITAL | Age: 74
End: 2024-03-14
Payer: MEDICARE

## 2024-03-14 VITALS — HEART RATE: 75 BPM | OXYGEN SATURATION: 96 % | BODY MASS INDEX: 31.22 KG/M2 | HEIGHT: 71 IN | WEIGHT: 223 LBS

## 2024-03-14 DIAGNOSIS — H35.30 MACULAR DEGENERATION OF BOTH EYES, UNSPECIFIED TYPE: ICD-10-CM

## 2024-03-14 DIAGNOSIS — S80.811A ABRASION OF SKIN OF RIGHT LOWER LEG: ICD-10-CM

## 2024-03-14 DIAGNOSIS — E11.65 UNCONTROLLED TYPE 2 DIABETES MELLITUS WITH HYPERGLYCEMIA: Primary | ICD-10-CM

## 2024-03-14 DIAGNOSIS — Z97.8 USES SELF-APPLIED CONTINUOUS GLUCOSE MONITORING DEVICE: ICD-10-CM

## 2024-03-14 LAB
EXPIRATION DATE: ABNORMAL
GLUCOSE BLDC GLUCOMTR-MCNC: 83 MG/DL (ref 70–99)
HBA1C MFR BLD: 7.8 % (ref 4.5–5.7)
Lab: ABNORMAL

## 2024-03-14 PROCEDURE — 1160F RVW MEDS BY RX/DR IN RCRD: CPT | Performed by: NURSE PRACTITIONER

## 2024-03-14 PROCEDURE — G0463 HOSPITAL OUTPT CLINIC VISIT: HCPCS | Performed by: NURSE PRACTITIONER

## 2024-03-14 PROCEDURE — 1159F MED LIST DOCD IN RCRD: CPT | Performed by: NURSE PRACTITIONER

## 2024-03-14 PROCEDURE — 3051F HG A1C>EQUAL 7.0%<8.0%: CPT | Performed by: NURSE PRACTITIONER

## 2024-03-14 PROCEDURE — 99214 OFFICE O/P EST MOD 30 MIN: CPT | Performed by: NURSE PRACTITIONER

## 2024-03-14 PROCEDURE — 95251 CONT GLUC MNTR ANALYSIS I&R: CPT | Performed by: NURSE PRACTITIONER

## 2024-03-14 PROCEDURE — 82948 REAGENT STRIP/BLOOD GLUCOSE: CPT | Performed by: NURSE PRACTITIONER

## 2024-03-14 RX ORDER — INSULIN GLARGINE 100 [IU]/ML
50 INJECTION, SOLUTION SUBCUTANEOUS 2 TIMES DAILY
Qty: 88 ML | Refills: 1 | Status: SHIPPED | OUTPATIENT
Start: 2024-03-14 | End: 2024-09-10

## 2024-03-14 RX ORDER — PEN NEEDLE, DIABETIC 32GX 5/32"
1 NEEDLE, DISPOSABLE MISCELLANEOUS
Qty: 450 EACH | Refills: 1 | Status: SHIPPED | OUTPATIENT
Start: 2024-03-14

## 2024-03-14 RX ORDER — CEPHALEXIN 500 MG/1
CAPSULE ORAL
COMMUNITY
Start: 2024-03-11

## 2024-03-14 RX ORDER — PIOGLITAZONEHYDROCHLORIDE 30 MG/1
30 TABLET ORAL DAILY
Qty: 90 TABLET | Refills: 1 | Status: SHIPPED | OUTPATIENT
Start: 2024-03-14

## 2024-03-14 NOTE — PATIENT INSTRUCTIONS
Increase your Lantus to taking 50 units twice a day    Change your mealtime insulin using the following dosing:BEFORE each meal take NovoLog insulin as follows:    20 units for “small meal” (30 - 45 grams of carbohydrate)        (may take ½ dose if eating less than 30 grams)  30 units for “medium meal” (45 - 60 grams of carbohydrate)  40 units for “large meal” (60 - 90 grams of carbohydrate)

## 2024-04-22 ENCOUNTER — LAB (OUTPATIENT)
Dept: LAB | Facility: HOSPITAL | Age: 74
End: 2024-04-22
Payer: MEDICARE

## 2024-04-22 DIAGNOSIS — E78.2 HYPERLIPEMIA, MIXED: ICD-10-CM

## 2024-04-22 DIAGNOSIS — C61 PROSTATE CANCER: ICD-10-CM

## 2024-04-22 LAB
ALBUMIN SERPL-MCNC: 4.1 G/DL (ref 3.5–5.2)
ALP SERPL-CCNC: 54 U/L (ref 39–117)
ALT SERPL W P-5'-P-CCNC: 12 U/L (ref 1–41)
AST SERPL-CCNC: 21 U/L (ref 1–40)
BILIRUB CONJ SERPL-MCNC: <0.2 MG/DL (ref 0–0.3)
BILIRUB INDIRECT SERPL-MCNC: NORMAL MG/DL
BILIRUB SERPL-MCNC: 0.3 MG/DL (ref 0–1.2)
CHOLEST SERPL-MCNC: 172 MG/DL (ref 0–200)
HDLC SERPL-MCNC: 44 MG/DL (ref 40–60)
LDLC SERPL CALC-MCNC: 105 MG/DL (ref 0–100)
LDLC/HDLC SERPL: 2.34 {RATIO}
PROT SERPL-MCNC: 7 G/DL (ref 6–8.5)
PSA SERPL-MCNC: 0.09 NG/ML (ref 0–4)
TRIGL SERPL-MCNC: 126 MG/DL (ref 0–150)
VLDLC SERPL-MCNC: 23 MG/DL (ref 5–40)

## 2024-04-22 PROCEDURE — 80061 LIPID PANEL: CPT

## 2024-04-22 PROCEDURE — 84153 ASSAY OF PSA TOTAL: CPT

## 2024-04-22 PROCEDURE — 36415 COLL VENOUS BLD VENIPUNCTURE: CPT

## 2024-04-22 PROCEDURE — 80076 HEPATIC FUNCTION PANEL: CPT

## 2024-04-24 ENCOUNTER — TELEPHONE (OUTPATIENT)
Dept: CARDIOLOGY | Facility: CLINIC | Age: 74
End: 2024-04-24
Payer: MEDICARE

## 2024-04-24 DIAGNOSIS — E78.2 HYPERLIPEMIA, MIXED: Primary | ICD-10-CM

## 2024-04-24 RX ORDER — ROSUVASTATIN CALCIUM 40 MG/1
40 TABLET, COATED ORAL NIGHTLY
Qty: 90 TABLET | Refills: 3 | Status: SHIPPED | OUTPATIENT
Start: 2024-04-24

## 2024-04-24 NOTE — TELEPHONE ENCOUNTER
----- Message from JASON Jeffers sent at 4/23/2024  5:55 PM EDT -----  LDL remains above goal range, recommend increasing rosuvastatin dose to 40 mg nightly.  Repeat fasting lipid hepatic function panel in 3 months.  Continue omega-3 fatty acid 2000 mg daily and fenofibrate 145 mg daily.

## 2024-04-25 ENCOUNTER — PRE-ADMISSION TESTING (OUTPATIENT)
Dept: PREADMISSION TESTING | Facility: HOSPITAL | Age: 74
End: 2024-04-25
Payer: MEDICARE

## 2024-04-25 VITALS
RESPIRATION RATE: 16 BRPM | TEMPERATURE: 97 F | WEIGHT: 228.3 LBS | BODY MASS INDEX: 32.69 KG/M2 | HEART RATE: 69 BPM | HEIGHT: 70 IN | OXYGEN SATURATION: 97 % | SYSTOLIC BLOOD PRESSURE: 156 MMHG | DIASTOLIC BLOOD PRESSURE: 70 MMHG

## 2024-04-25 LAB
ABO GROUP BLD: NORMAL
ANION GAP SERPL CALCULATED.3IONS-SCNC: 10 MMOL/L (ref 5–15)
BLD GP AB SCN SERPL QL: NEGATIVE
BUN SERPL-MCNC: 19 MG/DL (ref 8–23)
BUN/CREAT SERPL: 19.4 (ref 7–25)
CALCIUM SPEC-SCNC: 9.3 MG/DL (ref 8.6–10.5)
CHLORIDE SERPL-SCNC: 107 MMOL/L (ref 98–107)
CO2 SERPL-SCNC: 25 MMOL/L (ref 22–29)
CREAT SERPL-MCNC: 0.98 MG/DL (ref 0.76–1.27)
DEPRECATED RDW RBC AUTO: 46.2 FL (ref 37–54)
EGFRCR SERPLBLD CKD-EPI 2021: 82.4 ML/MIN/1.73
ERYTHROCYTE [DISTWIDTH] IN BLOOD BY AUTOMATED COUNT: 13.5 % (ref 12.3–15.4)
GLUCOSE SERPL-MCNC: 136 MG/DL (ref 65–99)
HCT VFR BLD AUTO: 44.2 % (ref 37.5–51)
HGB BLD-MCNC: 13.9 G/DL (ref 13–17.7)
MCH RBC QN AUTO: 28.9 PG (ref 26.6–33)
MCHC RBC AUTO-ENTMCNC: 31.4 G/DL (ref 31.5–35.7)
MCV RBC AUTO: 91.9 FL (ref 79–97)
PLATELET # BLD AUTO: 221 10*3/MM3 (ref 140–450)
PMV BLD AUTO: 10.7 FL (ref 6–12)
POTASSIUM SERPL-SCNC: 4.1 MMOL/L (ref 3.5–5.2)
RBC # BLD AUTO: 4.81 10*6/MM3 (ref 4.14–5.8)
RH BLD: POSITIVE
SODIUM SERPL-SCNC: 142 MMOL/L (ref 136–145)
T&S EXPIRATION DATE: NORMAL
WBC NRBC COR # BLD AUTO: 5.77 10*3/MM3 (ref 3.4–10.8)

## 2024-04-25 PROCEDURE — 80048 BASIC METABOLIC PNL TOTAL CA: CPT

## 2024-04-25 PROCEDURE — 86850 RBC ANTIBODY SCREEN: CPT | Performed by: ORTHOPAEDIC SURGERY

## 2024-04-25 PROCEDURE — 86900 BLOOD TYPING SEROLOGIC ABO: CPT | Performed by: ORTHOPAEDIC SURGERY

## 2024-04-25 PROCEDURE — 86901 BLOOD TYPING SEROLOGIC RH(D): CPT | Performed by: ORTHOPAEDIC SURGERY

## 2024-04-25 PROCEDURE — 36415 COLL VENOUS BLD VENIPUNCTURE: CPT

## 2024-04-25 PROCEDURE — 85027 COMPLETE CBC AUTOMATED: CPT

## 2024-04-25 NOTE — DISCHARGE INSTRUCTIONS
Take the following medications the morning of surgery:  MACKENZIE PETERS    GO TO [facility].  DR. SANCHEZ'S OFFICE WILL CLARIFY YOUR TIME.      If you are on prescription narcotic pain medication to control your pain you may also take that medication the morning of surgery.    General Instructions:  Do not eat solid food after midnight the night before surgery.  You may drink clear liquids day of surgery but must stop at least one hour before your hospital arrival time.  It is beneficial for you to have a clear drink that contains carbohydrates the day of surgery.  We suggest a 12 to 20 ounce bottle of Gatorade or Powerade for non-diabetic patients or a 12 to 20 ounce bottle of G2 or Powerade Zero for diabetic patients. (Pediatric patients, are not advised to drink a 12 to 20 ounce carbohydrate drink)    Clear liquids are liquids you can see through.  Nothing red in color.     Plain water                               Sports drinks  Sodas                                   Gelatin (Jell-O)  Fruit juices without pulp such as white grape juice and apple juice  Popsicles that contain no fruit or yogurt  Tea or coffee (no cream or milk added)  Gatorade / Powerade  G2 / Powerade Zero    Infants may have breast milk up to four hours before surgery.  Infants drinking formula may drink formula up to six hours before surgery.   Patients who avoid smoking, chewing tobacco and alcohol for 4 weeks prior to surgery have a reduced risk of post-operative complications.  Quit smoking as many days before surgery as you can.  Do not smoke, use chewing tobacco or drink alcohol the day of surgery.   If applicable bring your C-PAP/ BI-PAP machine in with you to preop day of surgery.  Bring any papers given to you in the doctor’s office.  Wear clean comfortable clothes.  Do not wear contact lenses, false eyelashes or make-up.  Bring a case for your glasses.   Bring crutches or walker if applicable.  Remove all piercings.  Leave jewelry and  any other valuables at home.  Hair extensions with metal clips must be removed prior to surgery.  The Pre-Admission Testing nurse will instruct you to bring medications if unable to obtain an accurate list in Pre-Admission Testing.        If you were given a blood bank ID arm band remember to bring it with you the day of surgery.    Preventing a Surgical Site Infection:  For 2 to 3 days before surgery, avoid shaving with a razor because the razor can irritate skin and make it easier to develop an infection.    Any areas of open skin can increase the risk of a post-operative wound infection by allowing bacteria to enter and travel throughout the body.  Notify your surgeon if you have any skin wounds / rashes even if it is not near the expected surgical site.  The area will need assessed to determine if surgery should be delayed until it is healed.  The night prior to surgery shower using a fresh bar of anti-bacterial soap (such as Dial) and clean washcloth.  Sleep in a clean bed with clean clothing.  Do not allow pets to sleep with you.  Shower on the morning of surgery using a fresh bar of anti-bacterial soap (such as Dial) and clean washcloth.  Dry with a clean towel and dress in clean clothing.    CHLORHEXIDINE CLOTH INSTRUCTIONS  The morning of surgery follow these instructions using the Chlorhexidine cloths you've been given.  These steps reduce bacteria on the body.  Do not use the cloths near your eyes, ears mouth, genitalia or on open wounds.  Throw the cloths away after use but do not try to flush them down a toilet.      Open and remove one cloth at a time from the package.    Leave the cloth unfolded and begin the bathing.  Massage the skin with the cloths using gentle pressure to remove bacteria.  Do not scrub harshly.   Follow the steps below with one 2% CHG cloth per area (6 total cloths).  One cloth for neck, shoulders and chest.  One cloth for both arms, hands, fingers and underarms (do underarms  last).  One cloth for the abdomen followed by groin.  One cloth for right leg and foot including between the toes.  One cloth for left leg and foot including between the toes.  The last cloth is to be used for the back of the neck, back and buttocks.    Allow the CHG to air dry 3 minutes on the skin which will give it time to work and decrease the chance of irritation.  The skin may feel sticky until it is dry.  Do not rinse with water or any other liquid or you will lose the beneficial effects of the CHG.  If mild skin irritation occurs, do rinse the skin to remove the CHG.  Report this to the nurse at time of admission.  Do not apply lotions, creams, ointments, deodorants or perfumes after using the cloths. Dress in clean clothes before coming to the hospital.    Ask your surgeon if you will be receiving antibiotics prior to surgery.  Make sure you, your family, and all healthcare providers clean their hands with soap and water or an alcohol based hand  before caring for you or your wound.    Day of surgery:  Your arrival time is approximately two hours before your scheduled surgery time.  Upon arrival, a Pre-op nurse and Anesthesiologist will review your health history, obtain vital signs, and answer questions you may have.  The only belongings needed at this time will be a list of your home medications and if applicable your C-PAP/BI-PAP machine.  A Pre-op nurse will start an IV and you may receive medication in preparation for surgery, including something to help you relax.     Please be aware that surgery does come with discomfort.  We want to make every effort to control your discomfort so please discuss any uncontrolled symptoms with your nurse.   Your doctor will most likely have prescribed pain medications.      If you are going home after surgery you will receive individualized written care instructions before being discharged.  A responsible adult must drive you to and from the hospital on the  day of your surgery and ideally stay with you through the night.   .  Discharge prescriptions can be filled by the hospital pharmacy during regular pharmacy hours.  If you are having surgery late in the day/evening your prescription may be e-prescribed to your pharmacy.  Please verify your pharmacy hours or chose a 24 hour pharmacy to avoid not having access to your prescription because your pharmacy has closed for the day.    If you are staying overnight following surgery, you will be transported to your hospital room following the recovery period.  [facility] has all private rooms.    If you have any questions please call Pre-Admission Testing at [phone].  Deductibles and co-payments are collected on the day of service. Please be prepared to pay the required co-pay, deductible or deposit on the day of service as defined by your plan.    Call your surgeon immediately if you experience any of the following symptoms:  Sore Throat  Shortness of Breath or difficulty breathing  Cough  Chills  Body soreness or muscle pain  Headache  Fever  New loss of taste or smell  Do not arrive for your surgery ill.  Your procedure will need to be rescheduled to another time.  You will need to call your physician before the day of surgery to avoid any unnecessary exposure to hospital staff as well as other patients.

## 2024-05-02 ENCOUNTER — OFFICE VISIT (OUTPATIENT)
Dept: ORTHOPEDIC SURGERY | Facility: CLINIC | Age: 74
End: 2024-05-02
Payer: MEDICARE

## 2024-05-02 VITALS
HEIGHT: 70 IN | BODY MASS INDEX: 32.77 KG/M2 | DIASTOLIC BLOOD PRESSURE: 70 MMHG | SYSTOLIC BLOOD PRESSURE: 156 MMHG | WEIGHT: 228.9 LBS | TEMPERATURE: 98.2 F

## 2024-05-02 DIAGNOSIS — Z96.641 STATUS POST RIGHT HIP REPLACEMENT: Primary | ICD-10-CM

## 2024-05-02 NOTE — PROGRESS NOTES
Patient he is here today for H&P visit scheduled for right hip revision, unfortunately he has developed multiple sores right lower extremity, sounds like he has problems with chronic venous stasis, I have advised him we would need to hold off on surgery for now, given he also has diabetes he would be at high risk for infection.  Patient verbalized understanding, he will call his PCP to get this treated and taking care of ASAP and once his skin is completely healed and swelling has improved he will call us back and we will get his surgery rescheduled

## 2024-05-15 RX ORDER — CLOPIDOGREL BISULFATE 75 MG/1
75 TABLET ORAL DAILY
Qty: 90 TABLET | Refills: 3 | Status: SHIPPED | OUTPATIENT
Start: 2024-05-15

## 2024-05-15 NOTE — TELEPHONE ENCOUNTER
PT CAME INTO OFFICE REQUESTING PLAVIX REFILL.  KIKO PT.  PT WOULD LIKE REFILL TO BE SENT TO [facility].  SENT AS REQUESTED.

## 2024-05-23 ENCOUNTER — TELEPHONE (OUTPATIENT)
Dept: ORTHOPEDIC SURGERY | Facility: CLINIC | Age: 74
End: 2024-05-23

## 2024-05-23 NOTE — TELEPHONE ENCOUNTER
Provider: MARLIN    Caller: PATIENT    Phone Number: [phone]    Reason for Call: PATIENT STATES THAT THE SORES HE HAD ON HIS LEG HAVE HEALED, AND HE IS NO LONGER HAVING AN ISSUE. HE IS ASKING IF HE CAN GET HIS SURGERY R/S, OR DOES NAJMA WANT TO SEE HIM IN OFFICE AGAIN FIRST.

## 2024-06-02 NOTE — PLAN OF CARE
Problem: Perioperative Period (Adult)  Goal: Signs and Symptoms of Listed Potential Problems Will be Absent or Manageable (Perioperative Period)  Outcome: Ongoing (interventions implemented as appropriate)       You were seen in the ER today for your dizziness and elevated blood pressure readings.     In the ER your testing was overall reassuring. Your symptoms resolved.   Some of your symptoms may have been due to taking an extra dose of your home metoprolol. Please take all of your medications as prescribed and not more than prescribed except at the recommendation of your doctors.     Please follow up closely with your primary care provider within 2 days. Please call for an appointment. Please also follow up with your cardiologist as soon as possible. Call tomorrow.     Return to the ER if you develop any significant worsening of your symptoms worsening dizziness, lightheadedness, if you pass out, if you develop worsening chest pain, shortness of breath, leg swelling, if you develop a severe headache, vision changes, weakness, numbness/tingling or if any other new concerning symptoms develop.

## 2024-06-16 NOTE — PROGRESS NOTES
Chief Complaint  Diabetes (Follow up, med mgt, A1c eval, cgm eval )    Referred By: Sarina Javed,*    Subjective          Lucas Deluca presents to [facility] for diabetes medication management    History of Present Illness    Visit type:  follow-up  Diabetes type:  Type 2  Current diabetes status/concerns/issues:  He had an episode of low glucose of 53 mg/dL and struggled to get it back up quickly  Other health concerns:  He is scheduled for up upcoming revision of his right total hip replacement  Current Diabetes symptoms:    Polyuria: No   Polydipsia: No   Polyphagia: No   Blurred vision: No   Excessive fatigue: No  Known Diabetes complications:  Neuropathy: None; Location: N/A  Renal: 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  Actos 30 mg once a day   Blood glucose device:  Anipipo CGM  Blood glucose monitoring frequency:  Continuous per CGM  Blood glucose range/average:  The 14-day sensor report shows an average glucose of 170 mg/dL, with 60% in target range ( mgdL), 32% in the high range (181-250 mg/dL), 8% in the very high range (>250 mg/dL), 0% in the low range (54-70 mg/dL) and 0% in the very low range (<54 mg/dL).   Glucose Source: Device Reviewed  Diet:  Limits high carb/sweet foods, Avoids sugary drinks  Activity/Exercise:   dancing 4 nights a week    Past Medical History:   Diagnosis Date    CAD S/P  percutaneous coronary angioplasty 2017    Diabetes mellitus     Essential hypertension 2021    Hyperlipidemia     Hypertension     Macular degeneration      Past Surgical History:   Procedure Laterality Date    CATARACT EXTRACTION Bilateral     CORONARY ANGIOPLASTY WITH STENT PLACEMENT      CORONARY ARTERY BYPASS GRAFT      2004    SC TX TIBL SHFT FX IMED IMPLT W/WO SCREWS&/CERCLA Right 2017    Procedure: TIBIA INTRAMEDULLARY NAIL/SHANNON INSERTION;  Surgeon: Colton Nascimento MD;  Location: [facility];  Service: Orthopedics    TONSILLECTOMY      AGE 65     Family History   Problem Relation Age of Onset    Cancer Mother     Diabetes Mother     Heart disease Father     Diabetes Sister     Cancer Maternal Grandmother     Cancer Maternal Grandfather     Cancer Maternal Aunt     Cancer Maternal Uncle      Social History     Socioeconomic History    Marital status:    Tobacco Use    Smoking status: Former     Current packs/day: 0.00     Types: Cigarettes     Quit date: 1991     Years since quittin.5    Smokeless tobacco: Former   Vaping Use    Vaping status: Never Used   Substance and Sexual Activity    Alcohol use: No    Drug use: No    Sexual activity: Defer     No Known Allergies    Current Outpatient Medications:     amLODIPine (NORVASC) 10 MG tablet, Take 0.5 tablets by mouth Daily., Disp: 90 tablet, Rfl: 3    aspirin 81 MG chewable tablet, Chew 1 tablet Daily. HOLD PRIOR TO SURGERY PER MD INSTRUCTIONS, Disp: , Rfl:     B Complex Vitamins (VITAMIN B COMPLEX PO), HOLD FOR SURGERY, Disp: , Rfl:     carvedilol (COREG) 25 MG tablet, Take 1 tablet by mouth 2 (Two) Times a Day., Disp: 180 tablet, Rfl: 3    Cholecalciferol 25 MCG (1000 UT) capsule, HOLD FOR SURGERY, Disp: , Rfl:     clopidogrel (PLAVIX) 75 MG tablet, Take 1 tablet by mouth Daily., Disp: 90 tablet, Rfl: 3    empagliflozin (Jardiance) 25 MG tablet tablet, Take 1 tablet by mouth Daily., Disp: 90 tablet, Rfl: 1    fenofibrate  (TRICOR) 145 MG tablet, Take 1 tablet by mouth Daily., Disp: 90 tablet, Rfl: 3    finasteride (PROSCAR) 5 MG tablet, TAKE 1 TABLET DAILY, Disp: 90 tablet, Rfl: 3    fish oil-omega-3 fatty acids 1000 MG capsule, Take 2 capsules by mouth Daily. (Patient taking differently: Take 2 capsules by mouth Daily. HOLD FOR SURGERY), Disp: 180 capsule, Rfl: 1    insulin aspart (NovoLOG FlexPen) 100 UNIT/ML solution pen-injector sc pen, Inject 30 Units under the skin into the appropriate area as directed 3 (Three) Times a Day With Meals for 180 days., Disp: 81 mL, Rfl: 1    Insulin Glargine (Lantus SoloStar) 100 UNIT/ML injection pen, Inject 50 Units under the skin into the appropriate area as directed 2 (Two) Times a Day for 180 days., Disp: 88 mL, Rfl: 1    Insulin Lispro, 1 Unit Dial, (HumaLOG KwikPen) 100 UNIT/ML solution pen-injector, Inject 40 Units under the skin into the appropriate area as directed 3 (Three) Times a Day Before Meals for 180 days., Disp: 108 mL, Rfl: 1    lisinopril (PRINIVIL,ZESTRIL) 20 MG tablet, Take 1 tablet by mouth 2 (Two) Times a Day. (Patient taking differently: Take 1 tablet by mouth Daily.), Disp: 180 tablet, Rfl: 3    Multiple Vitamins-Minerals (VITEYES AREDS FORMULA PO), Take  by mouth. Hold for surgery, Disp: , Rfl:     nitroglycerin (NITROSTAT) 0.4 MG SL tablet, 1 under the tongue as needed for angina, may repeat q5mins for up three doses, Disp: 100 tablet, Rfl: 3    pioglitazone (ACTOS) 30 MG tablet, Take 1 tablet by mouth Daily., Disp: 90 tablet, Rfl: 1    PRECISION XTRA TEST STRIPS test strip, Test 1-2 times each day, Disp: 200 each, Rfl: 1    rosuvastatin (CRESTOR) 40 MG tablet, Take 1 tablet by mouth Every Night., Disp: 90 tablet, Rfl: 3    Sure Comfort Pen Needles 32G X 6 MM misc, Use 1 each 5 (Five) Times a Day., Disp: 450 each, Rfl: 1    tolterodine LA (DETROL LA) 4 MG 24 hr capsule, Take 1 capsule by mouth Every Night. (Patient taking differently: Take 2 mg by mouth Every Night.),  "Disp: , Rfl:     Objective     Vitals:    06/17/24 1028   BP: 137/58   Pulse: 62   SpO2: 98%   Weight: 99.3 kg (219 lb)   Height: 180.3 cm (71\")   PainSc: 0-No pain     Body mass index is 30.54 kg/m².    Physical Exam  Constitutional:       Appearance: Normal appearance. He is obese.      Comments: Obesity (BMI 30 - 39.9) Pt Current BMI = 30.54    HENT:      Head: Normocephalic and atraumatic.      Right Ear: External ear normal.      Left Ear: External ear normal.      Nose: Nose normal.   Eyes:      Extraocular Movements: Extraocular movements intact.      Conjunctiva/sclera: Conjunctivae normal.   Pulmonary:      Effort: Pulmonary effort is normal.   Musculoskeletal:         General: Normal range of motion.      Cervical back: Normal range of motion.   Skin:     General: Skin is warm and dry.   Neurological:      General: No focal deficit present.      Mental Status: He is alert and oriented to person, place, and time. Mental status is at baseline.   Psychiatric:         Mood and Affect: Mood normal.         Behavior: Behavior normal.         Thought Content: Thought content normal.         Judgment: Judgment normal.             Result Review :   The following data was reviewed by: JASON Burnham on 06/17/2024:    Most Recent A1C          6/17/2024    10:35   HGBA1C Most Recent   Hemoglobin A1C 7.3        A1C Last 3 Results          12/21/2023    08:24 3/14/2024    09:08 6/17/2024    10:35   HGBA1C Last 3 Results   Hemoglobin A1C 7.9  7.8  7.3      A1c collected in the office today is 7.3%, indicating Uncontrolled Type II diabetes.  This result is down from the prior result of 7.8% collected on 3/14/24     Glucose   Date Value Ref Range Status   06/17/2024 108 (H) 70 - 99 mg/dL Final     Comment:     Serial Number: [id number]:  [id number]     Point of care glucose in the office today is within normal limits for nonfasting glucose    Creatinine   Date Value Ref Range Status   04/25/2024 0.98 0.76 " - 1.27 mg/dL Final   12/26/2023 1.20 0.76 - 1.27 mg/dL Final     eGFR   Date Value Ref Range Status   04/25/2024 82.4 >60.0 mL/min/1.73 Final   12/26/2023 64.7 >60.0 mL/min/1.73 Final     Labs collected on 4/25/24 show Stage II mild (GFR = 60-89mL/min)      Total Cholesterol   Date Value Ref Range Status   04/22/2024 172 0 - 200 mg/dL Final   12/26/2023 193 0 - 200 mg/dL Final     Triglycerides   Date Value Ref Range Status   04/22/2024 126 0 - 150 mg/dL Final   12/26/2023 181 (H) 0 - 150 mg/dL Final     HDL Cholesterol   Date Value Ref Range Status   04/22/2024 44 40 - 60 mg/dL Final   12/26/2023 46 40 - 60 mg/dL Final     LDL Cholesterol    Date Value Ref Range Status   04/22/2024 105 (H) 0 - 100 mg/dL Final   12/26/2023 115 (H) 0 - 100 mg/dL Final     Lipid panel collected on 4/22/24 shows Hypercholesterolemia            Assessment: He has had improvement in his A1c but does remain above target.  In review of his CGM report he has a progressive steady climbing glucose levels throughout the evening.  In discussion with the patient was noted he is taking his first dose of Lantus in the morning and around 7 to 8 AM but he is waiting until 11 PM to take his second dose.  This may be causing the progressive rise in glucose levels as the evening progresses.      Diagnoses and all orders for this visit:    1. Uncontrolled type 2 diabetes mellitus with hyperglycemia (Primary)  -     POC Glycosylated Hemoglobin (Hb A1C)  -     insulin aspart (NovoLOG FlexPen) 100 UNIT/ML solution pen-injector sc pen; Inject 30 Units under the skin into the appropriate area as directed 3 (Three) Times a Day With Meals for 180 days.  Dispense: 81 mL; Refill: 1    2. Uses self-applied continuous glucose monitoring device    3. Obesity (BMI 30-39.9)    Other orders  -     POC Glucose        Plan: Patient was advised to change the timing on his Lantus insulin so it is spaced more appropriately apart at around 12 hours.  No other changes were  made to his treatment plan today.  The patient is scheduled for hip replacement and is advised to call our office if he has any difficulty managing his glucose levels postoperatively.    The patient will monitor his blood glucose levels using his CGM.  If he develops problematic hyperglycemia or hypoglycemia or adverse drug reactions, he will contact the office for further instructions.        Follow Up     Return in about 3 months (around 9/17/2024) for Medication Management, CGM Follow-up.    Patient was given instructions and counseling regarding his condition or for health maintenance advice. Please see specific information pulled into the AVS if appropriate.     Magaly Woodson, JASON  06/17/2024      Dictated Utilizing Dragon Dictation.  Please note that portions of this note were completed with a voice recognition program.  Part of this note may be an electronic transcription/translation of spoken language to printed text using the Dragon Dictation System.

## 2024-06-17 ENCOUNTER — OFFICE VISIT (OUTPATIENT)
Dept: DIABETES SERVICES | Facility: HOSPITAL | Age: 74
End: 2024-06-17
Payer: MEDICARE

## 2024-06-17 VITALS
BODY MASS INDEX: 30.66 KG/M2 | WEIGHT: 219 LBS | HEIGHT: 71 IN | HEART RATE: 62 BPM | SYSTOLIC BLOOD PRESSURE: 137 MMHG | OXYGEN SATURATION: 98 % | DIASTOLIC BLOOD PRESSURE: 58 MMHG

## 2024-06-17 DIAGNOSIS — Z97.8 USES SELF-APPLIED CONTINUOUS GLUCOSE MONITORING DEVICE: ICD-10-CM

## 2024-06-17 DIAGNOSIS — E11.65 UNCONTROLLED TYPE 2 DIABETES MELLITUS WITH HYPERGLYCEMIA: Primary | ICD-10-CM

## 2024-06-17 DIAGNOSIS — E66.9 OBESITY (BMI 30-39.9): ICD-10-CM

## 2024-06-17 LAB
EXPIRATION DATE: ABNORMAL
GLUCOSE BLDC GLUCOMTR-MCNC: 108 MG/DL (ref 70–99)
HBA1C MFR BLD: 7.3 % (ref 4.5–5.7)
Lab: ABNORMAL

## 2024-06-17 PROCEDURE — 99214 OFFICE O/P EST MOD 30 MIN: CPT | Performed by: NURSE PRACTITIONER

## 2024-06-17 PROCEDURE — 1159F MED LIST DOCD IN RCRD: CPT | Performed by: NURSE PRACTITIONER

## 2024-06-17 PROCEDURE — 82948 REAGENT STRIP/BLOOD GLUCOSE: CPT | Performed by: NURSE PRACTITIONER

## 2024-06-17 PROCEDURE — 3051F HG A1C>EQUAL 7.0%<8.0%: CPT | Performed by: NURSE PRACTITIONER

## 2024-06-17 PROCEDURE — 1160F RVW MEDS BY RX/DR IN RCRD: CPT | Performed by: NURSE PRACTITIONER

## 2024-06-17 PROCEDURE — G0463 HOSPITAL OUTPT CLINIC VISIT: HCPCS | Performed by: NURSE PRACTITIONER

## 2024-06-17 PROCEDURE — 95251 CONT GLUC MNTR ANALYSIS I&R: CPT | Performed by: NURSE PRACTITIONER

## 2024-06-17 PROCEDURE — 83036 HEMOGLOBIN GLYCOSYLATED A1C: CPT | Performed by: NURSE PRACTITIONER

## 2024-06-17 PROCEDURE — 3075F SYST BP GE 130 - 139MM HG: CPT | Performed by: NURSE PRACTITIONER

## 2024-06-17 PROCEDURE — 3078F DIAST BP <80 MM HG: CPT | Performed by: NURSE PRACTITIONER

## 2024-06-17 RX ORDER — INSULIN ASPART 100 [IU]/ML
30 INJECTION, SOLUTION INTRAVENOUS; SUBCUTANEOUS
Qty: 81 ML | Refills: 1 | Status: SHIPPED | OUTPATIENT
Start: 2024-06-17 | End: 2024-12-14

## 2024-06-21 ENCOUNTER — PRE-ADMISSION TESTING (OUTPATIENT)
Dept: PREADMISSION TESTING | Facility: HOSPITAL | Age: 74
End: 2024-06-21
Payer: MEDICARE

## 2024-06-21 VITALS
TEMPERATURE: 97.9 F | BODY MASS INDEX: 31.42 KG/M2 | HEIGHT: 70 IN | HEART RATE: 66 BPM | SYSTOLIC BLOOD PRESSURE: 153 MMHG | DIASTOLIC BLOOD PRESSURE: 74 MMHG | OXYGEN SATURATION: 98 % | WEIGHT: 219.5 LBS | RESPIRATION RATE: 16 BRPM

## 2024-06-21 LAB
ANION GAP SERPL CALCULATED.3IONS-SCNC: 13 MMOL/L (ref 5–15)
BUN SERPL-MCNC: 14 MG/DL (ref 8–23)
BUN/CREAT SERPL: 14.4 (ref 7–25)
CALCIUM SPEC-SCNC: 9.1 MG/DL (ref 8.6–10.5)
CHLORIDE SERPL-SCNC: 113 MMOL/L (ref 98–107)
CO2 SERPL-SCNC: 23 MMOL/L (ref 22–29)
CREAT SERPL-MCNC: 0.97 MG/DL (ref 0.76–1.27)
DEPRECATED RDW RBC AUTO: 46.1 FL (ref 37–54)
EGFRCR SERPLBLD CKD-EPI 2021: 83.5 ML/MIN/1.73
ERYTHROCYTE [DISTWIDTH] IN BLOOD BY AUTOMATED COUNT: 14 % (ref 12.3–15.4)
GLUCOSE SERPL-MCNC: 81 MG/DL (ref 65–99)
HCT VFR BLD AUTO: 41.8 % (ref 37.5–51)
HGB BLD-MCNC: 13.4 G/DL (ref 13–17.7)
MCH RBC QN AUTO: 28.8 PG (ref 26.6–33)
MCHC RBC AUTO-ENTMCNC: 32.1 G/DL (ref 31.5–35.7)
MCV RBC AUTO: 89.9 FL (ref 79–97)
PLATELET # BLD AUTO: 210 10*3/MM3 (ref 140–450)
PMV BLD AUTO: 10.8 FL (ref 6–12)
POTASSIUM SERPL-SCNC: 3.8 MMOL/L (ref 3.5–5.2)
RBC # BLD AUTO: 4.65 10*6/MM3 (ref 4.14–5.8)
SODIUM SERPL-SCNC: 149 MMOL/L (ref 136–145)
WBC NRBC COR # BLD AUTO: 5.95 10*3/MM3 (ref 3.4–10.8)

## 2024-06-21 PROCEDURE — 36415 COLL VENOUS BLD VENIPUNCTURE: CPT

## 2024-06-21 PROCEDURE — 85027 COMPLETE CBC AUTOMATED: CPT

## 2024-06-21 PROCEDURE — 80048 BASIC METABOLIC PNL TOTAL CA: CPT

## 2024-06-21 RX ORDER — TOLTERODINE 2 MG/1
2 CAPSULE, EXTENDED RELEASE ORAL NIGHTLY
COMMUNITY
Start: 2024-05-14

## 2024-06-21 NOTE — DISCHARGE INSTRUCTIONS
Take the following medications the morning of surgery:      COREG AND NORVASC    If you are on prescription narcotic pain medication to control your pain you may also take that medication the morning of surgery.    General Instructions:  Do not eat solid food after midnight the night before surgery.  You may drink clear liquids day of surgery but must stop at least one hour before your hospital arrival time.  It is beneficial for you to have a clear drink that contains carbohydrates the day of surgery.  We suggest a 12 to 20 ounce bottle of Gatorade or Powerade for non-diabetic patients or a 12 to 20 ounce bottle of G2 or Powerade Zero for diabetic patients. (Pediatric patients, are not advised to drink a 12 to 20 ounce carbohydrate drink)    Clear liquids are liquids you can see through.  Nothing red in color.     Plain water                               Sports drinks  Sodas                                   Gelatin (Jell-O)  Fruit juices without pulp such as white grape juice and apple juice  Popsicles that contain no fruit or yogurt  Tea or coffee (no cream or milk added)  Gatorade / Powerade  G2 / Powerade Zero    Infants may have breast milk up to four hours before surgery.  Infants drinking formula may drink formula up to six hours before surgery.   Patients who avoid smoking, chewing tobacco and alcohol for 4 weeks prior to surgery have a reduced risk of post-operative complications.  Quit smoking as many days before surgery as you can.  Do not smoke, use chewing tobacco or drink alcohol the day of surgery.   If applicable bring your C-PAP/ BI-PAP machine in with you to preop day of surgery.  Bring any papers given to you in the doctor’s office.  Wear clean comfortable clothes.  Do not wear contact lenses, false eyelashes or make-up.  Bring a case for your glasses.   Bring crutches or walker if applicable.  Remove all piercings.  Leave jewelry and any other valuables at home.  Hair extensions with metal  clips must be removed prior to surgery.  The Pre-Admission Testing nurse will instruct you to bring medications if unable to obtain an accurate list in Pre-Admission Testing.        If you were given a blood bank ID arm band remember to bring it with you the day of surgery.    Preventing a Surgical Site Infection:  For 2 to 3 days before surgery, avoid shaving with a razor because the razor can irritate skin and make it easier to develop an infection.    Any areas of open skin can increase the risk of a post-operative wound infection by allowing bacteria to enter and travel throughout the body.  Notify your surgeon if you have any skin wounds / rashes even if it is not near the expected surgical site.  The area will need assessed to determine if surgery should be delayed until it is healed.  The night prior to surgery shower using a fresh bar of anti-bacterial soap (such as Dial) and clean washcloth.  Sleep in a clean bed with clean clothing.  Do not allow pets to sleep with you.  Shower on the morning of surgery using a fresh bar of anti-bacterial soap (such as Dial) and clean washcloth.  Dry with a clean towel and dress in clean clothing.  Ask your surgeon if you will be receiving antibiotics prior to surgery.  Make sure you, your family, and all healthcare providers clean their hands with soap and water or an alcohol based hand  before caring for you or your wound.    Day of surgery:  Your arrival time is approximately two hours before your scheduled surgery time.  Upon arrival, a Pre-op nurse and Anesthesiologist will review your health history, obtain vital signs, and answer questions you may have.  The only belongings needed at this time will be a list of your home medications and if applicable your C-PAP/BI-PAP machine.  A Pre-op nurse will start an IV and you may receive medication in preparation for surgery, including something to help you relax.     Please be aware that surgery does come with  discomfort.  We want to make every effort to control your discomfort so please discuss any uncontrolled symptoms with your nurse.   Your doctor will most likely have prescribed pain medications.      If you are going home after surgery you will receive individualized written care instructions before being discharged.  A responsible adult must drive you to and from the hospital on the day of your surgery and ideally stay with you through the night.   .  Discharge prescriptions can be filled by the hospital pharmacy during regular pharmacy hours.  If you are having surgery late in the day/evening your prescription may be e-prescribed to your pharmacy.  Please verify your pharmacy hours or chose a 24 hour pharmacy to avoid not having access to your prescription because your pharmacy has closed for the day.    If you are staying overnight following surgery, you will be transported to your hospital room following the recovery period.  [facility] has all private rooms.    If you have any questions please call Pre-Admission Testing at [phone].  Deductibles and co-payments are collected on the day of service. Please be prepared to pay the required co-pay, deductible or deposit on the day of service as defined by your plan.    Call your surgeon immediately if you experience any of the following symptoms:  Sore Throat  Shortness of Breath or difficulty breathing  Cough  Chills  Body soreness or muscle pain  Headache  Fever  New loss of taste or smell  Do not arrive for your surgery ill.  Your procedure will need to be rescheduled to another time.  You will need to call your physician before the day of surgery to avoid any unnecessary exposure to hospital staff as well as other patients.      CHLORHEXIDINE CLOTH INSTRUCTIONS  The morning of surgery follow these instructions using the Chlorhexidine cloths you've been given.  These steps reduce bacteria on the body.  Do not use the cloths near your eyes,  ears mouth, genitalia or on open wounds.  Throw the cloths away after use but do not try to flush them down a toilet.      Open and remove one cloth at a time from the package.    Leave the cloth unfolded and begin the bathing.  Massage the skin with the cloths using gentle pressure to remove bacteria.  Do not scrub harshly.   Follow the steps below with one 2% CHG cloth per area (6 total cloths).  One cloth for neck, shoulders and chest.  One cloth for both arms, hands, fingers and underarms (do underarms last).  One cloth for the abdomen followed by groin.  One cloth for right leg and foot including between the toes.  One cloth for left leg and foot including between the toes.  The last cloth is to be used for the back of the neck, back and buttocks.    Allow the CHG to air dry 3 minutes on the skin which will give it time to work and decrease the chance of irritation.  The skin may feel sticky until it is dry.  Do not rinse with water or any other liquid or you will lose the beneficial effects of the CHG.  If mild skin irritation occurs, do rinse the skin to remove the CHG.  Report this to the nurse at time of admission.  Do not apply lotions, creams, ointments, deodorants or perfumes after using the clothes. Dress in clean clothes before coming to the hospital.

## 2024-06-27 ENCOUNTER — OFFICE VISIT (OUTPATIENT)
Dept: ORTHOPEDIC SURGERY | Facility: CLINIC | Age: 74
End: 2024-06-27
Payer: MEDICARE

## 2024-06-27 VITALS
TEMPERATURE: 97.8 F | BODY MASS INDEX: 32.2 KG/M2 | DIASTOLIC BLOOD PRESSURE: 74 MMHG | SYSTOLIC BLOOD PRESSURE: 153 MMHG | WEIGHT: 217.4 LBS | HEIGHT: 69 IN

## 2024-06-27 DIAGNOSIS — Z96.641 STATUS POST RIGHT HIP REPLACEMENT: Primary | ICD-10-CM

## 2024-06-27 PROCEDURE — 1160F RVW MEDS BY RX/DR IN RCRD: CPT | Performed by: NURSE PRACTITIONER

## 2024-06-27 PROCEDURE — 3078F DIAST BP <80 MM HG: CPT | Performed by: NURSE PRACTITIONER

## 2024-06-27 PROCEDURE — 1159F MED LIST DOCD IN RCRD: CPT | Performed by: NURSE PRACTITIONER

## 2024-06-27 PROCEDURE — 3077F SYST BP >= 140 MM HG: CPT | Performed by: NURSE PRACTITIONER

## 2024-06-27 PROCEDURE — S0260 H&P FOR SURGERY: HCPCS | Performed by: NURSE PRACTITIONER

## 2024-06-27 NOTE — H&P
Patient: Lucas Deluca    Date of Admission: 7/3/2024    YOB: 1950    Medical Record Number: [id number]    Admitting Physician: Dr. Hebert Jung    Reason for Admission: Right hip revision    History of Present Illness: 73 y.o. male presents with severe hip pain, has had partial hip replacement with metal rubbing on sub chondral bone, he is scheduled for right hip revision    Allergies: No Known Allergies      Current Medications:  Home Medications:    Current Outpatient Medications on File Prior to Visit   Medication Sig    amLODIPine (NORVASC) 10 MG tablet Take 0.5 tablets by mouth Daily.    aspirin 81 MG chewable tablet Chew 1 tablet Daily. HOLD PRIOR TO SURGERY PER MD INSTRUCTIONS    B Complex Vitamins (VITAMIN B COMPLEX PO) HOLD FOR SURGERY    carvedilol (COREG) 25 MG tablet Take 1 tablet by mouth 2 (Two) Times a Day.    Cholecalciferol 25 MCG (1000 UT) capsule HOLD FOR SURGERY    clopidogrel (PLAVIX) 75 MG tablet Take 1 tablet by mouth Daily. (Patient taking differently: Take 1 tablet by mouth Daily. PT IS HOLDING FOR 5 DAYS BEFORE SURGERY PER CARDIOLOGY)    empagliflozin (Jardiance) 25 MG tablet tablet Take 1 tablet by mouth Daily.    fenofibrate (TRICOR) 145 MG tablet Take 1 tablet by mouth Daily.    finasteride (PROSCAR) 5 MG tablet TAKE 1 TABLET DAILY    fish oil-omega-3 fatty acids 1000 MG capsule Take 2 capsules by mouth Daily. (Patient taking differently: Take 2 capsules by mouth Daily. HOLD FOR SURGERY)    insulin aspart (NovoLOG FlexPen) 100 UNIT/ML solution pen-injector sc pen Inject 30 Units under the skin into the appropriate area as directed 3 (Three) Times a Day With Meals for 180 days. (Patient taking differently: Inject 30 Units under the skin into the appropriate area as directed 3 (Three) Times a Day With Meals. Pt is not taking currently)    Insulin Glargine (Lantus SoloStar) 100 UNIT/ML injection pen Inject 50 Units under the skin into the appropriate area as directed 2  (Two) Times a Day for 180 days.    Insulin Lispro, 1 Unit Dial, (HumaLOG KwikPen) 100 UNIT/ML solution pen-injector Inject 40 Units under the skin into the appropriate area as directed 3 (Three) Times a Day Before Meals for 180 days.    lisinopril (PRINIVIL,ZESTRIL) 20 MG tablet Take 1 tablet by mouth 2 (Two) Times a Day. (Patient taking differently: Take 1 tablet by mouth Daily.)    Multiple Vitamins-Minerals (VITEYES AREDS FORMULA PO) Take  by mouth. HOLD FOR SURGERY    nitroglycerin (NITROSTAT) 0.4 MG SL tablet 1 under the tongue as needed for angina, may repeat q5mins for up three doses    pioglitazone (ACTOS) 30 MG tablet Take 1 tablet by mouth Daily.    PRECISION XTRA TEST STRIPS test strip Test 1-2 times each day    rosuvastatin (CRESTOR) 40 MG tablet Take 1 tablet by mouth Every Night.    Sure Comfort Pen Needles 32G X 6 MM misc Use 1 each 5 (Five) Times a Day.    tolterodine LA (DETROL LA) 2 MG 24 hr capsule Take 1 capsule by mouth Every Night.    tolterodine LA (DETROL LA) 4 MG 24 hr capsule Take 1 capsule by mouth Every Night. (Patient taking differently: Take 2 mg by mouth Every Night.)     No current facility-administered medications on file prior to visit.     PRN Meds:.    PMH:  Past Medical History:   Diagnosis Date    Arthritis     CAD S/P percutaneous coronary angioplasty 01/14/2017    Diabetes mellitus     Essential hypertension 12/12/2021    History of prostate cancer     Hyperlipidemia     Hypertension     Macular degeneration     Right hip pain     Sleep apnea     CPAP        PSURGH:  Past Surgical History:   Procedure Laterality Date    CATARACT EXTRACTION Bilateral     CORONARY ANGIOPLASTY WITH STENT PLACEMENT      CORONARY ARTERY BYPASS GRAFT      2004 X 4 VESSEL    CYSTOSCOPY TRANSURETHRAL RESECTION OF PROSTATE      [place] TX TIBL SHFT FX IMED IMPLT W/WO SCREWS&/CERCLA Right 01/12/2017    Procedure: TIBIA INTRAMEDULLARY NAIL/SHANNON INSERTION;  Surgeon: Colton Nascimento MD;  [facility]  "OR;  Service: Orthopedics    TONSILLECTOMY      AGE 65       SocialHx:  Social History     Occupational History    Not on file   Tobacco Use    Smoking status: Former     Current packs/day: 0.00     Types: Cigarettes     Quit date: 1991     Years since quittin.5    Smokeless tobacco: Former   Vaping Use    Vaping status: Never Used   Substance and Sexual Activity    Alcohol use: No    Drug use: No    Sexual activity: Defer      Social History     Social History Narrative    Not on file       FamHx:  Family History   Problem Relation Age of Onset    Cancer Mother     Diabetes Mother     Heart disease Father     Diabetes Sister     Cancer Maternal Aunt     Cancer Maternal Uncle     Cancer Maternal Grandmother     Cancer Maternal Grandfather     Malig Hyperthermia Neg Hx          Review of Systems:   A 14 point review of systems was performed, pertinent positives discussed above, all other systems are negative    Physical Exam: 73 y.o. male  Vital Signs :   Vitals:    24 1535   BP: 153/74   BP Location: Left arm   Patient Position: Sitting   Cuff Size: Large Adult   Temp: 97.8 °F (36.6 °C)   TempSrc: Temporal   Weight: 98.6 kg (217 lb 6.4 oz)   Height: 176.5 cm (69.49\")   PainSc:   3   PainLoc: Hip     General: Alert and Oriented x 3, No acute distress.  Psych: mood and affect appropriate; recent and remote memory intact  Eyes: conjunctivae clear; pupils equally round and reactive, sclerae antiicteric  CV: no peripheral edema  Resp: normal respiratory effort  Skin: no rashes or wounds; normal turgor  Musculosketetal; pain with hip range of motion. Positive Stinchfeld test. No trochanteric tenderness.  Vascular: palpable distal pulses    Labs:    Pre-Admission Testing on 2024   Component Date Value Ref Range Status    Glucose 2024 81  65 - 99 mg/dL Final    BUN 2024 14  8 - 23 mg/dL Final    Creatinine 2024 0.97  0.76 - 1.27 mg/dL Final    Sodium 2024 149 (H)  136 - 145 " mmol/L Final    Potassium 06/21/2024 3.8  3.5 - 5.2 mmol/L Final    Chloride 06/21/2024 113 (H)  98 - 107 mmol/L Final    CO2 06/21/2024 23.0  22.0 - 29.0 mmol/L Final    Calcium 06/21/2024 9.1  8.6 - 10.5 mg/dL Final    BUN/Creatinine Ratio 06/21/2024 14.4  7.0 - 25.0 Final    Anion Gap 06/21/2024 13.0  5.0 - 15.0 mmol/L Final    eGFR 06/21/2024 83.5  >60.0 mL/min/1.73 Final    WBC 06/21/2024 5.95  3.40 - 10.80 10*3/mm3 Final    RBC 06/21/2024 4.65  4.14 - 5.80 10*6/mm3 Final    Hemoglobin 06/21/2024 13.4  13.0 - 17.7 g/dL Final    Hematocrit 06/21/2024 41.8  37.5 - 51.0 % Final    MCV 06/21/2024 89.9  79.0 - 97.0 fL Final    MCH 06/21/2024 28.8  26.6 - 33.0 pg Final    MCHC 06/21/2024 32.1  31.5 - 35.7 g/dL Final    RDW 06/21/2024 14.0  12.3 - 15.4 % Final    RDW-SD 06/21/2024 46.1  37.0 - 54.0 fl Final    MPV 06/21/2024 10.8  6.0 - 12.0 fL Final    Platelets 06/21/2024 210  140 - 450 10*3/mm3 Final   Office Visit on 06/17/2024   Component Date Value Ref Range Status    Hemoglobin A1C 06/17/2024 7.3 (A)  4.5 - 5.7 % Final    Lot Number 06/17/2024 #[id number]   Final    Expiration Date 06/17/2024 02.12.26   Final    Glucose 06/17/2024 108 (H)  70 - 99 mg/dL Final    Serial Number: [id number]:  [id number]     Xrays:  Previous x-rays of the right hip were reviewed and show a previously placed right partial hip replacement with metal rubbing on subchondral bone    Assessment: Painful right hip bipolar hemiarthroplasty    Plan:  The plan is to proceed with right hip revision the patient voiced understanding of the risks, benefits, and alternative forms of treatment that were discussed with Dr Jung at the time of scheduling.  Overnight stay with [facility]    Maricel Cole, JASON  6/27/2024

## 2024-07-02 ENCOUNTER — TELEPHONE (OUTPATIENT)
Dept: ORTHOPEDIC SURGERY | Facility: CLINIC | Age: 74
End: 2024-07-02
Payer: MEDICARE

## 2024-07-02 NOTE — TELEPHONE ENCOUNTER
While speaking with patient about surgery arrival, he asked about Estelita 2 Freestyle sensor. He had put a new one on on 7/1/24 and it's supposed to last for 2 weeks. He was questioning if it would need to be removed. It's located on his arm.    Per Bernadine, the patient may keep the sensor on but needs to inform the pre-op nurse that it's there and the location.     I called the patient back and relayed the above information. Patient expressed understanding.

## 2024-07-03 ENCOUNTER — ANESTHESIA EVENT (OUTPATIENT)
Dept: PERIOP | Facility: HOSPITAL | Age: 74
End: 2024-07-03
Payer: MEDICARE

## 2024-07-03 ENCOUNTER — ANESTHESIA (OUTPATIENT)
Dept: PERIOP | Facility: HOSPITAL | Age: 74
End: 2024-07-03
Payer: MEDICARE

## 2024-07-03 ENCOUNTER — HOSPITAL ENCOUNTER (OUTPATIENT)
Facility: HOSPITAL | Age: 74
Setting detail: SURGERY ADMIT
Discharge: HOME | End: 2024-07-03
Attending: ORTHOPAEDIC SURGERY | Admitting: ORTHOPAEDIC SURGERY
Payer: MEDICARE

## 2024-07-03 DIAGNOSIS — Z96.641 STATUS POST RIGHT HIP REPLACEMENT: ICD-10-CM

## 2024-07-03 LAB — GLUCOSE BLDC GLUCOMTR-MCNC: 78 MG/DL (ref 70–130)

## 2024-07-03 PROCEDURE — 82948 REAGENT STRIP/BLOOD GLUCOSE: CPT

## 2024-07-03 PROCEDURE — G0463 HOSPITAL OUTPT CLINIC VISIT: HCPCS | Performed by: ORTHOPAEDIC SURGERY

## 2024-07-03 RX ORDER — ONDANSETRON 2 MG/ML
4 INJECTION INTRAMUSCULAR; INTRAVENOUS ONCE AS NEEDED
Status: CANCELLED | OUTPATIENT
Start: 2024-07-03

## 2024-07-03 RX ORDER — HYDROCODONE BITARTRATE AND ACETAMINOPHEN 5; 325 MG/1; MG/1
1 TABLET ORAL ONCE AS NEEDED
Status: CANCELLED | OUTPATIENT
Start: 2024-07-03

## 2024-07-03 RX ORDER — SODIUM CHLORIDE 0.9 % (FLUSH) 0.9 %
3 SYRINGE (ML) INJECTION EVERY 12 HOURS SCHEDULED
Status: DISCONTINUED | OUTPATIENT
Start: 2024-07-03 | End: 2024-07-03 | Stop reason: HOSPADM

## 2024-07-03 RX ORDER — EPHEDRINE SULFATE 50 MG/ML
5 INJECTION, SOLUTION INTRAVENOUS ONCE AS NEEDED
Status: CANCELLED | OUTPATIENT
Start: 2024-07-03

## 2024-07-03 RX ORDER — MIDAZOLAM HYDROCHLORIDE 1 MG/ML
0.5 INJECTION INTRAMUSCULAR; INTRAVENOUS
Status: DISCONTINUED | OUTPATIENT
Start: 2024-07-03 | End: 2024-07-03 | Stop reason: HOSPADM

## 2024-07-03 RX ORDER — FENTANYL CITRATE 50 UG/ML
50 INJECTION, SOLUTION INTRAMUSCULAR; INTRAVENOUS
Status: DISCONTINUED | OUTPATIENT
Start: 2024-07-03 | End: 2024-07-03 | Stop reason: HOSPADM

## 2024-07-03 RX ORDER — OXYCODONE AND ACETAMINOPHEN 7.5; 325 MG/1; MG/1
1 TABLET ORAL EVERY 4 HOURS PRN
Status: CANCELLED | OUTPATIENT
Start: 2024-07-03 | End: 2024-07-10

## 2024-07-03 RX ORDER — MELOXICAM 15 MG/1
15 TABLET ORAL ONCE
Status: DISCONTINUED | OUTPATIENT
Start: 2024-07-03 | End: 2024-07-03 | Stop reason: HOSPADM

## 2024-07-03 RX ORDER — FENTANYL CITRATE 50 UG/ML
50 INJECTION, SOLUTION INTRAMUSCULAR; INTRAVENOUS
Status: CANCELLED | OUTPATIENT
Start: 2024-07-03

## 2024-07-03 RX ORDER — IPRATROPIUM BROMIDE AND ALBUTEROL SULFATE 2.5; .5 MG/3ML; MG/3ML
3 SOLUTION RESPIRATORY (INHALATION) ONCE AS NEEDED
Status: CANCELLED | OUTPATIENT
Start: 2024-07-03

## 2024-07-03 RX ORDER — DROPERIDOL 2.5 MG/ML
0.62 INJECTION, SOLUTION INTRAMUSCULAR; INTRAVENOUS
Status: CANCELLED | OUTPATIENT
Start: 2024-07-03

## 2024-07-03 RX ORDER — SODIUM CHLORIDE, SODIUM LACTATE, POTASSIUM CHLORIDE, CALCIUM CHLORIDE 600; 310; 30; 20 MG/100ML; MG/100ML; MG/100ML; MG/100ML
9 INJECTION, SOLUTION INTRAVENOUS CONTINUOUS
Status: DISCONTINUED | OUTPATIENT
Start: 2024-07-03 | End: 2024-07-03 | Stop reason: HOSPADM

## 2024-07-03 RX ORDER — HYDRALAZINE HYDROCHLORIDE 20 MG/ML
5 INJECTION INTRAMUSCULAR; INTRAVENOUS
Status: CANCELLED | OUTPATIENT
Start: 2024-07-03

## 2024-07-03 RX ORDER — PROMETHAZINE HYDROCHLORIDE 25 MG/1
25 TABLET ORAL ONCE AS NEEDED
Status: CANCELLED | OUTPATIENT
Start: 2024-07-03

## 2024-07-03 RX ORDER — LABETALOL HYDROCHLORIDE 5 MG/ML
5 INJECTION, SOLUTION INTRAVENOUS
Status: CANCELLED | OUTPATIENT
Start: 2024-07-03

## 2024-07-03 RX ORDER — FLUMAZENIL 0.1 MG/ML
0.2 INJECTION INTRAVENOUS AS NEEDED
Status: CANCELLED | OUTPATIENT
Start: 2024-07-03

## 2024-07-03 RX ORDER — PREGABALIN 75 MG/1
150 CAPSULE ORAL ONCE
Status: DISCONTINUED | OUTPATIENT
Start: 2024-07-03 | End: 2024-07-03 | Stop reason: HOSPADM

## 2024-07-03 RX ORDER — SODIUM CHLORIDE 0.9 % (FLUSH) 0.9 %
3-10 SYRINGE (ML) INJECTION AS NEEDED
Status: DISCONTINUED | OUTPATIENT
Start: 2024-07-03 | End: 2024-07-03 | Stop reason: HOSPADM

## 2024-07-03 RX ORDER — PROMETHAZINE HYDROCHLORIDE 25 MG/1
25 SUPPOSITORY RECTAL ONCE AS NEEDED
Status: CANCELLED | OUTPATIENT
Start: 2024-07-03

## 2024-07-03 RX ORDER — FAMOTIDINE 10 MG/ML
20 INJECTION, SOLUTION INTRAVENOUS ONCE
Status: DISCONTINUED | OUTPATIENT
Start: 2024-07-03 | End: 2024-07-03 | Stop reason: HOSPADM

## 2024-07-03 RX ORDER — NALOXONE HCL 0.4 MG/ML
0.2 VIAL (ML) INJECTION AS NEEDED
Status: CANCELLED | OUTPATIENT
Start: 2024-07-03

## 2024-07-03 RX ORDER — DIPHENHYDRAMINE HYDROCHLORIDE 50 MG/ML
12.5 INJECTION INTRAMUSCULAR; INTRAVENOUS
Status: CANCELLED | OUTPATIENT
Start: 2024-07-03

## 2024-07-03 RX ORDER — HYDROMORPHONE HYDROCHLORIDE 1 MG/ML
0.5 INJECTION, SOLUTION INTRAMUSCULAR; INTRAVENOUS; SUBCUTANEOUS
Status: CANCELLED | OUTPATIENT
Start: 2024-07-03

## 2024-07-03 RX ORDER — VANCOMYCIN/0.9 % SOD CHLORIDE 1.5G/250ML
15 PLASTIC BAG, INJECTION (ML) INTRAVENOUS ONCE
Status: DISCONTINUED | OUTPATIENT
Start: 2024-07-03 | End: 2024-07-03 | Stop reason: HOSPADM

## 2024-07-03 NOTE — PERIOPERATIVE NURSING NOTE
Patient admitted for right hip replacement and acetabular revision to pre-op. Wound noted on right lower leg approximately 3 inches long and 1 1/2 to 2 inch in diameter, weeping serous fluid. Surrounding skin red, warm and firm to touch with edema noted from knee to foot. Poor pedal pulse noted. Patient states NP at [facility] Jung's office aware of wound and has been in treatment for chronic right lower extremity wounds. Wounds documented and shown to Dr. Jung, case to be cancelled and patient instructed to contact previous wound care specialists regarding current wound.

## 2024-07-03 NOTE — ANESTHESIA PREPROCEDURE EVALUATION
Anesthesia Evaluation     no history of anesthetic complications:   NPO Solid Status: > 8 hours  NPO Liquid Status: > 2 hours           Airway   Mallampati: II  Neck ROM: full  no difficulty expected  Dental    (+) edentulous    Pulmonary - normal exam   (+) a smoker Former,sleep apnea on CPAP  (-) COPD, asthma    PE comment: nonlabored  Cardiovascular - normal exam  Exercise tolerance: good (4-7 METS)    Rhythm: regular  Rate: normal    (+) hypertension, CAD, CABG, cardiac stents , hyperlipidemia  (-) valvular problems/murmurs, past MI, dysrhythmias, angina      Neuro/Psych- negative ROS  (-) seizures, TIA, CVA  GI/Hepatic/Renal/Endo    (+) obesity, diabetes mellitus  (-) GERD, liver disease, no renal disease, no thyroid disorder    Musculoskeletal     (+) arthralgias  Abdominal    Substance History      OB/GYN          Other   arthritis,   history of cancer (prostate cancer)                  Anesthesia Plan    ASA 3     general   total IV anesthesia  intravenous induction     Anesthetic plan, risks, benefits, and alternatives have been provided, discussed and informed consent has been obtained with: patient.    CODE STATUS:

## 2024-07-08 ENCOUNTER — TELEPHONE (OUTPATIENT)
Dept: ORTHOPEDIC SURGERY | Facility: CLINIC | Age: 74
End: 2024-07-08

## 2024-07-08 NOTE — TELEPHONE ENCOUNTER
Caller: Lucas Landry    Relationship: Self    Best call back number: [phone] (home)     Who are you requesting to speak with (clinical staff, provider,  specific staff member): CLINICAL    What was the call regarding: MR LANDRY'S SX ON 7/3/24 WAS CANCELLED DUE TO AN ULCER. HE WOULD LIKE TO KNOW WHAT THE NEXT STEPS ARE TO GET BACK ON THE SCHEDULE FOR SX    Is it okay if the provider responds through MyChart: CALL

## 2024-07-09 NOTE — TELEPHONE ENCOUNTER
Called patient and relayed message from JASON Valle. Patient expressed understanding and will call to schedule once wound is healed.

## 2024-07-09 NOTE — TELEPHONE ENCOUNTER
Spoke to patient. He stated he is scheduled to see a wound care doctor on 7/15/24. Patient stated last time he had a similar wound, that provider used a treatment method that cleared the wounds within a week. Patient is diabetic and blisters easily. He is concerned because he has daily swelling in his leg from previous break and that is what causes his blisters.     Patient is inquiring about how long he needs to wait to reschedule surgery.

## 2024-07-09 NOTE — TELEPHONE ENCOUNTER
Per Dr. Jung, once skin is completely healed, no further wounds noted, patient can call back and we will get his surgery rescheduled.

## 2024-07-09 NOTE — TELEPHONE ENCOUNTER
Returned patient's call, but had to leave a message. Provided my direct line for him to call back. [phone]

## 2024-07-17 ENCOUNTER — TELEPHONE (OUTPATIENT)
Dept: ORTHOPEDIC SURGERY | Facility: CLINIC | Age: 74
End: 2024-07-17
Payer: MEDICARE

## 2024-07-17 NOTE — TELEPHONE ENCOUNTER
Patient called to let us know wound doctor has cleared him and stated wounds are healed. He would like to get back on the schedule for Rt DEREJE Revision. Patient is susceptible to developing wounds due to diabetes and leg swelling. Dr. Jung's first available is about 12 weeks out.

## 2024-07-18 ENCOUNTER — OFFICE VISIT (OUTPATIENT)
Dept: ORTHOPEDIC SURGERY | Facility: CLINIC | Age: 74
End: 2024-07-18
Payer: MEDICARE

## 2024-07-18 VITALS — HEIGHT: 71 IN | BODY MASS INDEX: 30.6 KG/M2 | TEMPERATURE: 98.2 F | WEIGHT: 218.6 LBS

## 2024-07-18 DIAGNOSIS — Z96.641 STATUS POST RIGHT HIP REPLACEMENT: ICD-10-CM

## 2024-07-18 DIAGNOSIS — R52 PAIN: Primary | ICD-10-CM

## 2024-07-18 PROCEDURE — 1160F RVW MEDS BY RX/DR IN RCRD: CPT | Performed by: ORTHOPAEDIC SURGERY

## 2024-07-18 PROCEDURE — 1159F MED LIST DOCD IN RCRD: CPT | Performed by: ORTHOPAEDIC SURGERY

## 2024-07-18 PROCEDURE — 73502 X-RAY EXAM HIP UNI 2-3 VIEWS: CPT | Performed by: ORTHOPAEDIC SURGERY

## 2024-07-18 PROCEDURE — 99214 OFFICE O/P EST MOD 30 MIN: CPT | Performed by: ORTHOPAEDIC SURGERY

## 2024-07-18 RX ORDER — MELOXICAM 7.5 MG/1
15 TABLET ORAL ONCE
OUTPATIENT
Start: 2024-07-18 | End: 2024-07-18

## 2024-07-18 RX ORDER — PREGABALIN 150 MG/1
150 CAPSULE ORAL ONCE
OUTPATIENT
Start: 2024-07-18 | End: 2024-07-18

## 2024-07-18 RX ORDER — CHLORHEXIDINE GLUCONATE 500 MG/1
CLOTH TOPICAL 2 TIMES DAILY
OUTPATIENT
Start: 2024-07-18

## 2024-07-18 NOTE — PROGRESS NOTES
Patient: Lucas Deluca  YOB: 1950 73 y.o. male  Medical Record Number: [id number]    Chief Complaint:   Chief Complaint   Patient presents with    Right Hip - Follow-up, Pain       History of Present Illness:Lucas Deluca is a 73 y.o. male who presents for follow-up of right hip.  He still having persistent right hip and groin region pain he is a previous hemiarthroplasty on that hip his activities more recently have led to increasing pain.  He was previously scheduled for conversion from partial to total hip replacement but he developed significant lower extremity swelling and a right leg ulceration so the surgery was canceled.  He has been working aggressively on elevation and compression and is swelling and leg ulceration have now resolved.    Allergies: No Known Allergies    Medications:   Current Outpatient Medications   Medication Sig Dispense Refill    amLODIPine (NORVASC) 10 MG tablet Take 0.5 tablets by mouth Daily. 90 tablet 3    aspirin 81 MG chewable tablet Chew 1 tablet Daily. HOLD PRIOR TO SURGERY PER MD INSTRUCTIONS      B Complex Vitamins (VITAMIN B COMPLEX PO) HOLD FOR SURGERY      carvedilol (COREG) 25 MG tablet Take 1 tablet by mouth 2 (Two) Times a Day. 180 tablet 3    Cholecalciferol 25 MCG (1000 UT) capsule HOLD FOR SURGERY      clopidogrel (PLAVIX) 75 MG tablet Take 1 tablet by mouth Daily. (Patient taking differently: Take 1 tablet by mouth Daily. PT IS HOLDING FOR 5 DAYS BEFORE SURGERY PER CARDIOLOGY) 90 tablet 3    empagliflozin (Jardiance) 25 MG tablet tablet Take 1 tablet by mouth Daily. 90 tablet 1    fenofibrate (TRICOR) 145 MG tablet Take 1 tablet by mouth Daily. 90 tablet 3    finasteride (PROSCAR) 5 MG tablet TAKE 1 TABLET DAILY 90 tablet 3    fish oil-omega-3 fatty acids 1000 MG capsule Take 2 capsules by mouth Daily. (Patient taking differently: Take 2 capsules by mouth Daily. HOLD FOR SURGERY) 180 capsule 1    insulin aspart (NovoLOG FlexPen) 100 UNIT/ML  solution pen-injector sc pen Inject 30 Units under the skin into the appropriate area as directed 3 (Three) Times a Day With Meals for 180 days. (Patient taking differently: Inject 30 Units under the skin into the appropriate area as directed 3 (Three) Times a Day With Meals. Pt is not taking currently) 81 mL 1    Insulin Glargine (Lantus SoloStar) 100 UNIT/ML injection pen Inject 50 Units under the skin into the appropriate area as directed 2 (Two) Times a Day for 180 days. 88 mL 1    Insulin Lispro, 1 Unit Dial, (HumaLOG KwikPen) 100 UNIT/ML solution pen-injector Inject 40 Units under the skin into the appropriate area as directed 3 (Three) Times a Day Before Meals for 180 days. 108 mL 1    lisinopril (PRINIVIL,ZESTRIL) 20 MG tablet Take 1 tablet by mouth 2 (Two) Times a Day. (Patient taking differently: Take 1 tablet by mouth Daily.) 180 tablet 3    Multiple Vitamins-Minerals (VITEYES AREDS FORMULA PO) Take  by mouth. HOLD FOR SURGERY      nitroglycerin (NITROSTAT) 0.4 MG SL tablet 1 under the tongue as needed for angina, may repeat q5mins for up three doses 100 tablet 3    pioglitazone (ACTOS) 30 MG tablet Take 1 tablet by mouth Daily. 90 tablet 1    PRECISION XTRA TEST STRIPS test strip Test 1-2 times each day 200 each 1    rosuvastatin (CRESTOR) 40 MG tablet Take 1 tablet by mouth Every Night. 90 tablet 3    Sure Comfort Pen Needles 32G X 6 MM misc Use 1 each 5 (Five) Times a Day. 450 each 1    tolterodine LA (DETROL LA) 2 MG 24 hr capsule Take 1 capsule by mouth Every Night.      tolterodine LA (DETROL LA) 4 MG 24 hr capsule Take 1 capsule by mouth Every Night. (Patient taking differently: Take 2 mg by mouth Every Night.)       No current facility-administered medications for this visit.         The following portions of the patient's history were reviewed and updated as appropriate: allergies, current medications, past family history, past medical history, past social history, past surgical history and  problem list.    Review of Systems:   Pertinent positives/negative listed in HPI above    Hip:  right                          LEG ALIGNMENT:     Neutral                              LEG LENGTH DISCREPANCY   :    none                          GAIT:     Antalgic                          SKIN:     No abnormality                          RANGE OF MOTION:     Limited by joint irritability                          STRENGTH:     Limited by joint irratibility                          DISTAL PULSES:    Paplable                          DISTAL SENSATION :   Intact                          LYMPHATICS:     No   lymphadenopathy                          OTHER:          +   Stinchfeld test                                                  -    log roll                                                  -   Tenderness to palpation trochanteric bursa                Radiology:    Xrays 2views right hip (AP bilateral hips and lateral hip) were ordered and reviewed for evaluation of hip pain demonstrating a right partial hip replacement the joint space above the hemiarthroplasty is completely gone with metal rubbing on the subchondral bone.  It may be a Yodit/OsteModera.cos implant.  I compared them to previous films and there is been no change.  It was performed at the [facility] in [facility] July 2015.  I have reviewed the operative report.  He has a secure-fit max 127 degree size 10 stem with a 51 mm head ball and a -3 mm head.  Comparison views:      Assessment/Plan:  Right hip unipolar hemiarthroplasty with acetabular wear.  Unfortunately is a very active individual and is quite limited in his activities.  The plan at this point is conversion from partial to total hip replacement we will place an acetabular component.  Continuation of conservative management vs. DEREJE discussed.  The patient wishes to proceed with total hip replacement.  At this point the patient has failed the full gamut of conservative treatment  and stating complete understanding of the risks/benefits/ anternatives wishes to proceed with surgical treatment.     Risk and benefits of surgery were reviewed.  Including, but not limited to, blood clots, anesthesia risk, infection, leg length discrepancy, fracture, skin/leg numbness, failure of the implant, need for future surgeries, continued pain, hematoma, need for transfusion, and death, among others.  The patient understands and wishes to proceed.      The spectrum of treatment options were discussed with the patient in detail including both the nonoperative and operative treatment modalities and their respective risks and benefits.  After thorough discussion, the patient has elected to undergo surgical treatment.  The details of the surgical procedure were explained including the location of probable incisions and a description of the likely implants to be used.  Models and diagrams were used as educational resources. The patient understands the likely convalescence after surgery, as well as the rehabilitation required.  We thoroughly discussed the risks, benefits, and alternatives to surgery.  The risks include but are not limited to the risk of infection, joint stiffness, blood clots (including DVT and/or pulmonary embolus along with the risk of death), neurologic and/or vascular injury, fracture, dislocation, nonunion, malunion, need for further surgery including hardware failure requiring revision, and continued pain.  It was explained that if tissue has been repaired or reconstructed, there is also a chance of failure which may require further management.  Following the completion of the discussion, the patient expressed understanding of this planned course of care, all their questions were answered and consent will be obtained preoperatively.     Operative Plan: Posterior approach revision from partial to total Hip Replacement 23 HR STAY   He will need cardiac clearance and permission to be off Plavix  for 7 days preop.  Diagnoses and all orders for this visit:       Hebert Jung MD  7/18/2024

## 2024-07-23 ENCOUNTER — TELEPHONE (OUTPATIENT)
Dept: UROLOGY | Facility: CLINIC | Age: 74
End: 2024-07-23

## 2024-07-23 ENCOUNTER — OFFICE VISIT (OUTPATIENT)
Dept: UROLOGY | Facility: CLINIC | Age: 74
End: 2024-07-23
Payer: MEDICARE

## 2024-07-23 ENCOUNTER — LAB (OUTPATIENT)
Dept: LAB | Facility: HOSPITAL | Age: 74
End: 2024-07-23
Payer: MEDICARE

## 2024-07-23 VITALS
HEIGHT: 71 IN | HEART RATE: 69 BPM | BODY MASS INDEX: 30.66 KG/M2 | DIASTOLIC BLOOD PRESSURE: 62 MMHG | WEIGHT: 219 LBS | SYSTOLIC BLOOD PRESSURE: 142 MMHG

## 2024-07-23 DIAGNOSIS — E78.2 HYPERLIPEMIA, MIXED: ICD-10-CM

## 2024-07-23 DIAGNOSIS — N40.1 BENIGN PROSTATIC HYPERPLASIA WITH NOCTURIA: ICD-10-CM

## 2024-07-23 DIAGNOSIS — C61 PROSTATE CANCER: Primary | ICD-10-CM

## 2024-07-23 DIAGNOSIS — R35.1 BENIGN PROSTATIC HYPERPLASIA WITH NOCTURIA: ICD-10-CM

## 2024-07-23 DIAGNOSIS — N39.41 URGE INCONTINENCE: ICD-10-CM

## 2024-07-23 LAB
ALBUMIN SERPL-MCNC: 4 G/DL (ref 3.5–5.2)
ALP SERPL-CCNC: 49 U/L (ref 39–117)
ALT SERPL W P-5'-P-CCNC: 14 U/L (ref 1–41)
AST SERPL-CCNC: 18 U/L (ref 1–40)
BILIRUB CONJ SERPL-MCNC: <0.2 MG/DL (ref 0–0.3)
BILIRUB INDIRECT SERPL-MCNC: NORMAL MG/DL
BILIRUB SERPL-MCNC: 0.3 MG/DL (ref 0–1.2)
CHOLEST SERPL-MCNC: 158 MG/DL (ref 0–200)
HDLC SERPL-MCNC: 44 MG/DL (ref 40–60)
LDLC SERPL CALC-MCNC: 91 MG/DL (ref 0–100)
LDLC/HDLC SERPL: 2.02 {RATIO}
PROT SERPL-MCNC: 7 G/DL (ref 6–8.5)
TRIGL SERPL-MCNC: 126 MG/DL (ref 0–150)
URINE VOLUME: 100
VLDLC SERPL-MCNC: 23 MG/DL (ref 5–40)

## 2024-07-23 PROCEDURE — 80061 LIPID PANEL: CPT

## 2024-07-23 PROCEDURE — 36415 COLL VENOUS BLD VENIPUNCTURE: CPT

## 2024-07-23 PROCEDURE — 80076 HEPATIC FUNCTION PANEL: CPT

## 2024-07-23 RX ORDER — FENOFIBRATE 145 MG/1
145 TABLET, COATED ORAL DAILY
Qty: 90 TABLET | Refills: 3 | OUTPATIENT
Start: 2024-07-23

## 2024-07-23 NOTE — TELEPHONE ENCOUNTER
LVM asking if pt has completed his PSA order. Informed if he hasn't then we would need to r/s his appointment.

## 2024-07-23 NOTE — TELEPHONE ENCOUNTER
VERBAL REFILL SENT TO [facility] 07/19/24 DUE TO EPIC BEING DOWN.     FENOFIBRATE 145 MG   TAKE ONE TABLET PO DAILY  QTY #90 WITH ZERO REFILLS.  DR FIORELLA STEVEN

## 2024-07-23 NOTE — PROGRESS NOTES
UROLOGY OFFICE FOLLOW UP NOTE    Subjective   HPI  Lucas Deluca is a 73 y.o. male. Who presents to the office today for further evaluation of urinary frequency.      4 mo ago thought that he was passing a kidney stone; seen by PCP and told that he had UTI.  Had severe abdominal pain.      Has had 2 UTIs that have been treated with antibiotics, first 3/2/2020 treated via Macrobid and again treated 4/13/2020 with Augmentin.  UTI symptoms noted initially as lower back pain which resolved with Macrobid but then came back and he was placed on Augmentin. UA and cultures not available for review.   No prior h/o UTI.      3 months ago suddenly started having nocturnal enuresis; to the point of wearing brief and incontinence and pad. Also gets up 3-4x at night. Does not even realize that he is going.      Current urinary symptoms include urinary frequency at the end of urination.  And burning at the end of the penis.  Feels that symptoms are better controlled during the day; able to hold; rare daytime leakage.   Denies hematuria  Denies hesitancy  Denies sensation of incomplete emptying.  Reports good strong stream.   Not on any BPH medications.  Denies history of BPH.  Denies h/o renal insufficiency; has h/o DM     Had PSA drawn 2-3 weeks ago.      Initial PVR today 691; repeat after voiding 611 cc. Feels like he could void again; not a painful or bothersome sense of urgency.      No prior imaging     Update 9/22/20: Presents for follow up; cathing 3x a day. Getting around 800cc in upon waking; 700cc at midday, and 800cc prior to sleep. Urinary frequency, nocturia, and nocturnal enuresis have resolved since starting to cathing. Denies issues cathing. Denies flank or back pain. Taking Flomax and finasteride without adverse side effects.    Had CT scan prior to today's visit.     Update 10/29/2020: Patient presents for follow-up after UDS.  Did not have her US performed prior.  Patient states he is generally doing  "well.  Continues to self cath 3 times daily.  Gets volumes of 700 cc each time.  Denies issues cathing; does not like to cath; does not like the idea of self cathing.  No other complaints today.  Denies changes.     Update 3/4/2021: Patient presents postop follow-up from TURP.  Patient removed catheter this morning around 5 AM; has voided 3 times.  Denies significant sensation of incomplete emptying.  Denies dysuria or pain with voiding.  No complaints.  Final PVR today 250 cc     Update 3/12/2021: Patient reports \"doing great.\"  Voiding with good strong stream; no straining or hesitancy.  Denies sensation of incomplete emptying.  Wants to know if he can go dancing.  No complaints today.   cc     Update 5/6/2021: Patient presents for follow-up states he is doing very well.  States he is voiding with good strong stream denies sensation of incomplete emptying.  Pleased with results after TURP.  Gets every 3 month lab checks with PCP; wonders if PCP can order his PSA testing for surveillance of his prostate cancer.  PVR today 198 cc     Update 12/1/21: Presents for routine follow up with psa prior.  Gets up at night every 2 hours; able to go right back to sleep. Has had 2 accidents at nighttime since last visit. Continues to take flomax and finasteride. Reports good strong stream. Denies significant daytime urinary symptoms.  Has retrograde ejaculation.     Update 6/3/2022: Patient presents for routine follow-up of BPH history of TURP and [place] 6 prostate cancer on active surveillance with PSA prior.  Patient doing well; no voiding complaints.  No longer taking Flomax.  Does note antegrade ejaculation.     Update 12/6/2022: Patient presents for follow-up of prostate cancer active surveillance, PSA prior; history of BPH with outlet obstruction managed with TURP. The patient reports that he is doing well.  The patient reports that he has been waking up every 2 hours to urinate. He states that he is not " comfortable enough to not urinate and if he does not go to the bathroom when he wakes he will leak. The patient reports that he is sleeping with a pad. He states that he drinks water constantly.     Update 6/6/2023: Patient presents for follow-up prostate cancer on active surveillance, BPH with LUTS.  PSA prior to today's visit.  The patient reports that he is still leaking at night. He states that he did try to catheterize before bed, and it did make a difference.   The patient reports that he is up 3 to 4 times every night to urinate. He states that when he urinates at night, he is full.   The patient reports that he drinks until bed. He states that he has to keep hydrated because of his blood sugar. The patient reports that he is diabetic. He states that his sugars are not controlled.   The patient reports that he is still taking finasteride for his prostate.   He states that he does not have a lot of trouble with symptoms during the day.   The patient reports that he is not sure that at night that he feels the urges. He states that he uses a CPAP machine.   He states that he would like to try a dedicated urgency medication for nighttime.     Update 9/6/23: presents today for follow-up on prostate cancer, BPH, nocturia.  The patient reports that he has noticed a slight improvement in his symptoms since his previous visit. He has adjusted to his nighttime issues and feels that the medication he was prescribed at his last visit has aided with a decrease in urinary leakage. He has been sleeping with a sanitary napkin which has allowed him to achieve better sleep. He denies self catheterizing at this time. He is taking his Detrol LA at night and denies any negative side effects. He does note some mild dry mouth with the medication. He feels that he is able to empty his bladder when he urinates and he has a healthy stream.      Update 12/21/23: Presents today for follow-up on prostate cancer, BPH, nocturia.  The  patient is doing well. He denies any changes since his last visit. He is still leaking a little. He would like to try a stronger dose. He did see a little bit of improvement with the trial of tolterodine. He denies any side effects. He is still taking finasteride.  Complains of bulges bilaterally, lower abdomen which are new and increasing in size.  Patient notes some fullness.  This is somewhat bothersome to him but no specific pain.     Update 2/21/2023: Presents today for follow-up on prostate cancer, BPH, nocturia, and inguinal pain.  CT scan prior.  Plan to tolterodine increased to 4 mg daily.  The patient is doing well. He feels like he empties his bladder well. He has doubled the medication that is a blue tablet and starts with an F and he is not having any problems at all now.  PSA drawn earlier than expected with other lab draws.  The patient has hip surgery scheduled for 05/08/2024.        Update 7/23/2024:    Presents today for follow-up on prostate cancer, BPH, nocturia on tolterodine and finasteride.  No PSA checked prior to today's visit.  History of Present Illness  The patient reports experiencing significant urinary leakage. Despite attempts to use a catheter on a few occasions, he was unable to fully insert the catheter into his bladder. Despite this, he full bladder emptying, albeit with a weakened urinary stream. His groin discomfort remains unchanged.    Plan for hip surgery 10/09/2023.      ___________  Renal ultrasound, 4/27/2021: No hydronephrosis or acute sonographic abnormality     PSA  12/26/2023: 0.144  12/5/23: 0.112  6/2/2023: 0.129  12/1/2022: 0.191  6/1/22: 0.28  9/20/2021: 0.3  7/14/2020: 1.6     Prostate chips pathology, 2/22/2021: Adenocarcinoma prostate, [place] 3+3 = 6 in 1 of multiple chips involving approximately 1% of tissue     CT scan 8/24/2020: Mildly enlarged prostate; moderate distention of bladder; moderate right hydro-nephrosis without obstructing ureteral stone    "  UDS, 10/15/2020: During filling bladder with increased sensation, normal capacity.  Evidence of involuntary bladder contractions causing increased urgency no urge incontinence.  During voiding, bladder with normal detrusor contraction.  Flow was moderate with an elongated rate.  Flow charted obstructed with AG #113.3.  PVR: 198 cc EMG normal.      Review of systems  A review of systems was performed, and positive findings are noted in the HPI.    Objective     Vital Signs:   /62   Pulse 69   Ht 180.3 cm (70.98\")   Wt 99.3 kg (219 lb)   BMI 30.56 kg/m²       Physical exam  No acute distress, well-nourished  Awake alert and oriented  Mood normal; affect normal  Physical Exam        Bladder Scan interpretation 07/23/2024    Estimation of residual urine via [employer] 3000 Identyx Bladder Scan  Performed by: Ana Anaya RN  Residual Urine: 0 ml  Indication: Prostate cancer    Urge incontinence    Benign prostatic hyperplasia with nocturia   Position: Supine  Examination: Incremental scanning of the suprapubic area using 2.0 MHz transducer using copious amounts of acoustic gel.   Findings: An anechoic area was demonstrated which represented the bladder, with measurement of residual urine as noted. I inspected this myself. In that the residual urine was stable or insignificant, refer to plan for treatment and plan necessary at this time.     Problem List:  Patient Active Problem List   Diagnosis    Closed fracture of distal end of fibula with tibia    Diabetes    CAD S/P percutaneous coronary angioplasty    Benign prostatic hyperplasia with nocturia    Prostate cancer    Essential hypertension    Hyperlipemia, mixed    Macular degeneration    OA (osteoarthritis) of hip    Status post right hip replacement       Assessment & Plan   Diagnoses and all orders for this visit:    1. Prostate cancer (Primary)  -     PSA Diagnostic; Future    2. Urge incontinence  -     Cystoscopy; Future    3. Benign prostatic hyperplasia " with nocturia  -     Cystoscopy; Future        Assessment & Plan    A PSA test has been ordered for the patient.  Will notify him of the result     Continue current medication regimen    Plan for cystoscopy, anatomic assessment.             Schedule cystoscopy  All questions addressed      Patient or patient representative verbalized consent for the use of Ambient Listening during the visit with  Connie Fraire MD for chart documentation. 7/24/2024  05:07 EDT

## 2024-07-24 ENCOUNTER — TELEPHONE (OUTPATIENT)
Dept: CARDIOLOGY | Facility: CLINIC | Age: 74
End: 2024-07-24
Payer: MEDICARE

## 2024-07-24 DIAGNOSIS — E78.2 HYPERLIPEMIA, MIXED: Primary | ICD-10-CM

## 2024-07-24 RX ORDER — EZETIMIBE 10 MG/1
10 TABLET ORAL DAILY
Qty: 90 TABLET | Refills: 3 | Status: SHIPPED | OUTPATIENT
Start: 2024-07-24

## 2024-07-24 NOTE — TELEPHONE ENCOUNTER
----- Message from Georgette Talavera sent at 7/23/2024  4:36 PM EDT -----  Lipid panel is improved and liver enzymes are good, recommend increasing rosuvastatin to 40 mg daily and repeat fasting lipid/hepatic function panel in 3 months

## 2024-07-30 ENCOUNTER — PRE-ADMISSION TESTING (OUTPATIENT)
Dept: PREADMISSION TESTING | Facility: HOSPITAL | Age: 74
End: 2024-07-30
Payer: MEDICARE

## 2024-07-30 VITALS
WEIGHT: 221.6 LBS | TEMPERATURE: 97 F | HEIGHT: 70 IN | DIASTOLIC BLOOD PRESSURE: 64 MMHG | RESPIRATION RATE: 16 BRPM | HEART RATE: 66 BPM | OXYGEN SATURATION: 97 % | BODY MASS INDEX: 31.73 KG/M2 | SYSTOLIC BLOOD PRESSURE: 163 MMHG

## 2024-07-30 LAB
ABO GROUP BLD: NORMAL
ANION GAP SERPL CALCULATED.3IONS-SCNC: 10 MMOL/L (ref 5–15)
BLD GP AB SCN SERPL QL: NEGATIVE
BUN SERPL-MCNC: 16 MG/DL (ref 8–23)
BUN/CREAT SERPL: 14.4 (ref 7–25)
CALCIUM SPEC-SCNC: 9.6 MG/DL (ref 8.6–10.5)
CHLORIDE SERPL-SCNC: 107 MMOL/L (ref 98–107)
CO2 SERPL-SCNC: 25 MMOL/L (ref 22–29)
CREAT SERPL-MCNC: 1.11 MG/DL (ref 0.76–1.27)
DEPRECATED RDW RBC AUTO: 47.1 FL (ref 37–54)
EGFRCR SERPLBLD CKD-EPI 2021: 71 ML/MIN/1.73
ERYTHROCYTE [DISTWIDTH] IN BLOOD BY AUTOMATED COUNT: 13.9 % (ref 12.3–15.4)
GLUCOSE SERPL-MCNC: 174 MG/DL (ref 65–99)
HCT VFR BLD AUTO: 43.7 % (ref 37.5–51)
HGB BLD-MCNC: 13.5 G/DL (ref 13–17.7)
MCH RBC QN AUTO: 28.1 PG (ref 26.6–33)
MCHC RBC AUTO-ENTMCNC: 30.9 G/DL (ref 31.5–35.7)
MCV RBC AUTO: 90.9 FL (ref 79–97)
PLATELET # BLD AUTO: 230 10*3/MM3 (ref 140–450)
PMV BLD AUTO: 11.5 FL (ref 6–12)
POTASSIUM SERPL-SCNC: 3.9 MMOL/L (ref 3.5–5.2)
RBC # BLD AUTO: 4.81 10*6/MM3 (ref 4.14–5.8)
RH BLD: POSITIVE
SODIUM SERPL-SCNC: 142 MMOL/L (ref 136–145)
T&S EXPIRATION DATE: NORMAL
WBC NRBC COR # BLD AUTO: 6.15 10*3/MM3 (ref 3.4–10.8)

## 2024-07-30 PROCEDURE — 86850 RBC ANTIBODY SCREEN: CPT | Performed by: ORTHOPAEDIC SURGERY

## 2024-07-30 PROCEDURE — 86900 BLOOD TYPING SEROLOGIC ABO: CPT | Performed by: ORTHOPAEDIC SURGERY

## 2024-07-30 PROCEDURE — 80048 BASIC METABOLIC PNL TOTAL CA: CPT

## 2024-07-30 PROCEDURE — 36415 COLL VENOUS BLD VENIPUNCTURE: CPT

## 2024-07-30 PROCEDURE — 93005 ELECTROCARDIOGRAM TRACING: CPT

## 2024-07-30 PROCEDURE — 85027 COMPLETE CBC AUTOMATED: CPT

## 2024-07-30 PROCEDURE — 86901 BLOOD TYPING SEROLOGIC RH(D): CPT | Performed by: ORTHOPAEDIC SURGERY

## 2024-07-30 NOTE — DISCHARGE INSTRUCTIONS
Take the following medications the morning of surgery:    COREG, FINASTERIDE, NORVASC    If you are on prescription narcotic pain medication to control your pain you may also take that medication the morning of surgery.    General Instructions:  Do not eat solid food after midnight the night before surgery.  You may drink clear liquids day of surgery but must stop at least one hour before your hospital arrival time.  It is beneficial for you to have a clear drink that contains carbohydrates the day of surgery.  We suggest a 12 to 20 ounce bottle of G2 or Powerade Zero for diabetic patients.     Clear liquids are liquids you can see through.  Nothing red in color.     Plain water                                  Sports drinks  Sodas                                   Gelatin (Jell-O)  Fruit juices without pulp such as white grape juice and apple juice  Popsicles that contain no fruit or yogurt  Tea or coffee (no cream or milk added)  Gatorade / Powerade  G2 / Powerade Zero    Chicken / beef / pork / vegetable bullion / cubes or any formulation are not considered clear liquids and are not allowed.      Patients who avoid smoking, chewing tobacco and alcohol for 4 weeks prior to surgery have a reduced risk of post-operative complications.    Do not smoke, use chewing tobacco or drink alcohol the day of surgery.   If applicable bring your C-PAP   machine in with you to preop day of surgery.  Bring any papers given to you in the doctor’s office.  Wear clean comfortable clothes.  Do not wear contact lenses, false eyelashes or make-up.  Bring a case for your glasses.   Remove all piercings.  Leave jewelry and any other valuables at home.  Hair extensions with metal clips must be removed prior to surgery.  The Pre-Admission Testing nurse will instruct you to bring medications if unable to obtain an accurate list in Pre-Admission Testing.        If you were given a blood bank ID arm band remember to bring it with you the day  of surgery.    Preventing a Surgical Site Infection:  For 2 to 3 days before surgery, avoid shaving with a razor because the razor can irritate skin and make it easier to develop an infection.    Any areas of open skin can increase the risk of a post-operative wound infection by allowing bacteria to enter and travel throughout the body.  Notify your surgeon if you have any skin wounds / rashes even if it is not near the expected surgical site.  The area will need assessed to determine if surgery should be delayed until it is healed.  The night prior to surgery shower using a fresh bar of anti-bacterial soap (such as Dial) and clean washcloth.  Sleep in a clean bed with clean clothing.  Do not allow pets to sleep with you.  Shower on the morning of surgery using a fresh bar of anti-bacterial soap (such as Dial) and clean washcloth.  Dry with a clean towel and dress in clean clothing.  Ask your surgeon if you will be receiving antibiotics prior to surgery.  Make sure you, your family, and all healthcare providers clean their hands with soap and water or an alcohol based hand  before caring for you or your wound.    Day of surgery:  Your arrival time is approximately two hours before your scheduled surgery time.  Upon arrival, a Pre-op nurse and Anesthesiologist will review your health history, obtain vital signs, and answer questions you may have.  The only belongings needed at this time will be a list of your home medications and if applicable your C-PAP/BI-PAP machine.  A Pre-op nurse will start an IV and you may receive medication in preparation for surgery, including something to help you relax.     Please be aware that surgery does come with discomfort.  We want to make every effort to control your discomfort so please discuss any uncontrolled symptoms with your nurse.   Your doctor will most likely have prescribed pain medications.      CHLORHEXIDINE CLOTH INSTRUCTIONS  The morning of surgery follow  these instructions using the Chlorhexidine cloths you've been given.  These steps reduce bacteria on the body.  Do not use the cloths near your eyes, ears mouth, genitalia or on open wounds.  Throw the cloths away after use but do not try to flush them down a toilet.      Open and remove one cloth at a time from the package.    Leave the cloth unfolded and begin the bathing.  Massage the skin with the cloths using gentle pressure to remove bacteria.  Do not scrub harshly.   Follow the steps below with one 2% CHG cloth per area (6 total cloths).  One cloth for neck, shoulders and chest.  One cloth for both arms, hands, fingers and underarms (do underarms last).  One cloth for the abdomen followed by groin.  One cloth for right leg and foot including between the toes.  One cloth for left leg and foot including between the toes.  The last cloth is to be used for the back of the neck, back and buttocks.    Allow the CHG to air dry 3 minutes on the skin which will give it time to work and decrease the chance of irritation.  The skin may feel sticky until it is dry.  Do not rinse with water or any other liquid or you will lose the beneficial effects of the CHG.  If mild skin irritation occurs, do rinse the skin to remove the CHG.  Report this to the nurse at time of admission.  Do not apply lotions, creams, ointments, deodorants or perfumes after using the clothes. Dress in clean clothes before coming to the hospital.    If you are staying overnight following surgery, you will be transported to your hospital room following the recovery period.  [facility] has all private rooms.    If you have any questions please call Pre-Admission Testing at [phone].  Deductibles and co-payments are collected on the day of service. Please be prepared to pay the required co-pay, deductible or deposit on the day of service as defined by your plan.    Call your surgeon immediately if you experience any of the following  symptoms:  Sore Throat  Shortness of Breath or difficulty breathing  Cough  Chills  Body soreness or muscle pain  Headache  Fever  New loss of taste or smell  Do not arrive for your surgery ill.  Your procedure will need to be rescheduled to another time.  You will need to call your physician before the day of surgery to avoid any unnecessary exposure to hospital staff as well as other patients.

## 2024-07-31 ENCOUNTER — TELEPHONE (OUTPATIENT)
Dept: CARDIOLOGY | Facility: CLINIC | Age: 74
End: 2024-07-31
Payer: MEDICARE

## 2024-07-31 LAB
QT INTERVAL: 432 MS
QTC INTERVAL: 428 MS

## 2024-08-01 ENCOUNTER — OFFICE VISIT (OUTPATIENT)
Dept: ORTHOPEDIC SURGERY | Facility: CLINIC | Age: 74
End: 2024-08-01
Payer: MEDICARE

## 2024-08-01 VITALS
DIASTOLIC BLOOD PRESSURE: 65 MMHG | TEMPERATURE: 98 F | HEIGHT: 70 IN | WEIGHT: 220.2 LBS | SYSTOLIC BLOOD PRESSURE: 145 MMHG | BODY MASS INDEX: 31.52 KG/M2

## 2024-08-01 DIAGNOSIS — R52 PAIN: Primary | ICD-10-CM

## 2024-08-01 NOTE — H&P
Patient: Lucas Deluac    Date of Admission: 8/7/2024    YOB: 1950    Medical Record Number: [id number]    Admitting Physician: Dr. Hebert Jung    Reason for Admission: Right hip revision    History of Present Illness: 73 y.o. male presents with severe hip pain, patient has had partial hip replacement with metal rubbing on subchondral bone, patient is scheduled for right hip revision    Allergies: No Known Allergies      Current Medications:  Home Medications:    Current Outpatient Medications on File Prior to Visit   Medication Sig    amLODIPine (NORVASC) 10 MG tablet Take 0.5 tablets by mouth Daily.    aspirin 81 MG chewable tablet Chew 1 tablet Daily. HOLD PRIOR TO SURGERY PER MD INSTRUCTIONS    B Complex Vitamins (VITAMIN B COMPLEX PO) Take 1 tablet by mouth Every 3 (Three) Days. HOLD FOR SURGERY    carvedilol (COREG) 25 MG tablet Take 1 tablet by mouth 2 (Two) Times a Day.    Cholecalciferol 25 MCG (1000 UT) capsule HOLD FOR SURGERY    clopidogrel (PLAVIX) 75 MG tablet Take 1 tablet by mouth Daily. (Patient taking differently: Take 1 tablet by mouth Daily. PT IS HOLDING FOR 5 DAYS BEFORE SURGERY PER CARDIOLOGY)    empagliflozin (Jardiance) 25 MG tablet tablet Take 1 tablet by mouth Daily.    ezetimibe (ZETIA) 10 MG tablet Take 1 tablet by mouth Daily.    fenofibrate (TRICOR) 145 MG tablet Take 1 tablet by mouth Daily.    finasteride (PROSCAR) 5 MG tablet TAKE 1 TABLET DAILY (Patient taking differently: Take 1 tablet by mouth Daily.)    fish oil-omega-3 fatty acids 1000 MG capsule Take 2 capsules by mouth Daily. (Patient taking differently: Take 2 capsules by mouth Daily. HOLD FOR SURGERY)    insulin aspart (NovoLOG FlexPen) 100 UNIT/ML solution pen-injector sc pen Inject 30 Units under the skin into the appropriate area as directed 3 (Three) Times a Day With Meals for 180 days. (Patient taking differently: Inject 30 Units under the skin into the appropriate area as directed 3 (Three) Times a  Day With Meals. Pt is not taking currently)    Insulin Glargine (Lantus SoloStar) 100 UNIT/ML injection pen Inject 50 Units under the skin into the appropriate area as directed 2 (Two) Times a Day for 180 days.    lisinopril (PRINIVIL,ZESTRIL) 20 MG tablet Take 1 tablet by mouth 2 (Two) Times a Day. (Patient taking differently: Take 1 tablet by mouth Daily.)    Multiple Vitamins-Minerals (VITEYES AREDS FORMULA PO) Take 1 tablet by mouth Every Morning. HOLD FOR SURGERY    nitroglycerin (NITROSTAT) 0.4 MG SL tablet 1 under the tongue as needed for angina, may repeat q5mins for up three doses (Patient taking differently: 1 tablet. 1 under the tongue as needed for angina, may repeat q5mins for up three doses)    pioglitazone (ACTOS) 30 MG tablet Take 1 tablet by mouth Daily.    PRECISION XTRA TEST STRIPS test strip Test 1-2 times each day    Sure Comfort Pen Needles 32G X 6 MM misc Use 1 each 5 (Five) Times a Day.    tolterodine LA (DETROL LA) 2 MG 24 hr capsule Take 1 capsule by mouth Every Night.    rosuvastatin (CRESTOR) 40 MG tablet Take 1 tablet by mouth Every Night. (Patient not taking: Reported on 8/1/2024)     No current facility-administered medications on file prior to visit.     PRN Meds:.    PMH:  Past Medical History:   Diagnosis Date    Arthritis     CAD S/P percutaneous coronary angioplasty 01/14/2017    Diabetes mellitus     TYPE 2    Essential hypertension 12/12/2021    History of prostate cancer     Hyperlipidemia     Hypertension     Macular degeneration     Right hip pain     Sleep apnea     CPAP        PSURGH:  Past Surgical History:   Procedure Laterality Date    CATARACT EXTRACTION Bilateral     CORONARY ANGIOPLASTY WITH STENT PLACEMENT  2004    ONE    CORONARY ARTERY BYPASS GRAFT      2004 X 4 VESSEL    CYSTOSCOPY TRANSURETHRAL RESECTION OF PROSTATE  2020    OR TX TIBL SHFT FX IMED IMPLT W/WO SCREWS&/CERCLA Right 01/12/2017    Procedure: TIBIA INTRAMEDULLARY NAIL/SHANNON INSERTION;  Surgeon: Colton  "MD Pily;  Location: [facility] OR;  Service: Orthopedics    TONSILLECTOMY      AGE 65       SocialHx:  Social History     Occupational History    Not on file   Tobacco Use    Smoking status: Former     Current packs/day: 0.00     Types: Cigarettes     Quit date: 1991     Years since quittin.6     Passive exposure: Past    Smokeless tobacco: Former   Vaping Use    Vaping status: Never Used   Substance and Sexual Activity    Alcohol use: No    Drug use: No    Sexual activity: Defer      Social History     Social History Narrative    Not on file       FamHx:  Family History   Problem Relation Age of Onset    Cancer Mother     Diabetes Mother     Heart disease Father     Diabetes Sister     Cancer Maternal Aunt     Cancer Maternal Uncle     Cancer Maternal Grandmother     Cancer Maternal Grandfather     Malig Hyperthermia Neg Hx          Review of Systems:   A 14 point review of systems was performed, pertinent positives discussed above, all other systems are negative    Physical Exam: 73 y.o. male  Vital Signs :   Vitals:    24 1418   BP: 145/65   BP Location: Right arm   Patient Position: Sitting   Cuff Size: Adult   Temp: 98 °F (36.7 °C)   TempSrc: Temporal   Weight: 99.9 kg (220 lb 3.2 oz)   Height: 177.8 cm (70\")   PainSc: 0-No pain   PainLoc: Hip     General: Alert and Oriented x 3, No acute distress.  Psych: mood and affect appropriate; recent and remote memory intact  Eyes: conjunctivae clear; pupils equally round and reactive, sclerae antiicteric  CV: no peripheral edema  Resp: normal respiratory effort  Skin: no rashes or wounds; normal turgor  Musculosketetal; pain with hip range of motion. Positive Stinchfeld test. No trochanteric tenderness.  Vascular: palpable distal pulses    Labs:    Pre-Admission Testing on 2024   Component Date Value Ref Range Status    ABO Type 2024 A   Final    RH type 2024 Positive   Final    Antibody Screen 2024 Negative   Final    T&S " Expiration Date 07/30/2024 8/13/2024 11:59:00 PM   Final    WBC 07/30/2024 6.15  3.40 - 10.80 10*3/mm3 Final    RBC 07/30/2024 4.81  4.14 - 5.80 10*6/mm3 Final    Hemoglobin 07/30/2024 13.5  13.0 - 17.7 g/dL Final    Hematocrit 07/30/2024 43.7  37.5 - 51.0 % Final    MCV 07/30/2024 90.9  79.0 - 97.0 fL Final    MCH 07/30/2024 28.1  26.6 - 33.0 pg Final    MCHC 07/30/2024 30.9 (L)  31.5 - 35.7 g/dL Final    RDW 07/30/2024 13.9  12.3 - 15.4 % Final    RDW-SD 07/30/2024 47.1  37.0 - 54.0 fl Final    MPV 07/30/2024 11.5  6.0 - 12.0 fL Final    Platelets 07/30/2024 230  140 - 450 10*3/mm3 Final    Glucose 07/30/2024 174 (H)  65 - 99 mg/dL Final    BUN 07/30/2024 16  8 - 23 mg/dL Final    Creatinine 07/30/2024 1.11  0.76 - 1.27 mg/dL Final    Sodium 07/30/2024 142  136 - 145 mmol/L Final    Potassium 07/30/2024 3.9  3.5 - 5.2 mmol/L Final    Chloride 07/30/2024 107  98 - 107 mmol/L Final    CO2 07/30/2024 25.0  22.0 - 29.0 mmol/L Final    Calcium 07/30/2024 9.6  8.6 - 10.5 mg/dL Final    BUN/Creatinine Ratio 07/30/2024 14.4  7.0 - 25.0 Final    Anion Gap 07/30/2024 10.0  5.0 - 15.0 mmol/L Final    eGFR 07/30/2024 71.0  >60.0 mL/min/1.73 Final    QT Interval 07/30/2024 432  ms Final    QTC Interval 07/30/2024 428  ms Final   Office Visit on 07/23/2024   Component Date Value Ref Range Status    Urine Volume 07/23/2024 100   Final   Lab on 07/23/2024   Component Date Value Ref Range Status    Total Cholesterol 07/23/2024 158  0 - 200 mg/dL Final    Triglycerides 07/23/2024 126  0 - 150 mg/dL Final    HDL Cholesterol 07/23/2024 44  40 - 60 mg/dL Final    LDL Cholesterol  07/23/2024 91  0 - 100 mg/dL Final    VLDL Cholesterol 07/23/2024 23  5 - 40 mg/dL Final    LDL/HDL Ratio 07/23/2024 2.02   Final    Total Protein 07/23/2024 7.0  6.0 - 8.5 g/dL Final    Albumin 07/23/2024 4.0  3.5 - 5.2 g/dL Final    ALT (SGPT) 07/23/2024 14  1 - 41 U/L Final    AST (SGOT) 07/23/2024 18  1 - 40 U/L Final    Alkaline Phosphatase 07/23/2024 49   39 - 117 U/L Final    Total Bilirubin 07/23/2024 0.3  0.0 - 1.2 mg/dL Final    Bilirubin, Direct 07/23/2024 <0.2  0.0 - 0.3 mg/dL Final    Bilirubin, Indirect 07/23/2024    Final    Unable to calculate     Xrays:  Xrays AP pelvis and a lateral of the right hip were reviewed and show a previously placed right partial hip replacement with metal rubbing on subchondral bone    Assessment: Painful right hip bipolar hemiarthroplasty. Conservative measures have failed.      Plan:  The plan is to proceed with right hip revision. The patient voiced understanding of the risks, benefits, and alternative forms of treatment that were discussed with Dr Jung at the time of scheduling.  Overnight stay with [facility]    Maricel Cole, JASON  8/1/2024

## 2024-08-01 NOTE — H&P (VIEW-ONLY)
Patient: Lucas Deluca    Date of Admission: 8/7/2024    YOB: 1950    Medical Record Number: [id number]    Admitting Physician: Dr. Hebert Jung    Reason for Admission: Right hip revision    History of Present Illness: 73 y.o. male presents with severe hip pain, patient has had partial hip replacement with metal rubbing on subchondral bone, patient is scheduled for right hip revision    Allergies: No Known Allergies      Current Medications:  Home Medications:    Current Outpatient Medications on File Prior to Visit   Medication Sig    amLODIPine (NORVASC) 10 MG tablet Take 0.5 tablets by mouth Daily.    aspirin 81 MG chewable tablet Chew 1 tablet Daily. HOLD PRIOR TO SURGERY PER MD INSTRUCTIONS    B Complex Vitamins (VITAMIN B COMPLEX PO) Take 1 tablet by mouth Every 3 (Three) Days. HOLD FOR SURGERY    carvedilol (COREG) 25 MG tablet Take 1 tablet by mouth 2 (Two) Times a Day.    Cholecalciferol 25 MCG (1000 UT) capsule HOLD FOR SURGERY    clopidogrel (PLAVIX) 75 MG tablet Take 1 tablet by mouth Daily. (Patient taking differently: Take 1 tablet by mouth Daily. PT IS HOLDING FOR 5 DAYS BEFORE SURGERY PER CARDIOLOGY)    empagliflozin (Jardiance) 25 MG tablet tablet Take 1 tablet by mouth Daily.    ezetimibe (ZETIA) 10 MG tablet Take 1 tablet by mouth Daily.    fenofibrate (TRICOR) 145 MG tablet Take 1 tablet by mouth Daily.    finasteride (PROSCAR) 5 MG tablet TAKE 1 TABLET DAILY (Patient taking differently: Take 1 tablet by mouth Daily.)    fish oil-omega-3 fatty acids 1000 MG capsule Take 2 capsules by mouth Daily. (Patient taking differently: Take 2 capsules by mouth Daily. HOLD FOR SURGERY)    insulin aspart (NovoLOG FlexPen) 100 UNIT/ML solution pen-injector sc pen Inject 30 Units under the skin into the appropriate area as directed 3 (Three) Times a Day With Meals for 180 days. (Patient taking differently: Inject 30 Units under the skin into the appropriate area as directed 3 (Three) Times a  Day With Meals. Pt is not taking currently)    Insulin Glargine (Lantus SoloStar) 100 UNIT/ML injection pen Inject 50 Units under the skin into the appropriate area as directed 2 (Two) Times a Day for 180 days.    lisinopril (PRINIVIL,ZESTRIL) 20 MG tablet Take 1 tablet by mouth 2 (Two) Times a Day. (Patient taking differently: Take 1 tablet by mouth Daily.)    Multiple Vitamins-Minerals (VITEYES AREDS FORMULA PO) Take 1 tablet by mouth Every Morning. HOLD FOR SURGERY    nitroglycerin (NITROSTAT) 0.4 MG SL tablet 1 under the tongue as needed for angina, may repeat q5mins for up three doses (Patient taking differently: 1 tablet. 1 under the tongue as needed for angina, may repeat q5mins for up three doses)    pioglitazone (ACTOS) 30 MG tablet Take 1 tablet by mouth Daily.    PRECISION XTRA TEST STRIPS test strip Test 1-2 times each day    Sure Comfort Pen Needles 32G X 6 MM misc Use 1 each 5 (Five) Times a Day.    tolterodine LA (DETROL LA) 2 MG 24 hr capsule Take 1 capsule by mouth Every Night.    rosuvastatin (CRESTOR) 40 MG tablet Take 1 tablet by mouth Every Night. (Patient not taking: Reported on 8/1/2024)     No current facility-administered medications on file prior to visit.     PRN Meds:.    PMH:  Past Medical History:   Diagnosis Date    Arthritis     CAD S/P percutaneous coronary angioplasty 01/14/2017    Diabetes mellitus     TYPE 2    Essential hypertension 12/12/2021    History of prostate cancer     Hyperlipidemia     Hypertension     Macular degeneration     Right hip pain     Sleep apnea     CPAP        PSURGH:  Past Surgical History:   Procedure Laterality Date    CATARACT EXTRACTION Bilateral     CORONARY ANGIOPLASTY WITH STENT PLACEMENT  2004    ONE    CORONARY ARTERY BYPASS GRAFT      2004 X 4 VESSEL    CYSTOSCOPY TRANSURETHRAL RESECTION OF PROSTATE  2020    [place] TX TIBL SHFT FX IMED IMPLT W/WO SCREWS&/CERCLA Right 01/12/2017    Procedure: TIBIA INTRAMEDULLARY NAIL/SHANNON INSERTION;  Surgeon: Colton  "MD Pily;  Location: [facility] OR;  Service: Orthopedics    TONSILLECTOMY      AGE 65       SocialHx:  Social History     Occupational History    Not on file   Tobacco Use    Smoking status: Former     Current packs/day: 0.00     Types: Cigarettes     Quit date: 1991     Years since quittin.6     Passive exposure: Past    Smokeless tobacco: Former   Vaping Use    Vaping status: Never Used   Substance and Sexual Activity    Alcohol use: No    Drug use: No    Sexual activity: Defer      Social History     Social History Narrative    Not on file       FamHx:  Family History   Problem Relation Age of Onset    Cancer Mother     Diabetes Mother     Heart disease Father     Diabetes Sister     Cancer Maternal Aunt     Cancer Maternal Uncle     Cancer Maternal Grandmother     Cancer Maternal Grandfather     Malig Hyperthermia Neg Hx          Review of Systems:   A 14 point review of systems was performed, pertinent positives discussed above, all other systems are negative    Physical Exam: 73 y.o. male  Vital Signs :   Vitals:    24 1418   BP: 145/65   BP Location: Right arm   Patient Position: Sitting   Cuff Size: Adult   Temp: 98 °F (36.7 °C)   TempSrc: Temporal   Weight: 99.9 kg (220 lb 3.2 oz)   Height: 177.8 cm (70\")   PainSc: 0-No pain   PainLoc: Hip     General: Alert and Oriented x 3, No acute distress.  Psych: mood and affect appropriate; recent and remote memory intact  Eyes: conjunctivae clear; pupils equally round and reactive, sclerae antiicteric  CV: no peripheral edema  Resp: normal respiratory effort  Skin: no rashes or wounds; normal turgor  Musculosketetal; pain with hip range of motion. Positive Stinchfeld test. No trochanteric tenderness.  Vascular: palpable distal pulses    Labs:    Pre-Admission Testing on 2024   Component Date Value Ref Range Status    ABO Type 2024 A   Final    RH type 2024 Positive   Final    Antibody Screen 2024 Negative   Final    T&S " Expiration Date 07/30/2024 8/13/2024 11:59:00 PM   Final    WBC 07/30/2024 6.15  3.40 - 10.80 10*3/mm3 Final    RBC 07/30/2024 4.81  4.14 - 5.80 10*6/mm3 Final    Hemoglobin 07/30/2024 13.5  13.0 - 17.7 g/dL Final    Hematocrit 07/30/2024 43.7  37.5 - 51.0 % Final    MCV 07/30/2024 90.9  79.0 - 97.0 fL Final    MCH 07/30/2024 28.1  26.6 - 33.0 pg Final    MCHC 07/30/2024 30.9 (L)  31.5 - 35.7 g/dL Final    RDW 07/30/2024 13.9  12.3 - 15.4 % Final    RDW-SD 07/30/2024 47.1  37.0 - 54.0 fl Final    MPV 07/30/2024 11.5  6.0 - 12.0 fL Final    Platelets 07/30/2024 230  140 - 450 10*3/mm3 Final    Glucose 07/30/2024 174 (H)  65 - 99 mg/dL Final    BUN 07/30/2024 16  8 - 23 mg/dL Final    Creatinine 07/30/2024 1.11  0.76 - 1.27 mg/dL Final    Sodium 07/30/2024 142  136 - 145 mmol/L Final    Potassium 07/30/2024 3.9  3.5 - 5.2 mmol/L Final    Chloride 07/30/2024 107  98 - 107 mmol/L Final    CO2 07/30/2024 25.0  22.0 - 29.0 mmol/L Final    Calcium 07/30/2024 9.6  8.6 - 10.5 mg/dL Final    BUN/Creatinine Ratio 07/30/2024 14.4  7.0 - 25.0 Final    Anion Gap 07/30/2024 10.0  5.0 - 15.0 mmol/L Final    eGFR 07/30/2024 71.0  >60.0 mL/min/1.73 Final    QT Interval 07/30/2024 432  ms Final    QTC Interval 07/30/2024 428  ms Final   Office Visit on 07/23/2024   Component Date Value Ref Range Status    Urine Volume 07/23/2024 100   Final   Lab on 07/23/2024   Component Date Value Ref Range Status    Total Cholesterol 07/23/2024 158  0 - 200 mg/dL Final    Triglycerides 07/23/2024 126  0 - 150 mg/dL Final    HDL Cholesterol 07/23/2024 44  40 - 60 mg/dL Final    LDL Cholesterol  07/23/2024 91  0 - 100 mg/dL Final    VLDL Cholesterol 07/23/2024 23  5 - 40 mg/dL Final    LDL/HDL Ratio 07/23/2024 2.02   Final    Total Protein 07/23/2024 7.0  6.0 - 8.5 g/dL Final    Albumin 07/23/2024 4.0  3.5 - 5.2 g/dL Final    ALT (SGPT) 07/23/2024 14  1 - 41 U/L Final    AST (SGOT) 07/23/2024 18  1 - 40 U/L Final    Alkaline Phosphatase 07/23/2024 49   39 - 117 U/L Final    Total Bilirubin 07/23/2024 0.3  0.0 - 1.2 mg/dL Final    Bilirubin, Direct 07/23/2024 <0.2  0.0 - 0.3 mg/dL Final    Bilirubin, Indirect 07/23/2024    Final    Unable to calculate     Xrays:  Xrays AP pelvis and a lateral of the right hip were reviewed and show a previously placed right partial hip replacement with metal rubbing on subchondral bone    Assessment: Painful right hip bipolar hemiarthroplasty. Conservative measures have failed.      Plan:  The plan is to proceed with right hip revision. The patient voiced understanding of the risks, benefits, and alternative forms of treatment that were discussed with Dr Jung at the time of scheduling.  Overnight stay with [facility]    Maricel Cole, JASON  8/1/2024

## 2024-08-06 ENCOUNTER — TELEPHONE (OUTPATIENT)
Dept: ORTHOPEDIC SURGERY | Facility: CLINIC | Age: 74
End: 2024-08-06
Payer: MEDICARE

## 2024-08-07 ENCOUNTER — APPOINTMENT (OUTPATIENT)
Dept: GENERAL RADIOLOGY | Facility: HOSPITAL | Age: 74
End: 2024-08-07
Payer: MEDICARE

## 2024-08-07 ENCOUNTER — HOSPITAL ENCOUNTER (OUTPATIENT)
Facility: HOSPITAL | Age: 74
Setting detail: OBSERVATION
Discharge: HOME HEALTH | End: 2024-08-08
Attending: ORTHOPAEDIC SURGERY | Admitting: ORTHOPAEDIC SURGERY
Payer: MEDICARE

## 2024-08-07 ENCOUNTER — ANESTHESIA (OUTPATIENT)
Dept: PERIOP | Facility: HOSPITAL | Age: 74
End: 2024-08-07
Payer: MEDICARE

## 2024-08-07 ENCOUNTER — ANESTHESIA EVENT (OUTPATIENT)
Dept: PERIOP | Facility: HOSPITAL | Age: 74
End: 2024-08-07
Payer: MEDICARE

## 2024-08-07 DIAGNOSIS — Z96.641 STATUS POST RIGHT HIP REPLACEMENT: ICD-10-CM

## 2024-08-07 LAB
GLUCOSE BLDC GLUCOMTR-MCNC: 116 MG/DL (ref 70–130)
GLUCOSE BLDC GLUCOMTR-MCNC: 243 MG/DL (ref 70–130)
GLUCOSE BLDC GLUCOMTR-MCNC: 88 MG/DL (ref 70–130)
GLUCOSE BLDC GLUCOMTR-MCNC: 91 MG/DL (ref 70–130)

## 2024-08-07 PROCEDURE — 25810000003 SODIUM CHLORIDE 0.9 % SOLUTION: Performed by: ORTHOPAEDIC SURGERY

## 2024-08-07 PROCEDURE — A9270 NON-COVERED ITEM OR SERVICE: HCPCS | Performed by: INTERNAL MEDICINE

## 2024-08-07 PROCEDURE — C1776 JOINT DEVICE (IMPLANTABLE): HCPCS | Performed by: ORTHOPAEDIC SURGERY

## 2024-08-07 PROCEDURE — A9270 NON-COVERED ITEM OR SERVICE: HCPCS | Performed by: NURSE PRACTITIONER

## 2024-08-07 PROCEDURE — 25810000003 LACTATED RINGERS SOLUTION: Performed by: ORTHOPAEDIC SURGERY

## 2024-08-07 PROCEDURE — G0378 HOSPITAL OBSERVATION PER HR: HCPCS

## 2024-08-07 PROCEDURE — A9270 NON-COVERED ITEM OR SERVICE: HCPCS | Performed by: STUDENT IN AN ORGANIZED HEALTH CARE EDUCATION/TRAINING PROGRAM

## 2024-08-07 PROCEDURE — A9270 NON-COVERED ITEM OR SERVICE: HCPCS | Performed by: ORTHOPAEDIC SURGERY

## 2024-08-07 PROCEDURE — 25010000002 ROPIVACAINE PER 1 MG: Performed by: ORTHOPAEDIC SURGERY

## 2024-08-07 PROCEDURE — 25010000002 CEFAZOLIN PER 500 MG: Performed by: NURSE PRACTITIONER

## 2024-08-07 PROCEDURE — 25010000002 PROPOFOL 200 MG/20ML EMULSION: Performed by: NURSE ANESTHETIST, CERTIFIED REGISTERED

## 2024-08-07 PROCEDURE — 63710000001 CARVEDILOL 25 MG TABLET: Performed by: NURSE PRACTITIONER

## 2024-08-07 PROCEDURE — 27137 REVISE HIP JOINT REPLACEMENT: CPT | Performed by: ORTHOPAEDIC SURGERY

## 2024-08-07 PROCEDURE — 63710000001 INSULIN GLARGINE PER 5 UNITS: Performed by: INTERNAL MEDICINE

## 2024-08-07 PROCEDURE — 63710000001 CARVEDILOL 25 MG TABLET: Performed by: STUDENT IN AN ORGANIZED HEALTH CARE EDUCATION/TRAINING PROGRAM

## 2024-08-07 PROCEDURE — 25010000002 SUGAMMADEX 200 MG/2ML SOLUTION: Performed by: NURSE ANESTHETIST, CERTIFIED REGISTERED

## 2024-08-07 PROCEDURE — 25010000002 VANCOMYCIN 10 G RECONSTITUTED SOLUTION: Performed by: ORTHOPAEDIC SURGERY

## 2024-08-07 PROCEDURE — 25010000002 KETOROLAC TROMETHAMINE PER 15 MG: Performed by: ORTHOPAEDIC SURGERY

## 2024-08-07 PROCEDURE — A9270 NON-COVERED ITEM OR SERVICE: HCPCS | Performed by: NURSE ANESTHETIST, CERTIFIED REGISTERED

## 2024-08-07 PROCEDURE — 25010000002 EPINEPHRINE 1 MG/ML SOLUTION 30 ML VIAL: Performed by: ORTHOPAEDIC SURGERY

## 2024-08-07 PROCEDURE — 63710000001 INSULIN LISPRO (HUMAN) PER 5 UNITS: Performed by: INTERNAL MEDICINE

## 2024-08-07 PROCEDURE — 25010000002 ACETAMINOPHEN 10 MG/ML SOLUTION: Performed by: NURSE ANESTHETIST, CERTIFIED REGISTERED

## 2024-08-07 PROCEDURE — 63710000001 HYDROCODONE-ACETAMINOPHEN 5-325 MG TABLET: Performed by: NURSE ANESTHETIST, CERTIFIED REGISTERED

## 2024-08-07 PROCEDURE — 25010000002 DEXAMETHASONE PER 1 MG: Performed by: NURSE ANESTHETIST, CERTIFIED REGISTERED

## 2024-08-07 PROCEDURE — 25010000002 CEFAZOLIN PER 500 MG: Performed by: ORTHOPAEDIC SURGERY

## 2024-08-07 PROCEDURE — 25010000002 FENTANYL CITRATE (PF) 50 MCG/ML SOLUTION PREFILLED SYRINGE: Performed by: NURSE ANESTHETIST, CERTIFIED REGISTERED

## 2024-08-07 PROCEDURE — 25010000002 ONDANSETRON PER 1 MG: Performed by: NURSE ANESTHETIST, CERTIFIED REGISTERED

## 2024-08-07 PROCEDURE — 63710000001 PIOGLITAZONE 30 MG TABLET: Performed by: NURSE PRACTITIONER

## 2024-08-07 PROCEDURE — 63710000001 FINASTERIDE 5 MG TABLET: Performed by: NURSE PRACTITIONER

## 2024-08-07 PROCEDURE — 25810000003 LACTATED RINGERS PER 1000 ML: Performed by: STUDENT IN AN ORGANIZED HEALTH CARE EDUCATION/TRAINING PROGRAM

## 2024-08-07 PROCEDURE — 63710000001 ROSUVASTATIN 40 MG TABLET: Performed by: NURSE PRACTITIONER

## 2024-08-07 PROCEDURE — 63710000001 PREGABALIN 75 MG CAPSULE: Performed by: ORTHOPAEDIC SURGERY

## 2024-08-07 PROCEDURE — 25010000002 MORPHINE PER 10 MG: Performed by: ORTHOPAEDIC SURGERY

## 2024-08-07 PROCEDURE — 82948 REAGENT STRIP/BLOOD GLUCOSE: CPT

## 2024-08-07 PROCEDURE — 63710000001 EMPAGLIFLOZIN 25 MG TABLET: Performed by: NURSE PRACTITIONER

## 2024-08-07 PROCEDURE — 73501 X-RAY EXAM HIP UNI 1 VIEW: CPT

## 2024-08-07 PROCEDURE — C1713 ANCHOR/SCREW BN/BN,TIS/BN: HCPCS | Performed by: ORTHOPAEDIC SURGERY

## 2024-08-07 PROCEDURE — 25010000002 MAGNESIUM SULFATE PER 500 MG OF MAGNESIUM: Performed by: NURSE ANESTHETIST, CERTIFIED REGISTERED

## 2024-08-07 PROCEDURE — 63710000001 LISINOPRIL 20 MG TABLET: Performed by: NURSE PRACTITIONER

## 2024-08-07 DEVICE — VIOLET ANTIBACTERIAL POLYDIOXANONE, KNOTLESS TISSUE CONTROL DEVICE
Type: IMPLANTABLE DEVICE | Site: HIP | Status: FUNCTIONAL
Brand: STRATAFIX

## 2024-08-07 DEVICE — R3 3 HOLE ACETABULAR SHELL 54MM
Type: IMPLANTABLE DEVICE | Site: HIP | Status: FUNCTIONAL
Brand: R3 ACETABULAR

## 2024-08-07 DEVICE — REFLECTION SPHERICAL HEAD SCREW 35MM
Type: IMPLANTABLE DEVICE | Site: HIP | Status: FUNCTIONAL
Brand: REFLECTION

## 2024-08-07 DEVICE — UNIVERSAL C-TAPER ADAPTER SLEEVE
Type: IMPLANTABLE DEVICE | Site: HIP | Status: FUNCTIONAL
Brand: ADAPTER SLEEVE

## 2024-08-07 DEVICE — KNOTLESS TISSUE CONTROL DEVICE, UNDYED UNIDIRECTIONAL (ANTIBACTERIAL) SYNTHETIC ABSORBABLE DEVICE
Type: IMPLANTABLE DEVICE | Site: HIP | Status: FUNCTIONAL
Brand: STRATAFIX

## 2024-08-07 DEVICE — REFLECTION SPHERICAL HEAD SCREW 25MM
Type: IMPLANTABLE DEVICE | Site: HIP | Status: FUNCTIONAL
Brand: REFLECTION

## 2024-08-07 DEVICE — OR3O DUAL MOBILITY LINER 42/54
Type: IMPLANTABLE DEVICE | Site: HIP | Status: FUNCTIONAL
Brand: OR3O DUAL MOBILITY

## 2024-08-07 DEVICE — UNIVERSAL TAPER FEMORAL HEAD
Type: IMPLANTABLE DEVICE | Site: HIP | Status: FUNCTIONAL
Brand: BIOLOX

## 2024-08-07 DEVICE — OR3O DUAL MOBILITY XLPE INSERT 28/42
Type: IMPLANTABLE DEVICE | Site: HIP | Status: FUNCTIONAL
Brand: OR3O DUAL MOBILITY

## 2024-08-07 RX ORDER — ONDANSETRON 2 MG/ML
INJECTION INTRAMUSCULAR; INTRAVENOUS AS NEEDED
Status: DISCONTINUED | OUTPATIENT
Start: 2024-08-07 | End: 2024-08-07 | Stop reason: SURG

## 2024-08-07 RX ORDER — MAGNESIUM SULFATE HEPTAHYDRATE 500 MG/ML
INJECTION, SOLUTION INTRAMUSCULAR; INTRAVENOUS AS NEEDED
Status: DISCONTINUED | OUTPATIENT
Start: 2024-08-07 | End: 2024-08-07 | Stop reason: SURG

## 2024-08-07 RX ORDER — LIDOCAINE HYDROCHLORIDE 20 MG/ML
INJECTION, SOLUTION EPIDURAL; INFILTRATION; INTRACAUDAL; PERINEURAL AS NEEDED
Status: DISCONTINUED | OUTPATIENT
Start: 2024-08-07 | End: 2024-08-07 | Stop reason: SURG

## 2024-08-07 RX ORDER — DEXAMETHASONE SODIUM PHOSPHATE 4 MG/ML
INJECTION, SOLUTION INTRA-ARTICULAR; INTRALESIONAL; INTRAMUSCULAR; INTRAVENOUS; SOFT TISSUE AS NEEDED
Status: DISCONTINUED | OUTPATIENT
Start: 2024-08-07 | End: 2024-08-07 | Stop reason: SURG

## 2024-08-07 RX ORDER — PIOGLITAZONEHYDROCHLORIDE 30 MG/1
30 TABLET ORAL DAILY
Status: DISCONTINUED | OUTPATIENT
Start: 2024-08-07 | End: 2024-08-08 | Stop reason: HOSPADM

## 2024-08-07 RX ORDER — HYDROMORPHONE HYDROCHLORIDE 1 MG/ML
0.5 INJECTION, SOLUTION INTRAMUSCULAR; INTRAVENOUS; SUBCUTANEOUS
Status: DISCONTINUED | OUTPATIENT
Start: 2024-08-07 | End: 2024-08-07 | Stop reason: HOSPADM

## 2024-08-07 RX ORDER — MELOXICAM 15 MG/1
15 TABLET ORAL DAILY
Status: DISCONTINUED | OUTPATIENT
Start: 2024-08-08 | End: 2024-08-08 | Stop reason: HOSPADM

## 2024-08-07 RX ORDER — ONDANSETRON 2 MG/ML
4 INJECTION INTRAMUSCULAR; INTRAVENOUS EVERY 6 HOURS PRN
Status: DISCONTINUED | OUTPATIENT
Start: 2024-08-07 | End: 2024-08-08 | Stop reason: HOSPADM

## 2024-08-07 RX ORDER — MELOXICAM 15 MG/1
15 TABLET ORAL ONCE
Status: DISCONTINUED | OUTPATIENT
Start: 2024-08-07 | End: 2024-08-07 | Stop reason: HOSPADM

## 2024-08-07 RX ORDER — ONDANSETRON 2 MG/ML
4 INJECTION INTRAMUSCULAR; INTRAVENOUS ONCE AS NEEDED
Status: DISCONTINUED | OUTPATIENT
Start: 2024-08-07 | End: 2024-08-07 | Stop reason: HOSPADM

## 2024-08-07 RX ORDER — CARVEDILOL 25 MG/1
25 TABLET ORAL ONCE
Status: COMPLETED | OUTPATIENT
Start: 2024-08-07 | End: 2024-08-07

## 2024-08-07 RX ORDER — VANCOMYCIN/0.9 % SOD CHLORIDE 1.5G/250ML
15 PLASTIC BAG, INJECTION (ML) INTRAVENOUS ONCE
Status: COMPLETED | OUTPATIENT
Start: 2024-08-07 | End: 2024-08-07

## 2024-08-07 RX ORDER — ROSUVASTATIN CALCIUM 40 MG/1
40 TABLET, COATED ORAL NIGHTLY
Status: DISCONTINUED | OUTPATIENT
Start: 2024-08-07 | End: 2024-08-08 | Stop reason: HOSPADM

## 2024-08-07 RX ORDER — LABETALOL 100 MG/1
50 TABLET, FILM COATED ORAL EVERY 6 HOURS PRN
Status: DISCONTINUED | OUTPATIENT
Start: 2024-08-07 | End: 2024-08-08 | Stop reason: HOSPADM

## 2024-08-07 RX ORDER — DEXTROSE MONOHYDRATE 25 G/50ML
25 INJECTION, SOLUTION INTRAVENOUS
Status: DISCONTINUED | OUTPATIENT
Start: 2024-08-07 | End: 2024-08-08 | Stop reason: HOSPADM

## 2024-08-07 RX ORDER — HYDRALAZINE HYDROCHLORIDE 20 MG/ML
5 INJECTION INTRAMUSCULAR; INTRAVENOUS
Status: DISCONTINUED | OUTPATIENT
Start: 2024-08-07 | End: 2024-08-07 | Stop reason: HOSPADM

## 2024-08-07 RX ORDER — ASPIRIN 81 MG/1
81 TABLET ORAL EVERY 12 HOURS SCHEDULED
Status: DISCONTINUED | OUTPATIENT
Start: 2024-08-08 | End: 2024-08-08 | Stop reason: HOSPADM

## 2024-08-07 RX ORDER — SUCCINYLCHOLINE/SOD CL,ISO/PF 200MG/10ML
SYRINGE (ML) INTRAVENOUS AS NEEDED
Status: DISCONTINUED | OUTPATIENT
Start: 2024-08-07 | End: 2024-08-07 | Stop reason: SURG

## 2024-08-07 RX ORDER — HYDROCODONE BITARTRATE AND ACETAMINOPHEN 7.5; 325 MG/1; MG/1
2 TABLET ORAL EVERY 4 HOURS PRN
Status: DISCONTINUED | OUTPATIENT
Start: 2024-08-07 | End: 2024-08-08 | Stop reason: HOSPADM

## 2024-08-07 RX ORDER — INSULIN LISPRO 100 [IU]/ML
3-14 INJECTION, SOLUTION INTRAVENOUS; SUBCUTANEOUS
Status: DISCONTINUED | OUTPATIENT
Start: 2024-08-07 | End: 2024-08-08 | Stop reason: HOSPADM

## 2024-08-07 RX ORDER — FENTANYL CITRATE 50 UG/ML
50 INJECTION, SOLUTION INTRAMUSCULAR; INTRAVENOUS ONCE AS NEEDED
Status: DISCONTINUED | OUTPATIENT
Start: 2024-08-07 | End: 2024-08-07 | Stop reason: HOSPADM

## 2024-08-07 RX ORDER — FENTANYL CITRATE 50 UG/ML
INJECTION, SOLUTION INTRAMUSCULAR; INTRAVENOUS AS NEEDED
Status: DISCONTINUED | OUTPATIENT
Start: 2024-08-07 | End: 2024-08-07 | Stop reason: SURG

## 2024-08-07 RX ORDER — DROPERIDOL 2.5 MG/ML
0.62 INJECTION, SOLUTION INTRAMUSCULAR; INTRAVENOUS
Status: DISCONTINUED | OUTPATIENT
Start: 2024-08-07 | End: 2024-08-07 | Stop reason: HOSPADM

## 2024-08-07 RX ORDER — OXYCODONE AND ACETAMINOPHEN 7.5; 325 MG/1; MG/1
1 TABLET ORAL EVERY 4 HOURS PRN
Status: DISCONTINUED | OUTPATIENT
Start: 2024-08-07 | End: 2024-08-07 | Stop reason: HOSPADM

## 2024-08-07 RX ORDER — SODIUM CHLORIDE 0.9 % (FLUSH) 0.9 %
3-10 SYRINGE (ML) INJECTION AS NEEDED
Status: DISCONTINUED | OUTPATIENT
Start: 2024-08-07 | End: 2024-08-07 | Stop reason: HOSPADM

## 2024-08-07 RX ORDER — CARVEDILOL 25 MG/1
25 TABLET ORAL 2 TIMES DAILY
Status: DISCONTINUED | OUTPATIENT
Start: 2024-08-07 | End: 2024-08-08 | Stop reason: HOSPADM

## 2024-08-07 RX ORDER — NITROGLYCERIN 0.4 MG/1
0.4 TABLET SUBLINGUAL
Status: DISCONTINUED | OUTPATIENT
Start: 2024-08-07 | End: 2024-08-08 | Stop reason: HOSPADM

## 2024-08-07 RX ORDER — POLYETHYLENE GLYCOL 3350 17 G/17G
17 POWDER, FOR SOLUTION ORAL 2 TIMES DAILY
Status: DISCONTINUED | OUTPATIENT
Start: 2024-08-07 | End: 2024-08-08 | Stop reason: HOSPADM

## 2024-08-07 RX ORDER — AMLODIPINE BESYLATE 5 MG/1
5 TABLET ORAL DAILY
Status: DISCONTINUED | OUTPATIENT
Start: 2024-08-08 | End: 2024-08-08 | Stop reason: HOSPADM

## 2024-08-07 RX ORDER — CLOPIDOGREL BISULFATE 75 MG/1
75 TABLET ORAL DAILY
Status: DISCONTINUED | OUTPATIENT
Start: 2024-08-08 | End: 2024-08-08 | Stop reason: HOSPADM

## 2024-08-07 RX ORDER — ONDANSETRON 4 MG/1
4 TABLET, ORALLY DISINTEGRATING ORAL EVERY 6 HOURS PRN
Status: DISCONTINUED | OUTPATIENT
Start: 2024-08-07 | End: 2024-08-08 | Stop reason: HOSPADM

## 2024-08-07 RX ORDER — INSULIN LISPRO 100 [IU]/ML
10 INJECTION, SOLUTION INTRAVENOUS; SUBCUTANEOUS ONCE
Status: COMPLETED | OUTPATIENT
Start: 2024-08-07 | End: 2024-08-07

## 2024-08-07 RX ORDER — PROMETHAZINE HYDROCHLORIDE 25 MG/1
25 SUPPOSITORY RECTAL ONCE AS NEEDED
Status: DISCONTINUED | OUTPATIENT
Start: 2024-08-07 | End: 2024-08-07 | Stop reason: HOSPADM

## 2024-08-07 RX ORDER — HYDROCODONE BITARTRATE AND ACETAMINOPHEN 5; 325 MG/1; MG/1
1 TABLET ORAL ONCE AS NEEDED
Status: COMPLETED | OUTPATIENT
Start: 2024-08-07 | End: 2024-08-07

## 2024-08-07 RX ORDER — HYDROCODONE BITARTRATE AND ACETAMINOPHEN 7.5; 325 MG/1; MG/1
1 TABLET ORAL EVERY 4 HOURS PRN
Status: DISCONTINUED | OUTPATIENT
Start: 2024-08-07 | End: 2024-08-08 | Stop reason: HOSPADM

## 2024-08-07 RX ORDER — LABETALOL HYDROCHLORIDE 5 MG/ML
5 INJECTION, SOLUTION INTRAVENOUS
Status: DISCONTINUED | OUTPATIENT
Start: 2024-08-07 | End: 2024-08-07 | Stop reason: HOSPADM

## 2024-08-07 RX ORDER — NALOXONE HCL 0.4 MG/ML
0.2 VIAL (ML) INJECTION AS NEEDED
Status: DISCONTINUED | OUTPATIENT
Start: 2024-08-07 | End: 2024-08-07 | Stop reason: HOSPADM

## 2024-08-07 RX ORDER — IBUPROFEN 600 MG/1
1 TABLET ORAL
Status: DISCONTINUED | OUTPATIENT
Start: 2024-08-07 | End: 2024-08-08 | Stop reason: HOSPADM

## 2024-08-07 RX ORDER — SODIUM CHLORIDE, SODIUM LACTATE, POTASSIUM CHLORIDE, CALCIUM CHLORIDE 600; 310; 30; 20 MG/100ML; MG/100ML; MG/100ML; MG/100ML
9 INJECTION, SOLUTION INTRAVENOUS CONTINUOUS
Status: DISCONTINUED | OUTPATIENT
Start: 2024-08-07 | End: 2024-08-07

## 2024-08-07 RX ORDER — NICOTINE POLACRILEX 4 MG
15 LOZENGE BUCCAL
Status: DISCONTINUED | OUTPATIENT
Start: 2024-08-07 | End: 2024-08-08 | Stop reason: HOSPADM

## 2024-08-07 RX ORDER — INSULIN LISPRO 100 [IU]/ML
15 INJECTION, SOLUTION INTRAVENOUS; SUBCUTANEOUS
Status: DISCONTINUED | OUTPATIENT
Start: 2024-08-08 | End: 2024-08-08 | Stop reason: HOSPADM

## 2024-08-07 RX ORDER — DEXTROSE MONOHYDRATE 25 G/50ML
25 INJECTION, SOLUTION INTRAVENOUS ONCE
Status: COMPLETED | OUTPATIENT
Start: 2024-08-07 | End: 2024-08-07

## 2024-08-07 RX ORDER — MAGNESIUM HYDROXIDE 1200 MG/15ML
LIQUID ORAL AS NEEDED
Status: DISCONTINUED | OUTPATIENT
Start: 2024-08-07 | End: 2024-08-07 | Stop reason: HOSPADM

## 2024-08-07 RX ORDER — ACETAMINOPHEN 325 MG/1
325 TABLET ORAL EVERY 4 HOURS PRN
Status: DISCONTINUED | OUTPATIENT
Start: 2024-08-07 | End: 2024-08-08 | Stop reason: HOSPADM

## 2024-08-07 RX ORDER — PREGABALIN 75 MG/1
150 CAPSULE ORAL ONCE
Status: COMPLETED | OUTPATIENT
Start: 2024-08-07 | End: 2024-08-07

## 2024-08-07 RX ORDER — ACETAMINOPHEN 10 MG/ML
INJECTION, SOLUTION INTRAVENOUS AS NEEDED
Status: DISCONTINUED | OUTPATIENT
Start: 2024-08-07 | End: 2024-08-07 | Stop reason: SURG

## 2024-08-07 RX ORDER — FINASTERIDE 5 MG/1
5 TABLET, FILM COATED ORAL DAILY
Status: DISCONTINUED | OUTPATIENT
Start: 2024-08-07 | End: 2024-08-08 | Stop reason: HOSPADM

## 2024-08-07 RX ORDER — IPRATROPIUM BROMIDE AND ALBUTEROL SULFATE 2.5; .5 MG/3ML; MG/3ML
3 SOLUTION RESPIRATORY (INHALATION) ONCE AS NEEDED
Status: DISCONTINUED | OUTPATIENT
Start: 2024-08-07 | End: 2024-08-07 | Stop reason: HOSPADM

## 2024-08-07 RX ORDER — FENTANYL CITRATE 50 UG/ML
50 INJECTION, SOLUTION INTRAMUSCULAR; INTRAVENOUS
Status: DISCONTINUED | OUTPATIENT
Start: 2024-08-07 | End: 2024-08-07 | Stop reason: HOSPADM

## 2024-08-07 RX ORDER — EPHEDRINE SULFATE 50 MG/ML
5 INJECTION, SOLUTION INTRAVENOUS ONCE AS NEEDED
Status: DISCONTINUED | OUTPATIENT
Start: 2024-08-07 | End: 2024-08-07 | Stop reason: HOSPADM

## 2024-08-07 RX ORDER — PANTOPRAZOLE SODIUM 40 MG/1
40 TABLET, DELAYED RELEASE ORAL
Status: DISCONTINUED | OUTPATIENT
Start: 2024-08-08 | End: 2024-08-08 | Stop reason: HOSPADM

## 2024-08-07 RX ORDER — DIPHENHYDRAMINE HYDROCHLORIDE 50 MG/ML
12.5 INJECTION INTRAMUSCULAR; INTRAVENOUS
Status: DISCONTINUED | OUTPATIENT
Start: 2024-08-07 | End: 2024-08-07 | Stop reason: HOSPADM

## 2024-08-07 RX ORDER — PROPOFOL 10 MG/ML
INJECTION, EMULSION INTRAVENOUS AS NEEDED
Status: DISCONTINUED | OUTPATIENT
Start: 2024-08-07 | End: 2024-08-07 | Stop reason: SURG

## 2024-08-07 RX ORDER — LIDOCAINE HYDROCHLORIDE 10 MG/ML
0.5 INJECTION, SOLUTION INFILTRATION; PERINEURAL ONCE AS NEEDED
Status: DISCONTINUED | OUTPATIENT
Start: 2024-08-07 | End: 2024-08-07 | Stop reason: HOSPADM

## 2024-08-07 RX ORDER — SODIUM CHLORIDE 0.9 % (FLUSH) 0.9 %
3 SYRINGE (ML) INJECTION EVERY 12 HOURS SCHEDULED
Status: DISCONTINUED | OUTPATIENT
Start: 2024-08-07 | End: 2024-08-07 | Stop reason: HOSPADM

## 2024-08-07 RX ORDER — PROMETHAZINE HYDROCHLORIDE 25 MG/1
25 TABLET ORAL ONCE AS NEEDED
Status: DISCONTINUED | OUTPATIENT
Start: 2024-08-07 | End: 2024-08-07 | Stop reason: HOSPADM

## 2024-08-07 RX ORDER — INSULIN LISPRO 100 [IU]/ML
10 INJECTION, SOLUTION INTRAVENOUS; SUBCUTANEOUS
Status: DISCONTINUED | OUTPATIENT
Start: 2024-08-08 | End: 2024-08-07

## 2024-08-07 RX ORDER — TRANEXAMIC ACID 100 MG/ML
INJECTION, SOLUTION INTRAVENOUS AS NEEDED
Status: DISCONTINUED | OUTPATIENT
Start: 2024-08-07 | End: 2024-08-07 | Stop reason: SURG

## 2024-08-07 RX ORDER — FLUMAZENIL 0.1 MG/ML
0.2 INJECTION INTRAVENOUS AS NEEDED
Status: DISCONTINUED | OUTPATIENT
Start: 2024-08-07 | End: 2024-08-07 | Stop reason: HOSPADM

## 2024-08-07 RX ORDER — LISINOPRIL 20 MG/1
20 TABLET ORAL DAILY
Status: DISCONTINUED | OUTPATIENT
Start: 2024-08-07 | End: 2024-08-08 | Stop reason: HOSPADM

## 2024-08-07 RX ORDER — ROCURONIUM BROMIDE 10 MG/ML
INJECTION, SOLUTION INTRAVENOUS AS NEEDED
Status: DISCONTINUED | OUTPATIENT
Start: 2024-08-07 | End: 2024-08-07 | Stop reason: SURG

## 2024-08-07 RX ADMIN — TRANEXAMIC ACID 1000 MG: 1 INJECTION, SOLUTION INTRAVENOUS at 13:28

## 2024-08-07 RX ADMIN — ONDANSETRON 4 MG: 2 INJECTION INTRAMUSCULAR; INTRAVENOUS at 12:33

## 2024-08-07 RX ADMIN — CARVEDILOL 25 MG: 25 TABLET, FILM COATED ORAL at 20:12

## 2024-08-07 RX ADMIN — SODIUM CHLORIDE, POTASSIUM CHLORIDE, SODIUM LACTATE AND CALCIUM CHLORIDE 500 ML: 600; 310; 30; 20 INJECTION, SOLUTION INTRAVENOUS at 10:34

## 2024-08-07 RX ADMIN — VANCOMYCIN HYDROCHLORIDE 1500 MG: 10 INJECTION, POWDER, LYOPHILIZED, FOR SOLUTION INTRAVENOUS at 11:23

## 2024-08-07 RX ADMIN — SODIUM CHLORIDE 2000 MG: 900 INJECTION INTRAVENOUS at 20:11

## 2024-08-07 RX ADMIN — ROCURONIUM BROMIDE 5 MG: 10 INJECTION, SOLUTION INTRAVENOUS at 12:23

## 2024-08-07 RX ADMIN — INSULIN LISPRO 10 UNITS: 100 INJECTION, SOLUTION INTRAVENOUS; SUBCUTANEOUS at 17:55

## 2024-08-07 RX ADMIN — DEXAMETHASONE SODIUM PHOSPHATE 4 MG: 4 INJECTION, SOLUTION INTRA-ARTICULAR; INTRALESIONAL; INTRAMUSCULAR; INTRAVENOUS; SOFT TISSUE at 12:33

## 2024-08-07 RX ADMIN — SODIUM CHLORIDE 2 G: 900 INJECTION INTRAVENOUS at 12:06

## 2024-08-07 RX ADMIN — SODIUM CHLORIDE, POTASSIUM CHLORIDE, SODIUM LACTATE AND CALCIUM CHLORIDE: 600; 310; 30; 20 INJECTION, SOLUTION INTRAVENOUS at 13:45

## 2024-08-07 RX ADMIN — LISINOPRIL 20 MG: 20 TABLET ORAL at 17:55

## 2024-08-07 RX ADMIN — PROPOFOL 150 MG: 10 INJECTION, EMULSION INTRAVENOUS at 12:23

## 2024-08-07 RX ADMIN — TRANEXAMIC ACID 1000 MG: 1 INJECTION, SOLUTION INTRAVENOUS at 12:30

## 2024-08-07 RX ADMIN — PROPOFOL 150 MCG/KG/MIN: 10 INJECTION, EMULSION INTRAVENOUS at 12:24

## 2024-08-07 RX ADMIN — ROSUVASTATIN CALCIUM 40 MG: 40 TABLET, FILM COATED ORAL at 20:13

## 2024-08-07 RX ADMIN — FINASTERIDE 5 MG: 5 TABLET, FILM COATED ORAL at 17:55

## 2024-08-07 RX ADMIN — INSULIN LISPRO 5 UNITS: 100 INJECTION, SOLUTION INTRAVENOUS; SUBCUTANEOUS at 20:59

## 2024-08-07 RX ADMIN — INSULIN GLARGINE 30 UNITS: 100 INJECTION, SOLUTION SUBCUTANEOUS at 20:59

## 2024-08-07 RX ADMIN — CARVEDILOL 25 MG: 25 TABLET, FILM COATED ORAL at 11:21

## 2024-08-07 RX ADMIN — LIDOCAINE HYDROCHLORIDE 100 MG: 20 INJECTION, SOLUTION EPIDURAL; INFILTRATION; INTRACAUDAL; PERINEURAL at 12:23

## 2024-08-07 RX ADMIN — PREGABALIN 150 MG: 75 CAPSULE ORAL at 10:18

## 2024-08-07 RX ADMIN — ROCURONIUM BROMIDE 20 MG: 10 INJECTION, SOLUTION INTRAVENOUS at 12:44

## 2024-08-07 RX ADMIN — FENTANYL CITRATE 50 MCG: 50 INJECTION, SOLUTION INTRAMUSCULAR; INTRAVENOUS at 12:18

## 2024-08-07 RX ADMIN — ROCURONIUM BROMIDE 45 MG: 10 INJECTION, SOLUTION INTRAVENOUS at 12:25

## 2024-08-07 RX ADMIN — PROPOFOL 50 MG: 10 INJECTION, EMULSION INTRAVENOUS at 12:27

## 2024-08-07 RX ADMIN — Medication 180 MG: at 12:23

## 2024-08-07 RX ADMIN — HYDROCODONE BITARTRATE AND ACETAMINOPHEN 1 TABLET: 5; 325 TABLET ORAL at 15:30

## 2024-08-07 RX ADMIN — SUGAMMADEX 200 MG: 100 INJECTION, SOLUTION INTRAVENOUS at 13:36

## 2024-08-07 RX ADMIN — EMPAGLIFLOZIN 25 MG: 25 TABLET, FILM COATED ORAL at 17:55

## 2024-08-07 RX ADMIN — DEXTROSE MONOHYDRATE 25 ML: 25 INJECTION, SOLUTION INTRAVENOUS at 10:53

## 2024-08-07 RX ADMIN — MAGNESIUM SULFATE HEPTAHYDRATE 2 G: 500 INJECTION, SOLUTION INTRAMUSCULAR; INTRAVENOUS at 13:00

## 2024-08-07 RX ADMIN — ACETAMINOPHEN 1000 MG: 1000 INJECTION INTRAVENOUS at 12:27

## 2024-08-07 RX ADMIN — PIOGLITAZONE HYDROCHLORIDE 30 MG: 30 TABLET ORAL at 17:55

## 2024-08-07 NOTE — ANESTHESIA PROCEDURE NOTES
Airway  Urgency: elective    Date/Time: 8/7/2024 12:24 PM  End Time:8/7/2024 12:25 PM  Airway not difficult    General Information and Staff    Patient location during procedure: OR  Anesthesiologist: Omar Lancaster MD  CRNA/CAA: Merari Raines CRNA    Indications and Patient Condition  Indications for airway management: airway protection    Preoxygenated: yes  MILS maintained throughout  Mask difficulty assessment: 2 - vent by mask + OA or adjuvant +/- NMBA    Final Airway Details  Final airway type: endotracheal airway      Successful airway: ETT  Cuffed: yes   Successful intubation technique: direct laryngoscopy  Facilitating devices/methods: intubating stylet  Endotracheal tube insertion site: oral  Blade size: 4  ETT size (mm): 7.5  Cormack-Lehane Classification: grade IIa - partial view of glottis  Placement verified by: chest auscultation and capnometry   Cuff volume (mL): 8  Measured from: lips  ETT/EBT  to lips (cm): 22  Number of attempts at approach: 1  Assessment: lips, teeth, and gum same as pre-op and atraumatic intubation

## 2024-08-07 NOTE — ANESTHESIA PREPROCEDURE EVALUATION
Anesthesia Evaluation     Patient summary reviewed and Nursing notes reviewed   no history of anesthetic complications:   NPO Solid Status: > 8 hours  NPO Liquid Status: > 2 hours           Airway   Mallampati: II  TM distance: >3 FB  Neck ROM: full  Dental      Pulmonary    (+) ,sleep apnea  Cardiovascular     ECG reviewed    (+) hypertension, CAD, CABG      Neuro/Psych  GI/Hepatic/Renal/Endo    (+) obesity, diabetes mellitus using insulin    Musculoskeletal     Abdominal   (+) obese   Substance History      OB/GYN          Other                          Anesthesia Plan    ASA 3     general     intravenous induction     Anesthetic plan, risks, benefits, and alternatives have been provided, discussed and informed consent has been obtained with: patient.        CODE STATUS:

## 2024-08-07 NOTE — CONSULTS
[facility]  CONSULT NOTE      Patient Name: Lucas Deluca  : 1950  MRN: [id number]    Primary Care Physician: Sarina Javed MD  Provider requesting consultation: Hebert Jung MD    Subjective   Subjective     Reason for Consultation:  Diabetes management    HPI:  Lucas Deluca is a 73 y.o. male with past medical history of partial hip replacement and worsening arthritic and severe right hip pain worse with movement presents to the hospital for hip revision.  He underwent right hip acetabular revision today .  We have been asked to weigh in on diabetes management.  At home patient says he uses 50 units of long-acting insulin twice daily and 20 units 3 times daily with meals of short acting insulin.  He reports his most recent A1c is 7.3 and he is mended by his endocrinologist.  He skipped his 50 units this morning on the day of surgery.  Before his dinner his blood sugar was 113 and following the dinner and he says he is currently 256 on his freestyle esequiel.      Review of Systems  No current fevers or chills  No current shortness of breath or cough  No current nausea, vomiting, or diarrhea  No current chest pain or palpitations    All other systems reviewed and are negative.     Personal History     Past Medical History:   Diagnosis Date    Arthritis     CAD S/P percutaneous coronary angioplasty 2017    Diabetes mellitus     TYPE 2    Essential hypertension 2021    History of prostate cancer     Hyperlipidemia     Hypertension     Macular degeneration     Right hip pain     Sleep apnea     CPAP       Past Surgical History:   Procedure Laterality Date    CATARACT EXTRACTION Bilateral     CORONARY ANGIOPLASTY WITH STENT PLACEMENT  2004    ONE    CORONARY ARTERY BYPASS GRAFT      2004 X 4 VESSEL    CYSTOSCOPY TRANSURETHRAL RESECTION OF PROSTATE      LA TX TIBL SHFT FX IMED IMPLT W/WO SCREWS&/CERCLA Right 2017    Procedure: TIBIA INTRAMEDULLARY  NAIL/SHANNON INSERTION;  Surgeon: Colton Nascimento MD;  Location: [facility];  Service: Orthopedics    TONSILLECTOMY      AGE 65       Family History: family history includes Cancer in his maternal aunt, maternal grandfather, maternal grandmother, maternal uncle, and mother; Diabetes in his mother and sister; Heart disease in his father. Other pertinent FHx was reviewed and unremarkable.     Social History:  reports that he quit smoking about 32 years ago. His smoking use included cigarettes. He has been exposed to tobacco smoke. He has quit using smokeless tobacco. He reports that he does not drink alcohol and does not use drugs.    Medications:  B Complex Vitamins, Cholecalciferol, Insulin Glargine, Insulin Pen Needle, Multiple Vitamins-Minerals, amLODIPine, aspirin, carvedilol, clopidogrel, empagliflozin, ezetimibe, fenofibrate, finasteride, fish oil-omega-3 fatty acids, glucose blood, insulin aspart, lisinopril, nitroglycerin, pioglitazone, rosuvastatin, and tolterodine LA    Scheduled Meds:[START ON 8/8/2024] amLODIPine, 5 mg, Oral, Daily  [START ON 8/8/2024] aspirin, 81 mg, Oral, Q12H  carvedilol, 25 mg, Oral, BID  ceFAZolin, 2,000 mg, Intravenous, Q8H  [START ON 8/8/2024] clopidogrel, 75 mg, Oral, Daily  empagliflozin, 25 mg, Oral, Daily  finasteride, 5 mg, Oral, Daily  insulin glargine, 30 Units, Subcutaneous, Q12H  [START ON 8/8/2024] insulin lispro, 15 Units, Subcutaneous, TID With Meals  insulin lispro, 3-14 Units, Subcutaneous, 4x Daily AC & at Bedtime  lisinopril, 20 mg, Oral, Daily  [START ON 8/8/2024] meloxicam, 15 mg, Oral, Daily  [START ON 8/8/2024] pantoprazole, 40 mg, Oral, Q AM  pioglitazone, 30 mg, Oral, Daily  polyethylene glycol, 17 g, Oral, BID  rosuvastatin, 40 mg, Oral, Nightly      Continuous Infusions:   PRN Meds:.  acetaminophen    dextrose    dextrose    glucagon (human recombinant)    HYDROcodone-acetaminophen    HYDROcodone-acetaminophen    nitroglycerin    ondansetron ODT **OR**  ondansetron    No Known Allergies    Objective   Objective     Vital Signs:   Temp:  [97.3 °F (36.3 °C)-97.9 °F (36.6 °C)] 97.9 °F (36.6 °C)  Heart Rate:  [56-68] 65  Resp:  [12-18] 16  BP: (118-182)/() 141/56  Flow (L/min):  [2-4] 2    Physical Exam  Constitutional:Awake, alert, not acute distress  HENT: NCAT  Respiratory: No cough or wheezes, normal respirations, nonlabored breathing   Cardiovascular: Pulse rate is normal, palpable radial pulses  Gastrointestinal: nondistended  Musculoskeletal: Mildly obese otherwise normal musculature for age, no lower extremity edema, BMI 31  Psychiatric: Appropriate affect, cooperative, conversational  Neurologic: No slurred speech or facial droop, follows commands  Skin: No rashes or jaundice, warm       LAB RESULTS:                              Brief Urine Lab Results  (Last result in the past 365 days)        Color   Clarity   Blood   Leuk Est   Nitrite   Protein   CREAT   Urine HCG        12/26/23 1125             53.1               Microbiology Results (last 10 days)       ** No results found for the last 240 hours. **            XR Hip 1 View Without Pelvis Right (Surgery Only)    Result Date: 8/7/2024  FRONTAL PROJECTION OF THE RIGHT HIP AND PELVIS  CLINICAL HISTORY: Postop right total hip arthroplasty.  FINDINGS:  Two frontal projections of the right hip were obtained. These demonstrate recent postoperative changes from a right total hip arthroplasty procedure. The arthroplasty prosthesis is appropriately positioned. There is no significant periprosthetic lucency. No fracture is appreciated.  This report was finalized on 8/7/2024 2:53 PM by Dr. Chivo Monte M.D on Workstation: [id number]           Assessment & Plan   Assessment & Plan     Active Hospital Problems    Diagnosis  POA    **Status post right hip replacement [Z96.641]  Not Applicable    OA (osteoarthritis) of hip [M16.9]  Yes    Essential hypertension [I10]  Yes      Resolved Hospital Problems   No  resolved problems to display.     Type 2 diabetes mellitus with hyperglycemia:  Patient reportedly eating probably a little healthier and less here in the hospital and he does not home.  Home regimen: 50 units long-acting insulin twice daily and 20 units short acting insulin 3 times daily with meals  Hospital regimen: Initially decrease long-acting to 30 units twice daily to avoid risk of hypoglycemia, decrease short acting insulin to 15 units 3 times daily with correction scale.  -Patient has continuous glucometer Freestyle esequile and can monitor for any hypoglycemia  -Monitor glucose and adjust further as needed.    Essential hypertension: Continue lisinopril, carvedilol, and amlodipine.  I will add as needed labetalol if blood pressure goes over 180.    Osteoarthritis status post hip revision: Postoperative management per orthopedic surgery.  Patient states he is planning on discharging tomorrow.  PT has been consulted.    Treatment plan discussed with patient who is in agreement.    Thank you for allowing [facility] to provide consultative care for your patient, we will continue to follow while clinically appropriate.    Aquiles Jara MD  08/07/24

## 2024-08-07 NOTE — ANESTHESIA POSTPROCEDURE EVALUATION
Patient: Lucas Deluca    Procedure Summary       Date: 08/07/24 Room / Location:  [facility] [address] [place] [address] [facility]    Anesthesia Start: 1210 Anesthesia Stop: 1400    Procedure: TOTAL HIP ARTHROPLASTY REVISION (Right: Hip) Diagnosis:       Status post right hip replacement      (Status post right hip replacement [Z96.641])    Surgeons: Hebert Jung MD Provider: Omar Lancaster MD    Anesthesia Type: general ASA Status: 3            Anesthesia Type: general    Vitals  Vitals Value Taken Time   /80 08/07/24 1415   Temp 36.6 °C (97.8 °F) 08/07/24 1358   Pulse 67 08/07/24 1418   Resp 17 08/07/24 1415   SpO2 99 % 08/07/24 1418   Vitals shown include unfiled device data.        Anesthesia Post Evaluation

## 2024-08-07 NOTE — OP NOTE
Name: Lucas Deluca  YOB: 1950    DATE OF SURGERY: 8/7/2024    PREOPERATIVE DIAGNOSIS:  Right hip painful hemiarthroplasty    POSTOPERATIVE DIAGNOSIS: Right hip painful hemiarthroplasty    PROCEDURE PERFORMED: Right  acetabular revision (dual mobility cup)    SURGEON: Hebert Jung M.D.    ASSISTANT: TONJA CUEVAS    A surgical assistant was integral in ensuring a successful outcome with this procedure.  The assistant was utilized to assist in positioning the patient, draping the patient, was used throughout the case to provide with retraction of tissues, suctioning of blood and body fluids for visualization, positioning of the extremity to allow for proper exposure so that I could perform the procedure.  Without the use of a surgical assistant during this procedure I feel that the outcome may have been compromised or would have been suboptimal or at risk for complications.    IMPLANTS:   Implant Name Type Inv. Item Serial No.  Lot No. LRB No. Used Action   DEV CONTRL TISS STRATAFIX SYMM PDS PLUS JASMINA CT-1 60CM - [id number] Implant DEV CONTRL TISS STRATAFIX SYMM PDS PLUS JASMINA CT-1 60CM  [employer]  DIV [employer] J [employer] J [id number] Right 1 Implanted   DEV CONTRL TISS STRATAFIXSPIRALMNCRYL PLSPS2 REV3/0 45CM - [id number] Implant DEV CONTRL TISS STRATAFIXSPIRALMNCRYL PLSPS2 REV3/0 45CM  [employer]  DIV OF J AND J [id number] Right 1 Implanted   SHLL ACET R3 3H STD 54MM - [id number] Implant SHLL ACET R3 3H STD 54MM  [employer] [id number] Right 1 Implanted   LINER HIP OR30 2/MOBL OXINIUM SZ42/54 - [id number] Implant LINER HIP OR30 2/MOBL OXINIUM SZ42/54  NEUMANN AND NEPHEW [id number] Right 1 Implanted   SCRW SPH HD REFLECTION 6.5X25MM - [id number] Implant SCRW SPH HD REFLECTION 6.5X25MM  NEUMANN [employer] NEP[employer]W [id number] Right 1 Implanted   SCRW SPH HD REFLECTION 6.5X35MM - [id number] Implant SCRW SPH HD REFLECTION 6.5X35MM  NEUMANN [employer] NEP[employer]W [id number] Right 1 Implanted   INSRT HIP OR30 2/MOBL XLPE OXINIUM SZ28/42 -  [id number] Implant INSRT HIP OR30 2/MOBL XLPE OXINIUM SZ28/42  NEUMANN [employer] NEPHEW [id number] Right 1 Implanted   HD FEM/HIP BIOLOX/DELTA UNIV TPR CERAM 28MM - [id number] Implant HD FEM/HIP BIOLOX/DELTA UNIV TPR CERAM 28MM  [id number] Right 1 Implanted   SLV ADPT C/TPR UNIV TI OFFST PLS0MM - [id number] Implant SLV ADPT C/TPR UNIV TI OFFST PLS0MM  ZALP [id number] Right 1 Implanted         Estimated Blood Loss: 200 mL  Specimens : None  Complications: none    DESCRIPTION OF PROCEDURE:    The patient was taken to the operating room and placed in the supine position. A sequential compression device was carefully placed on the non-operative leg. Preoperative antibiotics were administered. Surgical time out was performed. After adequate induction of anesthesia the patient was then transferred onto the  table and positioned appropriately in the lateral decubitus position. The hip was then prepped and draped in the usual sterile fashion.   A posterior lateral surgical incision was then made through the previous incision.  The gluteus mary jo fascia was then divided the gluteus mary jo muscle was then bluntly dissected.  A Charnley self-retaining retractor was then placed.   There was a small amount of clear fluid within the hip joint. The pericapsular tissue appeared normal.. The hip was then dislocated. We then placed an anterior retractor and inferior retractor.The fibrous tissue and scar tissue around the edges of the acetabular  component was then excised along with the lip.     The remaining bone stock appeared very good without deficiency.  There was clear loss of cartilage throughout the acetabulum..   I then progressively reamed up to the appropriate size in 45° of abduction and 20° of anteversion. Line to line reaming was performed. We then impacted the 54mm R3 acetabular component in 45 of abduction and 20 of anteversion the cup was stable.  Additional fixation  included 2 screws in the posterior superior quadrant..  At this point the cup was quite stable and we placed the oculi zirconium dual mobility acetabular liner.  We then chose the trial dual mobility femoral head with a 28 mm +0 Yodit femoral head and reduced the hip.  Leg lengths were equal.  We then chose the final head combination and assembled them on the back table.  The final head combination was then placed on a clean dry taper and the hip was reduced. The hip was copiously irrigated with pulse lavage and injected with anesthetic cocktail solution.  The hip was very stable. The capsule was then reapproximated with #1 PDS suture, and the remainder of the hip was closed in multiple layers in standard fashion..  There was excellent hemostasis. We placed a one-eighth inch Hemovac drain.  Sterile dressings were applied. At the end of the case, the sponge and needle counts were reported as being correct. There were no known complications. The patient was then transported to the recovery room.        Hebert Jung M.D.  8/7/2024

## 2024-08-08 ENCOUNTER — DOCUMENTATION (OUTPATIENT)
Dept: HOME HEALTH SERVICES | Facility: HOME HEALTHCARE | Age: 74
End: 2024-08-08
Payer: MEDICARE

## 2024-08-08 ENCOUNTER — HOME HEALTH ADMISSION (OUTPATIENT)
Dept: HOME HEALTH SERVICES | Facility: HOME HEALTHCARE | Age: 74
End: 2024-08-08
Payer: MEDICARE

## 2024-08-08 ENCOUNTER — TELEPHONE (OUTPATIENT)
Dept: ORTHOPEDIC SURGERY | Facility: CLINIC | Age: 74
End: 2024-08-08
Payer: MEDICARE

## 2024-08-08 VITALS
HEART RATE: 69 BPM | SYSTOLIC BLOOD PRESSURE: 161 MMHG | OXYGEN SATURATION: 98 % | TEMPERATURE: 97.5 F | DIASTOLIC BLOOD PRESSURE: 88 MMHG | RESPIRATION RATE: 16 BRPM

## 2024-08-08 LAB
GLUCOSE BLDC GLUCOMTR-MCNC: 273 MG/DL (ref 70–130)
GLUCOSE BLDC GLUCOMTR-MCNC: 290 MG/DL (ref 70–130)
HCT VFR BLD AUTO: 41.5 % (ref 37.5–51)
HGB BLD-MCNC: 13 G/DL (ref 13–17.7)

## 2024-08-08 PROCEDURE — G0378 HOSPITAL OBSERVATION PER HR: HCPCS

## 2024-08-08 PROCEDURE — 85014 HEMATOCRIT: CPT | Performed by: NURSE PRACTITIONER

## 2024-08-08 PROCEDURE — 82948 REAGENT STRIP/BLOOD GLUCOSE: CPT

## 2024-08-08 PROCEDURE — 63710000001 POLYETHYLENE GLYCOL 17 G PACK: Performed by: NURSE PRACTITIONER

## 2024-08-08 PROCEDURE — 63710000001 CLOPIDOGREL 75 MG TABLET: Performed by: NURSE PRACTITIONER

## 2024-08-08 PROCEDURE — 63710000001 INSULIN LISPRO (HUMAN) PER 5 UNITS: Performed by: INTERNAL MEDICINE

## 2024-08-08 PROCEDURE — A9270 NON-COVERED ITEM OR SERVICE: HCPCS | Performed by: NURSE PRACTITIONER

## 2024-08-08 PROCEDURE — 85018 HEMOGLOBIN: CPT | Performed by: NURSE PRACTITIONER

## 2024-08-08 PROCEDURE — 63710000001 EMPAGLIFLOZIN 25 MG TABLET: Performed by: NURSE PRACTITIONER

## 2024-08-08 PROCEDURE — 63710000001 ASPIRIN 81 MG TABLET DELAYED-RELEASE: Performed by: NURSE PRACTITIONER

## 2024-08-08 PROCEDURE — A9270 NON-COVERED ITEM OR SERVICE: HCPCS | Performed by: INTERNAL MEDICINE

## 2024-08-08 PROCEDURE — 63710000001 CARVEDILOL 25 MG TABLET: Performed by: NURSE PRACTITIONER

## 2024-08-08 PROCEDURE — 99024 POSTOP FOLLOW-UP VISIT: CPT | Performed by: NURSE PRACTITIONER

## 2024-08-08 PROCEDURE — 25010000002 CEFAZOLIN PER 500 MG: Performed by: NURSE PRACTITIONER

## 2024-08-08 PROCEDURE — 63710000001 FINASTERIDE 5 MG TABLET: Performed by: NURSE PRACTITIONER

## 2024-08-08 PROCEDURE — 63710000001 MELOXICAM 15 MG TABLET: Performed by: NURSE PRACTITIONER

## 2024-08-08 PROCEDURE — 63710000001 AMLODIPINE 5 MG TABLET: Performed by: NURSE PRACTITIONER

## 2024-08-08 PROCEDURE — 63710000001 LISINOPRIL 20 MG TABLET: Performed by: NURSE PRACTITIONER

## 2024-08-08 PROCEDURE — 63710000001 PANTOPRAZOLE 40 MG TABLET DELAYED-RELEASE: Performed by: NURSE PRACTITIONER

## 2024-08-08 PROCEDURE — 63710000001 PIOGLITAZONE 30 MG TABLET: Performed by: NURSE PRACTITIONER

## 2024-08-08 PROCEDURE — 63710000001 INSULIN GLARGINE PER 5 UNITS: Performed by: INTERNAL MEDICINE

## 2024-08-08 PROCEDURE — 97110 THERAPEUTIC EXERCISES: CPT

## 2024-08-08 PROCEDURE — 97161 PT EVAL LOW COMPLEX 20 MIN: CPT

## 2024-08-08 RX ORDER — ONDANSETRON 4 MG/1
4 TABLET, FILM COATED ORAL EVERY 8 HOURS PRN
Qty: 10 TABLET | Refills: 0 | Status: SHIPPED | OUTPATIENT
Start: 2024-08-08

## 2024-08-08 RX ORDER — ASPIRIN 81 MG/1
TABLET ORAL
Qty: 60 TABLET | Refills: 0 | Status: SHIPPED | OUTPATIENT
Start: 2024-08-08 | End: 2024-09-23

## 2024-08-08 RX ORDER — PANTOPRAZOLE SODIUM 40 MG/1
40 TABLET, DELAYED RELEASE ORAL DAILY
Qty: 14 TABLET | Refills: 0 | Status: SHIPPED | OUTPATIENT
Start: 2024-08-08 | End: 2024-08-22

## 2024-08-08 RX ORDER — POLYETHYLENE GLYCOL 3350 17 G/17G
17 POWDER, FOR SOLUTION ORAL 2 TIMES DAILY
Qty: 238 G | Refills: 0 | Status: SHIPPED | OUTPATIENT
Start: 2024-08-08 | End: 2024-08-15

## 2024-08-08 RX ORDER — HYDROCODONE BITARTRATE AND ACETAMINOPHEN 7.5; 325 MG/1; MG/1
1 TABLET ORAL EVERY 4 HOURS PRN
Qty: 40 TABLET | Refills: 0 | Status: SHIPPED | OUTPATIENT
Start: 2024-08-08

## 2024-08-08 RX ADMIN — CARVEDILOL 25 MG: 25 TABLET, FILM COATED ORAL at 08:09

## 2024-08-08 RX ADMIN — INSULIN LISPRO 15 UNITS: 100 INJECTION, SOLUTION INTRAVENOUS; SUBCUTANEOUS at 08:07

## 2024-08-08 RX ADMIN — SODIUM CHLORIDE 2000 MG: 900 INJECTION INTRAVENOUS at 03:24

## 2024-08-08 RX ADMIN — CLOPIDOGREL BISULFATE 75 MG: 75 TABLET, FILM COATED ORAL at 08:09

## 2024-08-08 RX ADMIN — INSULIN LISPRO 8 UNITS: 100 INJECTION, SOLUTION INTRAVENOUS; SUBCUTANEOUS at 08:07

## 2024-08-08 RX ADMIN — PANTOPRAZOLE SODIUM 40 MG: 40 TABLET, DELAYED RELEASE ORAL at 06:20

## 2024-08-08 RX ADMIN — MELOXICAM 15 MG: 15 TABLET ORAL at 08:09

## 2024-08-08 RX ADMIN — ASPIRIN 81 MG: 81 TABLET, COATED ORAL at 08:09

## 2024-08-08 RX ADMIN — EMPAGLIFLOZIN 25 MG: 25 TABLET, FILM COATED ORAL at 08:10

## 2024-08-08 RX ADMIN — PIOGLITAZONE HYDROCHLORIDE 30 MG: 30 TABLET ORAL at 08:09

## 2024-08-08 RX ADMIN — POLYETHYLENE GLYCOL 3350 17 G: 17 POWDER, FOR SOLUTION ORAL at 08:09

## 2024-08-08 RX ADMIN — FINASTERIDE 5 MG: 5 TABLET, FILM COATED ORAL at 08:10

## 2024-08-08 RX ADMIN — INSULIN GLARGINE 30 UNITS: 100 INJECTION, SOLUTION SUBCUTANEOUS at 08:07

## 2024-08-08 RX ADMIN — AMLODIPINE BESYLATE 5 MG: 5 TABLET ORAL at 08:10

## 2024-08-08 RX ADMIN — LISINOPRIL 20 MG: 20 TABLET ORAL at 08:09

## 2024-08-08 NOTE — PLAN OF CARE
Goal Outcome Evaluation:  Plan of Care Reviewed With: patient           Outcome Evaluation: Pt is a 74 yo male who presents POD1 s/p R acetabular revision. Prior to admission, pt was living at home and independent with all mobility. Upon exam, pt demos R LE weakness and decreased endurance limiting mobility. Pt performed transfers independently and ambulated 175' with supervision and rwx. Pt was able to safely negotiate 4 stairs with CGA and rails. Pt demos no LOB or safety concerns with mobility. Pt completed DEREJE exercises with supervision and verbalizes understanding of activity recommendations for home. Pt planning to DC home today with sister to assist at home as needed. No further acute PT concerns at this time. Rec continuing with HH PT.      Anticipated Discharge Disposition (PT): home with home health, home with assist

## 2024-08-08 NOTE — PROGRESS NOTES
[facility] following for PT.  Spoke with patient and verified all info on facesheet.  Patient voiced all is correct.  D/C'd from the hospital and on the way home.  Noted order in per Dr Jung.

## 2024-08-08 NOTE — PLAN OF CARE
Goal Outcome Evaluation:      Patient is POD #1 for a right total hip arthroplasty.  He dressing is dry and intact.  He drain is flashing green.  Ice applied to incision. Worked with physical therapy this am.  Up to chair.  Order to discharge to home this am.

## 2024-08-08 NOTE — DISCHARGE PLACEMENT REQUEST
"Phillip Landry (73 y.o. Male)       Date of Birth   1950    Social Security Number       Address   [address] [place] [address]    Home Phone   [phone]    MRN   [id number]       Jain   [facility]    Marital Status                               Admission Date   8/7/24    Admission Type   Elective    Admitting Provider   Hebert Jung MD    Attending Provider       Department, Room/Bed   [address] P799/1       Discharge Date   8/8/2024    Discharge Disposition   Home-Health Care [facility]    Discharge Destination                                 Attending Provider: (none)   Allergies: No Known Allergies    Isolation: None   Infection: None   Code Status: CPR    Ht: 177.8 cm (70\")   Wt: 99.9 kg (220 lb 3.2 oz)    Admission Cmt: None   Principal Problem: Status post right hip replacement [Z96.641]                   Active Insurance as of 8/7/2024       Primary Coverage       Payor Plan Insurance Group Employer/Plan Group    MEDICARE MEDICARE A & B        Payor Plan Address Payor Plan Phone Number Payor Plan Fax Number Effective Dates    [address] [phone]  8/1/2017 - None Entered    [facility] [id number]         Subscriber Name Subscriber Birth Date Member ID       PHILLIP LANDRY 1950 [id number]               Secondary Coverage       Payor Plan Insurance Group Employer/Plan Group     FOR LIFE  FOR LIFE  SUP         Payor Plan Address Payor Plan Phone Number Payor Plan Fax Number Effective Dates    [address] [phone]  1/1/2021 - None Entered    [facility] [address]         Subscriber Name Subscriber Birth Date Member ID       PHILLIP LANDRY 1950 [id number]                     Emergency Contacts        (Rel.) Home Phone Work Phone Mobile Phone    Jose,Donna (Sister) [phone] -- --    YosiHugh (Son) [phone] -- --                "

## 2024-08-08 NOTE — THERAPY DISCHARGE NOTE
Patient Name: Lucas Deluca  : 1950    MRN: [id number]                              Today's Date: 2024       Admit Date: 2024    Visit Dx:     ICD-10-CM ICD-9-CM   1. Status post right hip replacement  Z96.641 V43.64     Patient Active Problem List   Diagnosis    Closed fracture of distal end of fibula with tibia    Diabetes    CAD S/P percutaneous coronary angioplasty    Benign prostatic hyperplasia with nocturia    Prostate cancer    Essential hypertension    Hyperlipemia, mixed    Macular degeneration    OA (osteoarthritis) of hip    Status post right hip replacement     Past Medical History:   Diagnosis Date    Arthritis     CAD S/P percutaneous coronary angioplasty 2017    Diabetes mellitus     TYPE 2    Essential hypertension 2021    History of prostate cancer     Hyperlipidemia     Hypertension     Macular degeneration     Right hip pain     Sleep apnea     CPAP     Past Surgical History:   Procedure Laterality Date    CATARACT EXTRACTION Bilateral     CORONARY ANGIOPLASTY WITH STENT PLACEMENT      ONE    CORONARY ARTERY BYPASS GRAFT      2004 X 4 VESSEL    CYSTOSCOPY TRANSURETHRAL RESECTION OF PROSTATE      LA TX TIBL SHFT FX IMED IMPLT W/WO SCREWS&/CERCLA Right 2017    Procedure: TIBIA INTRAMEDULLARY NAIL/SHANNON INSERTION;  Surgeon: Colton Nascimento MD;  Location: [facility];  Service: Orthopedics    TONSILLECTOMY      AGE 65      General Information       Row Name 24 0853          Physical Therapy Time and Intention    Document Type discharge evaluation/summary  -EF     Mode of Treatment individual therapy;physical therapy  -EF       Row Name 24 0853          General Information    Patient Profile Reviewed yes  -EF     Prior Level of Function independent:;all household mobility;community mobility  -EF     Existing Precautions/Restrictions fall  -EF     Barriers to Rehab none identified  -EF       Row Name 24 0853          Living Environment     People in Home alone  sister planning to stay with pt at [place]  -EF       Row Name 08/08/24 0853          Home Main Entrance    Number of Stairs, Main Entrance three  -EF     Stair Railings, Main Entrance railings safe and in good condition;railings on both sides of stairs  -EF       Row Name 08/08/24 0853          Cognition    Orientation Status (Cognition) oriented x 4  -EF       Row Name 08/08/24 0853          Safety Issues, Functional Mobility    Impairments Affecting Function (Mobility) endurance/activity tolerance;strength;range of motion (ROM)  -EF               User Key  (r) = Recorded By, (t) = Taken By, (c) = Cosigned By      Initials Name Provider Type    EF Roxana Irvin, PT Physical Therapist                   Mobility       Row Name 08/08/24 0854          Bed Mobility    Comment, (Bed Mobility) not tested; pt standing in room with [facility] staff upon PT arrival  -EF       Row Name 08/08/24 0854          Sit-Stand Transfer    Sit-Stand [place] (Transfers) modified independence  -EF     Assistive Device (Sit-Stand Transfers) walker, front-wheeled  -EF       Row Name 08/08/24 0854          Gait/Stairs (Locomotion)    [place] Level (Gait) supervision;verbal cues  -EF     Assistive Device (Gait) walker, front-wheeled  -EF     Distance in Feet (Gait) 175  -EF     Deviations/Abnormal Patterns (Gait) ben decreased;right sided deviations;weight shifting decreased  -EF     Bilateral Gait Deviations forward flexed posture  -EF     Right Sided Gait Deviations weight shift ability decreased  -EF     [place] Level (Stairs) stand by assist;contact guard;verbal cues  -EF     Handrail Location (Stairs) both sides  -EF     Number of Steps (Stairs) 4  -EF     Ascending Technique (Stairs) step-to-step  -EF     Descending Technique (Stairs) step-to-step  -EF     Comment, (Gait/Stairs) Cues for gait sequencing with rwx and on stairs. Pt able to progress to reciprocal gait pattern with cues. No LOB or safety  concerns.  -EF               User Key  (r) = Recorded By, (t) = Taken By, (c) = Cosigned By      Initials Name Provider Type    EF Roxana Irvin PT Physical Therapist                   Obj/Interventions       Row Name 08/08/24 0855          Range of Motion Comprehensive    General Range of Motion lower extremity range of motion deficits identified  -EF     Comment, General Range of Motion R hip ROM limited somewhat; L LE ROM WFL  -EF       Row Name 08/08/24 0855          Strength Comprehensive (MMT)    General Manual Muscle Testing (MMT) Assessment lower extremity strength deficits identified  -EF     Comment, General Manual Muscle Testing (MMT) Assessment R hip grossly 3-/5; L LE WFL  -EF       Row Name 08/08/24 0855          Motor Skills    Therapeutic Exercise --  R DEREJE Protocol x 10 reps  -EF       Row Name 08/08/24 0855          Balance    Balance Assessment standing static balance;standing dynamic balance  -EF     Static Standing Balance modified independence  -EF     Dynamic Standing Balance supervision  -EF     Position/Device Used, Standing Balance supported;walker, front-wheeled  -EF       Row Name 08/08/24 0855          Sensory Assessment (Somatosensory)    Sensory Assessment (Somatosensory) LE sensation intact  -EF               User Key  (r) = Recorded By, (t) = Taken By, (c) = Cosigned By      Initials Name Provider Type    EF Roxana Irvin, FRED Physical Therapist                   Goals/Plan    No documentation.                  Clinical Impression       Row Name 08/08/24 0856          Pain    Pretreatment Pain Rating 0/10 - no pain  -EF     Posttreatment Pain Rating 0/10 - no pain  -EF       Row Name 08/08/24 0856          Plan of Care Review    Plan of Care Reviewed With patient  -EF     Outcome Evaluation Pt is a 74 yo male who presents POD1 s/p R acetabular revision. Prior to admission, pt was living at home and independent with all mobility. Upon exam, pt demos R LE weakness and  decreased endurance limiting mobility. Pt performed transfers independently and ambulated 175' with supervision and rwx. Pt was able to safely negotiate 4 stairs with CGA and rails. Pt demos no LOB or safety concerns with mobility. Pt completed DEREJE exercises with supervision and verbalizes understanding of activity recommendations for home. Pt planning to DC home today with sister to assist at home as needed. No further acute PT concerns at this time. Rec continuing with HH PT.  -EF       Row Name 08/08/24 0856          Therapy Assessment/Plan (PT)    Rehab Potential (PT) good, to achieve stated therapy goals  -EF     Criteria for Skilled Interventions Met (PT) yes;meets criteria  -EF     Therapy Frequency (PT) evaluation only  -EF       Row Name 08/08/24 0856          Positioning and Restraints    Pre-Treatment Position standing in room  -EF     Post Treatment Position chair  -EF     In Chair reclined;call light within reach;encouraged to call for assist;exit alarm on;notified nsg;legs elevated  -EF               User Key  (r) = Recorded By, (t) = Taken By, (c) = Cosigned By      Initials Name Provider Type    EF Roxana Irvin, PT Physical Therapist                   Outcome Measures       Row Name 08/08/24 0859 08/08/24 0500       How much help from another person do you currently need...    Turning from your back to your side while in flat bed without using bedrails? 4  -EF 4  -MB    Moving from lying on back to sitting on the side of a flat bed without bedrails? 4  -EF 4  -MB    Moving to and from a bed to a chair (including a wheelchair)? 4  -EF 3  -MB    Standing up from a chair using your arms (e.g., wheelchair, bedside chair)? 4  -EF 3  -MB    Climbing 3-5 steps with a railing? 3  -EF 2  -MB    To walk in hospital room? 3  -EF 3  -MB    AM-PAC 6 Clicks Score (PT) 22  -EF 19  -MB    Highest Level of Mobility Goal 7 --> Walk 25 feet or more  -EF 6 --> Walk 10 steps or more  -MB              User Key  (r)  = Recorded By, (t) = Taken By, (c) = Cosigned By      Initials Name Provider Type     Roxana Irvin, FRED Physical Therapist    Bruna Gaona RN Registered Nurse                  Physical Therapy Education       Title: PT OT SLP Therapies (Resolved)       Topic: Physical Therapy (Resolved)       Point: Mobility training (Resolved)       Learning Progress Summary             Patient Acceptance, E,TB, VU,DU by  at 8/8/2024 0859                         Point: Home exercise program (Resolved)       Learning Progress Summary             Patient Acceptance, E,TB, VU,DU by  at 8/8/2024 0859                         Point: Body mechanics (Resolved)       Learner Progress:  Not documented in this visit.              Point: Precautions (Resolved)       Learner Progress:  Not documented in this visit.                              User Key       Initials Effective Dates Name Provider Type Discipline     05/31/24 -  Roxana Irvin PT Physical Therapist PT                  PT Recommendation and Plan     Plan of Care Reviewed With: patient  Outcome Evaluation: Pt is a 72 yo male who presents POD1 s/p R acetabular revision. Prior to admission, pt was living at home and independent with all mobility. Upon exam, pt demos R LE weakness and decreased endurance limiting mobility. Pt performed transfers independently and ambulated 175' with supervision and rwx. Pt was able to safely negotiate 4 stairs with CGA and rails. Pt demos no LOB or safety concerns with mobility. Pt completed DEREJE exercises with supervision and verbalizes understanding of activity recommendations for home. Pt planning to DC home today with sister to assist at home as needed. No further acute PT concerns at this time. Rec continuing with HH PT.     Time Calculation:         PT Charges       Row Name 08/08/24 0900             Time Calculation    Start Time 0839  -EF      Stop Time 0852  -EF      Time Calculation (min) 13 min  -EF      PT  Received On 08/08/24  -EF         Time Calculation- PT    Total Timed Code Minutes- PT 9 minute(s)  -EF         Timed Charges    67049 - PT Therapeutic Exercise Minutes 9  -EF         Total Minutes    Timed Charges Total Minutes 9  -EF       Total Minutes 9  -EF                User Key  (r) = Recorded By, (t) = Taken By, (c) = Cosigned By      Initials Name Provider Type    EF Roxana Irvin, PT Physical Therapist                  Therapy Charges for Today       Code Description Service Date Service Provider Modifiers Qty    34917367649 HC PT THER PROC EA 15 MIN 8/8/2024 Roxana Irvin, PT GP 1    54946358963 HC PT EVAL LOW COMPLEXITY 1 8/8/2024 Roxana Irvin, PT GP 1            PT G-Codes  AM-PAC 6 Clicks Score (PT): 22    PT Discharge Summary  Anticipated Discharge Disposition (PT): home with home health, home with assist    Roxana Irvin, PT  8/8/2024

## 2024-08-08 NOTE — CASE MANAGEMENT/SOCIAL WORK
Case Management Discharge Note      Final Note: dc home with [facility]          Selected Continued Care - Discharged on 8/8/2024 Admission date: 8/7/2024 - Discharge disposition: Home-Health Care Svc      Destination    No services have been selected for the patient.                Durable Medical Equipment    No services have been selected for the patient.                Dialysis/Infusion    No services have been selected for the patient.                Home Medical Care Coordination complete.      Service Provider Selected Services Address Phone Fax Patient Preferred    [facility] [address] [address] [phone] --              Therapy    No services have been selected for the patient.                Community Resources    No services have been selected for the patient.                Community & DME    No services have been selected for the patient.                    Transportation Services  Private: Car    Final Discharge Disposition Code: 06 - home with home health care

## 2024-08-08 NOTE — PROGRESS NOTES
Orthopedic Progress Note      Patient: Lucas Deluca  Date of Admission: 8/7/2024  YOB: 1950  Medical Record Number: [id number]    POD # :  1 Day Post-Op Procedure(s) (LRB):  TOTAL HIP ARTHROPLASTY REVISION (Right)    Systemic or Specific Complaints: No Complaints    Pain Relief: some relief    Physical Exam:  73 y.o.  male  Vitals:  Temp:  [97.3 °F (36.3 °C)-98.8 °F (37.1 °C)] 98.2 °F (36.8 °C)  Heart Rate:  [63-70] 65  Resp:  [12-17] 17  BP: (118-182)/() 133/56  alert and oriented  Chest: Clear to auscultation  CV: Regular Rate and Rhythm  Abd: Soft, NT, with BS +  Ext: NV intact. ROM appropriate. Calf is soft and nontender. Negative Homans sign  Skin: Incision clean dry and intact w/out signs or  symptoms of infection.    Activity: Mobilizing Per P.T.   Weight Bearing: As Tolerated    Data Review     Admission on 08/07/2024   Component Date Value Ref Range Status    Glucose 08/07/2024 88  70 - 130 mg/dL Final    Glucose 08/07/2024 91  70 - 130 mg/dL Final    Glucose 08/07/2024 116  70 - 130 mg/dL Final    Glucose 08/07/2024 243 (H)  70 - 130 mg/dL Final    Hemoglobin 08/08/2024 13.0  13.0 - 17.7 g/dL Final    Hematocrit 08/08/2024 41.5  37.5 - 51.0 % Final    Glucose 08/08/2024 273 (H)  70 - 130 mg/dL Final    Glucose 08/08/2024 290 (H)  70 - 130 mg/dL Final       No results found.    Medications:  amLODIPine, 5 mg, Oral, Daily  aspirin, 81 mg, Oral, Q12H  carvedilol, 25 mg, Oral, BID  clopidogrel, 75 mg, Oral, Daily  empagliflozin, 25 mg, Oral, Daily  finasteride, 5 mg, Oral, Daily  insulin glargine, 30 Units, Subcutaneous, Q12H  insulin lispro, 15 Units, Subcutaneous, TID With Meals  insulin lispro, 3-14 Units, Subcutaneous, 4x Daily AC & at Bedtime  lisinopril, 20 mg, Oral, Daily  meloxicam, 15 mg, Oral, Daily  pantoprazole, 40 mg, Oral, Q AM  pioglitazone, 30 mg, Oral, Daily  polyethylene glycol, 17 g, Oral, BID  rosuvastatin, 40 mg, Oral, Nightly        acetaminophen    dextrose     dextrose    glucagon (human recombinant)    HYDROcodone-acetaminophen    HYDROcodone-acetaminophen    labetalol    nitroglycerin    ondansetron ODT **OR** ondansetron    Assessment:  Doing well POD  # 1 Day Post-Op Procedure(s) (LRB):  TOTAL HIP ARTHROPLASTY REVISION (Right)  Problems Addressed this Visit          Musculoskeletal and Injuries    * (Principal) Status post right hip replacement    Relevant Medications    lactated ringers bolus 500 mL (Completed)    ceFAZolin 2000 mg IVPB in 100 mL NS (MBP) (Completed)    vancomycin IVPB 1500 mg in 0.9% NaCl (Premix) 500 mL (Completed)     Diagnoses         Codes Comments    Status post right hip replacement     ICD-10-CM: Z96.641  ICD-9-CM: V43.64             Plan:  Continue efforts to mobilize - WBAT  Continue Pain Control Measures - Orals  Continue incisional Care - BIBI  DVT prophylaxis - ASA 81mg BID and continue home dose of plavix. Can resume normal dose of ASA in two weeks.   Will need to f/u in 2 weeks with Dr. Jung in the clinic    Discharge Plan:Home today    JASON Milan    Date: 8/8/2024  Time: 11:26 EDT

## 2024-08-08 NOTE — PLAN OF CARE
Goal Outcome Evaluation:  Plan of Care Reviewed With: patient        Progress: improving  Outcome Evaluation: VSS, alert and oriented x4, RA, assist x1 to the bathroom, no complaints of pain during the night, received his antibiotics, ready for D/C.

## 2024-08-08 NOTE — PROGRESS NOTES
"    Name: Lucas Deluca ADMIT: 2024   : 1950  PCP: Sarina Javed MD    MRN: [id number] LOS: 0 days   AGE/SEX: 73 y.o. male  ROOM: [facility]   Subjective   No chief complaint on file.  Cc- hip pain    Pain is fair  Worse with movement  Better with meds  S/p OR yesterday  On insulin  Bg a little up today     ROS  No f/c  No n/v  No cp/palp  No soa/cough    Objective   Vital Signs  Temp:  [97.3 °F (36.3 °C)-98.8 °F (37.1 °C)] 98.2 °F (36.8 °C)  Heart Rate:  [63-70] 65  Resp:  [12-17] 17  BP: (118-182)/() 133/56  SpO2:  [92 %-100 %] 96 %  on  Flow (L/min):  [2-4] 2;   Device (Oxygen Therapy): room air  There is no height or weight on file to calculate BMI.    Physical Exam  HENT:      Head: Normocephalic and atraumatic.   Eyes:      General: No scleral icterus.  Pulmonary:      Effort: Pulmonary effort is normal. No respiratory distress.   Abdominal:      General: There is no distension.      Palpations: Abdomen is soft.      Tenderness: There is no abdominal tenderness.   Musculoskeletal:      Cervical back: Neck supple.   Neurological:      Mental Status: He is alert.   Psychiatric:         Mood and Affect: Mood normal.         Behavior: Behavior normal.         Thought Content: Thought content normal.         Results Review:       I reviewed the patient's new clinical results.  Results from last 7 days   Lab Units 24  0655   HEMOGLOBIN g/dL 13.0     Estimated Creatinine Clearance: 72.3 mL/min (by C-G formula based on SCr of 1.11 mg/dL).          Coag     HbA1C   Lab Results   Component Value Date    HGBA1C 7.3 (A) 2024    HGBA1C 7.8 (A) 2024    HGBA1C 7.9 (A) 2023     Infection     Radiology(recent) No radiology results for the last day  No results found for: \"TROPONINT\", \"TROPONINI\", \"BNP\"  No components found for: \"TSH;2\"    amLODIPine, 5 mg, Oral, Daily  aspirin, 81 mg, Oral, Q12H  carvedilol, 25 mg, Oral, BID  clopidogrel, 75 mg, Oral, Daily  empagliflozin, 25 " mg, Oral, Daily  finasteride, 5 mg, Oral, Daily  insulin glargine, 30 Units, Subcutaneous, Q12H  insulin lispro, 15 Units, Subcutaneous, TID With Meals  insulin lispro, 3-14 Units, Subcutaneous, 4x Daily AC & at Bedtime  lisinopril, 20 mg, Oral, Daily  meloxicam, 15 mg, Oral, Daily  pantoprazole, 40 mg, Oral, Q AM  pioglitazone, 30 mg, Oral, Daily  polyethylene glycol, 17 g, Oral, BID  rosuvastatin, 40 mg, Oral, Nightly       Diet: Regular/House; Fluid Consistency: Thin (IDDSI 0)      Assessment & Plan      Active Hospital Problems    Diagnosis  POA    **Status post right hip replacement [Z96.641]  Not Applicable    OA (osteoarthritis) of hip [M16.9]  Yes    Essential hypertension [I10]  Yes      Resolved Hospital Problems   No resolved problems to display.       Type 2 diabetes mellitus with hyperglycemia:  Currently on insulin  BG a little elevated but had some steroids with surgery  He is going to monitor BG closely and continue his home insulin      Essential hypertension: Continue lisinopril, carvedilol, and amlodipine.  I     Osteoarthritis status post hip revision: Postoperative management per orthopedic surgery.  Patient states he is planning on discharging today.      GERD  On ppi     He will follow up with his outpatient providers       DW family  Reviewed records       Leo Bustillo MD  [facility]  08/08/24  10:21 EDT

## 2024-08-08 NOTE — DISCHARGE SUMMARY
Patient Name: Lucas Deluca  Patient YOB: 1950    Date of Admission:  8/7/2024  Date of Discharge:  8/8/2024  Discharge Diagnosis: KS ARTHRP ACETBLR/PROX FEM PROSTC AGRFT/ALGRFT [48274] (TOTAL HIP ARTHROPLASTY)  Presenting Problem/History of Present Illness: Status post right hip replacement [Z96.641]  OA (osteoarthritis) of hip [M16.9]  Admitting Physician: Dr Hebert Jung  Consults:   Consults       Date and Time Order Name Status Description    8/7/2024  3:58 PM Inpatient Hospitalist Consult Completed             DETAILS OF HOSPITAL STAY:  Patient is a 73 y.o. male was admitted to the floor following the above procedure and underwent an uncomplicated hospital stay.  Patient did well with physical therapy and was ambulating well without problems.  On the day of discharge the wound was clean, dry and intact and calf was soft and nontender and Homans sign was negative.  Patient was tolerating  without problems.  Patient will be discharged home.    Condition on Discharge:  Stable    Vital Signs  Temp:  [97.3 °F (36.3 °C)-98.8 °F (37.1 °C)] 97.5 °F (36.4 °C)  Heart Rate:  [63-70] 69  Resp:  [12-17] 16  BP: (118-182)/() 161/88    LABS:   Admission on 08/07/2024, Discharged on 08/08/2024   Component Date Value Ref Range Status    Glucose 08/07/2024 88  70 - 130 mg/dL Final    Glucose 08/07/2024 91  70 - 130 mg/dL Final    Glucose 08/07/2024 116  70 - 130 mg/dL Final    Glucose 08/07/2024 243 (H)  70 - 130 mg/dL Final    Hemoglobin 08/08/2024 13.0  13.0 - 17.7 g/dL Final    Hematocrit 08/08/2024 41.5  37.5 - 51.0 % Final    Glucose 08/08/2024 273 (H)  70 - 130 mg/dL Final    Glucose 08/08/2024 290 (H)  70 - 130 mg/dL Final       No results found.        Discharge Medications     Discharge Medications        New Medications        Instructions Start Date   Aspirin Low Dose 81 MG EC tablet  Generic drug: aspirin  Replaces: aspirin 81 MG chewable tablet   Take 1 tablet by mouth 2 (Two) Times a Day for  14 days, THEN 1 tablet Daily for 28 days.   Start Date: August 8, 2024     HYDROcodone-acetaminophen 7.5-325 MG per tablet  Commonly known as: NORCO   1 tablet, Oral, Every 4 Hours PRN      ondansetron 4 MG tablet  Commonly known as: Zofran   4 mg, Oral, Every 8 Hours PRN      pantoprazole 40 MG EC tablet  Commonly known as: PROTONIX   40 mg, Oral, Daily      polyethylene glycol 17 GM/SCOOP powder  Commonly known as: MIRALAX   Mix 17 g in 4-8 oz of liquid and give by mouth 2 (Two) Times a Day for 7 days.             Changes to Medications        Instructions Start Date   nitroglycerin 0.4 MG SL tablet  Commonly known as: NITROSTAT  What changed: how much to take   1 under the tongue as needed for angina, may repeat q5mins for up three doses             Continue These Medications        Instructions Start Date   amLODIPine 10 MG tablet  Commonly known as: NORVASC   5 mg, Oral, Daily      carvedilol 25 MG tablet  Commonly known as: COREG   25 mg, Oral, 2 Times Daily      clopidogrel 75 MG tablet  Commonly known as: PLAVIX   75 mg, Oral, Daily      empagliflozin 25 MG tablet tablet  Commonly known as: Jardiance   25 mg, Oral, Daily      ezetimibe 10 MG tablet  Commonly known as: ZETIA   10 mg, Oral, Daily      fenofibrate 145 MG tablet  Commonly known as: TRICOR   145 mg, Oral, Daily      finasteride 5 MG tablet  Commonly known as: PROSCAR   TAKE 1 TABLET DAILY      Lantus SoloStar 100 UNIT/ML injection pen  Generic drug: Insulin Glargine   50 Units, Subcutaneous, 2 Times Daily      lisinopril 20 MG tablet  Commonly known as: PRINIVIL,ZESTRIL   20 mg, Oral, 2 Times Daily      NovoLOG FlexPen 100 UNIT/ML solution pen-injector sc pen  Generic drug: insulin aspart   30 Units, Subcutaneous, 3 Times Daily With Meals      pioglitazone 30 MG tablet  Commonly known as: ACTOS   30 mg, Oral, Daily      PRECISION XTRA TEST STRIPS test strip  Generic drug: glucose blood   Test 1-2 times each day      rosuvastatin 40 MG  tablet  Commonly known as: CRESTOR   40 mg, Oral, Nightly      Sure Comfort Pen Needles 32G X 6 MM misc  Generic drug: Insulin Pen Needle   1 each, Does not apply, 5 Times Daily      tolterodine LA 2 MG 24 hr capsule  Commonly known as: DETROL LA   2 mg, Oral, Nightly             Stop These Medications      aspirin 81 MG chewable tablet  Replaced by: Aspirin Low Dose 81 MG EC tablet     Cholecalciferol 25 MCG (1000 UT) capsule     fish oil-omega-3 fatty acids 1000 MG capsule     VITAMIN B COMPLEX PO     VITEYES AREDS FORMULA PO              Activity at Discharge:   Activity Instructions       Discharge Activity      Dr Hebert Jung  [address] [facility] [address]  [place], [place] [address] [phone]      Discharge Information (HIP REPLACEMENT)    The first 2-3 days after surgery your pain will likely be diminished due to medications that were given to you during surgery. It is very important to follow a few simple instructions to continue with good pain control after arriving home.    Activity control :  DON'T OVERDO IT, small amounts of activity on a frequent basis. You are encouraged to get up and move around  for  short periods but do not engage in any prolonged walking, standing or activity until cleared by your physical therapist. You may walk short distances frequently.  You will be notified while in hospital if you have any specific hip precautions  Ice - you should ice the hip as much as possible over the first week or two.  Placing a clean hand towel or pillowcase between the icepack and skin will allow you to ice for extended periods.   Pain Medication - Take some pain medication (1-2 tablets) on a regular schedule (every 4-6 hours) for the first 72 hours after arriving home. This is important to keep the pain under control as the operative medication begins to wear off. Make sure to have food in your stomach when taking the medication. If you develop any nausea with the pain medication, try taking Zofran 30  "minutes before taking the pain pills. After the first 72 hours you can take the medication as needed based on your pain.  REMEMBER - PAIN MEDICATION WORKS BETTER AT PREVENTING PAIN THAN IT DOES IN CATCHING UP TO PAIN ONCE IT INCREASES.  Physical therapy:  Follow the instructions of your physical therapist.  You may put full weight on your leg unless instructed otherwise.  If you have hip precautions, this will be discussed with you in the hospital. Continue to follow any hip precautions / restrictions until your follow-up appointment.   If you have been told that you have no hip precautions then you may sit / lay/ bend in any position within reason.  If you lay on your side then you should place a pillow between your legs for the first 2 weeks.  For all patients - \"listen to your hip\" - meaning don't force your hip into an uncomfortable position.    Showering :   The waterproof dressing will allow you to shower as soon as you get home.    The dressing should be left in place.  After 7 days the suction unit will stop functioning and at that time you may disconnect the suction tube.    If the dressing becomes disloged or saturated it should be changed.  Please contact our office for further assistance of this occurs. If you have a home health nurse or therapist and cannot make it into the office, have the call for further instructions before removing the dressing.  Please refer to the BIBI information sheet if you have any questions about the dressing.  You may also call the BIBI dressing hotline for questions related to the dressing (1-665.507.5848). If there still other problems or questions related to the dressing despite these measures then you can contact Bernadine at our office [phone]    Driving : No driving during the first 2 weeks post surgery. After the 2 week visit, Dr. Jung, Maricel, or Stalin will determine when you're ready to drive.    Blood Clot (DVT) Prevention:  Keep legs elevated when possible to " "limit swelling  Perform \"calf pump\" exercises regularly to encourage blood flow through the calf veins.  Most patients will be discharged on asprin 81mg (full strength) twice daily for 2 weeks, then once daily for four weeks. Some high risk patients may require Coumadin, Xarelto, or other blood thinners.    Follow-Up Visit: After arriving home, please call Dr Jung's office ([phone]) to arrange your follow-up appointment. This visit will be approximately 2 weeks post-op.     Your Implant: Your hip implant is made of the finest materials available. It is made by [employer]. For more info visit [email or web address]. There are four components:  Polarstem titanium femoral stem  R3 titanium acetabular cup  Oxidized zirconium femoral head (Oxinium ™)  Crosslinked polyethylene acetabular insert    Patient May Shower; No Tub Baths, Pools or Hot Tubs      Please instruct the patient to remove the bibi suction box before showering    Weight Bearing As Tolerated              Discharge Instructions:   1)  Patient is to continue with physical therapy exercises daily and continue working with the physical therapist as ordered.  2)  Follow NO hip precautions.   3)  Patient may weight bear as tolerated.   4)  Apply ice regularly. You may ice for long periods of time as long as ice is not directly on the skin. Patient instructed on frequent calf pumping exercises.  Patient also instructed on incentive spirometer during hospitalization and encouraged to continue to use at home regularly.   5)  The dressing should be left in place. If waterproof dressing is intact the patient may shower immediately following discharge. If the dressing becomes disloged or saturated it should be changed. Please refer to the BIBI information sheet if you have any questions about the dressing.  If you have a home health nurse or therapist they can be contacted to assist with dressing change or repair. You may also call the BIBI " dressing hotline for questions related to the dressing (1-453.515.5128). If there are still other problems or questions related to the dressing despite these measures then you can contact Bernadine at our office [phone].   6)  Follow up appointment in 2 weeks - patient to call the office at [phone] to schedule. 7)  Patient will be discharged on aspirin 81mg BID x 2 weeks, then daily x 4 weeks    Complete Discharge Diagnosis:    Status post right hip replacement    Essential hypertension    OA (osteoarthritis) of hip          Follow-up Appointments  Future Appointments   Date Time Provider Department Center   8/22/2024 11:20 AM Hebert Jung MD [facility] ROSALINOJ L100 TED   8/27/2024 10:00 AM Connie Fraire MD [facility] U ETRING [facility]   9/6/2024 10:15 AM Stephane Dow MD [facility] CD [place] DON[place]LL   10/3/2024 10:40 AM Stalin Galvez APRN [facility] L100 TED   10/17/2024  9:00 AM Magaly Woodson APRN [facility] DIAB ET DONNELL              JASON Milan  08/08/24  13:49 EDT

## 2024-08-09 ENCOUNTER — HOME CARE VISIT (OUTPATIENT)
Dept: HOME HEALTH SERVICES | Facility: HOME HEALTHCARE | Age: 74
End: 2024-08-09
Payer: MEDICARE

## 2024-08-09 PROCEDURE — G0151 HHCP-SERV OF PT,EA 15 MIN: HCPCS

## 2024-08-09 RX ORDER — CARVEDILOL 25 MG/1
25 TABLET ORAL 2 TIMES DAILY
Qty: 180 TABLET | Refills: 3 | Status: SHIPPED | OUTPATIENT
Start: 2024-08-09

## 2024-08-12 ENCOUNTER — HOME CARE VISIT (OUTPATIENT)
Dept: HOME HEALTH SERVICES | Facility: HOME HEALTHCARE | Age: 74
End: 2024-08-12
Payer: MEDICARE

## 2024-08-12 VITALS — DIASTOLIC BLOOD PRESSURE: 64 MMHG | SYSTOLIC BLOOD PRESSURE: 140 MMHG | HEART RATE: 76 BPM | OXYGEN SATURATION: 94 %

## 2024-08-12 VITALS
HEART RATE: 74 BPM | OXYGEN SATURATION: 95 % | DIASTOLIC BLOOD PRESSURE: 60 MMHG | SYSTOLIC BLOOD PRESSURE: 120 MMHG | TEMPERATURE: 98.3 F

## 2024-08-12 PROCEDURE — G0151 HHCP-SERV OF PT,EA 15 MIN: HCPCS

## 2024-08-12 NOTE — HOME HEALTH
Patient had a right THR revision per Dr. Jung on 8/7/24.  His follow up in on 8/22.  His BIBI is intact and clean.  No abnormal swelling.  He lives alone in a one story home.  His sister is staying with him for a few days.  His has PMH of TURP, right lower leg fractures in 2014 and a partial hip replacement in 2017.  He was independent with all ADLs prior to this.  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      Assessment: Skilled PT needed to guide him through the acute phase of post THR recovery, including pain management, transfer training, progressive HEP and gait training for safety.    Plan for next visit  Progress HEP to standing.   Gait training off the walker if ready.   Pain management teaching as needed.

## 2024-08-12 NOTE — HOME HEALTH
Pt presented sitting on the front porch.  He reports minimal pain in R LE.    R HIP BIBI is intact and no stains present.  He does have a small skin tear on R shin that we will monitor.    He reports he gets blisters from edema and has a tendency to pick them.    No falls  No med changes  He tolerated supine HEP and gait training.  He is not using a walker at this time.  But recommend the walker to prevent limping and falls.  He verbalized understanding.    Plan for next visit: progress to standing HEP, review pain/edema management, gait on level and unlevel surfaces.

## 2024-08-15 ENCOUNTER — HOME CARE VISIT (OUTPATIENT)
Dept: HOME HEALTH SERVICES | Facility: HOME HEALTHCARE | Age: 74
End: 2024-08-15
Payer: MEDICARE

## 2024-08-15 VITALS — SYSTOLIC BLOOD PRESSURE: 140 MMHG | DIASTOLIC BLOOD PRESSURE: 70 MMHG | HEART RATE: 68 BPM | OXYGEN SATURATION: 93 %

## 2024-08-15 PROCEDURE — G0151 HHCP-SERV OF PT,EA 15 MIN: HCPCS

## 2024-08-15 NOTE — HOME HEALTH
Pt presented on the porch. He thinks he is doing well. He was able to sleep on his R side. Bowels have moved.   He rates his pain right now 0/10.  Pt is amb without a walker.  But therapist recommends he obtain a cane for community outings.    He tolerated standing HEP and gait training on uneven surfaces today.  He is progressing very well.  No med questions, no falls.  He states no issues with blood sugar at this time.

## 2024-08-16 RX ORDER — LISINOPRIL 20 MG/1
20 TABLET ORAL 2 TIMES DAILY
Qty: 180 TABLET | Refills: 3 | OUTPATIENT
Start: 2024-08-16

## 2024-08-19 ENCOUNTER — HOME CARE VISIT (OUTPATIENT)
Dept: HOME HEALTH SERVICES | Facility: HOME HEALTHCARE | Age: 74
End: 2024-08-19
Payer: MEDICARE

## 2024-08-19 VITALS — SYSTOLIC BLOOD PRESSURE: 120 MMHG | OXYGEN SATURATION: 94 % | DIASTOLIC BLOOD PRESSURE: 70 MMHG | HEART RATE: 79 BPM

## 2024-08-19 PROCEDURE — G0151 HHCP-SERV OF PT,EA 15 MIN: HCPCS

## 2024-08-19 NOTE — HOME HEALTH
Pt reports he is doing really well.  He presented sitting on the front porch.   Pt reports he wants therapist to help him take a COVID test because with his macular degeneration he can't see the test instructions. He does not have any symptoms but was around someone last Tuesday who was sick.  Therapist donned a mask for safety. Test was negative.  Pt tolerated standing LE HEP and gait training with cane on level and unlevel surfaces.    Plan for next visit: DC OASIS, ensure I with HEP and cane.

## 2024-08-22 ENCOUNTER — HOME CARE VISIT (OUTPATIENT)
Dept: HOME HEALTH SERVICES | Facility: HOME HEALTHCARE | Age: 74
End: 2024-08-22
Payer: MEDICARE

## 2024-08-22 ENCOUNTER — OFFICE VISIT (OUTPATIENT)
Dept: ORTHOPEDIC SURGERY | Facility: CLINIC | Age: 74
End: 2024-08-22
Payer: MEDICARE

## 2024-08-22 VITALS — HEIGHT: 70 IN | WEIGHT: 214.2 LBS | TEMPERATURE: 97.8 F | BODY MASS INDEX: 30.67 KG/M2

## 2024-08-22 VITALS — OXYGEN SATURATION: 97 % | DIASTOLIC BLOOD PRESSURE: 80 MMHG | SYSTOLIC BLOOD PRESSURE: 140 MMHG | HEART RATE: 79 BPM

## 2024-08-22 DIAGNOSIS — Z96.641 S/P TOTAL RIGHT HIP ARTHROPLASTY: Primary | ICD-10-CM

## 2024-08-22 DIAGNOSIS — Z96.641 STATUS POST RIGHT HIP REPLACEMENT: ICD-10-CM

## 2024-08-22 PROCEDURE — 99024 POSTOP FOLLOW-UP VISIT: CPT | Performed by: ORTHOPAEDIC SURGERY

## 2024-08-22 PROCEDURE — G0151 HHCP-SERV OF PT,EA 15 MIN: HCPCS

## 2024-08-22 NOTE — PROGRESS NOTES
Lucas Deluca : 1950 MRN: [id number] DATE: 2024    DIAGNOSIS: 2 week follow up right total hip conversion to DEREJE with DM    SUBJECTIVE:Patient returns today for 2 week follow up of right total hip replacement. Patient reports doing well with no unusual complaints. Appears to be progressing appropriately.    OBJECTIVE:   Exam:. The incision is healing appropriately. No sign of infection. Range of motion is progressing as expected. The calf is soft and nontender with a negative Homans sign.    DIAGNOSTIC STUDIES  Xrays: 2 views of the right hip (AP pelvis and lateral right hip) were ordered and reviewed for evaluation of recent hip replacement conversion. They demonstrate a well positioned, well aligned hip replacement without complicating factors noted. In comparison with previous films there has been interval implant placement.    ASSESSMENT: 2 week status post right hip replacement conversion to DEREJE with DM    PLAN: 1) Staples removed and steri strips applied   2) PT exercises   3) Discontinue ENRIKE hose   4) Continue ice PRN   5) WBAT   6) aspirin 81 mg orally every day for 1 month   7) Follow up in 6 weeks with repeat Xrays of right hip (2views)    Hebert Jung MD  2024

## 2024-08-22 NOTE — HOME HEALTH
Pt presented sitting on the porch. He reports he had a bad day yesterday with blood sugar (69).  But other than that no complaints and his hip is fine.  He reports 0-3/10 R HIP pain.  He f/u with Dr Jung today.  He is only taking 1 pain pill per day and that is at HS. He has met all PT goals and is safe for DC.

## 2024-08-27 ENCOUNTER — TELEPHONE (OUTPATIENT)
Dept: UROLOGY | Facility: CLINIC | Age: 74
End: 2024-08-27

## 2024-08-27 ENCOUNTER — PREP FOR SURGERY (OUTPATIENT)
Dept: OTHER | Facility: HOSPITAL | Age: 74
End: 2024-08-27
Payer: MEDICARE

## 2024-08-27 ENCOUNTER — PROCEDURE VISIT (OUTPATIENT)
Dept: UROLOGY | Facility: CLINIC | Age: 74
End: 2024-08-27
Payer: MEDICARE

## 2024-08-27 VITALS
HEIGHT: 70 IN | SYSTOLIC BLOOD PRESSURE: 152 MMHG | WEIGHT: 215.6 LBS | HEART RATE: 68 BPM | DIASTOLIC BLOOD PRESSURE: 73 MMHG | BODY MASS INDEX: 30.86 KG/M2

## 2024-08-27 DIAGNOSIS — C61 PROSTATE CANCER: Primary | ICD-10-CM

## 2024-08-27 DIAGNOSIS — N40.1 BENIGN PROSTATIC HYPERPLASIA WITH NOCTURIA: ICD-10-CM

## 2024-08-27 DIAGNOSIS — N39.41 URGE INCONTINENCE: Primary | ICD-10-CM

## 2024-08-27 DIAGNOSIS — R32 URINARY INCONTINENCE, UNSPECIFIED TYPE: ICD-10-CM

## 2024-08-27 DIAGNOSIS — N35.914 STRICTURE OF ANTERIOR URETHRA IN MALE, UNSPECIFIED STRICTURE TYPE: ICD-10-CM

## 2024-08-27 DIAGNOSIS — N40.0 BENIGN PROSTATIC HYPERPLASIA, UNSPECIFIED WHETHER LOWER URINARY TRACT SYMPTOMS PRESENT: ICD-10-CM

## 2024-08-27 DIAGNOSIS — R35.1 BENIGN PROSTATIC HYPERPLASIA WITH NOCTURIA: ICD-10-CM

## 2024-08-27 DIAGNOSIS — N39.41 URGE INCONTINENCE: ICD-10-CM

## 2024-08-27 DIAGNOSIS — N35.919 URETHRAL STRICTURE: Primary | ICD-10-CM

## 2024-08-27 NOTE — TELEPHONE ENCOUNTER
Spoke to pt, he stated he needed a appointment for late September to have his surgery completed. Scheduled for 9/30/2024, sent pt . Informed to hold anti-coags 3 days prior and he will possibly need to hold 5 days after surgery depending on how surgery goes. Routed clearance letter to Georgette GOMEZ, and Dr. Echeverria, with cardiology.

## 2024-08-27 NOTE — PROGRESS NOTES
Preprocedure diagnosis  Urinary incontinence, BPH    Postprocedure diagnosis  Same as above    Procedure  Flexible Cystourethroscopy    Attending surgeon  Connie Fraire MD    Anesthesia  2% lidocaine jelly intraurethrally    Complications  None    Indications  74 y.o. male undergoing a flexible cystoscopy for the above mentioned indications.  Presents for evaluation of worsening urinary incontinence, history of BPH with urinary retention.  Prostate cancer, low risk Dutton 6 noted in TURP specimen diagnosed 2/2021.  Informed consent was obtained.      Findings  Patent meatus; approximately 6 cm at the lobar urethra, tight urethral stricture noted; scope would not pass proximal to this    Procedure  The patient was placed in supine position and prepped and draped in sterile fashion with lidocaine jelly per urethra for anesthesia.  A timeout was performed.  The 14F flexible cystoscope was lubricated and gently inserted at the meatus.  The scope was advanced without difficulty approximately 6 cm till it reached the bulbar urethra.  Tight urethral stricture was noted with distinct lumen.  It was too small for the scope to pass, approximately 8-10 Barbadian.  The scope was then withdrawn.  The patient tolerated the procedure well.        Plan:  Tolerated procedure well.  Instructions provided.  Cystoscopic findings discussed with patient include bulbar urethral stricture; unable to assess prostate or bladder upon today's evaluation    Once operative management of his urethral stricture, recommend proceeding with cystoscopy, direct visual internal urethrotomy, possible transurethral resection of prostate depending on prostatic urethral findings.       Discussed management of stricture via direct visual internal urethrotomy and possible TURP.  Risks including but not limited to bleeding, infection, damage to surrounding structures, worsening lower urinary tract symptoms, persistent incontinence, recurrence of stricture  and symptoms, pain, and risks of anesthesia including up to death.  Postoperative course also discussed with patient including the need for possible admission, postoperative catheterization to ensure adequate healing of the urethra.  Discussed that typically a urethral catheter remains in place for 7 to 10 days postop.  Patient participated in the discussion, asked appropriate questions and notes understanding.  Agreeable to proceed.     Schedule OR   all questions addressed

## 2024-09-04 ENCOUNTER — PRIOR AUTHORIZATION (OUTPATIENT)
Dept: DIABETES SERVICES | Facility: HOSPITAL | Age: 74
End: 2024-09-04
Payer: MEDICARE

## 2024-09-04 NOTE — PROGRESS NOTES
[facility]  Cardiology progress Note    Patient Name: Lucas Deluca  : 1950    CHIEF COMPLAINT  CAD        Subjective   Subjective     HISTORY OF PRESENT ILLNESS    Lucas Deluca is a 74 y.o. male with CAD and hypertension.  No chest pain.    REVIEW OF SYSTEMS    Constitutional:    No fever, no weight loss  Skin:     No rash  Otolaryngeal:    No difficulty swallowing  Cardiovascular: See HPI.  Pulmonary:    No cough, no sputum production    Personal History     Social History:    reports that he quit smoking about 32 years ago. His smoking use included cigarettes. He has been exposed to tobacco smoke. He has quit using smokeless tobacco. He reports that he does not drink alcohol and does not use drugs.    Home Medications:  Current Outpatient Medications on File Prior to Visit   Medication Sig    amLODIPine (NORVASC) 10 MG tablet Take 0.5 tablets by mouth Daily.    aspirin 81 MG EC tablet Take 1 tablet by mouth 2 (Two) Times a Day for 14 days, THEN 1 tablet Daily for 28 days.    carvedilol (COREG) 25 MG tablet TAKE 1 TABLET TWICE A DAY    clopidogrel (PLAVIX) 75 MG tablet Take 1 tablet by mouth Daily. (Patient taking differently: Take 1 tablet by mouth Daily. PT IS HOLDING FOR 5 DAYS BEFORE SURGERY PER CARDIOLOGY)    empagliflozin (Jardiance) 25 MG tablet tablet Take 1 tablet by mouth Daily.    ezetimibe (ZETIA) 10 MG tablet Take 1 tablet by mouth Daily.    fenofibrate (TRICOR) 145 MG tablet Take 1 tablet by mouth Daily.    finasteride (PROSCAR) 5 MG tablet TAKE 1 TABLET DAILY (Patient taking differently: Take 1 tablet by mouth Daily.)    HYDROcodone-acetaminophen (NORCO) 7.5-325 MG per tablet Take 1 tablet by mouth Every 4 (Four) Hours As Needed for Moderate Pain or Severe Pain.    insulin aspart (NovoLOG FlexPen) 100 UNIT/ML solution pen-injector sc pen Inject 30 Units under the skin into the appropriate area as directed 3 (Three) Times a Day With Meals for 180 days. (Patient taking  differently: Inject 30 Units under the skin into the appropriate area as directed 3 (Three) Times a Day With Meals. Pt is not taking currently)    Insulin Glargine (Lantus SoloStar) 100 UNIT/ML injection pen Inject 50 Units under the skin into the appropriate area as directed 2 (Two) Times a Day for 180 days.    lisinopril (PRINIVIL,ZESTRIL) 20 MG tablet Take 1 tablet by mouth 2 (Two) Times a Day. (Patient taking differently: Take 1 tablet by mouth Daily.)    nitroglycerin (NITROSTAT) 0.4 MG SL tablet 1 under the tongue as needed for angina, may repeat q5mins for up three doses (Patient taking differently: 1 tablet. 1 under the tongue as needed for angina, may repeat q5mins for up three doses)    ondansetron (Zofran) 4 MG tablet Take 1 tablet by mouth Every 8 (Eight) Hours As Needed for Nausea or Vomiting for up to 10 doses.    pioglitazone (ACTOS) 30 MG tablet Take 1 tablet by mouth Daily.    PRECISION XTRA TEST STRIPS test strip Test 1-2 times each day    rosuvastatin (CRESTOR) 40 MG tablet Take 1 tablet by mouth Every Night.    Sure Comfort Pen Needles 32G X 6 MM misc Use 1 each 5 (Five) Times a Day.    tolterodine LA (DETROL LA) 2 MG 24 hr capsule Take 1 capsule by mouth Every Night.     No current facility-administered medications on file prior to visit.       Past Medical History:   Diagnosis Date    Arthritis     CAD S/P percutaneous coronary angioplasty 01/14/2017    Diabetes mellitus     TYPE 2    Essential hypertension 12/12/2021    History of prostate cancer     Hyperlipidemia     Hypertension     Macular degeneration     Right hip pain     Sleep apnea     CPAP       Allergies:  No Known Allergies    Objective    Objective       Vitals:      There is no height or weight on file to calculate BMI.     PHYSICAL EXAM:    General Appearance:   well developed  well nourished  HENT:   oropharynx moist  lips not cyanotic  Neck:  thyroid not enlarged  supple  Respiratory:  no respiratory distress  normal breath  sounds  no rales  Cardiovascular:  no jugular venous distention  regular rhythm  apical impulse normal  S1 normal, S2 normal  no S3, no S4   no murmur  no rub, no thrill  carotid pulses normal; no bruit  pedal pulses normal  lower extremity edema: none    Skin:   warm, dry  Psychiatric:  judgement and insight appropriate  normal mood and affect        Result Review:  I have personally reviewed the available results from  [x]  Laboratory  [x]  EKG  [x]  Cardiology  [x]  Medications  [x]  Old records  []  Other:     Procedures  Lab Results   Component Value Date    CHOL 158 07/23/2024    CHOL 172 04/22/2024    CHOL 193 12/26/2023     Lab Results   Component Value Date    TRIG 126 07/23/2024    TRIG 126 04/22/2024    TRIG 181 (H) 12/26/2023     Lab Results   Component Value Date    HDL 44 07/23/2024    HDL 44 04/22/2024    HDL 46 12/26/2023     Lab Results   Component Value Date    LDL 91 07/23/2024     (H) 04/22/2024     (H) 12/26/2023     Lab Results   Component Value Date    VLDL 23 07/23/2024    VLDL 23 04/22/2024    VLDL 32 12/26/2023        Impression/Plan:  1.  Essential hypertension controlled whitecoat hypertension: Continue lisinopril 20 mg twice a day.  Continue amlodipine 10 mg once a day.  Blood pressure normal at home.  Monitor blood pressure regularly.  2.  Mixed hyperlipidemia: Lipid profile reviewed shows an LDL of 91.continue Crestor 20 mg once a day..  Monitor lipid and hepatic profile.  3.  Coronary disease s/p PTCA/stent stable: Continue aspirin 81 mg once a day.  Continue Plavix 75 mg once a day.  Continue carvedilol 25 mg twice a day.  No chest pain.              Stephane Dwo MD   09/04/24   11:30 EDT

## 2024-09-06 ENCOUNTER — OFFICE VISIT (OUTPATIENT)
Dept: CARDIOLOGY | Facility: CLINIC | Age: 74
End: 2024-09-06
Payer: MEDICARE

## 2024-09-06 VITALS — HEIGHT: 70 IN | BODY MASS INDEX: 30.21 KG/M2 | WEIGHT: 211 LBS

## 2024-09-06 DIAGNOSIS — I10 ESSENTIAL HYPERTENSION: ICD-10-CM

## 2024-09-06 DIAGNOSIS — Z95.5 HISTORY OF CORONARY ANGIOPLASTY WITH INSERTION OF STENT: ICD-10-CM

## 2024-09-06 DIAGNOSIS — I25.10 CORONARY ARTERY DISEASE INVOLVING NATIVE CORONARY ARTERY OF NATIVE HEART WITHOUT ANGINA PECTORIS: Primary | ICD-10-CM

## 2024-09-06 DIAGNOSIS — I10 HYPERTENSION, ESSENTIAL: ICD-10-CM

## 2024-09-06 DIAGNOSIS — E78.2 HYPERLIPEMIA, MIXED: ICD-10-CM

## 2024-09-06 RX ORDER — CHLORAL HYDRATE 500 MG
1000 CAPSULE ORAL
COMMUNITY

## 2024-09-06 RX ORDER — AMLODIPINE BESYLATE 10 MG/1
5 TABLET ORAL DAILY
Qty: 90 TABLET | Refills: 3 | Status: SHIPPED | OUTPATIENT
Start: 2024-09-06

## 2024-09-06 RX ORDER — CYANOCOBALAMIN (VITAMIN B-12) 250 MCG
250 TABLET ORAL DAILY
COMMUNITY

## 2024-09-06 RX ORDER — FENOFIBRATE 145 MG/1
145 TABLET, COATED ORAL DAILY
Qty: 90 TABLET | Refills: 3 | Status: SHIPPED | OUTPATIENT
Start: 2024-09-06

## 2024-09-06 RX ORDER — LISINOPRIL 20 MG/1
20 TABLET ORAL 2 TIMES DAILY
Qty: 180 TABLET | Refills: 3 | Status: SHIPPED | OUTPATIENT
Start: 2024-09-06

## 2024-09-09 ENCOUNTER — TELEPHONE (OUTPATIENT)
Dept: CARDIOLOGY | Facility: CLINIC | Age: 74
End: 2024-09-09
Payer: MEDICARE

## 2024-09-09 NOTE — TELEPHONE ENCOUNTER
Procedure: Cystoscopy Urethrotomy possible TURP    Med Directive: Plavix and Aspirin 3 days piror 5 days post    PMH: CAD prior stent, HTN, HLD    Last Seen: 9/6/24

## 2024-09-10 DIAGNOSIS — E11.65 UNCONTROLLED TYPE 2 DIABETES MELLITUS WITH HYPERGLYCEMIA: ICD-10-CM

## 2024-09-10 RX ORDER — PEN NEEDLE, DIABETIC 32GX 5/32"
NEEDLE, DISPOSABLE MISCELLANEOUS
Qty: 500 EACH | Refills: 3 | Status: SHIPPED | OUTPATIENT
Start: 2024-09-10

## 2024-09-19 ENCOUNTER — LAB (OUTPATIENT)
Dept: LAB | Facility: HOSPITAL | Age: 74
End: 2024-09-19
Payer: MEDICARE

## 2024-09-19 DIAGNOSIS — N39.41 URGE INCONTINENCE: ICD-10-CM

## 2024-09-19 PROCEDURE — 87086 URINE CULTURE/COLONY COUNT: CPT

## 2024-09-20 LAB — BACTERIA SPEC AEROBE CULT: NORMAL

## 2024-09-27 ENCOUNTER — ANESTHESIA EVENT (OUTPATIENT)
Dept: PERIOP | Facility: HOSPITAL | Age: 74
End: 2024-09-27
Payer: MEDICARE

## 2024-09-27 RX ORDER — INSULIN GLARGINE 100 [IU]/ML
50 INJECTION, SOLUTION SUBCUTANEOUS 2 TIMES DAILY
COMMUNITY

## 2024-09-27 RX ORDER — ASPIRIN 81 MG/1
81 TABLET ORAL DAILY
Status: ON HOLD | COMMUNITY
End: 2024-10-01

## 2024-09-27 NOTE — NURSING NOTE
DR HUSSEIN SPOKE WITH DR KEITH REGARDING PT NOT STOPPING PLAVIX FOR 5 DAYS AND ONLY STOPPED 3 DAYS PRIOR TO SURGERY, DR KEITH AWARE AND SAID SHE WILL CALL AND FOLLOW UP WITH THE PATIENT REGARDING HIS SURGERY FOR 9-30-24- NO FURTHER ORDERS NOTED

## 2024-09-27 NOTE — PRE-PROCEDURE INSTRUCTIONS
PATIENT INSTRUCTED TO BE:    - NOTHING TO EAT AFTER MIDNIGHT OR CHEW, EXCEPT SIPS OF WATER WITH MEDICATIONS ON DAY OF SURGERY    - TO HOLD ALL VITAMINS, SUPPLEMENTS, NSAIDS FOR ONE WEEK PRIOR TO THEIR SURGICAL PROCEDURE    - DO NOT TAKE __JARDIANCE 3 DAYS PRIOR TO PROCEDURE PER ANESTHESIA RECOMMENDATIONS/INSTRUCTIONS ( PT STATED WAS UNAWARE LAST DOSE TAKEN WAS 9-27-24, HE IS AWARE NOW NOT TO TAKE NO MORE UNTIL AFTER SURGERY ON 9-30-24    - INSTRUCTED PT TO USE SURGICAL SOAP 1 TIME THE NIGHT PRIOR TO SURGERY ____N/A_______ OR THE AM OF SURGERY ___N/A__________   USE THE SOAP FROM NECK TO TOES, AVOID THEIR FACE, HAIR, AND PRIVATE PARTS. IF USE THE SOAP THE NIGHT PRIOR TO SURGERY, CHANGE BED LINENS AND NO PETS IN THE BED.     INSTRUCTED NO LOTIONS, JEWELRY, PIERCINGS,  NAIL POLISH, OR DEODORANT DAY OF SURGERY    - IF DIABETIC, CHECK BLOOD GLUCOSE IF LESS THAN 70 OR HAVING SYMPTOMS CALL THE PREOP AREA FOR INSTRUCTIONS ON AM OF SURGERY ([phone] )    -INSTRUCTED TO TAKE THE FOLLOWING MEDICATIONS THE DAY OF SURGERY WITH SIPS OF WATER:        NORVASC, COREG, ZETIA, TRICOR, PROSCAR, 1/2 DOSE OF LANTUS ( 25 UNITS)    NO ORAL DIABETIC MEDICATIONS DAY OF SURGERY ( ACTOS)    NO HUMALOG INSULIN AFTER DINNER THE NIGHT PRIOR TO SURGERY AND NONE DAY OF SURGERY ( LAST DOSE 9-29-24 LUNCH DOSE)    HOLD PLAVIX 5 DAYS PRIOR TO SURGERY PER DR STEVEN ( LAST DOSE PT REPORTED TAKING WAS 9-27-24- AWARE NOT TO TAKE NO MORE UNTIL AFTER SURGERY)     HOLD ASPIRIN PER DR STEVEN FOR 7 DAYS PRIOR TO SURGERY ( PT ALREADY STOPPED LAST DOSE WAS 9-21-24)      - DO NOT BRING ANY MEDICATIONS WITH YOU TO THE HOSPITAL THE DAY OF SURGERY, EXCEPT IF USE INHALERS. BRING INHALERS DAY OF SURGERY       - BRING CPAP OR BIPAP TO THE HOSPITAL ONLY IF YOU ARE SPENDING THE NIGHT    - DO NOT SMOKE OR VAPE 24 HOURS PRIOR TO PROCEDURE PER ANESTHESIA REQUEST     -MAKE SURE YOU HAVE A RIDE HOME OR SOMEONE TO STAY WITH YOU THE DAY OF THE PROCEDURE AFTER YOU GO  HOME     - FOLLOW ANY OTHER INSTRUCTIONS GIVEN TO YOU BY YOUR SURGEON'S OFFICE.     - DAY OF SURGERY _4-49-68_____, COME TO ELEVATOR A, THIRD FLOOR, CHECK IN AT THE DESK FOR REGISTRATION/SURGERY     - YOU WILL RECEIVE A PHONE CALL THE DAY PRIOR TO SURGERY BETWEEN 1PM AND 4 PM WITH ARRIVAL TIME, IF YOUR SURGERY IS ON A MONDAY YOU WILL RECEIVE A CALL THE FRIDAY PRIOR TO SURGERY DATE    - BRING CASH OR CREDIT CARD FOR COPAYMENT OF MEDICATIONS AFTER SURGERY IF YOU USE THE HOSPITAL PHARMACY (MEDS TO BED)    - PREADMISSION TESTING NURSE OSKAR GAVIRIA -[phone] IF HAVE ANY QUESTIONS     -PATIENT PROVIDED THE NUMBER FOR PREOP SURGICAL DEPT IF HAD QUESTIONS AFTER HOURS PRIOR TO SURGERY ([phone]).  INFORMED PT IF NO ANSWER, LEAVE A MESSAGE AND SOMEONE WILL RETURN THEIR CALL       PATIENT VERBALIZED UNDERSTANDING

## 2024-09-27 NOTE — NURSING NOTE
NOTIFIED ROSALINDA AT DR KEITH OFFICE PT WAS INSTRUCTED TO STOP ASA 7 DAYS, PLAVIX 5 DAYS PRIOR TO SURGERY, PT DID STOP ASA 7 BUT ONLY STOPPED PLAVIX FOR 3 DAYS INSTEAD OF 5. OFFICE TO NOTIFY DR KEITH. THEY STATED SHE IS AT THE HOSPITAL TODAY IF WE SEE HER TO LET HER KNOW. ANESTHESIA IS AWARE AND WAS GOING TO SPEAK WITH SAGAR AS WELL.

## 2024-09-30 ENCOUNTER — ANESTHESIA (OUTPATIENT)
Dept: PERIOP | Facility: HOSPITAL | Age: 74
End: 2024-09-30
Payer: MEDICARE

## 2024-09-30 ENCOUNTER — HOSPITAL ENCOUNTER (OUTPATIENT)
Facility: HOSPITAL | Age: 74
Discharge: HOME | End: 2024-10-01
Attending: UROLOGY | Admitting: UROLOGY
Payer: MEDICARE

## 2024-09-30 DIAGNOSIS — R32 URINARY INCONTINENCE, UNSPECIFIED TYPE: ICD-10-CM

## 2024-09-30 DIAGNOSIS — N40.1 BENIGN PROSTATIC HYPERPLASIA WITH LOWER URINARY TRACT SYMPTOMS, SYMPTOM DETAILS UNSPECIFIED: Primary | ICD-10-CM

## 2024-09-30 DIAGNOSIS — N40.0 BENIGN PROSTATIC HYPERPLASIA, UNSPECIFIED WHETHER LOWER URINARY TRACT SYMPTOMS PRESENT: ICD-10-CM

## 2024-09-30 DIAGNOSIS — N35.919 URETHRAL STRICTURE: ICD-10-CM

## 2024-09-30 LAB
BACTERIA UR QL AUTO: ABNORMAL /HPF
BILIRUB UR QL STRIP: NEGATIVE
CLARITY UR: CLEAR
COLOR UR: ABNORMAL
GLUCOSE BLDC GLUCOMTR-MCNC: 168 MG/DL (ref 70–99)
GLUCOSE BLDC GLUCOMTR-MCNC: 191 MG/DL (ref 70–99)
GLUCOSE BLDC GLUCOMTR-MCNC: 311 MG/DL (ref 70–99)
GLUCOSE BLDC GLUCOMTR-MCNC: 329 MG/DL (ref 70–99)
GLUCOSE UR STRIP-MCNC: ABNORMAL MG/DL
HGB UR QL STRIP.AUTO: ABNORMAL
HYALINE CASTS UR QL AUTO: ABNORMAL /LPF
KETONES UR QL STRIP: NEGATIVE
LEUKOCYTE ESTERASE UR QL STRIP.AUTO: NEGATIVE
NITRITE UR QL STRIP: NEGATIVE
PH UR STRIP.AUTO: 8 [PH] (ref 5–8)
PROT UR QL STRIP: ABNORMAL
RBC # UR STRIP: ABNORMAL /HPF
REF LAB TEST METHOD: ABNORMAL
SP GR UR STRIP: 1.02 (ref 1–1.03)
SQUAMOUS #/AREA URNS HPF: ABNORMAL /HPF
UROBILINOGEN UR QL STRIP: ABNORMAL
WBC # UR STRIP: ABNORMAL /HPF

## 2024-09-30 PROCEDURE — 63710000001 INSULIN LISPRO (HUMAN) PER 5 UNITS: Performed by: UROLOGY

## 2024-09-30 PROCEDURE — A9270 NON-COVERED ITEM OR SERVICE: HCPCS | Performed by: UROLOGY

## 2024-09-30 PROCEDURE — 25810000003 LACTATED RINGERS PER 1000 ML: Performed by: UROLOGY

## 2024-09-30 PROCEDURE — 63710000001 CARVEDILOL 25 MG TABLET: Performed by: UROLOGY

## 2024-09-30 PROCEDURE — 25810000003 LACTATED RINGERS PER 1000 ML: Performed by: ANESTHESIOLOGY

## 2024-09-30 PROCEDURE — 63710000001 SENNOSIDES-DOCUSATE 8.6-50 MG TABLET: Performed by: UROLOGY

## 2024-09-30 PROCEDURE — 82948 REAGENT STRIP/BLOOD GLUCOSE: CPT

## 2024-09-30 PROCEDURE — 81001 URINALYSIS AUTO W/SCOPE: CPT | Performed by: UROLOGY

## 2024-09-30 PROCEDURE — 63710000001 FINASTERIDE 5 MG TABLET: Performed by: UROLOGY

## 2024-09-30 PROCEDURE — 52601 PROSTATECTOMY (TURP): CPT | Performed by: UROLOGY

## 2024-09-30 PROCEDURE — C1769 GUIDE WIRE: HCPCS | Performed by: UROLOGY

## 2024-09-30 PROCEDURE — 82948 REAGENT STRIP/BLOOD GLUCOSE: CPT | Performed by: ANESTHESIOLOGY

## 2024-09-30 PROCEDURE — 25010000002 CEFAZOLIN PER 500 MG: Performed by: UROLOGY

## 2024-09-30 PROCEDURE — 63710000001 LISINOPRIL 20 MG TABLET: Performed by: UROLOGY

## 2024-09-30 PROCEDURE — 25010000002 ONDANSETRON PER 1 MG

## 2024-09-30 PROCEDURE — G0378 HOSPITAL OBSERVATION PER HR: HCPCS

## 2024-09-30 PROCEDURE — 52276 CYSTOSCOPY AND TREATMENT: CPT | Performed by: UROLOGY

## 2024-09-30 PROCEDURE — 25010000002 DEXAMETHASONE PER 1 MG

## 2024-09-30 PROCEDURE — 25010000002 PROPOFOL 200 MG/20ML EMULSION

## 2024-09-30 PROCEDURE — S0260 H&P FOR SURGERY: HCPCS | Performed by: UROLOGY

## 2024-09-30 PROCEDURE — 25010000002 FENTANYL CITRATE (PF) 50 MCG/ML SOLUTION

## 2024-09-30 RX ORDER — PROMETHAZINE HYDROCHLORIDE 25 MG/1
25 SUPPOSITORY RECTAL ONCE AS NEEDED
Status: DISCONTINUED | OUTPATIENT
Start: 2024-09-30 | End: 2024-09-30 | Stop reason: HOSPADM

## 2024-09-30 RX ORDER — OXYCODONE AND ACETAMINOPHEN 5; 325 MG/1; MG/1
1 TABLET ORAL EVERY 4 HOURS PRN
Status: DISCONTINUED | OUTPATIENT
Start: 2024-09-30 | End: 2024-10-01 | Stop reason: HOSPADM

## 2024-09-30 RX ORDER — LIDOCAINE HYDROCHLORIDE 20 MG/ML
INJECTION, SOLUTION EPIDURAL; INFILTRATION; INTRACAUDAL; PERINEURAL AS NEEDED
Status: DISCONTINUED | OUTPATIENT
Start: 2024-09-30 | End: 2024-09-30 | Stop reason: SURG

## 2024-09-30 RX ORDER — FENTANYL CITRATE 50 UG/ML
INJECTION, SOLUTION INTRAMUSCULAR; INTRAVENOUS AS NEEDED
Status: DISCONTINUED | OUTPATIENT
Start: 2024-09-30 | End: 2024-09-30 | Stop reason: SURG

## 2024-09-30 RX ORDER — OXYBUTYNIN CHLORIDE 5 MG/1
5 TABLET ORAL 3 TIMES DAILY PRN
Status: DISCONTINUED | OUTPATIENT
Start: 2024-09-30 | End: 2024-10-01 | Stop reason: HOSPADM

## 2024-09-30 RX ORDER — INSULIN LISPRO 100 [IU]/ML
3-14 INJECTION, SOLUTION INTRAVENOUS; SUBCUTANEOUS
Status: DISCONTINUED | OUTPATIENT
Start: 2024-09-30 | End: 2024-10-01 | Stop reason: HOSPADM

## 2024-09-30 RX ORDER — IBUPROFEN 600 MG/1
1 TABLET ORAL
Status: DISCONTINUED | OUTPATIENT
Start: 2024-09-30 | End: 2024-10-01 | Stop reason: HOSPADM

## 2024-09-30 RX ORDER — ACETAMINOPHEN 650 MG/1
650 SUPPOSITORY RECTAL EVERY 4 HOURS PRN
Status: DISCONTINUED | OUTPATIENT
Start: 2024-09-30 | End: 2024-10-01 | Stop reason: HOSPADM

## 2024-09-30 RX ORDER — SODIUM CHLORIDE 9 MG/ML
40 INJECTION, SOLUTION INTRAVENOUS AS NEEDED
Status: DISCONTINUED | OUTPATIENT
Start: 2024-09-30 | End: 2024-10-01 | Stop reason: HOSPADM

## 2024-09-30 RX ORDER — CARVEDILOL 25 MG/1
25 TABLET ORAL 2 TIMES DAILY
Status: DISCONTINUED | OUTPATIENT
Start: 2024-09-30 | End: 2024-10-01 | Stop reason: HOSPADM

## 2024-09-30 RX ORDER — OXYCODONE AND ACETAMINOPHEN 10; 325 MG/1; MG/1
1 TABLET ORAL EVERY 4 HOURS PRN
Status: DISCONTINUED | OUTPATIENT
Start: 2024-09-30 | End: 2024-10-01 | Stop reason: HOSPADM

## 2024-09-30 RX ORDER — AMLODIPINE BESYLATE 5 MG/1
5 TABLET ORAL DAILY
Status: DISCONTINUED | OUTPATIENT
Start: 2024-10-01 | End: 2024-10-01 | Stop reason: HOSPADM

## 2024-09-30 RX ORDER — ONDANSETRON 4 MG/1
4 TABLET, ORALLY DISINTEGRATING ORAL EVERY 6 HOURS PRN
Status: DISCONTINUED | OUTPATIENT
Start: 2024-09-30 | End: 2024-10-01 | Stop reason: HOSPADM

## 2024-09-30 RX ORDER — DEXTROSE MONOHYDRATE 25 G/50ML
25 INJECTION, SOLUTION INTRAVENOUS
Status: DISCONTINUED | OUTPATIENT
Start: 2024-09-30 | End: 2024-10-01 | Stop reason: HOSPADM

## 2024-09-30 RX ORDER — ACETAMINOPHEN 500 MG
1000 TABLET ORAL ONCE
Status: COMPLETED | OUTPATIENT
Start: 2024-09-30 | End: 2024-09-30

## 2024-09-30 RX ORDER — SODIUM CHLORIDE 0.9 % (FLUSH) 0.9 %
10 SYRINGE (ML) INJECTION AS NEEDED
Status: DISCONTINUED | OUTPATIENT
Start: 2024-09-30 | End: 2024-10-01 | Stop reason: HOSPADM

## 2024-09-30 RX ORDER — ACETAMINOPHEN 325 MG/1
650 TABLET ORAL EVERY 4 HOURS PRN
Status: DISCONTINUED | OUTPATIENT
Start: 2024-09-30 | End: 2024-10-01 | Stop reason: HOSPADM

## 2024-09-30 RX ORDER — MORPHINE SULFATE 2 MG/ML
2 INJECTION, SOLUTION INTRAMUSCULAR; INTRAVENOUS
Status: DISCONTINUED | OUTPATIENT
Start: 2024-09-30 | End: 2024-10-01 | Stop reason: HOSPADM

## 2024-09-30 RX ORDER — CARVEDILOL 6.25 MG/1
25 TABLET ORAL ONCE
Status: COMPLETED | OUTPATIENT
Start: 2024-09-30 | End: 2024-09-30

## 2024-09-30 RX ORDER — PROMETHAZINE HYDROCHLORIDE 12.5 MG/1
25 TABLET ORAL ONCE AS NEEDED
Status: DISCONTINUED | OUTPATIENT
Start: 2024-09-30 | End: 2024-09-30 | Stop reason: HOSPADM

## 2024-09-30 RX ORDER — PROPOFOL 10 MG/ML
INJECTION, EMULSION INTRAVENOUS AS NEEDED
Status: DISCONTINUED | OUTPATIENT
Start: 2024-09-30 | End: 2024-09-30 | Stop reason: SURG

## 2024-09-30 RX ORDER — ONDANSETRON 2 MG/ML
INJECTION INTRAMUSCULAR; INTRAVENOUS AS NEEDED
Status: DISCONTINUED | OUTPATIENT
Start: 2024-09-30 | End: 2024-09-30 | Stop reason: SURG

## 2024-09-30 RX ORDER — NALOXONE HCL 0.4 MG/ML
0.4 VIAL (ML) INJECTION
Status: DISCONTINUED | OUTPATIENT
Start: 2024-09-30 | End: 2024-10-01 | Stop reason: HOSPADM

## 2024-09-30 RX ORDER — NITROGLYCERIN 0.4 MG/1
0.4 TABLET SUBLINGUAL
Status: DISCONTINUED | OUTPATIENT
Start: 2024-09-30 | End: 2024-10-01 | Stop reason: HOSPADM

## 2024-09-30 RX ORDER — LISINOPRIL 20 MG/1
20 TABLET ORAL 2 TIMES DAILY
Status: DISCONTINUED | OUTPATIENT
Start: 2024-09-30 | End: 2024-10-01 | Stop reason: HOSPADM

## 2024-09-30 RX ORDER — ONDANSETRON 2 MG/ML
4 INJECTION INTRAMUSCULAR; INTRAVENOUS ONCE AS NEEDED
Status: DISCONTINUED | OUTPATIENT
Start: 2024-09-30 | End: 2024-09-30 | Stop reason: HOSPADM

## 2024-09-30 RX ORDER — SODIUM CHLORIDE 0.9 % (FLUSH) 0.9 %
10 SYRINGE (ML) INJECTION EVERY 12 HOURS SCHEDULED
Status: DISCONTINUED | OUTPATIENT
Start: 2024-09-30 | End: 2024-10-01 | Stop reason: HOSPADM

## 2024-09-30 RX ORDER — PROMETHAZINE HYDROCHLORIDE 25 MG/1
12.5 TABLET ORAL EVERY 6 HOURS PRN
Status: DISCONTINUED | OUTPATIENT
Start: 2024-09-30 | End: 2024-10-01 | Stop reason: HOSPADM

## 2024-09-30 RX ORDER — OXYCODONE HYDROCHLORIDE 5 MG/1
5 TABLET ORAL
Status: DISCONTINUED | OUTPATIENT
Start: 2024-09-30 | End: 2024-09-30 | Stop reason: HOSPADM

## 2024-09-30 RX ORDER — SODIUM CHLORIDE, SODIUM LACTATE, POTASSIUM CHLORIDE, CALCIUM CHLORIDE 600; 310; 30; 20 MG/100ML; MG/100ML; MG/100ML; MG/100ML
9 INJECTION, SOLUTION INTRAVENOUS CONTINUOUS PRN
Status: DISCONTINUED | OUTPATIENT
Start: 2024-09-30 | End: 2024-10-01 | Stop reason: HOSPADM

## 2024-09-30 RX ORDER — ONDANSETRON 2 MG/ML
4 INJECTION INTRAMUSCULAR; INTRAVENOUS EVERY 6 HOURS PRN
Status: DISCONTINUED | OUTPATIENT
Start: 2024-09-30 | End: 2024-10-01 | Stop reason: HOSPADM

## 2024-09-30 RX ORDER — SODIUM CHLORIDE, SODIUM LACTATE, POTASSIUM CHLORIDE, CALCIUM CHLORIDE 600; 310; 30; 20 MG/100ML; MG/100ML; MG/100ML; MG/100ML
100 INJECTION, SOLUTION INTRAVENOUS CONTINUOUS
Status: DISCONTINUED | OUTPATIENT
Start: 2024-09-30 | End: 2024-10-01

## 2024-09-30 RX ORDER — FINASTERIDE 5 MG/1
5 TABLET, FILM COATED ORAL DAILY
Status: DISCONTINUED | OUTPATIENT
Start: 2024-09-30 | End: 2024-10-01 | Stop reason: HOSPADM

## 2024-09-30 RX ORDER — NICOTINE POLACRILEX 4 MG
15 LOZENGE BUCCAL
Status: DISCONTINUED | OUTPATIENT
Start: 2024-09-30 | End: 2024-10-01 | Stop reason: HOSPADM

## 2024-09-30 RX ORDER — EPHEDRINE SULFATE 50 MG/ML
INJECTION INTRAVENOUS AS NEEDED
Status: DISCONTINUED | OUTPATIENT
Start: 2024-09-30 | End: 2024-09-30 | Stop reason: SURG

## 2024-09-30 RX ORDER — DEXAMETHASONE SODIUM PHOSPHATE 4 MG/ML
INJECTION, SOLUTION INTRA-ARTICULAR; INTRALESIONAL; INTRAMUSCULAR; INTRAVENOUS; SOFT TISSUE AS NEEDED
Status: DISCONTINUED | OUTPATIENT
Start: 2024-09-30 | End: 2024-09-30 | Stop reason: SURG

## 2024-09-30 RX ORDER — PHENYLEPHRINE HCL IN 0.9% NACL 1 MG/10 ML
SYRINGE (ML) INTRAVENOUS AS NEEDED
Status: DISCONTINUED | OUTPATIENT
Start: 2024-09-30 | End: 2024-09-30 | Stop reason: SURG

## 2024-09-30 RX ORDER — AMOXICILLIN 250 MG
2 CAPSULE ORAL 2 TIMES DAILY
Status: DISCONTINUED | OUTPATIENT
Start: 2024-09-30 | End: 2024-10-01 | Stop reason: HOSPADM

## 2024-09-30 RX ADMIN — SODIUM CHLORIDE, POTASSIUM CHLORIDE, SODIUM LACTATE AND CALCIUM CHLORIDE 100 ML/HR: 600; 310; 30; 20 INJECTION, SOLUTION INTRAVENOUS at 22:20

## 2024-09-30 RX ADMIN — SODIUM CHLORIDE, POTASSIUM CHLORIDE, SODIUM LACTATE AND CALCIUM CHLORIDE 100 ML/HR: 600; 310; 30; 20 INJECTION, SOLUTION INTRAVENOUS at 15:36

## 2024-09-30 RX ADMIN — FENTANYL CITRATE 50 MCG: 50 INJECTION, SOLUTION INTRAMUSCULAR; INTRAVENOUS at 09:49

## 2024-09-30 RX ADMIN — INSULIN LISPRO 10 UNITS: 100 INJECTION, SOLUTION INTRAVENOUS; SUBCUTANEOUS at 18:04

## 2024-09-30 RX ADMIN — CARVEDILOL 25 MG: 6.25 TABLET, FILM COATED ORAL at 08:29

## 2024-09-30 RX ADMIN — Medication 10 ML: at 21:04

## 2024-09-30 RX ADMIN — FENTANYL CITRATE 50 MCG: 50 INJECTION, SOLUTION INTRAMUSCULAR; INTRAVENOUS at 09:25

## 2024-09-30 RX ADMIN — PROPOFOL 50 MG: 10 INJECTION, EMULSION INTRAVENOUS at 09:50

## 2024-09-30 RX ADMIN — SODIUM CHLORIDE, POTASSIUM CHLORIDE, SODIUM LACTATE AND CALCIUM CHLORIDE 9 ML/HR: 600; 310; 30; 20 INJECTION, SOLUTION INTRAVENOUS at 08:09

## 2024-09-30 RX ADMIN — PROPOFOL 100 MG: 10 INJECTION, EMULSION INTRAVENOUS at 09:25

## 2024-09-30 RX ADMIN — Medication 200 MCG: at 10:10

## 2024-09-30 RX ADMIN — INSULIN LISPRO 10 UNITS: 100 INJECTION, SOLUTION INTRAVENOUS; SUBCUTANEOUS at 20:58

## 2024-09-30 RX ADMIN — SENNOSIDES AND DOCUSATE SODIUM 2 TABLET: 50; 8.6 TABLET ORAL at 20:58

## 2024-09-30 RX ADMIN — CARVEDILOL 25 MG: 25 TABLET, FILM COATED ORAL at 20:58

## 2024-09-30 RX ADMIN — FINASTERIDE 5 MG: 5 TABLET, FILM COATED ORAL at 14:15

## 2024-09-30 RX ADMIN — LIDOCAINE HYDROCHLORIDE 100 MG: 20 INJECTION, SOLUTION EPIDURAL; INFILTRATION; INTRACAUDAL; PERINEURAL at 09:25

## 2024-09-30 RX ADMIN — EPHEDRINE SULFATE 10 MG: 50 INJECTION INTRAVENOUS at 09:43

## 2024-09-30 RX ADMIN — ONDANSETRON 4 MG: 2 INJECTION INTRAMUSCULAR; INTRAVENOUS at 10:29

## 2024-09-30 RX ADMIN — ACETAMINOPHEN 1000 MG: 500 TABLET ORAL at 08:09

## 2024-09-30 RX ADMIN — EPHEDRINE SULFATE 10 MG: 50 INJECTION INTRAVENOUS at 10:11

## 2024-09-30 RX ADMIN — EPHEDRINE SULFATE 15 MG: 50 INJECTION INTRAVENOUS at 09:37

## 2024-09-30 RX ADMIN — LISINOPRIL 20 MG: 20 TABLET ORAL at 20:58

## 2024-09-30 RX ADMIN — Medication 200 MCG: at 09:31

## 2024-09-30 RX ADMIN — DEXAMETHASONE SODIUM PHOSPHATE 4 MG: 4 INJECTION, SOLUTION INTRAMUSCULAR; INTRAVENOUS at 09:33

## 2024-09-30 RX ADMIN — Medication 100 MCG: at 09:32

## 2024-09-30 RX ADMIN — SODIUM CHLORIDE 2000 MG: 9 INJECTION, SOLUTION INTRAVENOUS at 09:33

## 2024-09-30 RX ADMIN — Medication 100 MCG: at 09:40

## 2024-09-30 RX ADMIN — Medication 200 MCG: at 09:51

## 2024-09-30 NOTE — PLAN OF CARE
Goal Outcome Evaluation:           Progress: no change  Outcome Evaluation: pt is alert and oriented x4, vss, CBI going slow, pt is diabetic, hospitalist consulted due to insulin management. pt able to ambulate with assistance of one person due to CBI. pt denies any pain at this time

## 2024-09-30 NOTE — ANESTHESIA PREPROCEDURE EVALUATION
Anesthesia Evaluation     Patient summary reviewed and Nursing notes reviewed   no history of anesthetic complications:   NPO Solid Status: > 8 hours  NPO Liquid Status: > 2 hours           Airway   Mallampati: II  TM distance: >3 FB  Neck ROM: full  Dental    (+) edentulous    Pulmonary - normal exam   (+) a smoker Former,sleep apnea  Cardiovascular - normal exam  Exercise tolerance: good (4-7 METS)    (+) hypertension, CAD, CABG >6 Months, cardiac stents more than 12 months ago , hyperlipidemia      Neuro/Psych- negative ROS  GI/Hepatic/Renal/Endo    (+) diabetes mellitus    Musculoskeletal     Abdominal  - normal exam   Substance History - negative use     OB/GYN negative ob/gyn ROS         Other   arthritis,   history of cancer    ROS/Med Hx Other: >4METS.HX CAD,HTN,HLD,TAVO. HX STENT X1 2004,4V CABG 2004,ANGIOPLASTY 2017. STRESS 2/9/21 EF 55%,NO ISCHEMIA.CARDS OV 9/6/24 STABLE,F/U 6MTHS. CARDS CLEARANCE MOD RISK, 9/9/24. KT               Anesthesia Plan    ASA 3     general     intravenous induction     Anesthetic plan, risks, benefits, and alternatives have been provided, discussed and informed consent has been obtained with: patient.    Plan discussed with CRNA.    CODE STATUS:

## 2024-09-30 NOTE — ANESTHESIA POSTPROCEDURE EVALUATION
Patient: Lucas Deluca    Procedure Summary       Date: 09/30/24 Room / Location: [facility] OR  / [facility] MAIN OR    Anesthesia Start: 0917 Anesthesia Stop: 1046    Procedure: CYSTOSCOPY URETHROTOMY VISUAL INTERNAL,transurethral resection of prostate Diagnosis:       Urethral stricture      Urinary incontinence, unspecified type      Benign prostatic hyperplasia, unspecified whether lower urinary tract symptoms present      (Urethral stricture [N35.919])      (Urinary incontinence, unspecified type [R32])      (Benign prostatic hyperplasia, unspecified whether lower urinary tract symptoms present [N40.0])    Surgeons: Connie Fraire MD Provider: Marina Graham MD    Anesthesia Type: general ASA Status: 3            Anesthesia Type: general    Vitals  Vitals Value Taken Time   /66 09/30/24 1155   Temp 36.7 °C (98 °F) 09/30/24 1145   Pulse 70 09/30/24 1158   Resp 16 09/30/24 1150   SpO2 92 % 09/30/24 1158   Vitals shown include unfiled device data.        Post Anesthesia Care and Evaluation    Patient location during evaluation: bedside  Patient participation: complete - patient participated  Level of consciousness: awake  Pain management: adequate    Airway patency: patent  PONV Status: none  Cardiovascular status: acceptable and stable  Respiratory status: acceptable  Hydration status: acceptable

## 2024-09-30 NOTE — OP NOTE
Preoperative diagnosis  Urethral stricture, urinary incontinence    Postoperative diagnosis  Urethral stricture, urinary incontinence, BPH    Procedure performed  Cystoscopy, direct visual internal urethrotomy  Transurethral resection of prostate with bipolar energy (button TURP)    Attending surgeon  Connie Fraire MD     Anesthesia  General    EBL  10 mL    Complications  None    Specimen  None    Findings  Bulbar urethral stricture approximately 10 [place]ian appreciated, opened with DVIU; prostatomegaly with regrowth of adenoma on left; treated with button TURP  Large capacity bladder with no bladder mucosal abnormality    Indications  74 y.o. male agreed to undergo the above named procedure after discussion of the alternatives, risks and benefits.   Informed consent was obtained.      Procedure  The patient was given monitored anesthesia care, placed in the lithotomy position and then prepped and draped in the usual standard sterile manner. A cystoscope was then gently placed into the urethral meatus and the length of the urethra inspected. A strictured area was noted in the bulbous urethra, sensor wire was passed through the stricture. The stricture was amenable to DVIU. A DVIU scope and sheath were placed into the urethra and the stricture was incised under direct vision using a cold Ortega knife. The entire depth of the stricture was incised until consistent bleeding occurred from the site. After the DVIU a 22 Bur[place]ian cystoscope was passed beyond the strictured area and the remainder of the urethra was inspected. No further lesions were noted, just proximal to the verumontanum was some adenoma and even more proximal to this some regrowth of adenoma after prior TURP.  The bladder was inspected, no mucosal abnormalities.   Next, the 25-Bur[place]ian rigid resectoscope was passed in the bladder using the visual obturator, and once again under direct vision.  Pass beyond the strictured area after DVIU without  difficulty.  Once we entered into the bladder, this was switched to the working element of the resectoscope and then we performed judicious [place] of the prostatic adenoma with plasma button with great attention staying just proximal from the verumontanum and treating enough tissue to open  up his channel.  Judicious electrocautery was performed to achieve hemostasis.  Once noting open channel and no further stricture, as well as adequate hemostasis the resectoscope was removed.  The wire was also removed.  A 22-Peruvian Canela catheter with 3 way ports were then in place with 30 mL of sterile water in the balloon.  The procedure was then deemed terminated.  The patient was in stable condition throughout and he was transferred to PACU for recovery.      Signed:  Connie Fraire MD  09/30/24  10:38 EDT

## 2024-09-30 NOTE — H&P
[facility]   Urology Preop H&P Note    Patient Name: Lucas Deluca  : 1950  MRN: [id number]  Primary Care Physician:  Sarina Javed MD  Referring Physician: Connie Fraire MD  Date of admission: 2024    Subjective   Subjective     Reason for Consult/ Chief Complaint: Urethral stricture [N35.919]  Urinary incontinence, unspecified type [R32]  Benign prostatic hyperplasia, unspecified whether lower urinary tract symptoms present [N40.0]    HPI:  Lucas Deluca is a 74 y.o. male history ofUrethral stricture [N35.919]  Urinary incontinence, unspecified type [R32]  Benign prostatic hyperplasia, unspecified whether lower urinary tract symptoms present [N40.0] who presents for further management OR.  Presents for planned Procedure(s):  CYSTOSCOPY URETHROTOMY VISUAL INTERNAL possible transurethral resection of prostate;  .   Risk, benefits, and alternatives discussed with patient prior to today.Discussed management of stricture via direct visual internal urethrotomy and possible TURP.  Risks including but not limited to bleeding, infection, damage to surrounding structures, worsening lower urinary tract symptoms, persistent incontinence, recurrence of stricture and symptoms, pain, and risks of anesthesia including up to death.  Postoperative course also discussed with patient including the need for possible admission, postoperative catheterization to ensure adequate healing of the urethra.  Discussed that typically a urethral catheter remains in place for 7 to 10 days postop. All questions were addressed after providing time for discussion.      Patient denies significant changes since last visit.  No new complaints today.    Patient states he held his Plavix about 3 days ago; aware of increased risk of bleeding, possibility of needing to do this in a staged manner depending on hematuria.  Primary objective of today's procedure is to treat his urethral stricture.  He notes understanding  of this.    Review of Systems   All systems were reviewed and negative except for the above  Personal History     Past Medical History:   Diagnosis Date    Arthritis     CAD S/P percutaneous coronary angioplasty 01/14/2017    4 VESSEL CABG- [place]. DENIED CP/SOB NOW    Diabetes mellitus     TYPE 2- bg runs around 141 in am    Essential hypertension 12/12/2021    History of prostate cancer     Hyperlipidemia     Hypertension     Macular degeneration     Right hip pain     Sleep apnea     CPAP    Urge incontinence        Past Surgical History:   Procedure Laterality Date    CATARACT EXTRACTION Bilateral     CORONARY ANGIOPLASTY WITH STENT PLACEMENT  2004    ONE    CORONARY ARTERY BYPASS GRAFT      2004 X 4 VESSEL    CYSTOSCOPY TRANSURETHRAL RESECTION OF PROSTATE  2020    IA TX TIBL SHFT FX IMED IMPLT W/WO SCREWS&/CERCLA Right 01/12/2017    Procedure: TIBIA INTRAMEDULLARY NAIL/SHANNON INSERTION;  Surgeon: Colton Nascimento MD;  Location: [facility] OR;  Service: Orthopedics    TONSILLECTOMY      AGE 65    TOTAL HIP ARTHROPLASTY Right 8/7/2024    Procedure: TOTAL HIP ARTHROPLASTY REVISION;  Surgeon: Hebert Jung MD;  Location: [facility] OR [facility];  Service: Orthopedics;  Laterality: Right;       Family History: family history includes Cancer in his maternal aunt, maternal grandfather, maternal grandmother, maternal uncle, and mother; Diabetes in his mother and sister; Heart disease in his father. Otherwise pertinent FHx was reviewed and not pertinent to current issue.    Social History:  reports that he quit smoking about 32 years ago. His smoking use included cigarettes. He has been exposed to tobacco smoke. He has quit using smokeless tobacco. He reports that he does not drink alcohol and does not use drugs.    Home Medications:  Insulin Pen Needle, amLODIPine, aspirin, carvedilol, clopidogrel, cyanocobalamin, empagliflozin, ezetimibe, fenofibrate, finasteride, fish oil, glucose blood, insulin aspart, insulin  glargine, lisinopril, nitroglycerin, pioglitazone, rosuvastatin, tolterodine LA, and vitamin D3    Allergies:  No Known Allergies    Objective    Objective     Vitals:   Temp:  [98.5 °F (36.9 °C)] 98.5 °F (36.9 °C)  Heart Rate:  [72] 72  Resp:  [18] 18  BP: (178)/(68) 178/68    Physical Exam:   Constitutional: Awake, alert   Respiratory: Clear, nonlabored respirations    Cardiovascular: Regular rate, no chest retractions   gastrointestinal: Appears soft, nontender     Results:    Assessment & Plan   Assessment / Plan     Brief Patient Summary:  Lucas Deluca is a 74 y.o. male who     Active Hospital Problems:  Active Hospital Problems    Diagnosis     Urethral stricture     Urinary incontinence     Benign prostatic hyperplasia        Plan:   Proceed to the OR for planned procedure, Procedure(s):  CYSTOSCOPY URETHROTOMY VISUAL INTERNAL possible transurethral resection of prostate,  ,   Risk, benefits, and alternatives discussed with patient at length he is agreeable to proceed  All questions addressed      Electronically signed by Connie Fraire MD, 09/30/24, 7:23 AM EDT.

## 2024-10-01 ENCOUNTER — TELEPHONE (OUTPATIENT)
Dept: UROLOGY | Facility: CLINIC | Age: 74
End: 2024-10-01
Payer: MEDICARE

## 2024-10-01 VITALS
HEIGHT: 70 IN | SYSTOLIC BLOOD PRESSURE: 142 MMHG | RESPIRATION RATE: 16 BRPM | DIASTOLIC BLOOD PRESSURE: 67 MMHG | TEMPERATURE: 97.7 F | HEART RATE: 76 BPM | OXYGEN SATURATION: 96 % | BODY MASS INDEX: 29.79 KG/M2 | WEIGHT: 208.11 LBS

## 2024-10-01 LAB
ANION GAP SERPL CALCULATED.3IONS-SCNC: 9.5 MMOL/L (ref 5–15)
BUN SERPL-MCNC: 17 MG/DL (ref 8–23)
BUN/CREAT SERPL: 17.2 (ref 7–25)
CALCIUM SPEC-SCNC: 9.6 MG/DL (ref 8.6–10.5)
CHLORIDE SERPL-SCNC: 100 MMOL/L (ref 98–107)
CO2 SERPL-SCNC: 24.5 MMOL/L (ref 22–29)
CREAT SERPL-MCNC: 0.99 MG/DL (ref 0.76–1.27)
DEPRECATED RDW RBC AUTO: 44.8 FL (ref 37–54)
EGFRCR SERPLBLD CKD-EPI 2021: 80.9 ML/MIN/1.73
ERYTHROCYTE [DISTWIDTH] IN BLOOD BY AUTOMATED COUNT: 13.9 % (ref 12.3–15.4)
GLUCOSE BLDC GLUCOMTR-MCNC: 295 MG/DL (ref 70–99)
GLUCOSE SERPL-MCNC: 314 MG/DL (ref 65–99)
HCT VFR BLD AUTO: 39.6 % (ref 37.5–51)
HGB BLD-MCNC: 12.5 G/DL (ref 13–17.7)
MCH RBC QN AUTO: 27.7 PG (ref 26.6–33)
MCHC RBC AUTO-ENTMCNC: 31.6 G/DL (ref 31.5–35.7)
MCV RBC AUTO: 87.6 FL (ref 79–97)
PLATELET # BLD AUTO: 277 10*3/MM3 (ref 140–450)
PMV BLD AUTO: 10.8 FL (ref 6–12)
POTASSIUM SERPL-SCNC: 4.1 MMOL/L (ref 3.5–5.2)
RBC # BLD AUTO: 4.52 10*6/MM3 (ref 4.14–5.8)
SODIUM SERPL-SCNC: 134 MMOL/L (ref 136–145)
WBC NRBC COR # BLD AUTO: 11.65 10*3/MM3 (ref 3.4–10.8)

## 2024-10-01 PROCEDURE — 63710000001 INSULIN LISPRO (HUMAN) PER 5 UNITS: Performed by: UROLOGY

## 2024-10-01 PROCEDURE — 94799 UNLISTED PULMONARY SVC/PX: CPT

## 2024-10-01 PROCEDURE — 80048 BASIC METABOLIC PNL TOTAL CA: CPT | Performed by: UROLOGY

## 2024-10-01 PROCEDURE — 63710000001 CARVEDILOL 25 MG TABLET: Performed by: UROLOGY

## 2024-10-01 PROCEDURE — 82948 REAGENT STRIP/BLOOD GLUCOSE: CPT | Performed by: UROLOGY

## 2024-10-01 PROCEDURE — 63710000001 AMLODIPINE 5 MG TABLET: Performed by: UROLOGY

## 2024-10-01 PROCEDURE — 63710000001 LISINOPRIL 20 MG TABLET: Performed by: UROLOGY

## 2024-10-01 PROCEDURE — 85027 COMPLETE CBC AUTOMATED: CPT | Performed by: UROLOGY

## 2024-10-01 PROCEDURE — A9270 NON-COVERED ITEM OR SERVICE: HCPCS | Performed by: UROLOGY

## 2024-10-01 PROCEDURE — 63710000001 FINASTERIDE 5 MG TABLET: Performed by: UROLOGY

## 2024-10-01 RX ORDER — IBUPROFEN 400 MG/1
600 TABLET, FILM COATED ORAL EVERY 6 HOURS PRN
Status: CANCELLED | OUTPATIENT
Start: 2024-10-01

## 2024-10-01 RX ORDER — ONDANSETRON 2 MG/ML
4 INJECTION INTRAMUSCULAR; INTRAVENOUS ONCE AS NEEDED
Status: DISCONTINUED | OUTPATIENT
Start: 2024-10-01 | End: 2024-10-01 | Stop reason: HOSPADM

## 2024-10-01 RX ORDER — PROMETHAZINE HYDROCHLORIDE 25 MG/1
12.5 TABLET ORAL ONCE AS NEEDED
Status: DISCONTINUED | OUTPATIENT
Start: 2024-10-01 | End: 2024-10-01 | Stop reason: HOSPADM

## 2024-10-01 RX ORDER — CLOPIDOGREL BISULFATE 75 MG/1
75 TABLET ORAL DAILY
Start: 2024-10-03

## 2024-10-01 RX ORDER — ASPIRIN 81 MG/1
81 TABLET ORAL DAILY
Start: 2024-10-02

## 2024-10-01 RX ORDER — ONDANSETRON 4 MG/1
4 TABLET, ORALLY DISINTEGRATING ORAL ONCE AS NEEDED
Status: DISCONTINUED | OUTPATIENT
Start: 2024-10-01 | End: 2024-10-01 | Stop reason: HOSPADM

## 2024-10-01 RX ORDER — ACETAMINOPHEN 325 MG/1
650 TABLET ORAL ONCE
Status: DISCONTINUED | OUTPATIENT
Start: 2024-10-01 | End: 2024-10-01 | Stop reason: HOSPADM

## 2024-10-01 RX ORDER — CEFDINIR 300 MG/1
300 CAPSULE ORAL 2 TIMES DAILY
Qty: 14 CAPSULE | Refills: 0 | Status: SHIPPED | OUTPATIENT
Start: 2024-10-01 | End: 2024-10-08

## 2024-10-01 RX ADMIN — Medication 10 ML: at 08:20

## 2024-10-01 RX ADMIN — INSULIN LISPRO 8 UNITS: 100 INJECTION, SOLUTION INTRAVENOUS; SUBCUTANEOUS at 08:19

## 2024-10-01 RX ADMIN — LISINOPRIL 20 MG: 20 TABLET ORAL at 08:19

## 2024-10-01 RX ADMIN — AMLODIPINE BESYLATE 5 MG: 5 TABLET ORAL at 08:20

## 2024-10-01 RX ADMIN — CARVEDILOL 25 MG: 25 TABLET, FILM COATED ORAL at 08:20

## 2024-10-01 RX ADMIN — FINASTERIDE 5 MG: 5 TABLET, FILM COATED ORAL at 08:20

## 2024-10-01 NOTE — DISCHARGE SUMMARY
[facility]   DISCHARGE SUMMARY      Name:  Lucas Deluca   Age:  74 y.o.  Sex:  male  :  1950  MRN:  [id number]   Visit Number:  [id number]  Primary Care Physician:  Sarina Javed MD  Date of Discharge:  10/1/2024  Admission Date:  2024      Discharge Diagnosis:       Active Hospital Problems    Diagnosis  POA    **BPH (benign prostatic hyperplasia) [N40.0]  Yes    Urethral stricture [N35.919]  Unknown    Urinary incontinence [R32]  Unknown    Benign prostatic hyperplasia [N40.0]  Unknown      Resolved Hospital Problems   No resolved problems to display.         Presenting Problem/History of Present Illness:    Urethral stricture [N35.919]  Urinary incontinence, unspecified type [R32]  Benign prostatic hyperplasia, unspecified whether lower urinary tract symptoms present [N40.0]  BPH (benign prostatic hyperplasia) [N40.0]         Hospital Course:    Patient underwent the below procedure.  He tolerated procedure well.  Please see operative report for further details.  Patient was admitted postoperatively for pain control and monitoring.  Patient remained stable during admission.  Postoperatively he was able to ambulate as well as tolerate a diet.  Catheter draining well after weaning off CBI.  At time of discharge patient was ambulating without assist, tolerating regular diet, and pain was controlled with p.o. medications.  At this time all goals of inpatient care meant patient is deemed ready for discharge home     Procedures Performed:    Procedure(s):  CYSTOSCOPY URETHROTOMY VISUAL INTERNAL,transurethral resection of prostate       Consults:     Consults       No orders found for last 30 day(s).            Pertinent Test Results:     Lab Results (all)       Procedure Component Value Units Date/Time    POC Glucose 4x Daily Before Meals & at Bedtime [[id number]]  (Abnormal) Collected: 10/01/24 0741    Specimen: Blood Updated: 10/01/24 0743     Glucose 295 mg/dL      Comment: Serial  Number: [id number]:  [id number]       Basic Metabolic Panel [[id number]]  (Abnormal) Collected: 10/01/24 0450    Specimen: Blood from Arm, Left Updated: 10/01/24 0524     Glucose 314 mg/dL      BUN 17 mg/dL      Creatinine 0.99 mg/dL      Sodium 134 mmol/L      Potassium 4.1 mmol/L      Chloride 100 mmol/L      CO2 24.5 mmol/L      Calcium 9.6 mg/dL      BUN/Creatinine Ratio 17.2     Anion Gap 9.5 mmol/L      eGFR 80.9 mL/min/1.73     Narrative:      GFR Normal >60  Chronic Kidney Disease <60  Kidney Failure <15    The GFR formula is only valid for adults with stable renal function between ages 18 and 70.    CBC (No Diff) [[id number]]  (Abnormal) Collected: 10/01/24 0450    Specimen: Blood from Arm, Left Updated: 10/01/24 0502     WBC 11.65 10*3/mm3      RBC 4.52 10*6/mm3      Hemoglobin 12.5 g/dL      Hematocrit 39.6 %      MCV 87.6 fL      MCH 27.7 pg      MCHC 31.6 g/dL      RDW 13.9 %      RDW-SD 44.8 fl      MPV 10.8 fL      Platelets 277 10*3/mm3     POC Glucose Once [[id number]]  (Abnormal) Collected: 09/30/24 2049    Specimen: Blood Updated: 09/30/24 2051     Glucose 329 mg/dL      Comment: Serial Number: [id number]:  [id number]       POC Glucose Once [[id number]]  (Abnormal) Collected: 09/30/24 1712    Specimen: Blood Updated: 09/30/24 1714     Glucose 311 mg/dL      Comment: Serial Number: [id number]:  [id number]       Urinalysis With Culture If Indicated - Indwelling Urethral Catheter [[id number]]  (Abnormal) Collected: 09/30/24 1536    Specimen: Urine from Indwelling Urethral Catheter Updated: 09/30/24 1559     Color, UA Red     Appearance, UA Clear     pH, UA 8.0     Specific Gravity, UA 1.021     Glucose, UA >=1000 mg/dL (3+)     Ketones, UA Negative     Bilirubin, UA Negative     Blood, UA Large (3+)     Protein, UA Trace     Leuk Esterase, UA Negative     Nitrite, UA Negative     Urobilinogen, UA 0.2 E.U./dL    Narrative:      In absence of clinical symptoms, the presence of pyuria,  "bacteria, and/or nitrites on the urinalysis result does not correlate with infection.    Urinalysis, Microscopic Only - Indwelling Urethral Catheter [[id number]]  (Abnormal) Collected: 09/30/24 1536    Specimen: Urine from Indwelling Urethral Catheter Updated: 09/30/24 1559     RBC, UA Too Numerous to Count /HPF      WBC, UA 0-2 /HPF      Comment: Urine culture not indicated.        Bacteria, UA None Seen /HPF      Squamous Epithelial Cells, UA 0-2 /HPF      Hyaline Casts, UA 3-6 /LPF      Methodology Automated Microscopy    POC Glucose STAT [[id number]]  (Abnormal) Collected: 09/30/24 1050    Specimen: Blood Updated: 09/30/24 1052     Glucose 168 mg/dL      Comment: Serial Number: [id number]:  [id number]       POC Glucose STAT [[id number]]  (Abnormal) Collected: 09/30/24 0756    Specimen: Blood Updated: 09/30/24 0759     Glucose 191 mg/dL      Comment: Serial Number: [id number]:  [id number]               Imaging Results (All)       None            Condition on Discharge:      Good    Vital Signs:    /67 (BP Location: Right arm, Patient Position: Sitting)   Pulse 76   Temp 97.7 °F (36.5 °C) (Oral)   Resp 16   Ht 177.8 cm (70\")   Wt 94.4 kg (208 lb 1.8 oz)   SpO2 96%   BMI 29.86 kg/m²     Discharge Disposition:    Home or Self Care    Discharge Medication:       Discharge Medications        New Medications        Instructions Start Date   cefdinir 300 MG capsule  Commonly known as: OMNICEF   300 mg, Oral, 2 Times Daily             Changes to Medications        Instructions Start Date   aspirin 81 MG EC tablet  What changed: additional instructions   81 mg, Oral, Daily, If urine remains relatively clear and not passing excessive clot   Start Date: October 2, 2024     clopidogrel 75 MG tablet  Commonly known as: PLAVIX  What changed:   additional instructions  These instructions start on October 3, 2024. If you are unsure what to do until then, ask your doctor or other care provider.   75 mg, " Oral, Daily, If urine remains clear and not passing excessive clot   Start Date: October 3, 2024     nitroglycerin 0.4 MG SL tablet  Commonly known as: NITROSTAT  What changed:   how much to take  how to take this  when to take this  reasons to take this   1 under the tongue as needed for angina, may repeat q5mins for up three doses      PRECISION XTRA TEST STRIPS test strip  Generic drug: glucose blood  What changed:   how much to take  how to take this  when to take this  reasons to take this   Test 1-2 times each day             Continue These Medications        Instructions Start Date   amLODIPine 10 MG tablet  Commonly known as: NORVASC   5 mg, Oral, Daily      carvedilol 25 MG tablet  Commonly known as: COREG   25 mg, Oral, 2 Times Daily      cyanocobalamin 250 MCG tablet  Commonly known as: VITAMIN B-12   250 mcg, Oral, Daily      empagliflozin 25 MG tablet tablet  Commonly known as: Jardiance   25 mg, Oral, Daily      ezetimibe 10 MG tablet  Commonly known as: ZETIA   10 mg, Oral, Daily      fenofibrate 145 MG tablet  Commonly known as: TRICOR   145 mg, Oral, Daily      finasteride 5 MG tablet  Commonly known as: PROSCAR   TAKE 1 TABLET DAILY      fish oil 1000 MG capsule capsule   1,000 mg, Oral, Daily With Breakfast      insulin glargine 100 UNIT/ML injection  Commonly known as: LANTUS, SEMGLEE   50 Units, Subcutaneous, 2 Times Daily      lisinopril 20 MG tablet  Commonly known as: PRINIVIL,ZESTRIL   20 mg, Oral, 2 Times Daily      NovoLOG FlexPen 100 UNIT/ML solution pen-injector sc pen  Generic drug: insulin aspart   30 Units, Subcutaneous, 3 Times Daily With Meals      pioglitazone 30 MG tablet  Commonly known as: ACTOS   30 mg, Oral, Daily      rosuvastatin 40 MG tablet  Commonly known as: CRESTOR   40 mg, Oral, Nightly      Sure Comfort Pen Needles 32G X 6 MM misc  Generic drug: Insulin Pen Needle   USE 1 NEEDLE FIVE TIMES DAILY      tolterodine LA 2 MG 24 hr capsule  Commonly known as: DETROL LA   2  mg, Oral, Nightly      vitamin D3 125 MCG (5000 UT) capsule capsule   5,000 Units, Oral, Daily               Discharge Diet:         Activity at Discharge:     Activity Instructions       Discharge Activity      Patient to maintain catheter to bag drainage until follow up with urology. OK to shower with catheter in place; may disonnect from bag and allow to drain in shower and then reconnect. Keep around catheter clean and dry; may apply small amount of vaseline at tip of penis as needed for comfort. Empty catheter bag as needed. May use bedside drainage bag or leg bad as needed for comfort.   Be sure that catheter tubing is not pulled on or become kinked and that catheter is permitted to drain at all times.      -Bladder spasms are possible after the procedure; call if this needs to be prescribed; and can receive oxybutynin medication as needed for bladder spasm     - Remove catheter (as instructed prior to discharge by cutting above colored plastic balloon port, allow release of water from balloon and gently pull, some resistance may be met until catheter is removed)  6-8 hours prior to follow up     - Some blood, sediment, clot in urine is expected after discharge, just ensure that catheter remains draining.      Call urology with any questions or concerns            Follow-up Appointments:    Future Appointments   Date Time Provider Department Center   10/3/2024 10:40 AM Stalin Galvez APRN [facility] [address] TED   10/21/2024  9:40 AM Magaly Woodson APRN [facility] [place]   3/11/2025 10:30 AM Stephane Dow MD [facility] CD [place]     Additional Instructions for the Follow-ups that You Need to Schedule       Discharge Follow-up with Specialty: Urology Rag[facility]; 1 Week   As directed      Specialty: Urology Ragini YOUNGER   Follow Up: 1 Week   Follow Up Details: Remove catheter 6/8 hours prior to appointment        Discharge Follow-up with Specified Provider: Dr. Fraire; 6 Weeks   As  directed      To: Dr. Fraire   Follow Up: 6 Weeks   Follow Up Details: [phone]                Test Results Pending at Discharge:           Connie Fraire MD  10/01/24  08:56 EDT

## 2024-10-01 NOTE — TELEPHONE ENCOUNTER
PER RICKEY, OK TO DOUBLE BOOK IN 10 DAYS.     SPOKE W/ PATIENT AND SCHEDULED FOR 10/11 @ 9:45 W/ RICKEY. PT IS AWARE TO REMOVE CATH 6-8 HOURS PRIOR TO APPT.

## 2024-10-01 NOTE — PLAN OF CARE
Goal Outcome Evaluation:      Patient CBI clear and running on a slow drip all shift. No complaints of pain. Patient ambulated around the unit independently once. IV fluids maintained.

## 2024-10-01 NOTE — TELEPHONE ENCOUNTER
PT IS BEING DISCHARGED TODAY WITH A CATHETER. HE IS TO F/U WITH RICKEY IN 10 DAYS AN HE WILL REMOVE THE CATH THE NIGHT BEFORE. WHEN CAN HE BE WORKED IN, THE APPT DESK DID NOT HAVE ANY AVAILABILITY. WE NEED TO CALL THE PATIENT WITH AN APPT.

## 2024-10-06 ENCOUNTER — HOSPITAL ENCOUNTER (EMERGENCY)
Facility: HOSPITAL | Age: 74
Discharge: HOME | End: 2024-10-06
Attending: EMERGENCY MEDICINE | Admitting: EMERGENCY MEDICINE
Payer: MEDICARE

## 2024-10-06 VITALS
SYSTOLIC BLOOD PRESSURE: 140 MMHG | DIASTOLIC BLOOD PRESSURE: 64 MMHG | TEMPERATURE: 97.7 F | WEIGHT: 204.37 LBS | BODY MASS INDEX: 29.26 KG/M2 | RESPIRATION RATE: 16 BRPM | HEART RATE: 69 BPM | HEIGHT: 70 IN | OXYGEN SATURATION: 97 %

## 2024-10-06 DIAGNOSIS — T83.9XXA COMPLICATION OF FOLEY CATHETER, INITIAL ENCOUNTER: Primary | ICD-10-CM

## 2024-10-06 DIAGNOSIS — R30.0 DYSURIA: ICD-10-CM

## 2024-10-06 LAB
BACTERIA UR QL AUTO: ABNORMAL /HPF
BILIRUB UR QL STRIP: NEGATIVE
CLARITY UR: CLEAR
COLOR UR: YELLOW
GLUCOSE UR STRIP-MCNC: ABNORMAL MG/DL
HGB UR QL STRIP.AUTO: ABNORMAL
HYALINE CASTS UR QL AUTO: ABNORMAL /LPF
KETONES UR QL STRIP: NEGATIVE
LEUKOCYTE ESTERASE UR QL STRIP.AUTO: NEGATIVE
NITRITE UR QL STRIP: NEGATIVE
PH UR STRIP.AUTO: 7 [PH] (ref 5–8)
PROT UR QL STRIP: ABNORMAL
RBC # UR STRIP: ABNORMAL /HPF
REF LAB TEST METHOD: ABNORMAL
SP GR UR STRIP: 1.02 (ref 1–1.03)
SQUAMOUS #/AREA URNS HPF: ABNORMAL /HPF
UROBILINOGEN UR QL STRIP: ABNORMAL
WBC # UR STRIP: ABNORMAL /HPF

## 2024-10-06 PROCEDURE — 99283 EMERGENCY DEPT VISIT LOW MDM: CPT

## 2024-10-06 PROCEDURE — 81001 URINALYSIS AUTO W/SCOPE: CPT | Performed by: EMERGENCY MEDICINE

## 2024-10-06 PROCEDURE — 51798 US URINE CAPACITY MEASURE: CPT

## 2024-10-06 RX ORDER — OXYCODONE AND ACETAMINOPHEN 5; 325 MG/1; MG/1
1 TABLET ORAL EVERY 6 HOURS PRN
Qty: 12 TABLET | Refills: 0 | Status: SHIPPED | OUTPATIENT
Start: 2024-10-06

## 2024-10-06 NOTE — ED PROVIDER NOTES
Time: 2:51 PM EDT  Date of encounter:  10/6/2024  Independent Historian/Clinical History and Information was obtained by:   Patient    History is limited by: N/A    Chief Complaint: Leakage around Canela catheter, some dysuria      History of Present Illness:  Patient is a 74 y.o. year old diabetic male with history of prostate cancer who presents to the emergency department for evaluation of acute issues related to his newly placed Canela catheter going on for the past 5 days since he was released from the hospital with a catheter, now having significant urinary leakage around the catheter site and also some mild dysuria.    He denies any suprapubic pain or feeling of distention, no fevers or chills or vomiting.    He states he is supposed to remove the catheter himself in a few days and follow-up with Dr. Gould at the urology clinic this Friday.      Patient Care Team  Primary Care Provider: Sarina Javed MD    Past Medical History:     No Known Allergies  Past Medical History:   Diagnosis Date    Arthritis     CAD S/P percutaneous coronary angioplasty 01/14/2017    4 VESSEL CABG- SEE'S TENISHA. DENIED CP/SOB NOW    Diabetes mellitus     TYPE 2- bg runs around 141 in am    Essential hypertension 12/12/2021    History of prostate cancer     Hyperlipidemia     Hypertension     Macular degeneration     Right hip pain     Sleep apnea     CPAP    Urge incontinence      Past Surgical History:   Procedure Laterality Date    CATARACT EXTRACTION Bilateral     CORONARY ANGIOPLASTY WITH STENT PLACEMENT  2004    ONE    CORONARY ARTERY BYPASS GRAFT      2004 X 4 VESSEL    CYSTOSCOPY TRANSURETHRAL RESECTION OF PROSTATE  2020    CYSTOSCOPY TRANSURETHRAL RESECTION OF PROSTATE  9/30/2024    Procedure: TRANSURETHRAL RESECTION OF PROSTATE WITH BUTTON VAPORIZATION;  Surgeon: Connie Fraire MD;  Location: [facility];  Service: Urology;;    CYSTOSCOPY URETHROTOMY VISUAL INTERNAL N/A 9/30/2024    Procedure:  CYSTOSCOPY URETHROTOMY VISUAL INTERNAL;  Surgeon: Connie Fraire MD;  Location: [facility] MAIN OR;  Service: Urology;  Laterality: N/A;    [place] TX TIBL SHFT FX IMED IMPLT W/WO SCREWS&/CERCLA Right 01/12/2017    Procedure: TIBIA INTRAMEDULLARY NAIL/SHANNON INSERTION;  Surgeon: Colton Nascimento MD;  Location: [facility] MAIN OR;  Service: Orthopedics    TONSILLECTOMY      AGE 65    TOTAL HIP ARTHROPLASTY Right 8/7/2024    Procedure: TOTAL HIP ARTHROPLASTY REVISION;  Surgeon: Hebert Jung MD;  Location: [facility] OR [facility];  Service: Orthopedics;  Laterality: Right;     Family History   Problem Relation Age of Onset    Cancer Mother     Diabetes Mother     Heart disease Father     Diabetes Sister     Cancer Maternal Aunt     Cancer Maternal Uncle     Cancer Maternal Grandmother     Cancer Maternal Grandfather     Malig Hyperthermia Neg Hx        Home Medications:  Prior to Admission medications    Medication Sig Start Date End Date Taking? Authorizing Provider   amLODIPine (NORVASC) 10 MG tablet Take 0.5 tablets by mouth Daily. 9/6/24   Stephane Dow MD   aspirin 81 MG EC tablet Take 1 tablet by mouth Daily. If urine remains relatively clear and not passing excessive clot 10/2/24   Connie Fraire MD   carvedilol (COREG) 25 MG tablet TAKE 1 TABLET TWICE A DAY 8/9/24   Stephane Dow MD   cefdinir (OMNICEF) 300 MG capsule Take 1 capsule by mouth 2 (Two) Times a Day for 7 days. 10/1/24 10/8/24  Connie Fraire MD   clopidogrel (PLAVIX) 75 MG tablet Take 1 tablet by mouth Daily. If urine remains clear and not passing excessive clot 10/3/24   Connie Fraire MD   cyanocobalamin (VITAMIN B-12) 250 MCG tablet Take 1 tablet by mouth Daily.    Provider, MD Gary   empagliflozin (Jardiance) 25 MG tablet tablet Take 1 tablet by mouth Daily. 3/14/24   Magaly Woodson APRN   ezetimibe (ZETIA) 10 MG tablet Take 1 tablet by mouth Daily. 7/24/24   Georgette Talavera APRN   fenofibrate (TRICOR) 145 MG tablet  Take 1 tablet by mouth Daily. 9/6/24   Stephane Dow MD   finasteride (PROSCAR) 5 MG tablet TAKE 1 TABLET DAILY  Patient taking differently: Take 1 tablet by mouth Daily. 11/13/23   Connie Fraire MD   insulin aspart (NovoLOG FlexPen) 100 UNIT/ML solution pen-injector sc pen Inject 30 Units under the skin into the appropriate area as directed 3 (Three) Times a Day With Meals for 180 days.  Patient taking differently: Inject 30 Units under the skin into the appropriate area as directed 3 (Three) Times a Day With Meals. SLIDING SCALE 6/17/24 12/14/24  Magaly Woodson APRN   insulin glargine (LANTUS, SEMGLEE) 100 UNIT/ML injection Inject 50 Units under the skin into the appropriate area as directed 2 (Two) Times a Day.    ProviderGary MD   lisinopril (PRINIVIL,ZESTRIL) 20 MG tablet Take 1 tablet by mouth 2 (Two) Times a Day. 9/6/24   Stephane Dow MD   nitroglycerin (NITROSTAT) 0.4 MG SL tablet 1 under the tongue as needed for angina, may repeat q5mins for up three doses  Patient taking differently: Place 1 tablet under the tongue Every 5 (Five) Minutes As Needed. 1 under the tongue as needed for angina, may repeat q5mins for up three doses 2/15/24   Georgette Talavera APRN   Omega-3 Fatty Acids (fish oil) 1000 MG capsule capsule Take 1 capsule by mouth Daily With Breakfast.    Provider, MD Gary   pioglitazone (ACTOS) 30 MG tablet Take 1 tablet by mouth Daily. 3/14/24   Magaly Woodson APRN   PRECISION XTRA TEST STRIPS test strip Test 1-2 times each day  Patient taking differently: 1 each by Other route As Needed. Test 1-2 times each day 6/8/23   Magaly Woodson APRN   rosuvastatin (CRESTOR) 40 MG tablet Take 1 tablet by mouth Every Night. 4/24/24   Georgette Talavera APRN   Sure Comfort Pen Needles 32G X 6 MM misc USE 1 NEEDLE FIVE TIMES DAILY 9/10/24   Paddy, Magaly Maribel, APRN   tolterodine LA (DETROL LA) 2 MG 24 hr capsule Take 1 capsule by mouth Every  "Night. 24   ProviderGary MD   vitamin D3 125 MCG (5000 UT) capsule capsule Take 1 capsule by mouth Daily.    Provider, MD Gary        Social History:   Social History     Tobacco Use    Smoking status: Former     Current packs/day: 0.00     Types: Cigarettes     Quit date: 1991     Years since quittin.8     Passive exposure: Past    Smokeless tobacco: Former   Vaping Use    Vaping status: Never Used   Substance Use Topics    Alcohol use: No    Drug use: No         Review of Systems:  Review of Systems   I performed a 10 point review of systems which was all negative, except for the positives found in the HPI above.    Physical Exam:  /64   Pulse 69   Temp 97.7 °F (36.5 °C) (Oral)   Resp 16   Ht 177.8 cm (70\")   Wt 92.7 kg (204 lb 5.9 oz)   SpO2 97%   BMI 29.32 kg/m²     Physical Exam   General: Awake alert and in no obvious distress    HEENT: Head normocephalic atraumatic, eyes PERRLA EOMI, nose normal, oropharynx normal.    Neck: Supple full range of motion, no meningismus, no lymphadenopathy    Heart: Regular rate and rhythm, no murmurs or rubs, 2+ radial pulses bilaterally    Lungs: Clear to auscultation bilaterally without wheezes or crackles, no respiratory distress    Abdomen: Soft, nontender, nondistended, no rebound or guarding    Genitourinary exam: Indwelling Canela catheter, no male genital erythema or drainage noted.    Skin: Warm, dry, no rash    Musculoskeletal: Normal range of motion, no lower extremity edema    Neurologic: Oriented x3, no motor deficits no sensory deficits    Psychiatric: Mood appears stable, no psychosis          Procedures:  Procedures      Medical Decision Making:      Comorbidities that affect care:    History of BPH and urethral stricture    External Notes reviewed:    Previous Operation Note: I reviewed his operative note from 1 week ago by Dr. Fraire of urology for cystoscopy, urethrostomy and TURP      The following orders were " placed and all results were independently analyzed by me:  Orders Placed This Encounter   Procedures    Urinalysis With Culture If Indicated - Urine, Clean Catch    Urinalysis, Microscopic Only - Urine, Clean Catch    Bladder scan    Urology (on-call MD unless specified)       Medications Given in the Emergency Department:  Medications - No data to display     ED Course:         Labs:    Lab Results (last 24 hours)       Procedure Component Value Units Date/Time    Urinalysis With Culture If Indicated - Urine, Clean Catch [[id number]]  (Abnormal) Collected: 10/06/24 1458    Specimen: Urine, Clean Catch Updated: 10/06/24 1507     Color, UA Yellow     Appearance, UA Clear     pH, UA 7.0     Specific Gravity, UA 1.020     Glucose, UA >=1000 mg/dL (3+)     Ketones, UA Negative     Bilirubin, UA Negative     Blood, UA Large (3+)     Protein,  mg/dL (2+)     Leuk Esterase, UA Negative     Nitrite, UA Negative     Urobilinogen, UA 0.2 E.U./dL    Narrative:      In absence of clinical symptoms, the presence of pyuria, bacteria, and/or nitrites on the urinalysis result does not correlate with infection.    Urinalysis, Microscopic Only - Urine, Clean Catch [[id number]]  (Abnormal) Collected: 10/06/24 1458    Specimen: Urine, Clean Catch Updated: 10/06/24 1524     RBC, UA 6-10 /HPF      WBC, UA 0-2 /HPF      Comment: Urine culture not indicated.        Bacteria, UA None Seen /HPF      Squamous Epithelial Cells, UA 0-2 /HPF      Hyaline Casts, UA None Seen /LPF      Methodology Automated Microscopy             Imaging:    No Radiology Exams Resulted Within Past 24 Hours      Differential Diagnosis and Discussion:    Dysuria: Differential diagnosis includes but is not limited to urethritis, cystitis, pyelonephritis, ureteral calculi, neoplasm, chemical irritant, urethral stricture, and trauma    All labs were reviewed and interpreted by me.    MDM     Amount and/or Complexity of Data Reviewed  Clinical lab tests:  reviewed             This patient is a 74-year-old male now postoperative day 7 status post TURP with urethral stricture surgery and cystoscopy discharged home with Canela catheter now presents with dysuria and significant leakage from around Canela catheter site.    The Canela catheter still in place and appears to be draining some urine into the bag we will irrigate it some to make sure there is not a blood clot present as he has passed some of these lately.    I will send off urinalysis to look for infection.    I performed a bedside ultrasound of the bladder to rule out any acute urinary retention and ensure adequate Canela catheter positioning, and I can visualize the Canela catheter balloon inside the bladder lumen and only a small amount of surrounding urine but no significant retention or any obvious signs of bladder clot at this time.      I am hesitant to remove or replace his Canela catheter today as he just had urethral surgery and TURP earlier in the week so I will have his urologist evaluate this week as discussed.        As there is no sign of infection on the urinalysis and no current urinary retention and the patient is feeling better now I think he can be safely discharged home to follow-up urgently at the urology clinic for further catheter management.                    Patient Care Considerations:          Consultants/Shared Management Plan:        Social Determinants of Health:    Patient is independent, reliable, and has access to care.       Disposition and Care Coordination:    Discharged: The patient is suitable and stable for discharge with no need for consideration of admission.    I have explained the patient´s condition, diagnoses and treatment plan based on the information available to me at this time. I have answered questions and addressed any concerns. The patient has a good  understanding of the patient´s diagnosis, condition, and treatment plan as can be expected at this point. The  vital signs have been stable. The patient´s condition is stable and appropriate for discharge from the emergency department.      The patient will pursue further outpatient evaluation with the primary care physician or other designated or consulting physician as outlined in the discharge instructions. They are agreeable to this plan of care and follow-up instructions have been explained in detail. The patient has received these instructions in written format and has expressed an understanding of the discharge instructions. The patient is aware that any significant change in condition or worsening of symptoms should prompt an immediate return to this or the closest emergency department or call to 911.  I have explained discharge medications and the need for follow up with the patient/caretakers. This was also printed in the discharge instructions. Patient was discharged with the following medications and follow up:      Medication List        New Prescriptions      oxyCODONE-acetaminophen 5-325 MG per tablet  Commonly known as: PERCOCET  Take 1 tablet by mouth Every 6 (Six) Hours As Needed for Severe Pain.            Changed      nitroglycerin 0.4 MG SL tablet  Commonly known as: NITROSTAT  1 under the tongue as needed for angina, may repeat q5mins for up three doses  What changed:   how much to take  how to take this  when to take this  reasons to take this     NovoLOG FlexPen 100 UNIT/ML solution pen-injector sc pen  Generic drug: insulin aspart  Inject 30 Units under the skin into the appropriate area as directed 3 (Three) Times a Day With Meals for 180 days.  What changed: additional instructions     PRECISION XTRA TEST STRIPS test strip  Generic drug: glucose blood  Test 1-2 times each day  What changed:   how much to take  how to take this  when to take this  reasons to take this               Where to Get Your Medications        These medications were sent to [facility]/pharmacy #[id number] - [place], [place] - [address] - [phone] [facility] [phone] [place]  [address] [place] [address]      Hours: 24-hours Phone: [phone]   oxyCODONE-acetaminophen 5-325 MG per tablet      Connie Fraire MD  [address]  [place] [address]  [phone]    Call in 1 day  for a follow-up appointment       Final diagnoses:   Complication of Canela catheter, initial encounter   Dysuria        ED Disposition       ED Disposition   Discharge    Condition   Stable    Comment   --               This medical record created using voice recognition software.             Nestor Acharya MD  10/06/24 3972

## 2024-10-08 ENCOUNTER — TELEPHONE (OUTPATIENT)
Dept: UROLOGY | Facility: CLINIC | Age: 74
End: 2024-10-08
Payer: MEDICARE

## 2024-10-08 NOTE — TELEPHONE ENCOUNTER
PATIENT CALLED.  HE HAD SURGERY WITH DR. ESTRADA ON 09/30/24.      HE HAS A CATHETER, AND HE SAID DR. ESTRADA WANTED IT LEFT IN FOR 5 DAYS, AND THEN HE WAS TO REMOVE IT 6 - 8 HOURS BEFORE HE IS SEEN BY [facility] ON 10/11/24 FOR VOIDING TRIAL.  HE SAID THAT IT WILL BE 11 DAYS WITH THE CATHETER IN, AND IT REALLY NEEDS TO BE TAKEN OUT.  HE SAID HE HAS THE LEG BAG, AND HE IS GETTING A LOT OF BLOOD CLOTS, HIS URINE IS RED, AND THE CATHETER IS PAINFUL.  THE TUBE ISN'T CLOGGED.  URINE IS SORT OF GOING IN THE BAG.    HE WENT TO THE ER ON 10/06/24, BECAUSE IT WAS HURTING SO BAD, HE COULDN'T STAND IT.  THEY CHECKED IT AND EVERYTHING WAS FINE, BUT THEY WOULD NOT REMOVE THE CATHETER WITHOUT OKAY FROM DR. ESTRADA.    HE HAS LOST THE SYRINGE THAT THE HOSPITAL GAVE HIM, SO HE COULD DEFLATE THE CATHETER BALLOON AND REMOVE THE CATHETER.    HE WANTS TO KNOW IF WE CAN EXPEDITE HIM BEING SEEN, AND REMOVE THE CATHETER.    #[phone]

## 2024-10-09 DIAGNOSIS — E11.65 UNCONTROLLED TYPE 2 DIABETES MELLITUS WITH HYPERGLYCEMIA: ICD-10-CM

## 2024-10-09 RX ORDER — EMPAGLIFLOZIN 25 MG/1
25 TABLET, FILM COATED ORAL DAILY
Qty: 90 TABLET | Refills: 3 | Status: SHIPPED | OUTPATIENT
Start: 2024-10-09

## 2024-10-09 NOTE — PROGRESS NOTES
Chief Complaint: Prostate Cancer    Subjective         History of Present Illness  Lucas Deluca is a 74 y.o. male presents to [facility] to be seen for follow-up.    Patient had cystoscopy urethrotomy and TURP by Dr. Connie Fraire on 9/30/2024.  He is here for removal of his Canela catheter.  He actually removed his catheter on 10/8/2024.  He reports that he has been voiding a lot since that time.  He is here for PVR.    Fever-denies    Pain-denies      Objective     Past Medical History:   Diagnosis Date    Arthritis     CAD S/P percutaneous coronary angioplasty 01/14/2017    4 VESSEL CABG- SEE'S [place]. DENIED CP/SOB NOW    Diabetes mellitus     TYPE 2- bg runs around 141 in am    Essential hypertension 12/12/2021    History of prostate cancer     Hyperlipidemia     Hypertension     Macular degeneration     Right hip pain     Sleep apnea     CPAP    Urge incontinence        Past Surgical History:   Procedure Laterality Date    CATARACT EXTRACTION Bilateral     CORONARY ANGIOPLASTY WITH STENT PLACEMENT  2004    ONE    CORONARY ARTERY BYPASS GRAFT      2004 X 4 VESSEL    CYSTOSCOPY TRANSURETHRAL RESECTION OF PROSTATE  2020    CYSTOSCOPY TRANSURETHRAL RESECTION OF PROSTATE  9/30/2024    Procedure: TRANSURETHRAL RESECTION OF PROSTATE WITH BUTTON VAPORIZATION;  Surgeon: Connie Fraire MD;  Location: [facility];  Service: Urology;;    CYSTOSCOPY URETHROTOMY VISUAL INTERNAL N/A 9/30/2024    Procedure: CYSTOSCOPY URETHROTOMY VISUAL INTERNAL;  Surgeon: Cnonie Fraire MD;  Location: [facility] OR;  Service: Urology;  Laterality: N/A;    [place] TX TIBL SHFT FX IMED IMPLT W/WO SCREWS&/CERCLA Right 01/12/2017    Procedure: TIBIA INTRAMEDULLARY NAIL/SHANNON INSERTION;  Surgeon: Colton Nascimento MD;  Location: [facility] OR;  Service: Orthopedics    TONSILLECTOMY      AGE 65    TOTAL HIP ARTHROPLASTY Right 8/7/2024    Procedure: TOTAL HIP ARTHROPLASTY REVISION;  Surgeon: Hebert Jung MD;   Location: [facility] OR [facility];  Service: Orthopedics;  Laterality: Right;         Current Outpatient Medications:     amLODIPine (NORVASC) 10 MG tablet, Take 0.5 tablets by mouth Daily., Disp: 90 tablet, Rfl: 3    aspirin 81 MG EC tablet, Take 1 tablet by mouth Daily. If urine remains relatively clear and not passing excessive clot, Disp: , Rfl:     carvedilol (COREG) 25 MG tablet, TAKE 1 TABLET TWICE A DAY, Disp: 180 tablet, Rfl: 3    clopidogrel (PLAVIX) 75 MG tablet, Take 1 tablet by mouth Daily. If urine remains clear and not passing excessive clot, Disp: , Rfl:     cyanocobalamin (VITAMIN B-12) 250 MCG tablet, Take 1 tablet by mouth Daily., Disp: , Rfl:     ezetimibe (ZETIA) 10 MG tablet, Take 1 tablet by mouth Daily., Disp: 90 tablet, Rfl: 3    fenofibrate (TRICOR) 145 MG tablet, Take 1 tablet by mouth Daily., Disp: 90 tablet, Rfl: 3    finasteride (PROSCAR) 5 MG tablet, TAKE 1 TABLET DAILY (Patient taking differently: Take 1 tablet by mouth Daily.), Disp: 90 tablet, Rfl: 3    insulin aspart (NovoLOG FlexPen) 100 UNIT/ML solution pen-injector sc pen, Inject 30 Units under the skin into the appropriate area as directed 3 (Three) Times a Day With Meals for 180 days. (Patient taking differently: Inject 30 Units under the skin into the appropriate area as directed 3 (Three) Times a Day With Meals. SLIDING SCALE), Disp: 81 mL, Rfl: 1    insulin glargine (LANTUS, SEMGLEE) 100 UNIT/ML injection, Inject 50 Units under the skin into the appropriate area as directed 2 (Two) Times a Day., Disp: , Rfl:     Jardiance 25 MG tablet tablet, TAKE 1 TABLET DAILY, Disp: 90 tablet, Rfl: 3    lisinopril (PRINIVIL,ZESTRIL) 20 MG tablet, Take 1 tablet by mouth 2 (Two) Times a Day., Disp: 180 tablet, Rfl: 3    nitroglycerin (NITROSTAT) 0.4 MG SL tablet, 1 under the tongue as needed for angina, may repeat q5mins for up three doses (Patient taking differently: Place 1 tablet under the tongue Every 5 (Five) Minutes As Needed. 1 under the  "tongue as needed for angina, may repeat q5mins for up three doses), Disp: 100 tablet, Rfl: 3    Omega-3 Fatty Acids (fish oil) 1000 MG capsule capsule, Take 1 capsule by mouth Daily With Breakfast., Disp: , Rfl:     oxyCODONE-acetaminophen (PERCOCET) 5-325 MG per tablet, Take 1 tablet by mouth Every 6 (Six) Hours As Needed for Severe Pain., Disp: 12 tablet, Rfl: 0    pioglitazone (ACTOS) 30 MG tablet, Take 1 tablet by mouth Daily., Disp: 90 tablet, Rfl: 1    PRECISION XTRA TEST STRIPS test strip, Test 1-2 times each day (Patient taking differently: 1 each by Other route As Needed. Test 1-2 times each day), Disp: 200 each, Rfl: 1    rosuvastatin (CRESTOR) 40 MG tablet, Take 1 tablet by mouth Every Night., Disp: 90 tablet, Rfl: 3    Sure Comfort Pen Needles 32G X 6 MM misc, USE 1 NEEDLE FIVE TIMES DAILY, Disp: 500 each, Rfl: 3    tolterodine LA (DETROL LA) 2 MG 24 hr capsule, Take 1 capsule by mouth Every Night., Disp: , Rfl:     vitamin D3 125 MCG (5000 UT) capsule capsule, Take 1 capsule by mouth Daily., Disp: , Rfl:     No Known Allergies     Family History   Problem Relation Age of Onset    Cancer Mother     Diabetes Mother     Heart disease Father     Diabetes Sister     Cancer Maternal Aunt     Cancer Maternal Uncle     Cancer Maternal Grandmother     Cancer Maternal Grandfather     Malig Hyperthermia Neg Hx        Social History     Socioeconomic History    Marital status:    Tobacco Use    Smoking status: Former     Current packs/day: 0.00     Types: Cigarettes     Quit date: 1991     Years since quittin.8     Passive exposure: Past    Smokeless tobacco: Former   Vaping Use    Vaping status: Never Used   Substance and Sexual Activity    Alcohol use: No    Drug use: No    Sexual activity: Defer       Vital Signs:   Ht 177.8 cm (70\")   Wt 92.7 kg (204 lb 5.9 oz)   BMI 29.32 kg/m²      Physical Exam  Vitals reviewed.   Constitutional:       Appearance: Normal appearance.   Neurological:      " General: No focal deficit present.      Mental Status: He is alert and oriented to person, place, and time.   Psychiatric:         Mood and Affect: Mood normal.         Behavior: Behavior normal.          Result Review :   The following data was reviewed by: JASON Buchanan on 10/11/2024:     PSA          12/5/2023    09:32 12/26/2023    11:25 4/22/2024    08:44   PSA   PSA 0.112  0.144  0.093        Bladder Scan interpretation 10/11/2024    Estimation of residual urine via BVI 3000 Verathon Bladder Scan  MA/nurse performing: Melanie GODWIN MA  Residual Urine: 163 ml  Indication: Prostate cancer    Stricture of anterior urethra in male, unspecified stricture type   Position: Supine  Examination: Incremental scanning of the suprapubic area using 2.0 MHz transducer using copious amounts of acoustic gel.   Findings: An anechoic area was demonstrated which represented the bladder, with measurement of residual urine as noted. I inspected this myself. In that the residual urine was stable or insignificant, refer to plan for treatment and plan necessary at this time.         Procedures        Assessment and Plan    Diagnoses and all orders for this visit:    1. Prostate cancer (Primary)  -     Bladder Scan    2. Stricture of anterior urethra in male, unspecified stricture type    He does feel like he is emptying his bladder and is voiding well.  He does have some urgency.  He is scheduled for follow-up with Dr. Gould on November 13.  He was advised to keep that appointment.  If he has any issues between now and then he should definitely call.        Follow Up   No follow-ups on file.  Patient was given instructions and counseling regarding his condition or for health maintenance advice. Please see specific information pulled into the AVS if appropriate.         This document has been electronically signed by JASON Buchanan  October 11, 2024 08:55 EDT

## 2024-10-11 ENCOUNTER — OFFICE VISIT (OUTPATIENT)
Dept: UROLOGY | Facility: CLINIC | Age: 74
End: 2024-10-11
Payer: MEDICARE

## 2024-10-11 VITALS — HEIGHT: 70 IN | BODY MASS INDEX: 29.26 KG/M2 | WEIGHT: 204.37 LBS

## 2024-10-11 DIAGNOSIS — C61 PROSTATE CANCER: Primary | ICD-10-CM

## 2024-10-11 DIAGNOSIS — N35.914 STRICTURE OF ANTERIOR URETHRA IN MALE, UNSPECIFIED STRICTURE TYPE: ICD-10-CM

## 2024-10-11 LAB — URINE VOLUME: 163

## 2024-10-20 NOTE — PROGRESS NOTES
Chief Complaint  Diabetes (F/u, med mgt, A1c eval, cgm eval)    Referred By: Sarina Javed,*    Subjective          Lucas Deluca presents to [facility] for diabetes medication management    History of Present Illness    Visit type:  follow-up  Diabetes type:  Type 2  Current diabetes status/concerns/issues:  His sugar has been very high recently.  He was receiving very little insulin while in the hospital but is now taking his usual dose  Other health concerns:  He underwent TURP on 9/30/2024; he has prostate cancer.  He had a right total hip replacement in August.  Current Diabetes symptoms:    Polyuria: Yes     Polydipsia: No   Polyphagia: No   Blurred vision: No   Excessive fatigue: No  Known Diabetes complications:  Neuropathy: None; Location: N/A  Renal: Stage II mild (GFR = 60-89 mL/min) and Microalbuminuria - POSITIVE  Eyes: No current eye exam available in record; Location: N/A  Amputation/Wounds: None  GI: None  Cardiovascular: Hypertension, Hyperlipidemia, and CAD  ED: Patient Reported  Other: None  Hypoglycemia:  None reported at this time  Hypoglycemia Symptoms:  No hypoglycemia at this time  Current diabetes treatment:  Lantus 50 units twice a day, NovoLog 20, 30, or 40 units of insulin based on the carbohydrate content of the meal (sometimes as low as 10) and Jardiance 25 mg once a day in the morning.  Actos 30 mg once a day   Blood glucose device:  [employer] CGM  Blood glucose monitoring frequency:  Continuous per CGM  Blood glucose range/average:  The 14-day sensor report shows an average glucose of 286 mg/dL, with 4% in target range ( mgdL), 30% in the high range (181-250 mg/dL), 66% in the very high range (>250 mg/dL), 0% in the low range (54-70 mg/dL) and 0% in the very low range (<54 mg/dL).   Glucose Source: Device Reviewed  Diet:  Limits high carb/sweet foods, Avoids sugary drinks  Activity/Exercise:  None    Past Medical History:   Diagnosis Date     Arthritis     CAD S/P percutaneous coronary angioplasty 01/14/2017    4 VESSEL CABG- SEE'S TAMERAAPPA. DENIED CP/SOB NOW    Diabetes mellitus     TYPE 2- bg runs around 141 in am    Essential hypertension 12/12/2021    History of prostate cancer     Hyperlipidemia     Hypertension     Macular degeneration     Right hip pain     Sleep apnea     CPAP    Urge incontinence      Past Surgical History:   Procedure Laterality Date    CATARACT EXTRACTION Bilateral     CORONARY ANGIOPLASTY WITH STENT PLACEMENT  2004    ONE    CORONARY ARTERY BYPASS GRAFT      2004 X 4 VESSEL    CYSTOSCOPY TRANSURETHRAL RESECTION OF PROSTATE  2020    CYSTOSCOPY TRANSURETHRAL RESECTION OF PROSTATE  9/30/2024    Procedure: TRANSURETHRAL RESECTION OF PROSTATE WITH BUTTON VAPORIZATION;  Surgeon: Connie Fraire MD;  Location: [facility] OR;  Service: Urology;;    CYSTOSCOPY URETHROTOMY VISUAL INTERNAL N/A 9/30/2024    Procedure: CYSTOSCOPY URETHROTOMY VISUAL INTERNAL;  Surgeon: Connie Fraire MD;  Location: [facility] OR;  Service: Urology;  Laterality: N/A;    OK TX TIBL SHFT FX IMED IMPLT W/WO SCREWS&/CERCLA Right 01/12/2017    Procedure: TIBIA INTRAMEDULLARY NAIL/SHANNON INSERTION;  Surgeon: Colton Nascimento MD;  Location: [facility] OR;  Service: Orthopedics    TONSILLECTOMY      AGE 65    TOTAL HIP ARTHROPLASTY Right 8/7/2024    Procedure: TOTAL HIP ARTHROPLASTY REVISION;  Surgeon: Hebert Jung MD;  Location: [facility];  Service: Orthopedics;  Laterality: Right;     Family History   Problem Relation Age of Onset    Cancer Mother     Diabetes Mother     Heart disease Father     Diabetes Sister     Cancer Maternal Aunt     Cancer Maternal Uncle     Cancer Maternal Grandmother     Cancer Maternal Grandfather     Malig Hyperthermia Neg Hx      Social History     Socioeconomic History    Marital status:    Tobacco Use    Smoking status: Former     Current packs/day: 0.00     Types: Cigarettes     Quit date: 12/14/1991      Years since quittin.8     Passive exposure: Past    Smokeless tobacco: Former   Vaping Use    Vaping status: Never Used   Substance and Sexual Activity    Alcohol use: No    Drug use: No    Sexual activity: Defer     No Known Allergies    Current Outpatient Medications:     amLODIPine (NORVASC) 10 MG tablet, Take 0.5 tablets by mouth Daily., Disp: 90 tablet, Rfl: 3    aspirin 81 MG EC tablet, Take 1 tablet by mouth Daily. If urine remains relatively clear and not passing excessive clot, Disp: , Rfl:     carvedilol (COREG) 25 MG tablet, TAKE 1 TABLET TWICE A DAY, Disp: 180 tablet, Rfl: 3    clopidogrel (PLAVIX) 75 MG tablet, Take 1 tablet by mouth Daily. If urine remains clear and not passing excessive clot, Disp: , Rfl:     cyanocobalamin (VITAMIN B-12) 250 MCG tablet, Take 1 tablet by mouth Daily., Disp: , Rfl:     empagliflozin (Jardiance) 25 MG tablet tablet, Take 1 tablet by mouth Daily., Disp: 90 tablet, Rfl: 1    ezetimibe (ZETIA) 10 MG tablet, Take 1 tablet by mouth Daily., Disp: 90 tablet, Rfl: 3    fenofibrate (TRICOR) 145 MG tablet, Take 1 tablet by mouth Daily., Disp: 90 tablet, Rfl: 3    finasteride (PROSCAR) 5 MG tablet, TAKE 1 TABLET DAILY (Patient taking differently: Take 1 tablet by mouth Daily.), Disp: 90 tablet, Rfl: 3    insulin aspart (NovoLOG FlexPen) 100 UNIT/ML solution pen-injector sc pen, Inject 40 Units under the skin into the appropriate area as directed 3 (Three) Times a Day With Meals for 180 days. Indications: Type 2 Diabetes, Disp: 108 mL, Rfl: 1    insulin glargine (LANTUS, SEMGLEE) 100 UNIT/ML injection, Inject 60 Units under the skin into the appropriate area as directed 2 (Two) Times a Day for 180 days., Disp: 108 mL, Rfl: 1    lisinopril (PRINIVIL,ZESTRIL) 20 MG tablet, Take 1 tablet by mouth 2 (Two) Times a Day., Disp: 180 tablet, Rfl: 3    nitroglycerin (NITROSTAT) 0.4 MG SL tablet, 1 under the tongue as needed for angina, may repeat q5mins for up three doses (Patient  taking differently: Place 1 tablet under the tongue Every 5 (Five) Minutes As Needed. 1 under the tongue as needed for angina, may repeat q5mins for up three doses), Disp: 100 tablet, Rfl: 3    Omega-3 Fatty Acids (fish oil) 1000 MG capsule capsule, Take 1 capsule by mouth Daily With Breakfast., Disp: , Rfl:     PRECISION XTRA TEST STRIPS test strip, Test 1-2 times each day (Patient taking differently: 1 each by Other route As Needed. Test 1-2 times each day), Disp: 200 each, Rfl: 1    rosuvastatin (CRESTOR) 40 MG tablet, Take 1 tablet by mouth Every Night., Disp: 90 tablet, Rfl: 3    Sure Comfort Pen Needles 32G X 6 MM misc, Use 1 each 5 (Five) Times a Day., Disp: 450 each, Rfl: 1    tolterodine LA (DETROL LA) 2 MG 24 hr capsule, Take 1 capsule by mouth Every Night., Disp: , Rfl:     vitamin D3 125 MCG (5000 UT) capsule capsule, Take 1 capsule by mouth Daily., Disp: , Rfl:     oxyCODONE-acetaminophen (PERCOCET) 5-325 MG per tablet, Take 1 tablet by mouth Every 6 (Six) Hours As Needed for Severe Pain. (Patient not taking: Reported on 10/21/2024), Disp: 12 tablet, Rfl: 0    pioglitazone (Actos) 45 MG tablet, Take 1 tablet by mouth Daily., Disp: 90 tablet, Rfl: 1    Objective     There were no vitals filed for this visit.  There is no height or weight on file to calculate BMI.    Physical Exam  Constitutional:       Appearance: Normal appearance.      Comments: Overweight (BMI 25 - 29.9) Pt Current BMI = 29.32    HENT:      Head: Normocephalic and atraumatic.      Right Ear: External ear normal.      Left Ear: External ear normal.      Nose: Nose normal.   Eyes:      Extraocular Movements: Extraocular movements intact.      Conjunctiva/sclera: Conjunctivae normal.   Pulmonary:      Effort: Pulmonary effort is normal.   Musculoskeletal:         General: Normal range of motion.      Cervical back: Normal range of motion.   Skin:     General: Skin is warm and dry.   Neurological:      General: No focal deficit present.       Mental Status: He is alert and oriented to person, place, and time. Mental status is at baseline.   Psychiatric:         Mood and Affect: Mood normal.         Behavior: Behavior normal.         Thought Content: Thought content normal.         Judgment: Judgment normal.             Result Review :   The following data was reviewed by: JASON Burnham on 10/21/2024:    Most Recent A1C          10/21/2024    09:08   HGBA1C Most Recent   Hemoglobin A1C 9.8        A1C Last 3 Results          3/14/2024    09:08 6/17/2024    10:35 10/21/2024    09:08   HGBA1C Last 3 Results   Hemoglobin A1C 7.8  7.3  9.8      A1c collected in the office today is 9.8%, indicating Uncontrolled Type II diabetes.  This result is up from the prior result of 7.3% collected on 6/17/24     Glucose   Date Value Ref Range Status   10/21/2024 127 (H) 70 - 99 mg/dL Final     Comment:     Serial Number: [id number]:  [id number]     Point of care glucose in the office today is within normal limits for nonfasting glucose    Creatinine   Date Value Ref Range Status   10/01/2024 0.99 0.76 - 1.27 mg/dL Final   07/30/2024 1.11 0.76 - 1.27 mg/dL Final     eGFR   Date Value Ref Range Status   10/01/2024 80.9 >60.0 mL/min/1.73 Final   07/30/2024 71.0 >60.0 mL/min/1.73 Final     Labs collected on 10/1/24 show Stage II mild (GFR = 60-89mL/min)      Total Cholesterol   Date Value Ref Range Status   07/23/2024 158 0 - 200 mg/dL Final   04/22/2024 172 0 - 200 mg/dL Final     Triglycerides   Date Value Ref Range Status   07/23/2024 126 0 - 150 mg/dL Final   04/22/2024 126 0 - 150 mg/dL Final     HDL Cholesterol   Date Value Ref Range Status   07/23/2024 44 40 - 60 mg/dL Final   04/22/2024 44 40 - 60 mg/dL Final     LDL Cholesterol    Date Value Ref Range Status   07/23/2024 91 0 - 100 mg/dL Final   04/22/2024 105 (H) 0 - 100 mg/dL Final     Lipid panel collected on 7/23/24 shows Hypercholesterolemia            Assessment: He has had a significant  increase in his A1c since the prior evaluation.  His CGM shows markedly elevated glucose levels as well.  Patient has had recent hospitalizations for hip replacement and TURP.  He states during the hospitalizations he did not receive his normal amounts of insulin.  He has been taking the previously prescribed doses since he has been out of the hospital but his glucose levels have remained elevated.      Diagnoses and all orders for this visit:    1. Uncontrolled type 2 diabetes mellitus with hyperglycemia (Primary)  -     POC Glycosylated Hemoglobin (Hb A1C)  -     pioglitazone (Actos) 45 MG tablet; Take 1 tablet by mouth Daily.  Dispense: 90 tablet; Refill: 1  -     insulin aspart (NovoLOG FlexPen) 100 UNIT/ML solution pen-injector sc pen; Inject 40 Units under the skin into the appropriate area as directed 3 (Three) Times a Day With Meals for 180 days. Indications: Type 2 Diabetes  Dispense: 108 mL; Refill: 1  -     insulin glargine (LANTUS, SEMGLEE) 100 UNIT/ML injection; Inject 60 Units under the skin into the appropriate area as directed 2 (Two) Times a Day for 180 days.  Dispense: 108 mL; Refill: 1  -     empagliflozin (Jardiance) 25 MG tablet tablet; Take 1 tablet by mouth Daily.  Dispense: 90 tablet; Refill: 1  -     Sure Comfort Pen Needles 32G X 6 MM misc; Use 1 each 5 (Five) Times a Day.  Dispense: 450 each; Refill: 1    2. Type 2 diabetes mellitus with stage 2 chronic kidney disease, with long-term current use of insulin    3. Overweight (BMI 25.0-29.9)    4. Uses self-applied continuous glucose monitoring device    Other orders  -     POC Glucose        Plan: At this time we will have the patient increase his Lantus insulin to 60 units twice a day.  We will also increase the Actos to 45 mg once a day.  No other changes will be made to the treatment plan.  The patient was advised should his glucose levels begin to level out and come down to his normal range he could start decreasing the insulin if  necessary to prevent the risk of hypoglycemia    The patient will monitor his blood glucose levels using his CGM.  If he develops problematic hyperglycemia or hypoglycemia or adverse drug reactions, he will contact the office for further instructions.        Follow Up     Return in about 3 months (around 1/21/2025) for Medication Management, CGM Follow-up.    Patient was given instructions and counseling regarding his condition or for health maintenance advice. Please see specific information pulled into the AVS if appropriate.     Magaly Woodson, APRN  10/21/2024      Dictated Utilizing Dragon Dictation.  Please note that portions of this note were completed with a voice recognition program.  Part of this note may be an electronic transcription/translation of spoken language to printed text using the Dragon Dictation System.

## 2024-10-21 ENCOUNTER — OFFICE VISIT (OUTPATIENT)
Dept: DIABETES SERVICES | Facility: HOSPITAL | Age: 74
End: 2024-10-21
Payer: MEDICARE

## 2024-10-21 DIAGNOSIS — Z97.8 USES SELF-APPLIED CONTINUOUS GLUCOSE MONITORING DEVICE: ICD-10-CM

## 2024-10-21 DIAGNOSIS — N18.2 TYPE 2 DIABETES MELLITUS WITH STAGE 2 CHRONIC KIDNEY DISEASE, WITH LONG-TERM CURRENT USE OF INSULIN: ICD-10-CM

## 2024-10-21 DIAGNOSIS — E11.22 TYPE 2 DIABETES MELLITUS WITH STAGE 2 CHRONIC KIDNEY DISEASE, WITH LONG-TERM CURRENT USE OF INSULIN: ICD-10-CM

## 2024-10-21 DIAGNOSIS — E11.65 UNCONTROLLED TYPE 2 DIABETES MELLITUS WITH HYPERGLYCEMIA: Primary | ICD-10-CM

## 2024-10-21 DIAGNOSIS — E66.3 OVERWEIGHT (BMI 25.0-29.9): ICD-10-CM

## 2024-10-21 DIAGNOSIS — Z79.4 TYPE 2 DIABETES MELLITUS WITH STAGE 2 CHRONIC KIDNEY DISEASE, WITH LONG-TERM CURRENT USE OF INSULIN: ICD-10-CM

## 2024-10-21 LAB
EXPIRATION DATE: ABNORMAL
GLUCOSE BLDC GLUCOMTR-MCNC: 127 MG/DL (ref 70–99)
HBA1C MFR BLD: 9.8 % (ref 4.5–5.7)
Lab: ABNORMAL

## 2024-10-21 PROCEDURE — G0463 HOSPITAL OUTPT CLINIC VISIT: HCPCS | Performed by: NURSE PRACTITIONER

## 2024-10-21 PROCEDURE — 3046F HEMOGLOBIN A1C LEVEL >9.0%: CPT | Performed by: NURSE PRACTITIONER

## 2024-10-21 PROCEDURE — 95251 CONT GLUC MNTR ANALYSIS I&R: CPT | Performed by: NURSE PRACTITIONER

## 2024-10-21 PROCEDURE — 83036 HEMOGLOBIN GLYCOSYLATED A1C: CPT | Performed by: NURSE PRACTITIONER

## 2024-10-21 PROCEDURE — 99214 OFFICE O/P EST MOD 30 MIN: CPT | Performed by: NURSE PRACTITIONER

## 2024-10-21 PROCEDURE — 82948 REAGENT STRIP/BLOOD GLUCOSE: CPT | Performed by: NURSE PRACTITIONER

## 2024-10-21 PROCEDURE — 1160F RVW MEDS BY RX/DR IN RCRD: CPT | Performed by: NURSE PRACTITIONER

## 2024-10-21 PROCEDURE — 1159F MED LIST DOCD IN RCRD: CPT | Performed by: NURSE PRACTITIONER

## 2024-10-21 RX ORDER — PIOGLITAZONEHYDROCHLORIDE 45 MG/1
45 TABLET ORAL DAILY
Qty: 90 TABLET | Refills: 1 | Status: SHIPPED | OUTPATIENT
Start: 2024-10-21

## 2024-10-21 RX ORDER — INSULIN ASPART 100 [IU]/ML
40 INJECTION, SOLUTION INTRAVENOUS; SUBCUTANEOUS
Qty: 108 ML | Refills: 1 | Status: SHIPPED | OUTPATIENT
Start: 2024-10-21 | End: 2025-04-19

## 2024-10-21 RX ORDER — INSULIN GLARGINE 100 [IU]/ML
60 INJECTION, SOLUTION SUBCUTANEOUS 2 TIMES DAILY
Qty: 108 ML | Refills: 1 | Status: SHIPPED | OUTPATIENT
Start: 2024-10-21 | End: 2025-04-19

## 2024-10-21 RX ORDER — PEN NEEDLE, DIABETIC 32GX 5/32"
1 NEEDLE, DISPOSABLE MISCELLANEOUS
Qty: 450 EACH | Refills: 1 | Status: SHIPPED | OUTPATIENT
Start: 2024-10-21

## 2024-10-21 NOTE — PATIENT INSTRUCTIONS
Increase the Lantus to 60 units twice a day.    Use the larger end of your NovoLog dose until we can get your blood sugars more stabilized.    We will increase the Actos to 45 mg once a day.  You may take 1-1/2 tablets of your current supply of the 30 mg strength to equal 45 mg until you run out of the supply then you will take 1 tablet a day of the 45 mg tablet.    Continue taking the Jardiance 25 mg once a day  
20-Oct-2017 23:09

## 2024-10-29 ENCOUNTER — OFFICE VISIT (OUTPATIENT)
Dept: ORTHOPEDIC SURGERY | Facility: CLINIC | Age: 74
End: 2024-10-29
Payer: MEDICARE

## 2024-10-29 VITALS — BODY MASS INDEX: 30.43 KG/M2 | HEIGHT: 70 IN | WEIGHT: 212.6 LBS | TEMPERATURE: 97.7 F

## 2024-10-29 DIAGNOSIS — R52 PAIN: Primary | ICD-10-CM

## 2024-10-29 DIAGNOSIS — Z96.641 S/P TOTAL RIGHT HIP ARTHROPLASTY: ICD-10-CM

## 2024-10-29 PROCEDURE — 73502 X-RAY EXAM HIP UNI 2-3 VIEWS: CPT | Performed by: NURSE PRACTITIONER

## 2024-10-29 PROCEDURE — 99024 POSTOP FOLLOW-UP VISIT: CPT | Performed by: NURSE PRACTITIONER

## 2024-10-29 NOTE — PROGRESS NOTES
Lucas Deluca : 1950 MRN: [id number] DATE: 10/29/2024    DIAGNOSIS: 8 week follow up right acetabular revision to dual mobility    SUBJECTIVE:Patient returns today for 8 week follow up of right acetabular revision to dual mobility.  Patient reports doing well with no unusual complaints. Appears to be progressing appropriately and is off a cane.  Reports that his pain level is very minimal and describes it as tolerable.  States that he is just doing home exercises now.  Denies any instability issues.  Denies any sign or symptoms of infection, and is without any other significant complaints today.    OBJECTIVE:   Exam:. The incision is healed. No sign of infection. Range of motion is progressing as expected. The calf is soft and nontender with a negative Homans sign. Strength progressing    DIAGNOSTIC STUDIES  Xrays: 2 views of the right hip (AP pelvis and lateral right hip) were ordered and reviewed for evaluation of recent hip replacement. They demonstrate a well positioned, well aligned hip replacement without complicating factors noted. In comparison with previous films there has been interval implant placement.    ASSESSMENT: 8 week status post right acetabular revision with dual mobility    PLAN: 1) Activity as tolerated   2) Continue hip strengthening exercises    3) Follow up 1 year post-op with repeat Xrays of right hip (2views AP Pelvis  and lateral left hip)    JASON Milan  10/29/2024

## 2024-11-06 DIAGNOSIS — E11.65 UNCONTROLLED TYPE 2 DIABETES MELLITUS WITH HYPERGLYCEMIA: ICD-10-CM

## 2024-11-08 RX ORDER — PIOGLITAZONEHYDROCHLORIDE 30 MG/1
30 TABLET ORAL DAILY
Qty: 90 TABLET | Refills: 3 | OUTPATIENT
Start: 2024-11-08

## 2024-11-13 ENCOUNTER — OFFICE VISIT (OUTPATIENT)
Dept: UROLOGY | Age: 74
End: 2024-11-13
Payer: MEDICARE

## 2024-11-13 ENCOUNTER — TELEPHONE (OUTPATIENT)
Dept: UROLOGY | Age: 74
End: 2024-11-13

## 2024-11-13 VITALS — WEIGHT: 217 LBS | HEIGHT: 70 IN | BODY MASS INDEX: 31.07 KG/M2

## 2024-11-13 DIAGNOSIS — R35.0 BENIGN PROSTATIC HYPERPLASIA WITH URINARY FREQUENCY: ICD-10-CM

## 2024-11-13 DIAGNOSIS — C61 PROSTATE CANCER: Primary | ICD-10-CM

## 2024-11-13 DIAGNOSIS — N35.914 STRICTURE OF ANTERIOR URETHRA IN MALE, UNSPECIFIED STRICTURE TYPE: ICD-10-CM

## 2024-11-13 DIAGNOSIS — N40.1 BENIGN PROSTATIC HYPERPLASIA WITH URINARY FREQUENCY: ICD-10-CM

## 2024-11-13 LAB — URINE VOLUME: 262

## 2024-11-13 RX ORDER — FINASTERIDE 5 MG/1
5 TABLET, FILM COATED ORAL DAILY
Qty: 90 TABLET | Refills: 3 | Status: SHIPPED | OUTPATIENT
Start: 2024-11-13

## 2024-11-13 NOTE — TELEPHONE ENCOUNTER
I would recommend allowing him to complete this prescription and then observe his symptoms.  It is possible that he may not need this medication anymore since his procedure was performed and not having significant symptoms    He can always call back if he wants to stay on this medication.  Thank you

## 2024-11-13 NOTE — PROGRESS NOTES
UROLOGY OFFICE FOLLOW UP NOTE    Subjective   HPI  Lucas Deluca is a 74 y.o. male.  Who presents to the office today for further evaluation of urinary frequency.      4 mo ago thought that he was passing a kidney stone; seen by PCP and told that he had UTI.  Had severe abdominal pain.      Has had 2 UTIs that have been treated with antibiotics, first 3/2/2020 treated via Macrobid and again treated 4/13/2020 with Augmentin.  UTI symptoms noted initially as lower back pain which resolved with Macrobid but then came back and he was placed on Augmentin. UA and cultures not available for review.   No prior h/o UTI.      3 months ago suddenly started having nocturnal enuresis; to the point of wearing brief and incontinence and pad. Also gets up 3-4x at night. Does not even realize that he is going.      Current urinary symptoms include urinary frequency at the end of urination.  And burning at the end of the penis.  Feels that symptoms are better controlled during the day; able to hold; rare daytime leakage.   Denies hematuria  Denies hesitancy  Denies sensation of incomplete emptying.  Reports good strong stream.   Not on any BPH medications.  Denies history of BPH.  Denies h/o renal insufficiency; has h/o DM     Had PSA drawn 2-3 weeks ago.      Initial PVR today 691; repeat after voiding 611 cc. Feels like he could void again; not a painful or bothersome sense of urgency.      No prior imaging     Update 9/22/20: Presents for follow up; cathing 3x a day. Getting around 800cc in upon waking; 700cc at midday, and 800cc prior to sleep. Urinary frequency, nocturia, and nocturnal enuresis have resolved since starting to cathing. Denies issues cathing. Denies flank or back pain. Taking Flomax and finasteride without adverse side effects.    Had CT scan prior to today's visit.     Update 10/29/2020: Patient presents for follow-up after UDS.  Did not have her US performed prior.  Patient states he is generally doing  "well.  Continues to self cath 3 times daily.  Gets volumes of 700 cc each time.  Denies issues cathing; does not like to cath; does not like the idea of self cathing.  No other complaints today.  Denies changes.     Update 3/4/2021: Patient presents postop follow-up from TURP.  Patient removed catheter this morning around 5 AM; has voided 3 times.  Denies significant sensation of incomplete emptying.  Denies dysuria or pain with voiding.  No complaints.  Final PVR today 250 cc     Update 3/12/2021: Patient reports \"doing great.\"  Voiding with good strong stream; no straining or hesitancy.  Denies sensation of incomplete emptying.  Wants to know if he can go dancing.  No complaints today.   cc     Update 5/6/2021: Patient presents for follow-up states he is doing very well.  States he is voiding with good strong stream denies sensation of incomplete emptying.  Pleased with results after TURP.  Gets every 3 month lab checks with PCP; wonders if PCP can order his PSA testing for surveillance of his prostate cancer.  PVR today 198 cc     Update 12/1/21: Presents for routine follow up with psa prior.  Gets up at night every 2 hours; able to go right back to sleep. Has had 2 accidents at nighttime since last visit. Continues to take flomax and finasteride. Reports good strong stream. Denies significant daytime urinary symptoms.  Has retrograde ejaculation.     Update 6/3/2022: Patient presents for routine follow-up of BPH history of TURP and [place] prostate cancer on active surveillance with PSA prior.  Patient doing well; no voiding complaints.  No longer taking Flomax.  Does note antegrade ejaculation.     Update 12/6/2022: Patient presents for follow-up of prostate cancer active surveillance, PSA prior; history of BPH with outlet obstruction managed with TURP. The patient reports that he is doing well.  The patient reports that he has been waking up every 2 hours to urinate. He states that he is not " comfortable enough to not urinate and if he does not go to the bathroom when he wakes he will leak. The patient reports that he is sleeping with a pad. He states that he drinks water constantly.     Update 6/6/2023: Patient presents for follow-up prostate cancer on active surveillance, BPH with LUTS.  PSA prior to today's visit.  The patient reports that he is still leaking at night. He states that he did try to catheterize before bed, and it did make a difference.   The patient reports that he is up 3 to 4 times every night to urinate. He states that when he urinates at night, he is full.   The patient reports that he drinks until bed. He states that he has to keep hydrated because of his blood sugar. The patient reports that he is diabetic. He states that his sugars are not controlled.   The patient reports that he is still taking finasteride for his prostate.   He states that he does not have a lot of trouble with symptoms during the day.   The patient reports that he is not sure that at night that he feels the urges. He states that he uses a CPAP machine.   He states that he would like to try a dedicated urgency medication for nighttime.     Update 9/6/23: presents today for follow-up on prostate cancer, BPH, nocturia.  The patient reports that he has noticed a slight improvement in his symptoms since his previous visit. He has adjusted to his nighttime issues and feels that the medication he was prescribed at his last visit has aided with a decrease in urinary leakage. He has been sleeping with a sanitary napkin which has allowed him to achieve better sleep. He denies self catheterizing at this time. He is taking his Detrol LA at night and denies any negative side effects. He does note some mild dry mouth with the medication. He feels that he is able to empty his bladder when he urinates and he has a healthy stream.      Update 12/21/23: Presents today for follow-up on prostate cancer, BPH, nocturia.  The  patient is doing well. He denies any changes since his last visit. He is still leaking a little. He would like to try a stronger dose. He did see a little bit of improvement with the trial of tolterodine. He denies any side effects. He is still taking finasteride.  Complains of bulges bilaterally, lower abdomen which are new and increasing in size.  Patient notes some fullness.  This is somewhat bothersome to him but no specific pain.     Update 2/21/2023: Presents today for follow-up on prostate cancer, BPH, nocturia, and inguinal pain.  CT scan prior.  Plan to tolterodine increased to 4 mg daily.  The patient is doing well. He feels like he empties his bladder well. He has doubled the medication that is a blue tablet and starts with an F and he is not having any problems at all now.  PSA drawn earlier than expected with other lab draws.  The patient has hip surgery scheduled for 05/08/2024.         Update 7/23/2024:    Presents today for follow-up on prostate cancer, BPH, nocturia on tolterodine and finasteride.  No PSA checked prior to today's visit.  History of Present Illness  The patient reports experiencing significant urinary leakage. Despite attempts to use a catheter on a few occasions, he was unable to fully insert the catheter into his bladder. Despite this, he full bladder emptying, albeit with a weakened urinary stream. His groin discomfort remains unchanged.     Plan for hip surgery 10/09/2023.    Update 11/13/2024: Presents for follow-up urethral stricture, urinary incontinence, BPH after cystoscopy, direct visual internal urethrotomy  Transurethral resection of prostate with bipolar energy (button TURP).  History of prostate cancer found incidentally on TURP specimen.   mL.  History of Present Illness  He reports feeling well post-procedure, with no instances of urine leakage or blood in his urine. He also denies experiencing any pain. His current medications include finasteride and a  "capsule; unsure of what the medication is.      ___________  Renal ultrasound, 4/27/2021: No hydronephrosis or acute sonographic abnormality     PSA  12/26/2023: 0.144  12/5/23: 0.112  6/2/2023: 0.129  12/1/2022: 0.191  6/1/22: 0.28  9/20/2021: 0.3  7/14/2020: 1.6     Prostate chips pathology, 2/22/2021: Adenocarcinoma prostate, [place] 3+3 = 6 in 1 of multiple chips involving approximately 1% of tissue     CT scan 8/24/2020: Mildly enlarged prostate; moderate distention of bladder; moderate right hydro-nephrosis without obstructing ureteral stone     UDS, 10/15/2020: During filling bladder with increased sensation, normal capacity.  Evidence of involuntary bladder contractions causing increased urgency no urge incontinence.  During voiding, bladder with normal detrusor contraction.  Flow was moderate with an elongated rate.  Flow charted obstructed with AG #113.3.  PVR: 198 cc EMG normal.          Review of systems  A review of systems was performed, and positive findings are noted in the HPI.    Objective     Vital Signs:   Ht 177.8 cm (70\")   Wt 98.4 kg (217 lb)   BMI 31.14 kg/m²       Physical exam  No acute distress, well-nourished  Awake alert and oriented  Mood normal; affect normal  Physical Exam        Bladder Scan interpretation 11/13/2024    Estimation of residual urine via BVI 3000 [place] Bladder Scan  Performed by: Amada Pendleton MA  Residual Urine: 262 ml  Indication: Prostate cancer    Stricture of anterior urethra in male, unspecified stricture type    Benign prostatic hyperplasia with urinary frequency   Position: Supine  Examination: Incremental scanning of the suprapubic area using 2.0 MHz transducer using copious amounts of acoustic gel.   Findings: An anechoic area was demonstrated which represented the bladder, with measurement of residual urine as noted. I inspected this myself. In that the residual urine was stable or insignificant, refer to plan for treatment and plan necessary at this " time.     Problem List:  Patient Active Problem List   Diagnosis    Closed fracture of distal end of fibula with tibia    Diabetes    CAD S/P percutaneous coronary angioplasty    Benign prostatic hyperplasia with nocturia    Prostate cancer    Essential hypertension    Hyperlipemia, mixed    Macular degeneration    OA (osteoarthritis) of hip    Status post right hip replacement    Urethral stricture    Urinary incontinence    Benign prostatic hyperplasia    BPH (benign prostatic hyperplasia)       Assessment & Plan   Diagnoses and all orders for this visit:    1. Prostate cancer (Primary)  -     Bladder Scan  -     PSA DIAGNOSTIC; Future    2. Stricture of anterior urethra in male, unspecified stricture type    3. Benign prostatic hyperplasia with urinary frequency  -     finasteride (PROSCAR) 5 MG tablet; Take 1 tablet by mouth Daily.  Dispense: 90 tablet; Refill: 3        Assessment & Plan  Doing well after surgical intervention of urethral stricture and button TURP  Adequately emptying bladder; continue to monitor  He reports no leakage, pain, or blood in the urine.     A PSA test will be conducted in February 2025.     His finasteride prescription has been refilled through [facility].    He will check his medications at home to confirm if he needs additional refills.     Follow-up  Return in 3 months, PVR for follow-up with a PSA test beforehand.         All questions addressed      Patient or patient representative verbalized consent for the use of Ambient Listening during the visit with  Connie Fraire MD for chart documentation. 11/13/2024  14:05 EST

## 2024-11-13 NOTE — TELEPHONE ENCOUNTER
PATIENT SAID HE WAS SEEN TODAY, AND HE WAS TO CALL BACK AND LET DR. ESTRADA KNOW THAT DETROL [place] CAP[place] 2 MG ONCE DAILY IS THE OTHER MEDICATION HE NEEDS REFILL ON SENT TO [facility].    #[phone]

## 2025-01-01 DIAGNOSIS — E11.65 UNCONTROLLED TYPE 2 DIABETES MELLITUS WITH HYPERGLYCEMIA: ICD-10-CM

## 2025-01-02 RX ORDER — PIOGLITAZONE 30 MG/1
30 TABLET ORAL DAILY
Qty: 90 TABLET | Refills: 3 | Status: SHIPPED | OUTPATIENT
Start: 2025-01-02

## 2025-01-02 RX ORDER — TOLTERODINE 2 MG/1
2 CAPSULE, EXTENDED RELEASE ORAL NIGHTLY
Qty: 90 CAPSULE | Refills: 3 | OUTPATIENT
Start: 2025-01-02

## 2025-01-02 NOTE — TELEPHONE ENCOUNTER
This was ordered by provider at [facility] and will need to be refilled by them or medication discussed w/ provider via appointment.

## 2025-01-20 NOTE — PROGRESS NOTES
Chief Complaint  Diabetes (Med management, A1C, CGM Eval )    Referred By: Sarina Javed,*    Subjective          Patient or patient representative verbalized consent for the use of Ambient Listening during the visit with  JASON Burnham for chart documentation. 1/21/2025  08:25 EST    Lucas Deluca presents to [facility] for diabetes medication management    History of Present Illness    Visit type:  follow-up  Diabetes type:  Type 2  Current diabetes status/concerns/issues:     History of Present Illness  The patient presents for evaluation of diabetes.    He has been experiencing hypoglycemic episodes, which he attributes to the addition of Lantus and NovoLog to his treatment regimen. He reports a recent blood glucose level of 72, accompanied by sweating, but no loss of focus. He manages these episodes with orange juice or jelly beans.  He did make adjustments to his Lantus dosing to minimize the risk for hypoglycemia.  His current medication regimen includes Lantus 50 units in the morning and 40 units in the evening, and NovoLog 25 to 40 units, depending on his diet. He also takes Jardiance once daily and Actos 45 mg. He is mindful of his diet and hydration. He wants to lose some weight.  He has never tried Ozempic or Trulicity. He has no history of pancreatitis or thyroid cancer, and there is no family history of thyroid cancer.        Current Diabetes symptoms:    Polyuria: No   Polydipsia: No   Polyphagia: No   Blurred vision: No   Excessive fatigue: No  Known Diabetes complications:  Neuropathy: None; Location: N/A  Renal: Stage II mild (GFR = 60-89 mL/min) and Microalbuminuria - POSITIVE  Eyes: No current eye exam available in record; Location: N/A  Amputation/Wounds: None  GI: None  Cardiovascular: Hypertension, Hyperlipidemia, and CAD  ED: Patient Reported  Other: None  Hypoglycemia:   he reports having lows but the sensor report shows no  trends  Hypoglycemia Symptoms:  shaking/tremors  Current diabetes treatment:  Lantus 50 units in the AM and 40 units in the PM, NovoLog 20, 30, or 40 units of insulin based on the carbohydrate content of the meal (averages 25-40 units) and Jardiance 25 mg once a day in the morning.  Actos 45 mg once a day   Blood glucose device:  Navita CGM  Blood glucose monitoring frequency:  Continuous per CGM  Blood glucose range/average:  The 14-day sensor report shows an average glucose of 171 mg/dL, with 64% in target range ( mgdL), 28% in the high range (181-250 mg/dL), 8% in the very high range (>250 mg/dL), 0% in the low range (54-70 mg/dL) and 0% in the very low range (<54 mg/dL).   Glucose Source: Device Reviewed  Diet:  Limits high carb/sweet foods, Avoids sugary drinks  Activity/Exercise:  None    Past Medical History:   Diagnosis Date    Arthritis     CAD S/P percutaneous coronary angioplasty 01/14/2017    4 VESSEL CABG- [place]. DENIED CP/SOB NOW    Diabetes mellitus     TYPE 2- bg runs around 141 in am    Essential hypertension 12/12/2021    History of prostate cancer     Hyperlipidemia     Hypertension     Macular degeneration     Right hip pain     Sleep apnea     CPAP    Urge incontinence      Past Surgical History:   Procedure Laterality Date    CATARACT EXTRACTION Bilateral     CORONARY ANGIOPLASTY WITH STENT PLACEMENT  2004    ONE    CORONARY ARTERY BYPASS GRAFT      2004 X 4 VESSEL    CYSTOSCOPY TRANSURETHRAL RESECTION OF PROSTATE  2020    CYSTOSCOPY TRANSURETHRAL RESECTION OF PROSTATE  9/30/2024    Procedure: TRANSURETHRAL RESECTION OF PROSTATE WITH BUTTON VAPORIZATION;  Surgeon: Connie Fraire MD;  Location: [facility] MAIN OR;  Service: Urology;;    CYSTOSCOPY URETHROTOMY VISUAL INTERNAL N/A 9/30/2024    Procedure: CYSTOSCOPY URETHROTOMY VISUAL INTERNAL;  Surgeon: Connie Fraire MD;  Location: [facility] MAIN OR;  Service: Urology;  Laterality: N/A;    SC TX TIBL SHFT FX IMED IMPLT W/WO  SCREWS&/CERCLA Right 2017    Procedure: TIBIA INTRAMEDULLARY NAIL/SHANNON INSERTION;  Surgeon: Colton Nascimento MD;  Location: [facility] MAIN OR;  Service: Orthopedics    TONSILLECTOMY      AGE 65    TOTAL HIP ARTHROPLASTY Right 2024    Procedure: TOTAL HIP ARTHROPLASTY REVISION;  Surgeon: Hebert Jung MD;  Location:  [facility];  Service: Orthopedics;  Laterality: Right;     Family History   Problem Relation Age of Onset    Cancer Mother     Diabetes Mother     Heart disease Father     Diabetes Sister     Cancer Maternal Aunt     Cancer Maternal Uncle     Cancer Maternal Grandmother     Cancer Maternal Grandfather     Malig Hyperthermia Neg Hx      Social History     Socioeconomic History    Marital status:    Tobacco Use    Smoking status: Former     Current packs/day: 0.00     Types: Cigarettes     Quit date: 1991     Years since quittin.1     Passive exposure: Past    Smokeless tobacco: Former   Vaping Use    Vaping status: Never Used   Substance and Sexual Activity    Alcohol use: No    Drug use: No    Sexual activity: Defer     No Known Allergies    Current Outpatient Medications:     amLODIPine (NORVASC) 10 MG tablet, Take 0.5 tablets by mouth Daily., Disp: 90 tablet, Rfl: 3    aspirin 81 MG EC tablet, Take 1 tablet by mouth Daily. If urine remains relatively clear and not passing excessive clot, Disp: , Rfl:     carvedilol (COREG) 25 MG tablet, TAKE 1 TABLET TWICE A DAY, Disp: 180 tablet, Rfl: 3    clopidogrel (PLAVIX) 75 MG tablet, Take 1 tablet by mouth Daily. If urine remains clear and not passing excessive clot, Disp: , Rfl:     cyanocobalamin (VITAMIN B-12) 250 MCG tablet, Take 1 tablet by mouth Daily., Disp: , Rfl:     empagliflozin (Jardiance) 25 MG tablet tablet, Take 1 tablet by mouth Daily., Disp: 90 tablet, Rfl: 1    ezetimibe (ZETIA) 10 MG tablet, Take 1 tablet by mouth Daily., Disp: 90 tablet, Rfl: 3    fenofibrate (TRICOR) 145 MG tablet, Take 1 tablet by mouth Daily.,  Disp: 90 tablet, Rfl: 3    finasteride (PROSCAR) 5 MG tablet, Take 1 tablet by mouth Daily., Disp: 90 tablet, Rfl: 3    insulin aspart (NovoLOG FlexPen) 100 UNIT/ML solution pen-injector sc pen, Inject 40 Units under the skin into the appropriate area as directed 3 (Three) Times a Day With Meals for 180 days. Indications: Type 2 Diabetes, Disp: 108 mL, Rfl: 1    insulin glargine (LANTUS, SEMGLEE) 100 UNIT/ML injection, Inject 50 Units under the skin into the appropriate area as directed Daily AND 40 Units Every Night. Do all this for 180 days., Disp: 81 mL, Rfl: 1    lisinopril (PRINIVIL,ZESTRIL) 20 MG tablet, Take 1 tablet by mouth 2 (Two) Times a Day., Disp: 180 tablet, Rfl: 3    nitroglycerin (NITROSTAT) 0.4 MG SL tablet, 1 under the tongue as needed for angina, may repeat q5mins for up three doses, Disp: 100 tablet, Rfl: 3    Omega-3 Fatty Acids (fish oil) 1000 MG capsule capsule, Take 1 capsule by mouth Daily With Breakfast., Disp: , Rfl:     pioglitazone (Actos) 45 MG tablet, Take 1 tablet by mouth Daily., Disp: 90 tablet, Rfl: 1    PRECISION XTRA TEST STRIPS test strip, Test 1-2 times each day (Patient taking differently: 1 each by Other route As Needed. Test 1-2 times each day), Disp: 200 each, Rfl: 1    rosuvastatin (CRESTOR) 40 MG tablet, Take 1 tablet by mouth Every Night., Disp: 90 tablet, Rfl: 3    Sure Comfort Pen Needles 32G X 6 MM misc, Use 1 each 5 (Five) Times a Day., Disp: 450 each, Rfl: 1    tolterodine LA (DETROL LA) 2 MG 24 hr capsule, Take 1 capsule by mouth Every Night., Disp: , Rfl:     vitamin D3 125 MCG (5000 UT) capsule capsule, Take 1 capsule by mouth Daily., Disp: , Rfl:     Tirzepatide (Mounjaro) 2.5 MG/0.5ML solution auto-injector, Inject 2.5 mg under the skin into the appropriate area as directed Every 7 (Seven) Days., Disp: 2 mL, Rfl: 0    Tirzepatide (Mounjaro) 5 MG/0.5ML solution auto-injector, Inject 5 mg under the skin into the appropriate area as directed Every 7 (Seven) Days.,  "Disp: 2 mL, Rfl: 1    Objective     Vitals:    01/21/25 0808   BP: 154/60   BP Location: Right arm   Patient Position: Sitting   Cuff Size: Adult   Pulse: 65   Temp: 98 °F (36.7 °C)   TempSrc: Temporal   SpO2: 97%   Weight: 104 kg (230 lb 1.6 oz)   Height: 177.8 cm (70\")     Body mass index is 33.02 kg/m².    Physical Exam  Constitutional:       Appearance: Normal appearance. He is obese.      Comments: Obesity (BMI 30 - 39.9) Pt Current BMI = 33.02    HENT:      Head: Normocephalic and atraumatic.      Right Ear: External ear normal.      Left Ear: External ear normal.      Nose: Nose normal.   Eyes:      Extraocular Movements: Extraocular movements intact.      Conjunctiva/sclera: Conjunctivae normal.   Pulmonary:      Effort: Pulmonary effort is normal.   Musculoskeletal:         General: Normal range of motion.      Cervical back: Normal range of motion.   Skin:     General: Skin is warm and dry.   Neurological:      General: No focal deficit present.      Mental Status: He is alert and oriented to person, place, and time. Mental status is at baseline.   Psychiatric:         Mood and Affect: Mood normal.         Behavior: Behavior normal.         Thought Content: Thought content normal.         Judgment: Judgment normal.             Result Review :   The following data was reviewed by: JASON Burnham on 01/21/2025:    Most Recent A1C          1/21/2025    08:16   HGBA1C Most Recent   Hemoglobin A1C 7.7        A1C Last 3 Results          6/17/2024    10:35 10/21/2024    09:08 1/21/2025    08:16   HGBA1C Last 3 Results   Hemoglobin A1C 7.3  9.8  7.7      A1c collected in the office today is 7.7%, indicating Uncontrolled Type II diabetes.  This result is down from the prior result of 9.8% collected on 10/21/24     Glucose   Date Value Ref Range Status   01/21/2025 72 70 - 99 mg/dL Final     Comment:     Serial Number: [id number]:  [id number]     Point of care glucose in the office today is within " normal limits for nonfasting glucose                Diagnoses and all orders for this visit:    1. Uncontrolled type 2 diabetes mellitus with hyperglycemia (Primary)  -     Microalbumin / Creatinine Urine Ratio - Urine, Clean Catch; Future  -     POC Glycosylated Hemoglobin (Hb A1C)  -     insulin glargine (LANTUS, SEMGLEE) 100 UNIT/ML injection; Inject 50 Units under the skin into the appropriate area as directed Daily AND 40 Units Every Night. Do all this for 180 days.  Dispense: 81 mL; Refill: 1  -     Tirzepatide (Mounjaro) 5 MG/0.5ML solution auto-injector; Inject 5 mg under the skin into the appropriate area as directed Every 7 (Seven) Days.  Dispense: 2 mL; Refill: 1  -     Microalbumin / Creatinine Urine Ratio - Urine, Clean Catch    2. Type 2 diabetes mellitus with stage 2 chronic kidney disease, with long-term current use of insulin    3. Microalbuminuria    4. Uses self-applied continuous glucose monitoring device    5. Obesity (BMI 30-39.9)    Other orders  -     POC Glucose  -     Tirzepatide (Mounjaro) 2.5 MG/0.5ML solution auto-injector; Inject 2.5 mg under the skin into the appropriate area as directed Every 7 (Seven) Days.  Dispense: 2 mL; Refill: 0        Assessment & Plan  1. Diabetes mellitus.  His blood glucose levels have shown significant improvement since the last visit in October 2024, with an average glucose level of 171 over the past 14 days and 64% of readings within the target range. His A1c has decreased from 9.8 in October 2024 to 7.7 currently. He has experienced some weight gain, approximately 13 pounds since the last visit, which may be attributed to holiday eating habits. He has been managing his hypoglycemic episodes effectively with the use of jelly beans. He was advised to monitor his portion sizes and avoid overindulgence. He was also advised to increase his NovoLog dosage during supper time due to higher carbohydrate intake. He was informed that his blood glucose levels should  be below 180 two hours post-meal. The potential benefits of Actos in enhancing insulin efficacy were discussed.  To facilitate better glucose control and reduction in daily insulin requirements, the patient will be started on Mounjaro 2.5 mg once weekly with a sample provided in the office today.  After 1 month at this dose he will increase to 5 mg once weekly.  Will increase the dose as necessary on a monthly basis.    2. Weight management.  He expressed a desire to lose weight. The potential benefits of GLP-1 medications in aiding weight loss were discussed.  A prescription for Mounjaro 2.5 mg was provided, with instructions to increase the dosage to 5 mg after one month.          The patient will monitor his blood glucose levels per continuous glucose monitor.  If he develops problematic hyperglycemia or hypoglycemia or adverse drug reactions, he will contact the office for further instructions.        Follow Up     Return in about 3 months (around 4/21/2025) for Medication Management, CGM Follow-up.    Patient was given instructions and counseling regarding his condition or for health maintenance advice. Please see specific information pulled into the AVS if appropriate.     Magaly Woodson, JASON  01/21/2025        Dictated Utilizing Dragon Dictation.  Please note that portions of this note were completed with a voice recognition program.  Part of this note may be an electronic transcription/translation of spoken language to printed text using the Dragon Dictation System.

## 2025-01-21 ENCOUNTER — OFFICE VISIT (OUTPATIENT)
Dept: DIABETES SERVICES | Facility: HOSPITAL | Age: 75
End: 2025-01-21
Payer: MEDICARE

## 2025-01-21 VITALS
BODY MASS INDEX: 32.94 KG/M2 | HEART RATE: 65 BPM | HEIGHT: 70 IN | SYSTOLIC BLOOD PRESSURE: 154 MMHG | DIASTOLIC BLOOD PRESSURE: 60 MMHG | OXYGEN SATURATION: 97 % | WEIGHT: 230.1 LBS | TEMPERATURE: 98 F

## 2025-01-21 DIAGNOSIS — Z97.8 USES SELF-APPLIED CONTINUOUS GLUCOSE MONITORING DEVICE: ICD-10-CM

## 2025-01-21 DIAGNOSIS — E11.65 UNCONTROLLED TYPE 2 DIABETES MELLITUS WITH HYPERGLYCEMIA: Primary | ICD-10-CM

## 2025-01-21 DIAGNOSIS — Z79.4 TYPE 2 DIABETES MELLITUS WITH STAGE 2 CHRONIC KIDNEY DISEASE, WITH LONG-TERM CURRENT USE OF INSULIN: ICD-10-CM

## 2025-01-21 DIAGNOSIS — E66.9 OBESITY (BMI 30-39.9): ICD-10-CM

## 2025-01-21 DIAGNOSIS — N18.2 TYPE 2 DIABETES MELLITUS WITH STAGE 2 CHRONIC KIDNEY DISEASE, WITH LONG-TERM CURRENT USE OF INSULIN: ICD-10-CM

## 2025-01-21 DIAGNOSIS — R80.9 MICROALBUMINURIA: ICD-10-CM

## 2025-01-21 DIAGNOSIS — E11.22 TYPE 2 DIABETES MELLITUS WITH STAGE 2 CHRONIC KIDNEY DISEASE, WITH LONG-TERM CURRENT USE OF INSULIN: ICD-10-CM

## 2025-01-21 LAB
ALBUMIN UR-MCNC: 5.8 MG/DL
CREAT UR-MCNC: 55.5 MG/DL
EXPIRATION DATE: ABNORMAL
GLUCOSE BLDC GLUCOMTR-MCNC: 72 MG/DL (ref 70–99)
HBA1C MFR BLD: 7.7 % (ref 4.5–5.7)
Lab: ABNORMAL
MICROALBUMIN/CREAT UR: 104.5 MG/G (ref 0–29)

## 2025-01-21 PROCEDURE — 83036 HEMOGLOBIN GLYCOSYLATED A1C: CPT | Performed by: NURSE PRACTITIONER

## 2025-01-21 PROCEDURE — 82948 REAGENT STRIP/BLOOD GLUCOSE: CPT | Performed by: NURSE PRACTITIONER

## 2025-01-21 PROCEDURE — 99214 OFFICE O/P EST MOD 30 MIN: CPT | Performed by: NURSE PRACTITIONER

## 2025-01-21 PROCEDURE — 3077F SYST BP >= 140 MM HG: CPT | Performed by: NURSE PRACTITIONER

## 2025-01-21 PROCEDURE — G2211 COMPLEX E/M VISIT ADD ON: HCPCS | Performed by: NURSE PRACTITIONER

## 2025-01-21 PROCEDURE — 82043 UR ALBUMIN QUANTITATIVE: CPT | Performed by: NURSE PRACTITIONER

## 2025-01-21 PROCEDURE — G0463 HOSPITAL OUTPT CLINIC VISIT: HCPCS | Performed by: NURSE PRACTITIONER

## 2025-01-21 PROCEDURE — 95251 CONT GLUC MNTR ANALYSIS I&R: CPT | Performed by: NURSE PRACTITIONER

## 2025-01-21 PROCEDURE — 1160F RVW MEDS BY RX/DR IN RCRD: CPT | Performed by: NURSE PRACTITIONER

## 2025-01-21 PROCEDURE — 1159F MED LIST DOCD IN RCRD: CPT | Performed by: NURSE PRACTITIONER

## 2025-01-21 PROCEDURE — 82570 ASSAY OF URINE CREATININE: CPT | Performed by: NURSE PRACTITIONER

## 2025-01-21 PROCEDURE — 3051F HG A1C>EQUAL 7.0%<8.0%: CPT | Performed by: NURSE PRACTITIONER

## 2025-01-21 PROCEDURE — 3078F DIAST BP <80 MM HG: CPT | Performed by: NURSE PRACTITIONER

## 2025-01-21 RX ORDER — TIRZEPATIDE 5 MG/.5ML
5 INJECTION, SOLUTION SUBCUTANEOUS
Qty: 2 ML | Refills: 1 | Status: SHIPPED | OUTPATIENT
Start: 2025-01-21

## 2025-01-21 RX ORDER — TIRZEPATIDE 2.5 MG/.5ML
2.5 INJECTION, SOLUTION SUBCUTANEOUS
Qty: 2 ML | Refills: 0 | COMMUNITY
Start: 2025-01-21

## 2025-01-21 RX ORDER — INSULIN GLARGINE 100 [IU]/ML
INJECTION, SOLUTION SUBCUTANEOUS
Qty: 81 ML | Refills: 1 | Status: SHIPPED | OUTPATIENT
Start: 2025-01-21 | End: 2025-07-20

## 2025-02-03 ENCOUNTER — TELEPHONE (OUTPATIENT)
Dept: DIABETES SERVICES | Facility: HOSPITAL | Age: 75
End: 2025-02-03
Payer: MEDICARE

## 2025-02-10 ENCOUNTER — LAB (OUTPATIENT)
Facility: HOSPITAL | Age: 75
End: 2025-02-10
Payer: MEDICARE

## 2025-02-10 ENCOUNTER — TELEPHONE (OUTPATIENT)
Dept: UROLOGY | Age: 75
End: 2025-02-10
Payer: MEDICARE

## 2025-02-10 DIAGNOSIS — C61 PROSTATE CANCER: ICD-10-CM

## 2025-02-10 DIAGNOSIS — E78.2 HYPERLIPEMIA, MIXED: ICD-10-CM

## 2025-02-10 LAB
ALBUMIN SERPL-MCNC: 3.8 G/DL (ref 3.5–5.2)
ALP SERPL-CCNC: 44 U/L (ref 39–117)
ALT SERPL W P-5'-P-CCNC: 13 U/L (ref 1–41)
AST SERPL-CCNC: 22 U/L (ref 1–40)
BILIRUB CONJ SERPL-MCNC: <0.1 MG/DL (ref 0–0.3)
BILIRUB INDIRECT SERPL-MCNC: NORMAL MG/DL
BILIRUB SERPL-MCNC: 0.2 MG/DL (ref 0–1.2)
CHOLEST SERPL-MCNC: 107 MG/DL (ref 0–200)
HDLC SERPL-MCNC: 41 MG/DL (ref 40–60)
LDLC SERPL CALC-MCNC: 52 MG/DL (ref 0–100)
LDLC/HDLC SERPL: 1.3 {RATIO}
PROT SERPL-MCNC: 6.6 G/DL (ref 6–8.5)
PSA SERPL-MCNC: 0.17 NG/ML (ref 0–4)
TRIGL SERPL-MCNC: 63 MG/DL (ref 0–150)
VLDLC SERPL-MCNC: 14 MG/DL (ref 5–40)

## 2025-02-10 PROCEDURE — 80076 HEPATIC FUNCTION PANEL: CPT

## 2025-02-10 PROCEDURE — 36415 COLL VENOUS BLD VENIPUNCTURE: CPT

## 2025-02-10 PROCEDURE — 84153 ASSAY OF PSA TOTAL: CPT

## 2025-02-10 PROCEDURE — 80061 LIPID PANEL: CPT

## 2025-02-10 RX ORDER — INSULIN GLARGINE 100 [IU]/ML
INJECTION, SOLUTION SUBCUTANEOUS
COMMUNITY
Start: 2025-01-21

## 2025-02-10 NOTE — TELEPHONE ENCOUNTER
LEFT DETAILED MESSAGE FOR PT THAT HE NEEDS TO HAVE HIS PSA DONE BY TUE FOR HIS [facility] APPT. ASKED FOR CALL BACK WITH QUESTIONS.

## 2025-02-11 NOTE — TELEPHONE ENCOUNTER
Caller: Lucas Deluca     Relationship: [unfilled]     Best call back number: [phone]    What is your medical concern? PT COMPLETED PSA. THANK YOU

## 2025-02-13 ENCOUNTER — OFFICE VISIT (OUTPATIENT)
Dept: UROLOGY | Age: 75
End: 2025-02-13
Payer: MEDICARE

## 2025-02-13 VITALS — WEIGHT: 230 LBS | HEIGHT: 70 IN | BODY MASS INDEX: 32.93 KG/M2

## 2025-02-13 DIAGNOSIS — N40.1 BENIGN PROSTATIC HYPERPLASIA WITH URINARY FREQUENCY: ICD-10-CM

## 2025-02-13 DIAGNOSIS — C61 PROSTATE CANCER: Primary | ICD-10-CM

## 2025-02-13 DIAGNOSIS — R35.0 BENIGN PROSTATIC HYPERPLASIA WITH URINARY FREQUENCY: ICD-10-CM

## 2025-02-13 DIAGNOSIS — N35.914 STRICTURE OF ANTERIOR URETHRA IN MALE, UNSPECIFIED STRICTURE TYPE: ICD-10-CM

## 2025-02-13 LAB — URINE VOLUME: NORMAL

## 2025-02-13 NOTE — PROGRESS NOTES
UROLOGY OFFICE FOLLOW UP NOTE    Subjective   HPI  Lucas Deluca is a 74 y.o. male. Who presents to the office today for further evaluation of urinary frequency.      4 mo ago thought that he was passing a kidney stone; seen by PCP and told that he had UTI.  Had severe abdominal pain.      Has had 2 UTIs that have been treated with antibiotics, first 3/2/2020 treated via Macrobid and again treated 4/13/2020 with Augmentin.  UTI symptoms noted initially as lower back pain which resolved with Macrobid but then came back and he was placed on Augmentin. UA and cultures not available for review.   No prior h/o UTI.      3 months ago suddenly started having nocturnal enuresis; to the point of wearing brief and incontinence and pad. Also gets up 3-4x at night. Does not even realize that he is going.      Current urinary symptoms include urinary frequency at the end of urination.  And burning at the end of the penis.  Feels that symptoms are better controlled during the day; able to hold; rare daytime leakage.   Denies hematuria  Denies hesitancy  Denies sensation of incomplete emptying.  Reports good strong stream.   Not on any BPH medications.  Denies history of BPH.  Denies h/o renal insufficiency; has h/o DM     Had PSA drawn 2-3 weeks ago.      Initial PVR today 691; repeat after voiding 611 cc. Feels like he could void again; not a painful or bothersome sense of urgency.      No prior imaging     Update 9/22/20: Presents for follow up; cathing 3x a day. Getting around 800cc in upon waking; 700cc at midday, and 800cc prior to sleep. Urinary frequency, nocturia, and nocturnal enuresis have resolved since starting to cathing. Denies issues cathing. Denies flank or back pain. Taking Flomax and finasteride without adverse side effects.    Had CT scan prior to today's visit.     Update 10/29/2020: Patient presents for follow-up after UDS.  Did not have her US performed prior.  Patient states he is generally doing  "well.  Continues to self cath 3 times daily.  Gets volumes of 700 cc each time.  Denies issues cathing; does not like to cath; does not like the idea of self cathing.  No other complaints today.  Denies changes.     Update 3/4/2021: Patient presents postop follow-up from TURP.  Patient removed catheter this morning around 5 AM; has voided 3 times.  Denies significant sensation of incomplete emptying.  Denies dysuria or pain with voiding.  No complaints.  Final PVR today 250 cc     Update 3/12/2021: Patient reports \"doing great.\"  Voiding with good strong stream; no straining or hesitancy.  Denies sensation of incomplete emptying.  Wants to know if he can go dancing.  No complaints today.   cc     Update 5/6/2021: Patient presents for follow-up states he is doing very well.  States he is voiding with good strong stream denies sensation of incomplete emptying.  Pleased with results after TURP.  Gets every 3 month lab checks with PCP; wonders if PCP can order his PSA testing for surveillance of his prostate cancer.  PVR today 198 cc     Update 12/1/21: Presents for routine follow up with psa prior.  Gets up at night every 2 hours; able to go right back to sleep. Has had 2 accidents at nighttime since last visit. Continues to take flomax and finasteride. Reports good strong stream. Denies significant daytime urinary symptoms.  Has retrograde ejaculation.     Update 6/3/2022: Patient presents for routine follow-up of BPH history of TURP and [place] 6 prostate cancer on active surveillance with PSA prior.  Patient doing well; no voiding complaints.  No longer taking Flomax.  Does note antegrade ejaculation.     Update 12/6/2022: Patient presents for follow-up of prostate cancer active surveillance, PSA prior; history of BPH with outlet obstruction managed with TURP. The patient reports that he is doing well.  The patient reports that he has been waking up every 2 hours to urinate. He states that he is not " comfortable enough to not urinate and if he does not go to the bathroom when he wakes he will leak. The patient reports that he is sleeping with a pad. He states that he drinks water constantly.     Update 6/6/2023: Patient presents for follow-up prostate cancer on active surveillance, BPH with LUTS.  PSA prior to today's visit.  The patient reports that he is still leaking at night. He states that he did try to catheterize before bed, and it did make a difference.   The patient reports that he is up 3 to 4 times every night to urinate. He states that when he urinates at night, he is full.   The patient reports that he drinks until bed. He states that he has to keep hydrated because of his blood sugar. The patient reports that he is diabetic. He states that his sugars are not controlled.   The patient reports that he is still taking finasteride for his prostate.   He states that he does not have a lot of trouble with symptoms during the day.   The patient reports that he is not sure that at night that he feels the urges. He states that he uses a CPAP machine.   He states that he would like to try a dedicated urgency medication for nighttime.     Update 9/6/23: presents today for follow-up on prostate cancer, BPH, nocturia.  The patient reports that he has noticed a slight improvement in his symptoms since his previous visit. He has adjusted to his nighttime issues and feels that the medication he was prescribed at his last visit has aided with a decrease in urinary leakage. He has been sleeping with a sanitary napkin which has allowed him to achieve better sleep. He denies self catheterizing at this time. He is taking his Detrol LA at night and denies any negative side effects. He does note some mild dry mouth with the medication. He feels that he is able to empty his bladder when he urinates and he has a healthy stream.      Update 12/21/23: Presents today for follow-up on prostate cancer, BPH, nocturia.  The  patient is doing well. He denies any changes since his last visit. He is still leaking a little. He would like to try a stronger dose. He did see a little bit of improvement with the trial of tolterodine. He denies any side effects. He is still taking finasteride.  Complains of bulges bilaterally, lower abdomen which are new and increasing in size.  Patient notes some fullness.  This is somewhat bothersome to him but no specific pain.     Update 2/21/2023: Presents today for follow-up on prostate cancer, BPH, nocturia, and inguinal pain.  CT scan prior.  Plan to tolterodine increased to 4 mg daily.  The patient is doing well. He feels like he empties his bladder well. He has doubled the medication that is a blue tablet and starts with an F and he is not having any problems at all now.  PSA drawn earlier than expected with other lab draws.  The patient has hip surgery scheduled for 05/08/2024.         Update 7/23/2024:    Presents today for follow-up on prostate cancer, BPH, nocturia on tolterodine and finasteride.  No PSA checked prior to today's visit.  History of Present Illness  The patient reports experiencing significant urinary leakage. Despite attempts to use a catheter on a few occasions, he was unable to fully insert the catheter into his bladder. Despite this, he full bladder emptying, albeit with a weakened urinary stream. His groin discomfort remains unchanged.     Plan for hip surgery 10/09/2023.     Update 11/13/2024: Presents for follow-up urethral stricture, urinary incontinence, BPH after cystoscopy, direct visual internal urethrotomy  Transurethral resection of prostate with bipolar energy (button TURP).  History of prostate cancer found incidentally on TURP specimen.   mL.  History of Present Illness  He reports feeling well post-procedure, with no instances of urine leakage or blood in his urine. He also denies experiencing any pain. His current medications include finasteride and a  capsule; unsure of what the medication is.       Update 2/13/2025: Presents for follow-up prostate cancer, BPH, urethral stricture, PSA prior.  PVR at last visit 262 mm.  History of Present Illness  He reports satisfactory urinary function, with no significant issues related to urgency or leakage. He has been self-catheterizing approximately once every 2 to 3 weeks, occasionally noting minor blood in urine after he caths. He does not experience any difficulty during catheter insertion and is able to identify when the catheter is correctly positioned within the bladder. The volume of urine obtained via catheterization is not substantial.   He has discontinued the use of tolterodine as he no longer perceives a need for it. His current medication regimen includes finasteride.        ___________  Cystoscopy DVIU, button TURP 9/30/2024    Renal ultrasound, 4/27/2021: No hydronephrosis or acute sonographic abnormality     PSA  2/10/2025: 0.172  4/22/2024: 0.093  12/26/2023: 0.144  12/5/23: 0.112  6/2/2023: 0.129  12/1/2022: 0.191  6/1/22: 0.28  9/20/2021: 0.3  7/14/2020: 1.6     Prostate chips pathology, 2/22/2021: Adenocarcinoma prostate, [place] 3+3 = 6 in 1 of multiple chips involving approximately 1% of tissue     CT scan 8/24/2020: Mildly enlarged prostate; moderate distention of bladder; moderate right hydro-nephrosis without obstructing ureteral stone     UDS, 10/15/2020: During filling bladder with increased sensation, normal capacity.  Evidence of involuntary bladder contractions causing increased urgency no urge incontinence.  During voiding, bladder with normal detrusor contraction.  Flow was moderate with an elongated rate.  Flow charted obstructed with AG #113.3.  PVR: 198 cc EMG normal.       Results for orders placed or performed in visit on 02/13/25   Bladder Scan    Collection Time: 02/13/25  9:23 AM   Result Value Ref Range    Urine Volume 71ml          Review of systems  A review of systems was  "performed, and positive findings are noted in the HPI.    Objective     Vital Signs:   Ht 177.8 cm (70\")   Wt 104 kg (230 lb)   BMI 33.00 kg/m²       Physical exam  No acute distress, well-nourished  Awake alert and oriented  Mood normal; affect normal  Physical Exam    Bladder Scan interpretation 02/13/2025    Estimation of residual urine via BVI 3000 [place] Bladder Scan  Performed by: INGE Porras  Residual Urine: 71 ml  Indication: Prostate cancer    Stricture of anterior urethra in male, unspecified stricture type    Benign prostatic hyperplasia with urinary frequency   Position: Supine  Examination: Incremental scanning of the suprapubic area using 2.0 MHz transducer using copious amounts of acoustic gel.   Findings: An anechoic area was demonstrated which represented the bladder, with measurement of residual urine as noted. I inspected this myself. In that the residual urine was stable or insignificant, refer to plan for treatment and plan necessary at this time.     Problem List:  Patient Active Problem List   Diagnosis    Closed fracture of distal end of fibula with tibia    Diabetes    CAD S/P percutaneous coronary angioplasty    Benign prostatic hyperplasia with nocturia    Prostate cancer    Essential hypertension    Hyperlipemia, mixed    Macular degeneration    OA (osteoarthritis) of hip    Status post right hip replacement    Urethral stricture    Urinary incontinence    Benign prostatic hyperplasia    BPH (benign prostatic hyperplasia)       Assessment & Plan   Diagnoses and all orders for this visit:    1. Prostate cancer (Primary)  -     Bladder Scan  -     PSA DIAGNOSTIC; Future    2. Stricture of anterior urethra in male, unspecified stricture type    3. Benign prostatic hyperplasia with urinary frequency      Postvoid residual bladder scan improved; continue to monitor     doing well  Assessment & Plan  PSA remains low and stable,0.172, on finasteride. Given his diagnosis of prostate cancer in " 2021, which was a low-volume [place] 6, this PSA level is considered exceptionally low.   Continue to monitor    He is advised to continue with self-catheterization routine every couple weeks to ensure stricture remains open.  Aware of the risk of recurrence of stricture.  Provided reassurance, the occasional presence of blood during catheterization is not a cause for concern unless it becomes significant. He is also advised to report any issues related to catheterization or significant blood presence.     A follow-up appointment will be scheduled in 6 months, during which a PVR and PSA test prior         All questions addressed      Patient or patient representative verbalized consent for the use of Ambient Listening during the visit with  Connie Fraire MD for chart documentation. 2/13/2025  10:13 EST

## 2025-03-06 NOTE — PROGRESS NOTES
[facility]  Cardiology progress Note    Patient Name: Lucas Deluca  : 1950    CHIEF COMPLAINT  CAD        Subjective   Subjective     HISTORY OF PRESENT ILLNESS    Lucas Deluca is a 74 y.o. male with CAD s/p PTCA/stent.  No chest pain.    REVIEW OF SYSTEMS    Constitutional:    No fever, no weight loss  Skin:     No rash  Otolaryngeal:    No difficulty swallowing  Cardiovascular: See HPI.  Pulmonary:    No cough, no sputum production    Personal History     Social History:    reports that he quit smoking about 33 years ago. His smoking use included cigarettes. He has been exposed to tobacco smoke. He has quit using smokeless tobacco. He reports that he does not drink alcohol and does not use drugs.    Home Medications:  Current Outpatient Medications on File Prior to Visit   Medication Sig    amLODIPine (NORVASC) 10 MG tablet Take 0.5 tablets by mouth Daily.    aspirin 81 MG EC tablet Take 1 tablet by mouth Daily. If urine remains relatively clear and not passing excessive clot    carvedilol (COREG) 25 MG tablet TAKE 1 TABLET TWICE A DAY    clopidogrel (PLAVIX) 75 MG tablet Take 1 tablet by mouth Daily. If urine remains clear and not passing excessive clot    cyanocobalamin (VITAMIN B-12) 250 MCG tablet Take 1 tablet by mouth Daily.    empagliflozin (Jardiance) 25 MG tablet tablet Take 1 tablet by mouth Daily.    ezetimibe (ZETIA) 10 MG tablet Take 1 tablet by mouth Daily.    fenofibrate (TRICOR) 145 MG tablet Take 1 tablet by mouth Daily.    finasteride (PROSCAR) 5 MG tablet Take 1 tablet by mouth Daily.    insulin aspart (NovoLOG FlexPen) 100 UNIT/ML solution pen-injector sc pen Inject 40 Units under the skin into the appropriate area as directed 3 (Three) Times a Day With Meals for 180 days. Indications: Type 2 Diabetes    insulin glargine (LANTUS, SEMGLEE) 100 UNIT/ML injection Inject 50 Units under the skin into the appropriate area as directed Daily AND 40 Units Every Night. Do all  this for 180 days.    Lantus SoloStar 100 UNIT/ML injection pen     lisinopril (PRINIVIL,ZESTRIL) 20 MG tablet Take 1 tablet by mouth 2 (Two) Times a Day.    nitroglycerin (NITROSTAT) 0.4 MG SL tablet 1 under the tongue as needed for angina, may repeat q5mins for up three doses    Omega-3 Fatty Acids (fish oil) 1000 MG capsule capsule Take 1 capsule by mouth Daily With Breakfast.    pioglitazone (Actos) 45 MG tablet Take 1 tablet by mouth Daily.    PRECISION XTRA TEST STRIPS test strip Test 1-2 times each day (Patient taking differently: 1 each by Other route As Needed. Test 1-2 times each day)    rosuvastatin (CRESTOR) 40 MG tablet Take 1 tablet by mouth Every Night.    Sure Comfort Pen Needles 32G X 6 MM misc Use 1 each 5 (Five) Times a Day.    vitamin D3 125 MCG (5000 UT) capsule capsule Take 1 capsule by mouth Daily.    [DISCONTINUED] Tirzepatide (Mounjaro) 2.5 MG/0.5ML solution auto-injector Inject 2.5 mg under the skin into the appropriate area as directed Every 7 (Seven) Days.    [DISCONTINUED] Tirzepatide (Mounjaro) 5 MG/0.5ML solution auto-injector Inject 5 mg under the skin into the appropriate area as directed Every 7 (Seven) Days.     No current facility-administered medications on file prior to visit.       Past Medical History:   Diagnosis Date    Arthritis     CAD S/P percutaneous coronary angioplasty 01/14/2017    4 VESSEL CABG- SEE'S TENISHA. DENIED CP/SOB NOW    Diabetes mellitus     TYPE 2- bg runs around 141 in am    Essential hypertension 12/12/2021    History of prostate cancer     Hyperlipidemia     Hypertension     Macular degeneration     Right hip pain     Sleep apnea     CPAP    Urge incontinence        Allergies:  No Known Allergies    Objective    Objective       Vitals:   Heart Rate:  [77] 77  BP: (135)/(71) 135/71  Body mass index is 31.71 kg/m².     PHYSICAL EXAM:    General Appearance:   well developed  well nourished  HENT:   oropharynx moist  lips not cyanotic  Neck:  thyroid not  enlarged  supple  Respiratory:  no respiratory distress  normal breath sounds  no rales  Cardiovascular:  no jugular venous distention  regular rhythm  apical impulse normal  S1 normal, S2 normal  no S3, no S4   no murmur  no rub, no thrill  carotid pulses normal; no bruit  pedal pulses normal  lower extremity edema: none    Skin:   warm, dry  Psychiatric:  judgement and insight appropriate  normal mood and affect        Result Review:  I have personally reviewed the available results from  [x]  Laboratory  [x]  EKG  [x]  Cardiology  [x]  Medications  [x]  Old records  []  Other:     Procedures  Lab Results   Component Value Date    CHOL 107 02/10/2025    CHOL 158 07/23/2024    CHOL 172 04/22/2024     Lab Results   Component Value Date    TRIG 63 02/10/2025    TRIG 126 07/23/2024    TRIG 126 04/22/2024     Lab Results   Component Value Date    HDL 41 02/10/2025    HDL 44 07/23/2024    HDL 44 04/22/2024     Lab Results   Component Value Date    LDL 52 02/10/2025    LDL 91 07/23/2024     (H) 04/22/2024     Lab Results   Component Value Date    VLDL 14 02/10/2025    VLDL 23 07/23/2024    VLDL 23 04/22/2024        Impression/Plan:  1.  Essential hypertension controlled: Continue lisinopril 20 mg twice a day.  Continue amlodipine 10 mg once a day.  Blood pressure normal at home.  Monitor blood pressure regularly.  2.  Mixed hyperlipidemia: Lipid profile reviewed shows an LDL of 52.continue Crestor 40 mg once a day..  Monitor lipid and hepatic profile.  3.  Coronary disease s/p PTCA/stent stable: Continue aspirin 81 mg once a day.  Continue Plavix 75 mg once a day.  Continue carvedilol 25 mg twice a day.  No chest pain.                 Stephane Dow MD   03/11/25   11:23 EDT

## 2025-03-11 ENCOUNTER — OFFICE VISIT (OUTPATIENT)
Dept: CARDIOLOGY | Facility: CLINIC | Age: 75
End: 2025-03-11
Payer: MEDICARE

## 2025-03-11 VITALS
BODY MASS INDEX: 31.64 KG/M2 | DIASTOLIC BLOOD PRESSURE: 71 MMHG | SYSTOLIC BLOOD PRESSURE: 135 MMHG | HEIGHT: 70 IN | WEIGHT: 221 LBS | HEART RATE: 77 BPM

## 2025-03-11 DIAGNOSIS — I25.10 CORONARY ARTERY DISEASE INVOLVING NATIVE CORONARY ARTERY OF NATIVE HEART WITHOUT ANGINA PECTORIS: Primary | ICD-10-CM

## 2025-03-11 DIAGNOSIS — E78.2 HYPERLIPEMIA, MIXED: ICD-10-CM

## 2025-03-11 DIAGNOSIS — I10 HYPERTENSION, ESSENTIAL: ICD-10-CM

## 2025-03-11 DIAGNOSIS — Z95.5 HISTORY OF CORONARY ANGIOPLASTY WITH INSERTION OF STENT: ICD-10-CM

## 2025-03-11 PROCEDURE — 3078F DIAST BP <80 MM HG: CPT | Performed by: SPECIALIST

## 2025-03-11 PROCEDURE — 3075F SYST BP GE 130 - 139MM HG: CPT | Performed by: SPECIALIST

## 2025-03-11 PROCEDURE — 1160F RVW MEDS BY RX/DR IN RCRD: CPT | Performed by: SPECIALIST

## 2025-03-11 PROCEDURE — 1159F MED LIST DOCD IN RCRD: CPT | Performed by: SPECIALIST

## 2025-03-11 PROCEDURE — 99214 OFFICE O/P EST MOD 30 MIN: CPT | Performed by: SPECIALIST

## 2025-03-11 RX ORDER — TIRZEPATIDE 7.5 MG/.5ML
7.5 INJECTION, SOLUTION SUBCUTANEOUS
Qty: 2 ML | Refills: 1 | Status: SHIPPED | OUTPATIENT
Start: 2025-03-11

## 2025-03-11 NOTE — TELEPHONE ENCOUNTER
Patient came into office with questions if he could move up to Mounjaro 7.5mg. Spoke with Magaly GOMEZ, increased dose for Mounjaro was sent to requested pharmacy.

## 2025-03-30 NOTE — DISCHARGE INSTRUCTIONS
Your Canela catheter appears to be in proper positioning and no signs of urinary retention in the bladder seen.    It may be worth your while to consider purchasing some adult briefs or depends to wear for the time being if your Canela catheter is leaking until you can follow-up with Dr. Fraire at the urology clinic this week.     No.

## 2025-04-24 ENCOUNTER — OFFICE VISIT (OUTPATIENT)
Dept: DIABETES SERVICES | Facility: HOSPITAL | Age: 75
End: 2025-04-24
Payer: MEDICARE

## 2025-04-24 VITALS
SYSTOLIC BLOOD PRESSURE: 153 MMHG | HEART RATE: 75 BPM | HEIGHT: 70 IN | WEIGHT: 215 LBS | BODY MASS INDEX: 30.78 KG/M2 | DIASTOLIC BLOOD PRESSURE: 71 MMHG | OXYGEN SATURATION: 96 %

## 2025-04-24 DIAGNOSIS — E11.22 TYPE 2 DIABETES MELLITUS WITH STAGE 2 CHRONIC KIDNEY DISEASE, WITH LONG-TERM CURRENT USE OF INSULIN: ICD-10-CM

## 2025-04-24 DIAGNOSIS — E11.65 UNCONTROLLED TYPE 2 DIABETES MELLITUS WITH HYPERGLYCEMIA: Primary | ICD-10-CM

## 2025-04-24 DIAGNOSIS — N18.2 TYPE 2 DIABETES MELLITUS WITH STAGE 2 CHRONIC KIDNEY DISEASE, WITH LONG-TERM CURRENT USE OF INSULIN: ICD-10-CM

## 2025-04-24 DIAGNOSIS — R80.9 MICROALBUMINURIA: ICD-10-CM

## 2025-04-24 DIAGNOSIS — Z97.8 USES SELF-APPLIED CONTINUOUS GLUCOSE MONITORING DEVICE: ICD-10-CM

## 2025-04-24 DIAGNOSIS — E66.9 OBESITY (BMI 30-39.9): ICD-10-CM

## 2025-04-24 DIAGNOSIS — Z79.4 TYPE 2 DIABETES MELLITUS WITH STAGE 2 CHRONIC KIDNEY DISEASE, WITH LONG-TERM CURRENT USE OF INSULIN: ICD-10-CM

## 2025-04-24 LAB
EXPIRATION DATE: ABNORMAL
GLUCOSE BLDC GLUCOMTR-MCNC: 137 MG/DL (ref 70–99)
HBA1C MFR BLD: 7.1 % (ref 4.5–5.7)
Lab: ABNORMAL

## 2025-04-24 PROCEDURE — 1159F MED LIST DOCD IN RCRD: CPT | Performed by: NURSE PRACTITIONER

## 2025-04-24 PROCEDURE — G2211 COMPLEX E/M VISIT ADD ON: HCPCS | Performed by: NURSE PRACTITIONER

## 2025-04-24 PROCEDURE — 3051F HG A1C>EQUAL 7.0%<8.0%: CPT | Performed by: NURSE PRACTITIONER

## 2025-04-24 PROCEDURE — 83036 HEMOGLOBIN GLYCOSYLATED A1C: CPT | Performed by: NURSE PRACTITIONER

## 2025-04-24 PROCEDURE — 1160F RVW MEDS BY RX/DR IN RCRD: CPT | Performed by: NURSE PRACTITIONER

## 2025-04-24 PROCEDURE — 3077F SYST BP >= 140 MM HG: CPT | Performed by: NURSE PRACTITIONER

## 2025-04-24 PROCEDURE — G0463 HOSPITAL OUTPT CLINIC VISIT: HCPCS | Performed by: NURSE PRACTITIONER

## 2025-04-24 PROCEDURE — 95251 CONT GLUC MNTR ANALYSIS I&R: CPT | Performed by: NURSE PRACTITIONER

## 2025-04-24 PROCEDURE — 3078F DIAST BP <80 MM HG: CPT | Performed by: NURSE PRACTITIONER

## 2025-04-24 PROCEDURE — 99214 OFFICE O/P EST MOD 30 MIN: CPT | Performed by: NURSE PRACTITIONER

## 2025-04-24 PROCEDURE — 82948 REAGENT STRIP/BLOOD GLUCOSE: CPT | Performed by: NURSE PRACTITIONER

## 2025-04-24 RX ORDER — INSULIN ASPART 100 [IU]/ML
40 INJECTION, SOLUTION INTRAVENOUS; SUBCUTANEOUS
Qty: 108 ML | Refills: 1 | Status: SHIPPED | OUTPATIENT
Start: 2025-04-24 | End: 2025-10-21

## 2025-04-24 RX ORDER — TIRZEPATIDE 10 MG/.5ML
10 INJECTION, SOLUTION SUBCUTANEOUS
Qty: 2 ML | Refills: 5 | Status: SHIPPED | OUTPATIENT
Start: 2025-04-24

## 2025-04-24 NOTE — PROGRESS NOTES
Chief Complaint  Diabetes (Med management, A1C, CGM Eval, stopped taking Actos about a week ago due to leg swelling)    Referred By: Sarina Javed,*    Subjective          Patient or patient representative verbalized consent for the use of Ambient Listening during the visit with  JASON Burnham for chart documentation. 4/24/2025  08:42 EDT    Lucas Deluca presents to [facility] for diabetes medication management    History of Present Illness    Visit type:  follow-up  Diabetes type:  Type 2  Current diabetes status/concerns/issues:     History of Present Illness  The patient presents for evaluation of diabetes mellitus.    The chief complaint includes leg swelling, which prompted the discontinuation of Actos approximately one week ago. The right leg swelling is attributed to a previous fracture, while the left leg swelling began recently. A reduction in swelling was observed following the cessation of Actos.    No significant alterations in blood glucose levels were noted after discontinuing Actos. The current medication regimen includes Lantus, administered at 50 units in the morning and 40 units at night, and NovoLog, dosed between 20 to 40 units based on carbohydrate intake, with an average usage of 25 to 40 units. Jardiance 25 mg is taken once daily in the morning. Mounjaro was initiated at the last visit and has been titrated up to 7.5 mg, which is well tolerated without gastrointestinal side effects. Proactive management of potential constipation with a stool softener has proven effective.    An active lifestyle is maintained, including dancing and gym workouts three times a week.     No new health concerns are reported.       Current Diabetes symptoms:    Polyuria: No   Polydipsia: No   Polyphagia: No   Blurred vision: No   Excessive fatigue: No  Known Diabetes complications:  Neuropathy: None; Location: N/A  Renal: Stage II mild (GFR = 60-89 mL/min) and  Microalbuminuria - POSITIVE  Eyes: No current eye exam available in record; Location: N/A  Amputation/Wounds: None  GI: None  Cardiovascular: Hypertension, Hyperlipidemia, and CAD  ED: Patient Reported  Other: None  Hypoglycemia:  None reported at this time  Hypoglycemia Symptoms:  No hypoglycemia at this time  Current diabetes treatment:  Lantus 50 units in the AM and 40 units in the PM, NovoLog 20, 30, or 40 units of insulin based on the carbohydrate content of the meal (averages 25-40 units) and Jardiance 25 mg once a day in the morning.  Mounjaro was started at the last appointment.  He is taking 7.5 mg once weekly currently  Blood glucose device:  Visual Pro 360 CGM  Blood glucose monitoring frequency:  Continuous per CGM  Blood glucose range/average:  The 14-day sensor report shows an average glucose of 184 mg/dL, with 50% in target range ( mgdL), 44% in the high range (181-250 mg/dL), 6% in the very high range (>250 mg/dL), 0% in the low range (54-70 mg/dL) and 0% in the very low range (<54 mg/dL).   Glucose Source: Device Reviewed  Diet:  Limits high carb/sweet foods, Avoids sugary drinks  Activity/Exercise:   dancing and going the gym 3 times a week    Past Medical History:   Diagnosis Date    Arthritis     CAD S/P percutaneous coronary angioplasty 01/14/2017    4 VESSEL CABG- SEE'S TENISHA. DENIED CP/SOB NOW    Diabetes mellitus     TYPE 2- bg runs around 141 in am    Essential hypertension 12/12/2021    History of prostate cancer     Hyperlipidemia     Hypertension     Macular degeneration     Right hip pain     Sleep apnea     CPAP    Urge incontinence      Past Surgical History:   Procedure Laterality Date    CATARACT EXTRACTION Bilateral     CORONARY ANGIOPLASTY WITH STENT PLACEMENT  2004    ONE    CORONARY ARTERY BYPASS GRAFT      2004 X 4 VESSEL    CYSTOSCOPY TRANSURETHRAL RESECTION OF PROSTATE  2020    CYSTOSCOPY TRANSURETHRAL RESECTION OF PROSTATE  9/30/2024    Procedure: TRANSURETHRAL RESECTION OF  PROSTATE WITH BUTTON VAPORIZATION;  Surgeon: Connie Fraire MD;  Location: [facility] OR;  Service: Urology;;    CYSTOSCOPY URETHROTOMY VISUAL INTERNAL N/A 2024    Procedure: CYSTOSCOPY URETHROTOMY VISUAL INTERNAL;  Surgeon: Connie Fraire MD;  Location: [facility] MAIN OR;  Service: Urology;  Laterality: N/A;    RI TX TIBL SHFT FX IMED IMPLT W/WO SCREWS&/CERCLA Right 2017    Procedure: TIBIA INTRAMEDULLARY NAIL/SHANNON INSERTION;  Surgeon: Colton Nascimento MD;  Location: [facility] MAIN OR;  Service: Orthopedics    TONSILLECTOMY      AGE 65    TOTAL HIP ARTHROPLASTY Right 2024    Procedure: TOTAL HIP ARTHROPLASTY REVISION;  Surgeon: Hebert Jung MD;  Location: [facility];  Service: Orthopedics;  Laterality: Right;     Family History   Problem Relation Age of Onset    Cancer Mother     Diabetes Mother     Heart disease Father     Diabetes Sister     Cancer Maternal Aunt     Cancer Maternal Uncle     Cancer Maternal Grandmother     Cancer Maternal Grandfather     Malig Hyperthermia Neg Hx      Social History     Socioeconomic History    Marital status:    Tobacco Use    Smoking status: Former     Current packs/day: 0.00     Types: Cigarettes     Quit date: 1991     Years since quittin.3     Passive exposure: Past    Smokeless tobacco: Former   Vaping Use    Vaping status: Never Used   Substance and Sexual Activity    Alcohol use: No    Drug use: No    Sexual activity: Defer     No Known Allergies    Current Outpatient Medications:     amLODIPine (NORVASC) 10 MG tablet, Take 0.5 tablets by mouth Daily., Disp: 90 tablet, Rfl: 3    aspirin 81 MG EC tablet, Take 1 tablet by mouth Daily. If urine remains relatively clear and not passing excessive clot, Disp: , Rfl:     carvedilol (COREG) 25 MG tablet, TAKE 1 TABLET TWICE A DAY, Disp: 180 tablet, Rfl: 3    clopidogrel (PLAVIX) 75 MG tablet, Take 1 tablet by mouth Daily. If urine remains clear and not passing excessive clot, Disp: , Rfl:      cyanocobalamin (VITAMIN B-12) 250 MCG tablet, Take 1 tablet by mouth Daily., Disp: , Rfl:     empagliflozin (Jardiance) 25 MG tablet tablet, Take 1 tablet by mouth Daily., Disp: 90 tablet, Rfl: 1    ezetimibe (ZETIA) 10 MG tablet, Take 1 tablet by mouth Daily., Disp: 90 tablet, Rfl: 3    fenofibrate (TRICOR) 145 MG tablet, Take 1 tablet by mouth Daily., Disp: 90 tablet, Rfl: 3    finasteride (PROSCAR) 5 MG tablet, Take 1 tablet by mouth Daily., Disp: 90 tablet, Rfl: 3    insulin aspart (NovoLOG FlexPen) 100 UNIT/ML solution pen-injector sc pen, Inject 40 Units under the skin into the appropriate area as directed 3 (Three) Times a Day With Meals for 180 days. Indications: Type 2 Diabetes, Disp: 108 mL, Rfl: 1    insulin glargine (LANTUS, SEMGLEE) 100 UNIT/ML injection, Inject 50 Units under the skin into the appropriate area as directed Daily AND 40 Units Every Night. Do all this for 180 days., Disp: 81 mL, Rfl: 1    lisinopril (PRINIVIL,ZESTRIL) 20 MG tablet, Take 1 tablet by mouth 2 (Two) Times a Day., Disp: 180 tablet, Rfl: 3    multivitamin with minerals (PRESERVISION AREDS PO), Take 1 tablet by mouth Daily., Disp: , Rfl:     Omega-3 Fatty Acids (fish oil) 1000 MG capsule capsule, Take 1 capsule by mouth Daily With Breakfast., Disp: , Rfl:     PRECISION XTRA TEST STRIPS test strip, Test 1-2 times each day (Patient taking differently: 1 each by Other route As Needed. Test 1-2 times each day), Disp: 200 each, Rfl: 1    rosuvastatin (CRESTOR) 40 MG tablet, Take 1 tablet by mouth Every Night., Disp: 90 tablet, Rfl: 3    Sure Comfort Pen Needles 32G X 6 MM misc, Use 1 each 5 (Five) Times a Day., Disp: 450 each, Rfl: 1    vitamin D3 125 MCG (5000 UT) capsule capsule, Take 1 capsule by mouth Daily., Disp: , Rfl:     nitroglycerin (NITROSTAT) 0.4 MG SL tablet, 1 under the tongue as needed for angina, may repeat q5mins for up three doses (Patient not taking: Reported on 4/24/2025), Disp: 100 tablet, Rfl: 3     "Tirzepatide (Mounjaro) 10 MG/0.5ML solution auto-injector, Inject 10 mg under the skin into the appropriate area as directed Every 7 (Seven) Days., Disp: 2 mL, Rfl: 5    Objective     Vitals:    04/24/25 0805   BP: 153/71   BP Location: Left arm   Patient Position: Sitting   Cuff Size: Adult   Pulse: 75   SpO2: 96%   Weight: 97.5 kg (215 lb)   Height: 177.8 cm (70\")     Body mass index is 30.85 kg/m².    Physical Exam  Constitutional:       Appearance: Normal appearance. He is obese.      Comments: Obesity (BMI 30 - 39.9) Pt Current BMI = 30.85    HENT:      Head: Normocephalic and atraumatic.      Right Ear: External ear normal.      Left Ear: External ear normal.      Nose: Nose normal.   Eyes:      Extraocular Movements: Extraocular movements intact.      Conjunctiva/sclera: Conjunctivae normal.   Pulmonary:      Effort: Pulmonary effort is normal.   Musculoskeletal:         General: Normal range of motion.      Cervical back: Normal range of motion.   Skin:     General: Skin is warm and dry.   Neurological:      General: No focal deficit present.      Mental Status: He is alert and oriented to person, place, and time. Mental status is at baseline.   Psychiatric:         Mood and Affect: Mood normal.         Behavior: Behavior normal.         Thought Content: Thought content normal.         Judgment: Judgment normal.             Result Review :   The following data was reviewed by: JASON Burnham on 04/24/2025:    Most Recent A1C          4/24/2025    08:17   HGBA1C Most Recent   Hemoglobin A1C 7.1        A1C Last 3 Results          10/21/2024    09:08 1/21/2025    08:16 4/24/2025    08:17   HGBA1C Last 3 Results   Hemoglobin A1C 9.8  7.7  7.1      A1c collected in the office today is 7.1%, indicating Uncontrolled Type II diabetes.  This result is down from the prior result of 7.7% collected on 1/21/25     Glucose   Date Value Ref Range Status   04/24/2025 137 (H) 70 - 99 mg/dL Final     Comment:     " Serial Number: [id number]:  [id number]     Point of care glucose in the office today is within normal limits for nonfasting glucose      Microalbumin, Urine   Date Value Ref Range Status   01/21/2025 5.8 mg/dL Final   12/26/2023 2.7 mg/dL Final     Creatinine, Urine   Date Value Ref Range Status   01/21/2025 55.5 mg/dL Final   12/26/2023 53.1 mg/dL Final     Microalbumin/Creatinine Ratio   Date Value Ref Range Status   01/21/2025 104.5 (H) 0.0 - 29.0 mg/g Final   12/26/2023 50.8 (H) 0.0 - 29.0 mg/g Final     Urine microalbuminuria collected on 1/21/25 is positive for microalbuminuria    Total Cholesterol   Date Value Ref Range Status   02/10/2025 107 0 - 200 mg/dL Final   07/23/2024 158 0 - 200 mg/dL Final     Triglycerides   Date Value Ref Range Status   02/10/2025 63 0 - 150 mg/dL Final   07/23/2024 126 0 - 150 mg/dL Final     HDL Cholesterol   Date Value Ref Range Status   02/10/2025 41 40 - 60 mg/dL Final   07/23/2024 44 40 - 60 mg/dL Final     LDL Cholesterol    Date Value Ref Range Status   02/10/2025 52 0 - 100 mg/dL Final   07/23/2024 91 0 - 100 mg/dL Final     Lipid panel collected on 2/10/25 shows normal lipid panel              Diagnoses and all orders for this visit:    1. Uncontrolled type 2 diabetes mellitus with hyperglycemia (Primary)  -     POC Glycosylated Hemoglobin (Hb A1C)  -     POC Glucose  -     Tirzepatide (Mounjaro) 10 MG/0.5ML solution auto-injector; Inject 10 mg under the skin into the appropriate area as directed Every 7 (Seven) Days.  Dispense: 2 mL; Refill: 5  -     empagliflozin (Jardiance) 25 MG tablet tablet; Take 1 tablet by mouth Daily.  Dispense: 90 tablet; Refill: 1  -     insulin aspart (NovoLOG FlexPen) 100 UNIT/ML solution pen-injector sc pen; Inject 40 Units under the skin into the appropriate area as directed 3 (Three) Times a Day With Meals for 180 days. Indications: Type 2 Diabetes  Dispense: 108 mL; Refill: 1    2. Type 2 diabetes mellitus with stage 2 chronic  kidney disease, with long-term current use of insulin    3. Microalbuminuria    4. Obesity (BMI 30-39.9)    5. Uses self-applied continuous glucose monitoring device        Assessment & Plan  1. Diabetes mellitus:  - A1c levels have shown significant improvement, decreasing from 9.8 in 10/2024 to 7.7 in 01/2025, and currently standing at 7.1.  - Glucose sensor indicates an average reading of 184, with 50% within the target range; glucose levels are higher during the day compared to overnight readings.  - Discussion included increasing Mounjaro dosage to 10 mg, with five refills provided, and sending the prescription to [facility]. The patient is advised to monitor blood sugar levels closely and adjust mealtime insulin dosage as needed.  - Refills for Jardiance and NovoLog will be provided, each with a 90-day supply plus one refill, and these will be sent to [facility].     2. Bilateral lower extremity edema:  - Reported swelling in both legs, with the right leg previously broken and the left leg recently starting to swell.  - Swelling decreased after discontinuing Actos about a week ago.  - Discussion included the impact of discontinuing Actos on swelling and blood sugar levels.  - No specific treatment prescribed for edema at this time.          The patient will monitor his blood glucose levels per continuous glucose monitor.  If he develops problematic hyperglycemia or hypoglycemia or adverse drug reactions, he will contact the office for further instructions.        Follow Up     Return in about 3 months (around 7/24/2025) for Medication Management, CGM Follow-up.    Patient was given instructions and counseling regarding his condition or for health maintenance advice. Please see specific information pulled into the AVS if appropriate.     Magaly Woodson, APRN  04/24/2025        Dictated Utilizing Dragon Dictation.  Please note that portions of this note were completed with a voice recognition  program.  Part of this note may be an electronic transcription/translation of spoken language to printed text using the Dragon Dictation System.

## 2025-05-29 DIAGNOSIS — I10 ESSENTIAL HYPERTENSION: ICD-10-CM

## 2025-05-29 RX ORDER — AMLODIPINE BESYLATE 10 MG/1
5 TABLET ORAL DAILY
Qty: 30 TABLET | Refills: 0 | Status: SHIPPED | OUTPATIENT
Start: 2025-05-29

## 2025-06-05 DIAGNOSIS — N40.1 BENIGN PROSTATIC HYPERPLASIA WITH LOWER URINARY TRACT SYMPTOMS, SYMPTOM DETAILS UNSPECIFIED: ICD-10-CM

## 2025-06-05 DIAGNOSIS — N35.919 URETHRAL STRICTURE: ICD-10-CM

## 2025-06-06 RX ORDER — CLOPIDOGREL BISULFATE 75 MG/1
75 TABLET ORAL DAILY
Qty: 90 TABLET | Refills: 3 | Status: SHIPPED | OUTPATIENT
Start: 2025-06-06

## 2025-06-30 ENCOUNTER — TELEPHONE (OUTPATIENT)
Dept: UROLOGY | Age: 75
End: 2025-06-30
Payer: MEDICARE

## 2025-06-30 DIAGNOSIS — R35.0 BENIGN PROSTATIC HYPERPLASIA WITH URINARY FREQUENCY: ICD-10-CM

## 2025-06-30 DIAGNOSIS — N40.1 BENIGN PROSTATIC HYPERPLASIA WITH URINARY FREQUENCY: ICD-10-CM

## 2025-06-30 RX ORDER — FINASTERIDE 5 MG/1
5 TABLET, FILM COATED ORAL DAILY
Qty: 90 TABLET | Refills: 0 | Status: SHIPPED | OUTPATIENT
Start: 2025-06-30

## 2025-06-30 NOTE — TELEPHONE ENCOUNTER
PATIENT CALLED AND SAID [facility] IS UNABLE TO GET [facility] TO FILL HIS PRESCRIPTION.     HE ASKED IF WE CAN SEND A PRESCRIPTION FOR A 30 DAYS OR 90 DAYS TO [facility] AT [address] [place] [address].    HE HAS AN APPOINTMENT WITH DR. ESTRADA ON 08/14/25.    PLEASE CALL HIM TO LET HIM KNOW WHEN SENT.    #[phone]

## 2025-06-30 NOTE — TELEPHONE ENCOUNTER
ADVISED PT THAT WE SENT TO [facility] AS REQUESTED. ADVISED TO CALL THEM AHEAD OF TIME TO ENSURE THAT IT IS READY. PT EXPRESSED UNDERSTANDING AND IS AGREEABLE.

## 2025-07-07 RX ORDER — ROSUVASTATIN CALCIUM 40 MG/1
40 TABLET, COATED ORAL NIGHTLY
Qty: 90 TABLET | Refills: 3 | Status: SHIPPED | OUTPATIENT
Start: 2025-07-07

## 2025-07-21 RX ORDER — EZETIMIBE 10 MG/1
10 TABLET ORAL DAILY
Qty: 90 TABLET | Refills: 3 | Status: SHIPPED | OUTPATIENT
Start: 2025-07-21

## 2025-07-24 ENCOUNTER — OFFICE VISIT (OUTPATIENT)
Dept: DIABETES SERVICES | Facility: HOSPITAL | Age: 75
End: 2025-07-24
Payer: MEDICARE

## 2025-07-24 VITALS
OXYGEN SATURATION: 95 % | DIASTOLIC BLOOD PRESSURE: 68 MMHG | WEIGHT: 203 LBS | HEIGHT: 70 IN | BODY MASS INDEX: 29.06 KG/M2 | HEART RATE: 71 BPM | SYSTOLIC BLOOD PRESSURE: 142 MMHG

## 2025-07-24 DIAGNOSIS — Z97.8 USES SELF-APPLIED CONTINUOUS GLUCOSE MONITORING DEVICE: ICD-10-CM

## 2025-07-24 DIAGNOSIS — E66.3 OVERWEIGHT (BMI 25.0-29.9): ICD-10-CM

## 2025-07-24 DIAGNOSIS — E11.65 UNCONTROLLED TYPE 2 DIABETES MELLITUS WITH HYPERGLYCEMIA: Primary | ICD-10-CM

## 2025-07-24 DIAGNOSIS — R80.9 MICROALBUMINURIA: ICD-10-CM

## 2025-07-24 DIAGNOSIS — N18.2 TYPE 2 DIABETES MELLITUS WITH STAGE 2 CHRONIC KIDNEY DISEASE, WITH LONG-TERM CURRENT USE OF INSULIN: ICD-10-CM

## 2025-07-24 DIAGNOSIS — Z79.4 TYPE 2 DIABETES MELLITUS WITH STAGE 2 CHRONIC KIDNEY DISEASE, WITH LONG-TERM CURRENT USE OF INSULIN: ICD-10-CM

## 2025-07-24 DIAGNOSIS — E11.22 TYPE 2 DIABETES MELLITUS WITH STAGE 2 CHRONIC KIDNEY DISEASE, WITH LONG-TERM CURRENT USE OF INSULIN: ICD-10-CM

## 2025-07-24 LAB
EXPIRATION DATE: ABNORMAL
GLUCOSE BLDC GLUCOMTR-MCNC: 189 MG/DL (ref 70–99)
HBA1C MFR BLD: 7.3 % (ref 4.5–5.7)
Lab: ABNORMAL

## 2025-07-24 PROCEDURE — 82948 REAGENT STRIP/BLOOD GLUCOSE: CPT | Performed by: NURSE PRACTITIONER

## 2025-07-24 PROCEDURE — 83036 HEMOGLOBIN GLYCOSYLATED A1C: CPT | Performed by: NURSE PRACTITIONER

## 2025-07-24 PROCEDURE — G0463 HOSPITAL OUTPT CLINIC VISIT: HCPCS | Performed by: NURSE PRACTITIONER

## 2025-07-24 RX ORDER — INSULIN GLARGINE 100 [IU]/ML
INJECTION, SOLUTION SUBCUTANEOUS
Qty: 66 ML | Refills: 1 | Status: SHIPPED | OUTPATIENT
Start: 2025-07-24 | End: 2026-01-20

## 2025-07-24 NOTE — PROGRESS NOTES
Chief Complaint  Diabetes (Med management, A1C, CGM Eval)    Referred By: Sarina Javed,*    Subjective          Patient or patient representative verbalized consent for the use of Ambient Listening during the visit with  JASON Burnham for chart documentation. 7/24/2025  10:11 EDT    Lucas Deluca presents to [facility] for diabetes medication management    History of Present Illness    Visit type:  follow-up  Diabetes type:  Type 2  Current diabetes status/concerns/issues:     History of Present Illness  The patient presents for evaluation of diabetes.    He has been experiencing hypoglycemic episodes at night, prompting him to reduce his Lantus dosage from 40 to 25 units. These episodes often manifest as sweating, which he manages by consuming a glass of orange juice before returning to sleep.     His current medication regimen includes Lantus 50 units in the morning and 25 units at night, Jardiance 25 mg once daily, and Mounjaro 10 mg once weekly. He administers NovoLog 30 to 40 units before each meal. He reports no gastrointestinal issues such as nausea, vomiting, diarrhea, or constipation with the increase in the Mounjaro. He also does not experience excessive thirst, urination, or hunger. There are no instances of blurry vision or unusual fatigue.     He has lost some weight and maintains a diet that includes zero-sugar soda. His exercise routine includes gym workouts three times a week. He has been under significant stress recently due to a vacation trip to [place] with his son and grandson in early 06/2025. He reports no new health concerns.        Current Diabetes symptoms:    Polyuria: No   Polydipsia: No   Polyphagia: No   Blurred vision: No   Excessive fatigue: No  Known Diabetes complications:  Neuropathy: None; Location: N/A  Renal: Stage II mild (GFR = 60-89 mL/min) and Microalbuminuria - POSITIVE  Eyes: No current eye exam available in  record; Location: N/A  Amputation/Wounds: None  GI: None  Cardiovascular: Hypertension, Hyperlipidemia, and CAD  ED: Patient Reported  Other: None  Hypoglycemia:  Level 1 hypoglycemia (54 mg/dL - 70 mg/dL); Frequency - 1% per CGM report  Hypoglycemia Symptoms:  sweating  Current diabetes treatment:  Lantus 50 units in the AM and 25 units in the PM, NovoLog 20, 30, or 40 units of insulin based on the carbohydrate content of the meal (averages 30-40 units) and Jardiance 25 mg once a day in the morning.  Mounjaro was started at the last appointment.  He is taking Mounjaro 10 mg once weekly currently  Blood glucose device:  Locket CGM  Blood glucose monitoring frequency:  Continuous per CGM  Blood glucose range/average:  The 14-day sensor report shows an average glucose of 151mg/dL, with 75% in target range ( mgdL), 22% in the high range (181-250 mg/dL), 5% in the very high range (>250 mg/dL), 1% in the low range (54-70 mg/dL) and 0% in the very low range (<54 mg/dL).   Glucose Source: Device Reviewed  Diet:  Limits high carb/sweet foods, Avoids sugary drinks  Activity/Exercise:  gym 3 times a week    Past Medical History:   Diagnosis Date    Arthritis     CAD S/P percutaneous coronary angioplasty 01/14/2017    4 VESSEL CABG- SEE'S TENISHA. DENIED CP/SOB NOW    Diabetes mellitus     TYPE 2- bg runs around 141 in am    Essential hypertension 12/12/2021    History of prostate cancer     Hyperlipidemia     Hypertension     Macular degeneration     Right hip pain     Sleep apnea     CPAP    Urge incontinence      Past Surgical History:   Procedure Laterality Date    CATARACT EXTRACTION Bilateral     CORONARY ANGIOPLASTY WITH STENT PLACEMENT  2004    ONE    CORONARY ARTERY BYPASS GRAFT      2004 X 4 VESSEL    CYSTOSCOPY TRANSURETHRAL RESECTION OF PROSTATE  2020    CYSTOSCOPY TRANSURETHRAL RESECTION OF PROSTATE  9/30/2024    Procedure: TRANSURETHRAL RESECTION OF PROSTATE WITH BUTTON VAPORIZATION;  Surgeon: Juno  MD Connie;  Location: [facility] OR;  Service: Urology;;    CYSTOSCOPY URETHROTOMY VISUAL INTERNAL N/A 2024    Procedure: CYSTOSCOPY URETHROTOMY VISUAL INTERNAL;  Surgeon: Connie Fraire MD;  Location: [facility] MAIN OR;  Service: Urology;  Laterality: N/A;    [place] TX TIBL SHFT FX IMED IMPLT W/WO SCREWS&/CERCLA Right 2017    Procedure: TIBIA INTRAMEDULLARY NAIL/SHANNON INSERTION;  Surgeon: Colton Nascimento MD;  Location: [facility] MAIN OR;  Service: Orthopedics    TONSILLECTOMY      AGE 65    TOTAL HIP ARTHROPLASTY Right 2024    Procedure: TOTAL HIP ARTHROPLASTY REVISION;  Surgeon: Hebert Jung MD;  Location: [facility];  Service: Orthopedics;  Laterality: Right;     Family History   Problem Relation Age of Onset    Cancer Mother     Diabetes Mother     Heart disease Father     Diabetes Sister     Cancer Maternal Aunt     Cancer Maternal Uncle     Cancer Maternal Grandmother     Cancer Maternal Grandfather     Malig Hyperthermia Neg Hx      Social History     Socioeconomic History    Marital status:    Tobacco Use    Smoking status: Former     Current packs/day: 0.00     Types: Cigarettes     Quit date: 1991     Years since quittin.6     Passive exposure: Past    Smokeless tobacco: Former   Vaping Use    Vaping status: Never Used   Substance and Sexual Activity    Alcohol use: No    Drug use: No    Sexual activity: Defer     No Known Allergies    Current Outpatient Medications:     amLODIPine (NORVASC) 10 MG tablet, Take 0.5 tablets by mouth Daily., Disp: 30 tablet, Rfl: 0    aspirin 81 MG EC tablet, Take 1 tablet by mouth Daily. If urine remains relatively clear and not passing excessive clot, Disp: , Rfl:     carvedilol (COREG) 25 MG tablet, TAKE 1 TABLET TWICE A DAY, Disp: 180 tablet, Rfl: 3    clopidogrel (PLAVIX) 75 MG tablet, TAKE 1 TABLET DAILY, Disp: 90 tablet, Rfl: 3    cyanocobalamin (VITAMIN B-12) 250 MCG tablet, Take 1 tablet by mouth Daily., Disp: , Rfl:      empagliflozin (Jardiance) 25 MG tablet tablet, Take 1 tablet by mouth Daily., Disp: 90 tablet, Rfl: 1    ezetimibe (ZETIA) 10 MG tablet, TAKE 1 TABLET DAILY, Disp: 90 tablet, Rfl: 3    fenofibrate (TRICOR) 145 MG tablet, Take 1 tablet by mouth Daily., Disp: 90 tablet, Rfl: 3    finasteride (PROSCAR) 5 MG tablet, Take 1 tablet by mouth Daily., Disp: 90 tablet, Rfl: 0    insulin aspart (NovoLOG FlexPen) 100 UNIT/ML solution pen-injector sc pen, Inject 40 Units under the skin into the appropriate area as directed 3 (Three) Times a Day With Meals for 180 days. Indications: Type 2 Diabetes, Disp: 108 mL, Rfl: 1    insulin glargine (LANTUS, SEMGLEE) 100 UNIT/ML injection, Inject 50 Units under the skin into the appropriate area as directed Daily AND 25 Units Every Night. Do all this for 180 days., Disp: 66 mL, Rfl: 1    lisinopril (PRINIVIL,ZESTRIL) 20 MG tablet, Take 1 tablet by mouth 2 (Two) Times a Day., Disp: 180 tablet, Rfl: 3    multivitamin with minerals (PRESERVISION AREDS PO), Take 1 tablet by mouth Daily., Disp: , Rfl:     nitroglycerin (NITROSTAT) 0.4 MG SL tablet, 1 under the tongue as needed for angina, may repeat q5mins for up three doses, Disp: 100 tablet, Rfl: 3    Omega-3 Fatty Acids (fish oil) 1000 MG capsule capsule, Take 1 capsule by mouth Daily With Breakfast., Disp: , Rfl:     PRECISION XTRA TEST STRIPS test strip, Test 1-2 times each day (Patient taking differently: 1 each by Other route As Needed. Test 1-2 times each day), Disp: 200 each, Rfl: 1    rosuvastatin (CRESTOR) 40 MG tablet, TAKE 1 TABLET EVERY NIGHT, Disp: 90 tablet, Rfl: 3    Sure Comfort Pen Needles 32G X 6 MM misc, Use 1 each 5 (Five) Times a Day., Disp: 450 each, Rfl: 1    Tirzepatide (Mounjaro) 10 MG/0.5ML solution auto-injector, Inject 10 mg under the skin into the appropriate area as directed Every 7 (Seven) Days., Disp: 2 mL, Rfl: 5    vitamin D3 125 MCG (5000 UT) capsule capsule, Take 1 capsule by mouth Daily., Disp: , Rfl:  "    Objective     Vitals:    07/24/25 0908   BP: 142/68   BP Location: Left arm   Patient Position: Sitting   Cuff Size: Adult   Pulse: 71   SpO2: 95%   Weight: 92.1 kg (203 lb)   Height: 177.8 cm (70\")     Body mass index is 29.13 kg/m².    Physical Exam  Constitutional:       Appearance: Normal appearance.      Comments: Overweight (BMI 25 - 29.9) Pt Current BMI = 29.13    HENT:      Head: Normocephalic and atraumatic.      Right Ear: External ear normal.      Left Ear: External ear normal.      Nose: Nose normal.   Eyes:      Extraocular Movements: Extraocular movements intact.      Conjunctiva/sclera: Conjunctivae normal.   Pulmonary:      Effort: Pulmonary effort is normal.   Musculoskeletal:         General: Normal range of motion.      Cervical back: Normal range of motion.   Skin:     General: Skin is warm and dry.   Neurological:      General: No focal deficit present.      Mental Status: He is alert and oriented to person, place, and time. Mental status is at baseline.   Psychiatric:         Mood and Affect: Mood normal.         Behavior: Behavior normal.         Thought Content: Thought content normal.         Judgment: Judgment normal.             Result Review :   The following data was reviewed by: JASON Burnham on 07/24/2025:    Most Recent A1C          7/24/2025    09:20   HGBA1C Most Recent   Hemoglobin A1C 7.3        A1C Last 3 Results          1/21/2025    08:16 4/24/2025    08:17 7/24/2025    09:20   HGBA1C Last 3 Results   Hemoglobin A1C 7.7  7.1  7.3      A1c collected in the office today is 7.3%, indicating Uncontrolled Type II diabetes.  This result is unchanged from the prior result of 7.1% collected on 4/24/25     Glucose   Date Value Ref Range Status   07/24/2025 189 (H) 70 - 99 mg/dL Final     Comment:     Serial Number: [id number]:  [id number]     Point of care glucose in the office today is elevated at 189 mg/dL                Diagnoses and all orders for this " visit:    1. Uncontrolled type 2 diabetes mellitus with hyperglycemia (Primary)  -     POC Glycosylated Hemoglobin (Hb A1C)  -     POC Glucose  -     insulin glargine (LANTUS, SEMGLEE) 100 UNIT/ML injection; Inject 50 Units under the skin into the appropriate area as directed Daily AND 25 Units Every Night. Do all this for 180 days.  Dispense: 66 mL; Refill: 1    2. Type 2 diabetes mellitus with stage 2 chronic kidney disease, with long-term current use of insulin    3. Microalbuminuria    4. Uses self-applied continuous glucose monitoring device    5. Overweight (BMI 25.0-29.9)        Assessment & Plan  1. Diabetes mellitus: Not at treatment goal.  - His A1c level has increased slightly to 7.3 from 7.1 in 04/2025, possibly due to recent travel and stress.  - His sensor report indicates an average glucose level of 151, with 75% of readings within the target range. The glucose management indicator suggests that his A1c would be 6.9 based on the last 14 days. He has lost 12 pounds, likely due to the Mounjaro.  - He was advised to take his evening dose of Lantus at 6 PM with supper, in addition to his morning dose at 6 AM. He should continue taking NovoLog 30 to 40 units before each meal. If nocturnal hypoglycemia persists, he may reduce his evening Lantus dose to 22 units.          The patient will monitor his blood glucose levels per continuous glucose monitor.  If he develops problematic hyperglycemia or hypoglycemia or adverse drug reactions, he will contact the office for further instructions.        Follow Up     Return in about 3 months (around 10/24/2025) for Medication Management, CGM Follow-up.    Patient was given instructions and counseling regarding his condition or for health maintenance advice. Please see specific information pulled into the AVS if appropriate.     Magaly Woodson, APRN  07/24/2025        Dictated Utilizing Dragon Dictation.  Please note that portions of this note were completed with  a voice recognition program.  Part of this note may be an electronic transcription/translation of spoken language to printed text using the Dragon Dictation System.

## 2025-07-28 ENCOUNTER — LAB (OUTPATIENT)
Facility: HOSPITAL | Age: 75
End: 2025-07-28
Payer: MEDICARE

## 2025-07-28 DIAGNOSIS — C61 PROSTATE CANCER: ICD-10-CM

## 2025-07-28 DIAGNOSIS — I25.10 CORONARY ARTERY DISEASE INVOLVING NATIVE CORONARY ARTERY OF NATIVE HEART WITHOUT ANGINA PECTORIS: ICD-10-CM

## 2025-07-28 DIAGNOSIS — I10 HYPERTENSION, ESSENTIAL: ICD-10-CM

## 2025-07-28 DIAGNOSIS — Z95.5 HISTORY OF CORONARY ANGIOPLASTY WITH INSERTION OF STENT: ICD-10-CM

## 2025-07-28 DIAGNOSIS — E78.2 HYPERLIPEMIA, MIXED: ICD-10-CM

## 2025-07-28 LAB
CHOLEST SERPL-MCNC: 100 MG/DL (ref 0–200)
HDLC SERPL-MCNC: 31 MG/DL (ref 40–60)
LDLC SERPL CALC-MCNC: 43 MG/DL (ref 0–100)
LDLC/HDLC SERPL: 1.25 {RATIO}
TRIGL SERPL-MCNC: 152 MG/DL (ref 0–150)
VLDLC SERPL-MCNC: 26 MG/DL (ref 5–40)

## 2025-07-28 PROCEDURE — 84153 ASSAY OF PSA TOTAL: CPT

## 2025-07-28 PROCEDURE — 36415 COLL VENOUS BLD VENIPUNCTURE: CPT

## 2025-07-28 PROCEDURE — 80061 LIPID PANEL: CPT

## 2025-07-29 ENCOUNTER — RESULTS FOLLOW-UP (OUTPATIENT)
Dept: CARDIOLOGY | Facility: CLINIC | Age: 75
End: 2025-07-29
Payer: MEDICARE

## 2025-07-29 LAB — PSA SERPL-MCNC: 0.15 NG/ML (ref 0–4)

## 2025-08-04 RX ORDER — CARVEDILOL 25 MG/1
25 TABLET ORAL 2 TIMES DAILY
Qty: 180 TABLET | Refills: 3 | Status: SHIPPED | OUTPATIENT
Start: 2025-08-04

## 2025-08-11 RX ORDER — INSULIN GLARGINE 100 [IU]/ML
INJECTION, SOLUTION SUBCUTANEOUS
COMMUNITY
Start: 2025-07-24

## 2025-08-14 ENCOUNTER — OFFICE VISIT (OUTPATIENT)
Dept: UROLOGY | Age: 75
End: 2025-08-14
Payer: MEDICARE

## 2025-08-14 VITALS — HEIGHT: 70 IN | BODY MASS INDEX: 29.06 KG/M2 | WEIGHT: 203 LBS

## 2025-08-14 DIAGNOSIS — N40.1 BENIGN PROSTATIC HYPERPLASIA WITH URINARY FREQUENCY: ICD-10-CM

## 2025-08-14 DIAGNOSIS — C61 PROSTATE CANCER: Primary | ICD-10-CM

## 2025-08-14 DIAGNOSIS — R35.0 BENIGN PROSTATIC HYPERPLASIA WITH URINARY FREQUENCY: ICD-10-CM

## 2025-08-14 DIAGNOSIS — N35.914 STRICTURE OF ANTERIOR URETHRA IN MALE, UNSPECIFIED STRICTURE TYPE: ICD-10-CM

## 2025-08-14 LAB — URINE VOLUME: NORMAL

## 2025-08-14 RX ORDER — FINASTERIDE 5 MG/1
5 TABLET, FILM COATED ORAL DAILY
Qty: 90 TABLET | Refills: 2 | Status: SHIPPED | OUTPATIENT
Start: 2025-08-14

## 2025-08-29 DIAGNOSIS — I10 HYPERTENSION, ESSENTIAL: ICD-10-CM

## 2025-08-29 RX ORDER — LISINOPRIL 20 MG/1
20 TABLET ORAL 2 TIMES DAILY
Qty: 180 TABLET | Refills: 3 | Status: SHIPPED | OUTPATIENT
Start: 2025-08-29

## (undated) DEVICE — GLV SURG SENSICARE W/ALOE PF LF 7.5 STRL

## (undated) DEVICE — DRAPE,REIN 53X77,STERILE: Brand: MEDLINE

## (undated) DEVICE — GLV SURG BIOGEL M LTX PF 7 1/2

## (undated) DEVICE — PK ORTHO MAJ 40

## (undated) DEVICE — MAT FLR ABSORBENT LG 4FT 10 2.5FT

## (undated) DEVICE — KT DRN EVAC WND PVC PCH WTROC RND 10F400

## (undated) DEVICE — GLV SURG SENSICARE W/ALOE PF LF 8 STRL

## (undated) DEVICE — Device

## (undated) DEVICE — REFLECTION FLEXIBLE DRILL 25MM: Brand: REFLECTION

## (undated) DEVICE — PENCL SMOKE/EVAC MEGADYNE TELESCP 10FT

## (undated) DEVICE — TRAP FLD MINIVAC MEGADYNE 100ML

## (undated) DEVICE — SUT VIC 1 CT1 36IN J947H

## (undated) DEVICE — T-DRAPE,EXTREMITY,STERILE: Brand: MEDLINE

## (undated) DEVICE — SYS CLS SKIN PREMIERPRO EXOFINFUSION 22CM

## (undated) DEVICE — 3M™ IOBAN™ 2 ANTIMICROBIAL INCISE DRAPE 6650EZ: Brand: IOBAN™ 2

## (undated) DEVICE — SOL IRRG H2O BG 3000ML STRL

## (undated) DEVICE — GLV SURG SENSICARE PI PF LF 7 GRN STRL

## (undated) DEVICE — GLV SURG OR CLASSIC PWDR LTX 8 STRL

## (undated) DEVICE — OCCLUSIVE GAUZE STRIP,3% BISMUTH TRIBROMOPHENATE IN PETROLATUM BLEND: Brand: XEROFORM

## (undated) DEVICE — CYSTO PACK: Brand: MEDLINE INDUSTRIES, INC.

## (undated) DEVICE — SKIN PREP TRAY W/CHG: Brand: MEDLINE INDUSTRIES, INC.

## (undated) DEVICE — 450 ML BOTTLE OF 0.05% CHLORHEXIDINE GLUCONATE IN 99.95% STERILE WATER FOR IRRIGATION, USP AND APPLICATOR.: Brand: IRRISEPT ANTIMICROBIAL WOUND LAVAGE

## (undated) DEVICE — ANTIBACTERIAL UNDYED BRAIDED (POLYGLACTIN 910), SYNTHETIC ABSORBABLE SUTURE: Brand: COATED VICRYL

## (undated) DEVICE — NITINOL WIRE WITH HYDROPHILIC TIP: Brand: SENSOR

## (undated) DEVICE — UNDERGLV SURG BIOGEL INDICATOR LF PF 7.5

## (undated) DEVICE — NEEDLE, QUINCKE 22GX3.5": Brand: MEDLINE INDUSTRIES, INC.

## (undated) DEVICE — PK HIP TOTL 40

## (undated) DEVICE — GLV SURG SENSICARE PI MIC PF SZ7 LF STRL

## (undated) DEVICE — APPL DURAPREP IODOPHOR APL 26ML

## (undated) DEVICE — HF-RESECTION ELECTRODE PLASMABAND BAND, MEDIUM, 24 FR., 12°/16°, PK TURIS: Brand: OLYMPUS

## (undated) DEVICE — DRSNG SURESITE WNDW 2.38X2.75

## (undated) DEVICE — PICO 7 DUAL 15X20CM: Brand: PICO™ 7

## (undated) DEVICE — CYSTO/BLADDER IRRIGATION SET, REGULATING CLAMP

## (undated) DEVICE — PATIENT RETURN ELECTRODE, SINGLE-USE, CONTACT QUALITY MONITORING, ADULT, WITH 9FT CORD, FOR PATIENTS WEIGING OVER 33LBS. (15KG): Brand: MEGADYNE

## (undated) DEVICE — GLOVE,SURG,SENSICARE SLT,LF,PF,7: Brand: MEDLINE

## (undated) DEVICE — DRP C/ARM 41X74IN

## (undated) DEVICE — SUT PDS 1 CT1 36IN Z347H

## (undated) DEVICE — ENCORE® LATEX ORTHO SIZE 8, STERILE LATEX POWDER-FREE SURGICAL GLOVE: Brand: ENCORE

## (undated) DEVICE — DRSNG BURN ACTICOAT FLEX 7 1X24IN

## (undated) DEVICE — PAD GRND REM POLYHESIVE A/ DISP

## (undated) DEVICE — SYR LUERLOK 30CC

## (undated) DEVICE — IMMOB KN 3PNL DLX CANVS 22IN BLU

## (undated) DEVICE — BIT DRL TIB/HUM CALIB 4.3MM

## (undated) DEVICE — HANDPIECE SET WITH COAXIAL HIGH FLOW TIP AND SUCTION TUBE: Brand: INTERPULSE

## (undated) DEVICE — PREP SOL POVIDONE/IODINE BT 4OZ

## (undated) DEVICE — PREMIUM WET SKIN PREP TRAY: Brand: MEDLINE INDUSTRIES, INC.

## (undated) DEVICE — STPLR SKIN VISISTAT WD 35CT

## (undated) DEVICE — TOWEL,OR,DSP,ST,BLUE,STD,4/PK,20PK/CS: Brand: MEDLINE

## (undated) DEVICE — WEBRIL* CAST PADDING: Brand: DEROYAL

## (undated) DEVICE — SPNG GZ STRL 2S 4X4 12PLY

## (undated) DEVICE — 3M™ IOBAN™ 2 ANTIMICROBIAL INCISE DRAPE 6640EZ: Brand: IOBAN™ 2

## (undated) DEVICE — BNDG ELAS ELITE V/CLOSE 6IN 5YD LF STRL

## (undated) DEVICE — APPL CHLORAPREP W/TINT 26ML ORNG

## (undated) DEVICE — TRAY,ADD A CATH,FOLEY,DRAIN BG,10ML SYR: Brand: MEDLINE

## (undated) DEVICE — UNDERCAST PADDING: Brand: DEROYAL

## (undated) DEVICE — CATH FOLEY LUBRICATH 3WY 22F 30CC

## (undated) DEVICE — SOL NACL 0.9PCT 100ML SGL

## (undated) DEVICE — GW TEAR DROP NAT 3X100CM

## (undated) DEVICE — BIT DRL FREE/HND TARGET 4.3MM